# Patient Record
Sex: MALE | Race: WHITE | Employment: FULL TIME | URBAN - METROPOLITAN AREA
[De-identification: names, ages, dates, MRNs, and addresses within clinical notes are randomized per-mention and may not be internally consistent; named-entity substitution may affect disease eponyms.]

---

## 2017-01-17 ENCOUNTER — OFFICE VISIT (OUTPATIENT)
Dept: INTERNAL MEDICINE CLINIC | Facility: CLINIC | Age: 67
End: 2017-01-17

## 2017-01-17 VITALS
WEIGHT: 173 LBS | HEIGHT: 72 IN | SYSTOLIC BLOOD PRESSURE: 123 MMHG | BODY MASS INDEX: 23.43 KG/M2 | TEMPERATURE: 98 F | DIASTOLIC BLOOD PRESSURE: 77 MMHG | HEART RATE: 71 BPM

## 2017-01-17 DIAGNOSIS — J45.50 ASTHMATIC BRONCHITIS, SEVERE PERSISTENT, UNCOMPLICATED: ICD-10-CM

## 2017-01-17 DIAGNOSIS — IMO0001 COLD: Primary | ICD-10-CM

## 2017-01-17 PROBLEM — J45.909 ASTHMATIC BRONCHITIS: Status: ACTIVE | Noted: 2017-01-17

## 2017-01-17 PROBLEM — J45.909 ASTHMATIC BRONCHITIS (HCC): Status: ACTIVE | Noted: 2017-01-17

## 2017-01-17 PROCEDURE — 99213 OFFICE O/P EST LOW 20 MIN: CPT | Performed by: INTERNAL MEDICINE

## 2017-01-17 PROCEDURE — 99212 OFFICE O/P EST SF 10 MIN: CPT | Performed by: INTERNAL MEDICINE

## 2017-01-17 RX ORDER — PREDNISONE 10 MG/1
TABLET ORAL
Qty: 20 TABLET | Refills: 0 | Status: SHIPPED | OUTPATIENT
Start: 2017-01-17 | End: 2017-01-23

## 2017-01-17 RX ORDER — ALBUTEROL SULFATE 90 UG/1
2 AEROSOL, METERED RESPIRATORY (INHALATION) EVERY 4 HOURS PRN
Qty: 1 INHALER | Refills: 6 | Status: SHIPPED | OUTPATIENT
Start: 2017-01-17 | End: 2018-10-20

## 2017-01-17 NOTE — PROGRESS NOTES
Still coughing did 2 rounds of bactrim  Blood pressure 123/77, pulse 71, temperature 97.9 °F (36.6 °C), temperature source Oral, height 6' (1.829 m), weight 173 lb (78.472 kg).     HEENT-NC/AT PEERLA, eom intact, sclerae non icteric, oral exam wnl, ears nl

## 2017-01-19 ENCOUNTER — OFFICE VISIT (OUTPATIENT)
Dept: DERMATOLOGY CLINIC | Facility: CLINIC | Age: 67
End: 2017-01-19

## 2017-01-19 DIAGNOSIS — D23.60 BENIGN NEOPLASM OF SKIN OF UPPER LIMB, INCLUDING SHOULDER, UNSPECIFIED LATERALITY: ICD-10-CM

## 2017-01-19 DIAGNOSIS — D23.30 BENIGN NEOPLASM OF SKIN OF FACE: ICD-10-CM

## 2017-01-19 DIAGNOSIS — D23.4 BENIGN NEOPLASM OF SCALP AND SKIN OF NECK: ICD-10-CM

## 2017-01-19 DIAGNOSIS — D48.5 NEOPLASM OF UNCERTAIN BEHAVIOR OF SKIN: ICD-10-CM

## 2017-01-19 DIAGNOSIS — Z85.820 PERSONAL HISTORY OF MALIGNANT MELANOMA OF SKIN: Primary | ICD-10-CM

## 2017-01-19 DIAGNOSIS — D23.5 BENIGN NEOPLASM OF SKIN OF TRUNK, EXCEPT SCROTUM: ICD-10-CM

## 2017-01-19 DIAGNOSIS — D23.70 BENIGN NEOPLASM OF SKIN OF LOWER LIMB, INCLUDING HIP, UNSPECIFIED LATERALITY: ICD-10-CM

## 2017-01-19 DIAGNOSIS — L81.4 SOLAR LENTIGO: ICD-10-CM

## 2017-01-19 DIAGNOSIS — L82.1 SEBORRHEIC KERATOSES: ICD-10-CM

## 2017-01-19 PROCEDURE — 11100 BIOPSY OF SKIN LESION: CPT | Performed by: DERMATOLOGY

## 2017-01-19 PROCEDURE — 99214 OFFICE O/P EST MOD 30 MIN: CPT | Performed by: DERMATOLOGY

## 2017-01-19 NOTE — PROGRESS NOTES
Past Medical History   Diagnosis Date   • High cholesterol    • Melanoma ([facility] [address] 8/2016     left arm, stage I         Past Surgical History    REPAIR OF NASAL SEPTUM  1985    FOOT SURGERY Left 2006    Comment Nerve procedure    SKIN SURGERY Left 10/07/16

## 2017-01-19 NOTE — PROGRESS NOTES
HPI:     Chief Complaint     Melanoma        HPI     Melanoma    Additional comments: pt here for 4 mos f/u full body exam ( left arm 2016)       Last edited by Deann Prince RN on 1/19/2017  3:04 PM. (History)         of note the patient's melanoma was 0. Social History Main Topics   Smoking status: Current Every Day Smoker  0.50 Packs/Day  For 42.00 Years     Types: Cigarettes    Last Attempt to Quit: 08/12/2015    Smokeless tobacco: Not on file    Alcohol Use: Yes  0.0 oz/week    0 Standard drinks or months. Neoplasm of uncertain behavior of skin-rule out basal cell carcinoma versus inflamed nevus nose– Shave biopsy is performed- See the operative note. Postop instructions given. Further plan pending pathology. .  Seborrheic keratoses-reassurance–no

## 2017-01-19 NOTE — PROCEDURES
Procedural Report for Shave Biopsy    Procedure: With the patient is a supine position, the skin was scrubbed with alcohol. Anesthesia was obtained by injecting 1 mL of 1% Xylocaine with Epinephrine.   The skin surrounding the lession was placed under t

## 2017-01-23 ENCOUNTER — OFFICE VISIT (OUTPATIENT)
Dept: SURGERY | Facility: CLINIC | Age: 67
End: 2017-01-23

## 2017-01-23 ENCOUNTER — TELEPHONE (OUTPATIENT)
Dept: SURGERY | Facility: CLINIC | Age: 67
End: 2017-01-23

## 2017-01-23 DIAGNOSIS — C44.311 BASAL CELL CARCINOMA OF SKIN OF NOSE: ICD-10-CM

## 2017-01-23 DIAGNOSIS — M67.441 GANGLION OF RIGHT HAND: Primary | ICD-10-CM

## 2017-01-23 DIAGNOSIS — M67.441 GANGLION, RIGHT HAND: Primary | ICD-10-CM

## 2017-01-23 PROCEDURE — 99212 OFFICE O/P EST SF 10 MIN: CPT | Performed by: PLASTIC SURGERY

## 2017-01-23 PROCEDURE — 99203 OFFICE O/P NEW LOW 30 MIN: CPT | Performed by: PLASTIC SURGERY

## 2017-01-23 RX ORDER — HYDROCODONE BITARTRATE AND ACETAMINOPHEN 7.5; 325 MG/1; MG/1
TABLET ORAL
Qty: 25 TABLET | Refills: 0 | Status: SHIPPED | OUTPATIENT
Start: 2017-01-23 | End: 2017-03-09

## 2017-01-23 NOTE — TELEPHONE ENCOUNTER
Surgery is scheduled for 2/17/17  Insurance is [facility] PPO  Thank You!

## 2017-01-23 NOTE — H&P
Sharon Gomez is a 78year old male that presents with Patient presents with:  Ganglion: Right Thumb  .     REFERRED BY:  Robert Santana    Pacemaker: No  Latex Allergy: no  Coumadin: No  Plavix: No  Other anticoagulants: No  Cardiac stents: No    HAND cell carcinoma 2017     left nose         Past Surgical History    REPAIR OF NASAL SEPTUM  1985    FOOT SURGERY Left 2006    Comment Nerve procedure    SKIN SURGERY Left 10/07/16    Comment left upper arm melanoma       Social History   Marital Status: Sin This ganglion/mass should be excised. I discussed the procedure at length, including post-operative course and risks as indicated on the Surgical Request Form. Therapy will be necessary post-operatively. The mass could recur (1-5%).    A flap will be

## 2017-01-24 ENCOUNTER — TELEPHONE (OUTPATIENT)
Dept: DERMATOLOGY CLINIC | Facility: CLINIC | Age: 67
End: 2017-01-24

## 2017-01-24 NOTE — TELEPHONE ENCOUNTER
Patient notified of recent biopsy from the left nose which revealed a nodular type basal cell carcinoma. In light of location, patient is referred to Dr. Bridgett Bernal for ultimate excision.   Of note patient just saw Dr. Bridgett Bernal last week for consultation

## 2017-01-26 ENCOUNTER — OFFICE VISIT (OUTPATIENT)
Dept: SURGERY | Facility: CLINIC | Age: 67
End: 2017-01-26

## 2017-01-26 DIAGNOSIS — M67.441 GANGLION OF RIGHT HAND: Primary | ICD-10-CM

## 2017-01-26 DIAGNOSIS — C44.311 BASAL CELL CARCINOMA OF NOSE: ICD-10-CM

## 2017-01-26 PROCEDURE — 99212 OFFICE O/P EST SF 10 MIN: CPT | Performed by: PLASTIC SURGERY

## 2017-01-26 PROCEDURE — 99213 OFFICE O/P EST LOW 20 MIN: CPT | Performed by: PLASTIC SURGERY

## 2017-01-26 NOTE — TELEPHONE ENCOUNTER
Patient is previously scheduled for Excision of ganglion with flap of right thumb on 2-17-17. Added to this procedure is:    Wide excision lesion Left nose, frozen section, full thickness skin great from left preauricular (CPT 77376, 65211)  Basal cell car

## 2017-01-26 NOTE — H&P
Kaylee Blancas is a 78year old male that presents with Patient presents with:  Lesion: Left nose  .     REFERRED BY:  Moi Zhao    Pacemaker: No  Latex Allergy: no  Coumadin: No  Plavix: No  Other anticoagulants: No  Cardiac stents: No    HAND RODGER cell carcinoma 2017     left nose         Past Surgical History    REPAIR OF NASAL SEPTUM  1985    FOOT SURGERY Left 2006    Comment Nerve procedure    SKIN SURGERY Left 10/07/16    Comment left upper arm melanoma       Social History   Marital Status: Sin to the patient the nature of this lesion / these lesions, as well as treatment options. WIDE EXCISION: treatment requires wide excision. A scar will result, which will be permanent.   The scar will be considerably longer than the size of the lesion, as

## 2017-02-02 PROBLEM — C44.311 BASAL CELL CARCINOMA OF NOSE: Status: ACTIVE | Noted: 2017-02-02

## 2017-02-03 ENCOUNTER — TELEPHONE (OUTPATIENT)
Dept: INTERNAL MEDICINE CLINIC | Facility: CLINIC | Age: 67
End: 2017-02-03

## 2017-02-03 RX ORDER — LEVOFLOXACIN 500 MG/1
500 TABLET, FILM COATED ORAL DAILY
Qty: 10 TABLET | Refills: 0 | Status: SHIPPED | OUTPATIENT
Start: 2017-02-03 | End: 2017-02-13

## 2017-02-03 NOTE — TELEPHONE ENCOUNTER
Reason for Call/Chief Complaint:  Sinus / bronchitis   Onset:  6 weeks   Nursing Assessment/Associated Symptoms:  Pt says he has been to see RF a couple of times in the last 6 weeks for sinus issues. Pt complains of sinus drainage, headache.   Pt says costa

## 2017-02-03 NOTE — TELEPHONE ENCOUNTER
Pt said still has sinus, bronchitis issues  Declined appt-asking if can get rx-already saw Dr Joseph Dowd    [facility]

## 2017-02-03 NOTE — TELEPHONE ENCOUNTER
Pt notified of RF message below, he verbalized understanding.      Tell pt a prescription for adifferent antibiotic was sent to his pharmacy

## 2017-02-03 NOTE — TELEPHONE ENCOUNTER
See message/advise, unable to reach to further Triage ,[address] [place] [address] 1/17/17 Dx Cough/Cold,Asthma  LMTCB, please transfer to [id number].

## 2017-02-06 NOTE — TELEPHONE ENCOUNTER
[place] a message [place] [facility] [place], to check on authorization of surgery 2/17/19 and to make sure they are aware initially it was Excision of ganglion with flap of RTH, but now also includes Wide excision lesion left nose, frozen section, Full th

## 2017-02-06 NOTE — TELEPHONE ENCOUNTER
Called [employer]. Patient has active coverage under a PPO policy. All codes checked - [phone] [id number], [phone], 1810 .[facility] [address],[facility], [place] [address], [id number]. No prior auth or pre-determination required for any of the procedures listed.

## 2017-02-14 ENCOUNTER — TELEPHONE (OUTPATIENT)
Dept: SURGERY | Facility: CLINIC | Age: 67
End: 2017-02-14

## 2017-02-14 NOTE — TELEPHONE ENCOUNTER
Pt called stating he was planning on quitting smoking 2/17/17 for surgery that day and post operatively. Could he apply a nicoderm patch in the am before surgery 2/17/17 or would it be better to wait till after the surgery?   Pt told I would ask Dr Layne Hdz

## 2017-02-15 ENCOUNTER — TELEPHONE (OUTPATIENT)
Dept: SURGERY | Facility: CLINIC | Age: 67
End: 2017-02-15

## 2017-02-15 NOTE — TELEPHONE ENCOUNTER
Pt called and told him per Dr Dean Core he was to apply nicotine patch Saturday 2/18/17, the day after surgery. Verbalized understanding. Instructed to call the office with any further questions or concerns. Dr Santosh Kimble notified.

## 2017-02-16 RX ORDER — ROSUVASTATIN CALCIUM 10 MG/1
TABLET, COATED ORAL
Qty: 90 TABLET | Refills: 0 | Status: SHIPPED | OUTPATIENT
Start: 2017-02-16 | End: 2017-05-16

## 2017-02-16 NOTE — TELEPHONE ENCOUNTER
Cholesterol Medications  Protocol Criteria:  · Appointment scheduled in the past 12 months or in the next 3 months  · ALT & LDL on file in the past 12 months  · ALT result < 80  · LDL result <130   Recent Visits       Provider Department Primary Dx    1 mo Results  Component Value Date   ALT 24 09/30/2016   Refilled per office protocol.

## 2017-02-17 ENCOUNTER — ANESTHESIA (OUTPATIENT)
Dept: SURGERY | Facility: HOSPITAL | Age: 67
End: 2017-02-17
Payer: COMMERCIAL

## 2017-02-17 ENCOUNTER — HOSPITAL ENCOUNTER (OUTPATIENT)
Facility: HOSPITAL | Age: 67
Setting detail: HOSPITAL OUTPATIENT SURGERY
Discharge: HOME | End: 2017-02-17
Attending: PLASTIC SURGERY | Admitting: PLASTIC SURGERY
Payer: COMMERCIAL

## 2017-02-17 ENCOUNTER — ANESTHESIA EVENT (OUTPATIENT)
Dept: SURGERY | Facility: HOSPITAL | Age: 67
End: 2017-02-17
Payer: COMMERCIAL

## 2017-02-17 ENCOUNTER — SURGERY (OUTPATIENT)
Age: 67
End: 2017-02-17

## 2017-02-17 ENCOUNTER — HOSPITAL DOCUMENTATION (OUTPATIENT)
Dept: SURGERY | Facility: CLINIC | Age: 67
End: 2017-02-17

## 2017-02-17 VITALS
HEIGHT: 72 IN | BODY MASS INDEX: 23.03 KG/M2 | SYSTOLIC BLOOD PRESSURE: 109 MMHG | TEMPERATURE: 98 F | WEIGHT: 170 LBS | RESPIRATION RATE: 16 BRPM | HEART RATE: 93 BPM | OXYGEN SATURATION: 97 % | DIASTOLIC BLOOD PRESSURE: 65 MMHG

## 2017-02-17 DIAGNOSIS — M67.441 GANGLION OF RIGHT HAND: Primary | ICD-10-CM

## 2017-02-17 DIAGNOSIS — M67.441 GANGLION OF RIGHT HAND: ICD-10-CM

## 2017-02-17 DIAGNOSIS — C44.311 BASAL CELL CARCINOMA OF NOSE: ICD-10-CM

## 2017-02-17 DIAGNOSIS — C44.311 BASAL CELL CARCINOMA OF NOSE: Primary | ICD-10-CM

## 2017-02-17 PROCEDURE — 14040 TIS TRNFR F/C/C/M/N/A/G/H/F: CPT | Performed by: PLASTIC SURGERY

## 2017-02-17 PROCEDURE — 0LB70ZZ EXCISION OF RIGHT HAND TENDON, OPEN APPROACH: ICD-10-PCS | Performed by: PLASTIC SURGERY

## 2017-02-17 PROCEDURE — 0JRJ07Z REPLACEMENT OF RIGHT HAND SUBCUTANEOUS TISSUE AND FASCIA WITH AUTOLOGOUS TISSUE SUBSTITUTE, OPEN APPROACH: ICD-10-PCS | Performed by: PLASTIC SURGERY

## 2017-02-17 PROCEDURE — 0HX1XZZ TRANSFER FACE SKIN, EXTERNAL APPROACH: ICD-10-PCS | Performed by: PLASTIC SURGERY

## 2017-02-17 PROCEDURE — 14060 TIS TRNFR E/N/E/L 10 SQ CM/<: CPT | Performed by: PLASTIC SURGERY

## 2017-02-17 PROCEDURE — 0HB1XZZ EXCISION OF FACE SKIN, EXTERNAL APPROACH: ICD-10-PCS | Performed by: PLASTIC SURGERY

## 2017-02-17 PROCEDURE — 26160 REMOVE TENDON SHEATH LESION: CPT | Performed by: PLASTIC SURGERY

## 2017-02-17 RX ORDER — KETOROLAC TROMETHAMINE 30 MG/ML
INJECTION, SOLUTION INTRAMUSCULAR; INTRAVENOUS AS NEEDED
Status: DISCONTINUED | OUTPATIENT
Start: 2017-02-17 | End: 2017-02-17 | Stop reason: SURG

## 2017-02-17 RX ORDER — HYDROCODONE BITARTRATE AND ACETAMINOPHEN 7.5; 325 MG/1; MG/1
1 TABLET ORAL EVERY 4 HOURS PRN
Status: DISCONTINUED | OUTPATIENT
Start: 2017-02-17 | End: 2017-02-17

## 2017-02-17 RX ORDER — MORPHINE SULFATE 10 MG/ML
6 INJECTION, SOLUTION INTRAMUSCULAR; INTRAVENOUS EVERY 10 MIN PRN
Status: DISCONTINUED | OUTPATIENT
Start: 2017-02-17 | End: 2017-02-17

## 2017-02-17 RX ORDER — FAMOTIDINE 20 MG/1
20 TABLET ORAL ONCE
Status: DISCONTINUED | OUTPATIENT
Start: 2017-02-17 | End: 2017-02-17 | Stop reason: HOSPADM

## 2017-02-17 RX ORDER — NALOXONE HYDROCHLORIDE 0.4 MG/ML
80 INJECTION, SOLUTION INTRAMUSCULAR; INTRAVENOUS; SUBCUTANEOUS AS NEEDED
Status: DISCONTINUED | OUTPATIENT
Start: 2017-02-17 | End: 2017-02-17

## 2017-02-17 RX ORDER — SODIUM CHLORIDE, SODIUM LACTATE, POTASSIUM CHLORIDE, CALCIUM CHLORIDE 600; 310; 30; 20 MG/100ML; MG/100ML; MG/100ML; MG/100ML
INJECTION, SOLUTION INTRAVENOUS CONTINUOUS
Status: DISCONTINUED | OUTPATIENT
Start: 2017-02-17 | End: 2017-02-17

## 2017-02-17 RX ORDER — HYDROMORPHONE HYDROCHLORIDE 1 MG/ML
0.6 INJECTION, SOLUTION INTRAMUSCULAR; INTRAVENOUS; SUBCUTANEOUS EVERY 5 MIN PRN
Status: DISCONTINUED | OUTPATIENT
Start: 2017-02-17 | End: 2017-02-17

## 2017-02-17 RX ORDER — ACETAMINOPHEN 325 MG/1
650 TABLET ORAL ONCE
Status: COMPLETED | OUTPATIENT
Start: 2017-02-17 | End: 2017-02-17

## 2017-02-17 RX ORDER — EPHEDRINE SULFATE 50 MG/ML
INJECTION, SOLUTION INTRAVENOUS AS NEEDED
Status: DISCONTINUED | OUTPATIENT
Start: 2017-02-17 | End: 2017-02-17 | Stop reason: SURG

## 2017-02-17 RX ORDER — ONDANSETRON 2 MG/ML
INJECTION INTRAMUSCULAR; INTRAVENOUS AS NEEDED
Status: DISCONTINUED | OUTPATIENT
Start: 2017-02-17 | End: 2017-02-17 | Stop reason: SURG

## 2017-02-17 RX ORDER — HYDROMORPHONE HYDROCHLORIDE 1 MG/ML
0.4 INJECTION, SOLUTION INTRAMUSCULAR; INTRAVENOUS; SUBCUTANEOUS EVERY 5 MIN PRN
Status: DISCONTINUED | OUTPATIENT
Start: 2017-02-17 | End: 2017-02-17

## 2017-02-17 RX ORDER — LIDOCAINE HYDROCHLORIDE 10 MG/ML
INJECTION, SOLUTION EPIDURAL; INFILTRATION; INTRACAUDAL; PERINEURAL AS NEEDED
Status: DISCONTINUED | OUTPATIENT
Start: 2017-02-17 | End: 2017-02-17 | Stop reason: SURG

## 2017-02-17 RX ORDER — PHENYLEPHRINE HCL 10 MG/ML
VIAL (ML) INJECTION AS NEEDED
Status: DISCONTINUED | OUTPATIENT
Start: 2017-02-17 | End: 2017-02-17 | Stop reason: SURG

## 2017-02-17 RX ORDER — CLINDAMYCIN PHOSPHATE 900 MG/50ML
900 INJECTION INTRAVENOUS ONCE
Status: DISCONTINUED | OUTPATIENT
Start: 2017-02-17 | End: 2017-02-17 | Stop reason: HOSPADM

## 2017-02-17 RX ORDER — MORPHINE SULFATE 4 MG/ML
4 INJECTION, SOLUTION INTRAMUSCULAR; INTRAVENOUS EVERY 10 MIN PRN
Status: DISCONTINUED | OUTPATIENT
Start: 2017-02-17 | End: 2017-02-17

## 2017-02-17 RX ORDER — SODIUM CHLORIDE, SODIUM LACTATE, POTASSIUM CHLORIDE, CALCIUM CHLORIDE 600; 310; 30; 20 MG/100ML; MG/100ML; MG/100ML; MG/100ML
INJECTION, SOLUTION INTRAVENOUS CONTINUOUS PRN
Status: DISCONTINUED | OUTPATIENT
Start: 2017-02-17 | End: 2017-02-17 | Stop reason: SURG

## 2017-02-17 RX ORDER — HYDROMORPHONE HYDROCHLORIDE 1 MG/ML
0.2 INJECTION, SOLUTION INTRAMUSCULAR; INTRAVENOUS; SUBCUTANEOUS EVERY 5 MIN PRN
Status: DISCONTINUED | OUTPATIENT
Start: 2017-02-17 | End: 2017-02-17

## 2017-02-17 RX ORDER — DEXAMETHASONE SODIUM PHOSPHATE 4 MG/ML
VIAL (ML) INJECTION AS NEEDED
Status: DISCONTINUED | OUTPATIENT
Start: 2017-02-17 | End: 2017-02-17 | Stop reason: SURG

## 2017-02-17 RX ORDER — MIDAZOLAM HYDROCHLORIDE 1 MG/ML
INJECTION INTRAMUSCULAR; INTRAVENOUS AS NEEDED
Status: DISCONTINUED | OUTPATIENT
Start: 2017-02-17 | End: 2017-02-17 | Stop reason: SURG

## 2017-02-17 RX ORDER — HYDROCODONE BITARTRATE AND ACETAMINOPHEN 5; 325 MG/1; MG/1
1 TABLET ORAL AS NEEDED
Status: DISCONTINUED | OUTPATIENT
Start: 2017-02-17 | End: 2017-02-17

## 2017-02-17 RX ORDER — ONDANSETRON 2 MG/ML
4 INJECTION INTRAMUSCULAR; INTRAVENOUS ONCE AS NEEDED
Status: DISCONTINUED | OUTPATIENT
Start: 2017-02-17 | End: 2017-02-17

## 2017-02-17 RX ORDER — LIDOCAINE HYDROCHLORIDE AND EPINEPHRINE 5; 5 MG/ML; UG/ML
INJECTION, SOLUTION INFILTRATION; PERINEURAL AS NEEDED
Status: DISCONTINUED | OUTPATIENT
Start: 2017-02-17 | End: 2017-02-17 | Stop reason: HOSPADM

## 2017-02-17 RX ORDER — CLINDAMYCIN PHOSPHATE 150 MG/ML
INJECTION, SOLUTION INTRAVENOUS AS NEEDED
Status: DISCONTINUED | OUTPATIENT
Start: 2017-02-17 | End: 2017-02-17 | Stop reason: SURG

## 2017-02-17 RX ORDER — GLYCOPYRROLATE 0.2 MG/ML
INJECTION INTRAMUSCULAR; INTRAVENOUS AS NEEDED
Status: DISCONTINUED | OUTPATIENT
Start: 2017-02-17 | End: 2017-02-17 | Stop reason: SURG

## 2017-02-17 RX ORDER — MORPHINE SULFATE 2 MG/ML
2 INJECTION, SOLUTION INTRAMUSCULAR; INTRAVENOUS EVERY 10 MIN PRN
Status: DISCONTINUED | OUTPATIENT
Start: 2017-02-17 | End: 2017-02-17

## 2017-02-17 RX ORDER — METOCLOPRAMIDE 10 MG/1
10 TABLET ORAL ONCE
Status: DISCONTINUED | OUTPATIENT
Start: 2017-02-17 | End: 2017-02-17 | Stop reason: HOSPADM

## 2017-02-17 RX ORDER — HYDROCODONE BITARTRATE AND ACETAMINOPHEN 5; 325 MG/1; MG/1
2 TABLET ORAL AS NEEDED
Status: DISCONTINUED | OUTPATIENT
Start: 2017-02-17 | End: 2017-02-17

## 2017-02-17 RX ADMIN — CLINDAMYCIN PHOSPHATE 900 MG: 150 INJECTION, SOLUTION INTRAVENOUS at 10:58:00

## 2017-02-17 RX ADMIN — DEXAMETHASONE SODIUM PHOSPHATE 4 MG: 4 MG/ML VIAL (ML) INJECTION at 10:39:00

## 2017-02-17 RX ADMIN — KETOROLAC TROMETHAMINE 30 MG: 30 INJECTION, SOLUTION INTRAMUSCULAR; INTRAVENOUS at 12:27:00

## 2017-02-17 RX ADMIN — EPHEDRINE SULFATE 10 MG: 50 INJECTION, SOLUTION INTRAVENOUS at 11:00:00

## 2017-02-17 RX ADMIN — MIDAZOLAM HYDROCHLORIDE 2 MG: 1 INJECTION INTRAMUSCULAR; INTRAVENOUS at 10:37:00

## 2017-02-17 RX ADMIN — EPHEDRINE SULFATE 10 MG: 50 INJECTION, SOLUTION INTRAVENOUS at 10:57:00

## 2017-02-17 RX ADMIN — PHENYLEPHRINE HCL 100 MCG: 10 MG/ML VIAL (ML) INJECTION at 11:15:00

## 2017-02-17 RX ADMIN — EPHEDRINE SULFATE 10 MG: 50 INJECTION, SOLUTION INTRAVENOUS at 11:25:00

## 2017-02-17 RX ADMIN — EPHEDRINE SULFATE 10 MG: 50 INJECTION, SOLUTION INTRAVENOUS at 10:55:00

## 2017-02-17 RX ADMIN — PHENYLEPHRINE HCL 100 MCG: 10 MG/ML VIAL (ML) INJECTION at 11:25:00

## 2017-02-17 RX ADMIN — LIDOCAINE HYDROCHLORIDE 50 MG: 10 INJECTION, SOLUTION EPIDURAL; INFILTRATION; INTRACAUDAL; PERINEURAL at 10:39:00

## 2017-02-17 RX ADMIN — GLYCOPYRROLATE 0.2 MG: 0.2 INJECTION INTRAMUSCULAR; INTRAVENOUS at 10:39:00

## 2017-02-17 RX ADMIN — SODIUM CHLORIDE, SODIUM LACTATE, POTASSIUM CHLORIDE, CALCIUM CHLORIDE: 600; 310; 30; 20 INJECTION, SOLUTION INTRAVENOUS at 10:37:00

## 2017-02-17 RX ADMIN — PHENYLEPHRINE HCL 100 MCG: 10 MG/ML VIAL (ML) INJECTION at 11:00:00

## 2017-02-17 RX ADMIN — ONDANSETRON 4 MG: 2 INJECTION INTRAMUSCULAR; INTRAVENOUS at 12:10:00

## 2017-02-17 RX ADMIN — SODIUM CHLORIDE, SODIUM LACTATE, POTASSIUM CHLORIDE, CALCIUM CHLORIDE: 600; 310; 30; 20 INJECTION, SOLUTION INTRAVENOUS at 11:40:00

## 2017-02-17 NOTE — BRIEF OP NOTE
[facility] OPERATING ROOM  Brief Op Note     Anisha Coreas Location: OR   [facility] [id number] MRN [id number]   Admission Date 2/17/2017 Operation Date 2/17/2017   Attending Physician Abhishek Cat MD Operating Physician Lilly Hilliard,

## 2017-02-17 NOTE — INTERVAL H&P NOTE
Pre-op Diagnosis: Ganglion and basal cell carcinoma    The above referenced H&P was reviewed by Sohail Li MD on 2/17/2017, the patient was examined and no significant changes have occurred in the patient's condition since the H&P was performed

## 2017-02-17 NOTE — H&P (VIEW-ONLY)
Jonatan Velazquez is a 78year old male that presents with Patient presents with:  Lesion: Left nose  .     REFERRED BY:  Marce Harley    Pacemaker: No  Latex Allergy: no  Coumadin: No  Plavix: No  Other anticoagulants: No  Cardiac stents: No    HAND RODGER cell carcinoma 2017     left nose         Past Surgical History    REPAIR OF NASAL SEPTUM  1985    FOOT SURGERY Left 2006    Comment Nerve procedure    SKIN SURGERY Left 10/07/16    Comment left upper arm melanoma       Social History   Marital Status: Sin to the patient the nature of this lesion / these lesions, as well as treatment options. WIDE EXCISION: treatment requires wide excision. A scar will result, which will be permanent.   The scar will be considerably longer than the size of the lesion, as

## 2017-02-17 NOTE — ANESTHESIA POSTPROCEDURE EVALUATION
Patient: Amanda Ernandez    Procedure Summary     Date Anesthesia Start Anesthesia Stop Room / Location    02/17/17 1037  [facility] MAIN OR 01 / 300 [facility] MAIN OR       Procedure Diagnosis Surgeon Responsible Provider    HAND GANGLION EXCISION (Right ); SKIN GRAFT

## 2017-02-17 NOTE — ANESTHESIA PREPROCEDURE EVALUATION
Anesthesia PreOp Note    HPI:     Estrellita Kimble is a 78year old male who presents for preoperative consultation requested by: Lorena Saleh MD    Date of Surgery: 2/17/2017    Procedure(s):  HAND GANGLION EXCISION  SKIN GRAFT  EXCISION AVIVA 25 tablet Rfl: 0 Not Taking   Albuterol Sulfate HFA (PROAIR HFA) 108 (90 BASE) MCG/ACT Inhalation Aero Soln Inhale 2 puffs into the lungs every 4 (four) hours as needed for Wheezing.  Disp: 1 Inhaler Rfl: 6 2/17/2017 at 0700       Current Facility-Administe 02/13/17  0935 02/17/17  0835   BP:  129/78   Pulse:  91   Temp:  97.7 °F (36.5 °C)   TempSrc:  Oral   Resp:  16   Height: 1.829 m (6') 1.829 m (6')   Weight: 78.019 kg (172 lb) 77.111 kg (170 lb)   SpO2:  98%        Anesthesia ROS/Med Hx and Physical Exam

## 2017-02-18 NOTE — OPERATIVE REPORT
[facility]    PATIENT'S NAME: Minerva Manish   ATTENDING PHYSICIAN: Kristofer Matthews MD   OPERATING PHYSICIAN: Kristofer Matthews MD   PATIENT ACCOUNT#:   [id number]    LOCATION:  [place] [address]  MEDICAL RECORD #:   [id number] tumor. We felt there was enough skin laxity laterally that we could avoid performing a skin graft and instead perform a local flap. We incised a V-Y subcutaneous triangular advancement flap. Hemostasis was achieved.   After appropriate undermining, it

## 2017-02-21 ENCOUNTER — NURSE ONLY (OUTPATIENT)
Dept: SURGERY | Facility: CLINIC | Age: 67
End: 2017-02-21

## 2017-02-21 DIAGNOSIS — Z48.02 ENCOUNTER FOR REMOVAL OF SUTURES: Primary | ICD-10-CM

## 2017-02-21 DIAGNOSIS — C44.311 BASAL CELL CARCINOMA OF NOSE: ICD-10-CM

## 2017-02-21 DIAGNOSIS — M67.441 GANGLION OF RIGHT HAND: ICD-10-CM

## 2017-02-21 PROCEDURE — 99211 OFF/OP EST MAY X REQ PHY/QHP: CPT | Performed by: PLASTIC SURGERY

## 2017-02-21 NOTE — PROGRESS NOTES
Surgery 1: Nose BCC / VY FLAP; r tH GANGLION / flap  - Date: 02/17/17  - Days Since: 4  Pt in the office today for Nurse only visit for suture removal of nose only. Pt identified by 2 identifiers and orders checked.   Arrived with R arm in a sling and elev

## 2017-02-28 ENCOUNTER — OFFICE VISIT (OUTPATIENT)
Dept: SURGERY | Facility: CLINIC | Age: 67
End: 2017-02-28

## 2017-02-28 DIAGNOSIS — M25.641 JOINT STIFFNESS OF HAND, RIGHT: Primary | ICD-10-CM

## 2017-02-28 DIAGNOSIS — M62.81 DISTAL MUSCLE WEAKNESS: ICD-10-CM

## 2017-02-28 PROCEDURE — 97165 OT EVAL LOW COMPLEX 30 MIN: CPT | Performed by: OCCUPATIONAL THERAPIST

## 2017-02-28 NOTE — PROGRESS NOTES
OCCUPATIONAL THERAPY EVALUATION:   Krista Kearns   [id number]       SUBJECTIVE:    HX of Injury: Right thumb mass  Chief Complaint:   None. Precautions: Minimal use of the right hand.   Premorbid Functional Status: Independent w/ Occ. duties, Feroz Cease evaluation and agrees to the plan of care. Brianna Adams, OTRL       I have reviewed the treatment plan and concur.    Sánchez Stevenson MD

## 2017-03-02 ENCOUNTER — OFFICE VISIT (OUTPATIENT)
Dept: SURGERY | Facility: CLINIC | Age: 67
End: 2017-03-02

## 2017-03-02 ENCOUNTER — NURSE ONLY (OUTPATIENT)
Dept: SURGERY | Facility: CLINIC | Age: 67
End: 2017-03-02

## 2017-03-02 DIAGNOSIS — M25.641 JOINT STIFFNESS OF HAND, RIGHT: Primary | ICD-10-CM

## 2017-03-02 DIAGNOSIS — M62.81 DISTAL MUSCLE WEAKNESS: ICD-10-CM

## 2017-03-02 DIAGNOSIS — C44.311 BASAL CELL CARCINOMA OF NOSE: ICD-10-CM

## 2017-03-02 DIAGNOSIS — M67.441 GANGLION OF RIGHT HAND: ICD-10-CM

## 2017-03-02 DIAGNOSIS — Z48.02 ENCOUNTER FOR REMOVAL OF SUTURES: Primary | ICD-10-CM

## 2017-03-02 PROCEDURE — 99211 OFF/OP EST MAY X REQ PHY/QHP: CPT | Performed by: PLASTIC SURGERY

## 2017-03-02 PROCEDURE — 97110 THERAPEUTIC EXERCISES: CPT | Performed by: OCCUPATIONAL THERAPIST

## 2017-03-02 PROCEDURE — 99070 SPECIAL SUPPLIES PHYS/QHP: CPT | Performed by: OCCUPATIONAL THERAPIST

## 2017-03-02 NOTE — PROGRESS NOTES
Subjective: Doing fine. Objective:     Current level of performance:  ADL: Independent with all daily care tasks. Work: Able to perform work related tasks in full. Leisure: Not addressed.     Measurements/Tests:  ROM:      Full right thumb range of m

## 2017-03-02 NOTE — PROGRESS NOTES
Sutures out, flaps healing well. No scabbing, no bleeding, no drainage, no edema, no c/o pain. All questions satisfactorily answered. Pt to OT for exercise and local care instructions. Pt will call the office with any other concerns.   Dr. Zaki Osei no

## 2017-03-09 ENCOUNTER — HOSPITAL ENCOUNTER (OUTPATIENT)
Dept: GENERAL RADIOLOGY | Facility: HOSPITAL | Age: 67
Discharge: HOME | End: 2017-03-09
Attending: INTERNAL MEDICINE
Payer: COMMERCIAL

## 2017-03-09 ENCOUNTER — OFFICE VISIT (OUTPATIENT)
Dept: INTERNAL MEDICINE CLINIC | Facility: CLINIC | Age: 67
End: 2017-03-09

## 2017-03-09 ENCOUNTER — LAB ENCOUNTER (OUTPATIENT)
Dept: LAB | Facility: HOSPITAL | Age: 67
End: 2017-03-09
Attending: INTERNAL MEDICINE
Payer: COMMERCIAL

## 2017-03-09 VITALS
WEIGHT: 171 LBS | DIASTOLIC BLOOD PRESSURE: 78 MMHG | BODY MASS INDEX: 23.16 KG/M2 | HEART RATE: 76 BPM | RESPIRATION RATE: 16 BRPM | TEMPERATURE: 98 F | SYSTOLIC BLOOD PRESSURE: 123 MMHG | HEIGHT: 72 IN

## 2017-03-09 DIAGNOSIS — R06.2 WHEEZING: Primary | ICD-10-CM

## 2017-03-09 DIAGNOSIS — J32.9 CHRONIC SINUSITIS, UNSPECIFIED LOCATION: ICD-10-CM

## 2017-03-09 DIAGNOSIS — R06.2 WHEEZING: ICD-10-CM

## 2017-03-09 LAB
ALBUMIN SERPL BCP-MCNC: 3.9 G/DL (ref 3.5–4.8)
ALBUMIN/GLOB SERPL: 1.4 {RATIO} (ref 1–2)
ALP SERPL-CCNC: 64 U/L (ref 32–100)
ALT SERPL-CCNC: 24 U/L (ref 17–63)
ANION GAP SERPL CALC-SCNC: 9 MMOL/L (ref 0–18)
AST SERPL-CCNC: 23 U/L (ref 15–41)
BASOPHILS # BLD: 0.1 K/UL (ref 0–0.2)
BASOPHILS NFR BLD: 1 %
BILIRUB SERPL-MCNC: 0.9 MG/DL (ref 0.3–1.2)
BUN SERPL-MCNC: 10 MG/DL (ref 8–20)
BUN/CREAT SERPL: 10.1 (ref 10–20)
CALCIUM SERPL-MCNC: 9.1 MG/DL (ref 8.5–10.5)
CHLORIDE SERPL-SCNC: 101 MMOL/L (ref 95–110)
CO2 SERPL-SCNC: 28 MMOL/L (ref 22–32)
CREAT SERPL-MCNC: 0.99 MG/DL (ref 0.5–1.5)
EOSINOPHIL # BLD: 0 K/UL (ref 0–0.7)
EOSINOPHIL NFR BLD: 1 %
ERYTHROCYTE [DISTWIDTH] IN BLOOD BY AUTOMATED COUNT: 13.3 % (ref 11–15)
GLOBULIN PLAS-MCNC: 2.8 G/DL (ref 2.5–3.7)
GLUCOSE SERPL-MCNC: 91 MG/DL (ref 70–99)
HCT VFR BLD AUTO: 46.6 % (ref 41–52)
HGB BLD-MCNC: 15.9 G/DL (ref 13.5–17.5)
LYMPHOCYTES # BLD: 1.6 K/UL (ref 1–4)
LYMPHOCYTES NFR BLD: 26 %
MCH RBC QN AUTO: 30.8 PG (ref 27–32)
MCHC RBC AUTO-ENTMCNC: 34.2 G/DL (ref 32–37)
MCV RBC AUTO: 90.2 FL (ref 80–100)
MONOCYTES # BLD: 0.8 K/UL (ref 0–1)
MONOCYTES NFR BLD: 14 %
NEUTROPHILS # BLD AUTO: 3.5 K/UL (ref 1.8–7.7)
NEUTROPHILS NFR BLD: 59 %
OSMOLALITY UR CALC.SUM OF ELEC: 285 MOSM/KG (ref 275–295)
PLATELET # BLD AUTO: 176 K/UL (ref 140–400)
PMV BLD AUTO: 7.7 FL (ref 7.4–10.3)
POTASSIUM SERPL-SCNC: 3.7 MMOL/L (ref 3.3–5.1)
PROT SERPL-MCNC: 6.7 G/DL (ref 5.9–8.4)
RBC # BLD AUTO: 5.17 M/UL (ref 4.5–5.9)
SODIUM SERPL-SCNC: 138 MMOL/L (ref 136–144)
WBC # BLD AUTO: 6 K/UL (ref 4–11)

## 2017-03-09 PROCEDURE — 85025 COMPLETE CBC W/AUTO DIFF WBC: CPT

## 2017-03-09 PROCEDURE — 71020 XR CHEST PA + LAT CHEST (CPT=71020): CPT

## 2017-03-09 PROCEDURE — 80053 COMPREHEN METABOLIC PANEL: CPT

## 2017-03-09 PROCEDURE — 36415 COLL VENOUS BLD VENIPUNCTURE: CPT

## 2017-03-09 PROCEDURE — 99213 OFFICE O/P EST LOW 20 MIN: CPT | Performed by: INTERNAL MEDICINE

## 2017-03-09 PROCEDURE — 94640 AIRWAY INHALATION TREATMENT: CPT | Performed by: INTERNAL MEDICINE

## 2017-03-09 RX ORDER — ALBUTEROL SULFATE 2.5 MG/3ML
2.5 SOLUTION RESPIRATORY (INHALATION) ONCE
Status: COMPLETED | OUTPATIENT
Start: 2017-03-09 | End: 2017-03-09

## 2017-03-09 RX ORDER — PREDNISONE 10 MG/1
TABLET ORAL
Qty: 30 TABLET | Refills: 0 | Status: SHIPPED | OUTPATIENT
Start: 2017-03-09 | End: 2017-03-30 | Stop reason: ALTCHOICE

## 2017-03-09 RX ORDER — LEVOFLOXACIN 750 MG/1
750 TABLET ORAL DAILY
Qty: 5 TABLET | Refills: 1 | Status: SHIPPED | OUTPATIENT
Start: 2017-03-09 | End: 2017-03-30 | Stop reason: ALTCHOICE

## 2017-03-09 RX ADMIN — ALBUTEROL SULFATE 2.5 MG: 2.5 SOLUTION RESPIRATORY (INHALATION) at 17:46:00

## 2017-03-10 ENCOUNTER — PATIENT MESSAGE (OUTPATIENT)
Dept: INTERNAL MEDICINE CLINIC | Facility: CLINIC | Age: 67
End: 2017-03-10

## 2017-03-13 ENCOUNTER — PATIENT MESSAGE (OUTPATIENT)
Dept: INTERNAL MEDICINE CLINIC | Facility: CLINIC | Age: 67
End: 2017-03-13

## 2017-03-13 ENCOUNTER — HOSPITAL ENCOUNTER (OUTPATIENT)
Dept: CT IMAGING | Facility: HOSPITAL | Age: 67
Discharge: HOME | End: 2017-03-13
Attending: INTERNAL MEDICINE
Payer: COMMERCIAL

## 2017-03-13 DIAGNOSIS — J32.9 CHRONIC SINUSITIS, UNSPECIFIED LOCATION: ICD-10-CM

## 2017-03-13 PROCEDURE — 70486 CT MAXILLOFACIAL W/O DYE: CPT

## 2017-03-13 NOTE — TELEPHONE ENCOUNTER
From: Krista Kearns  To: Natividad Car MD  Sent: 3/10/2017 11:49 AM CST  Subject: Test Results Question    Would the atherosclerosis found on the X-Ray be a cause for immediate concern?

## 2017-03-14 ENCOUNTER — TELEPHONE (OUTPATIENT)
Dept: INTERNAL MEDICINE CLINIC | Facility: CLINIC | Age: 67
End: 2017-03-14

## 2017-03-14 DIAGNOSIS — J32.9 CHRONIC SINUSITIS, UNSPECIFIED LOCATION: Primary | ICD-10-CM

## 2017-03-14 DIAGNOSIS — J33.9 NASAL POLYPOSIS: ICD-10-CM

## 2017-03-16 ENCOUNTER — TELEPHONE (OUTPATIENT)
Dept: INTERNAL MEDICINE CLINIC | Facility: CLINIC | Age: 67
End: 2017-03-16

## 2017-03-16 ENCOUNTER — OFFICE VISIT (OUTPATIENT)
Dept: SURGERY | Facility: CLINIC | Age: 67
End: 2017-03-16

## 2017-03-16 DIAGNOSIS — M67.441 GANGLION OF RIGHT HAND: Primary | ICD-10-CM

## 2017-03-16 DIAGNOSIS — M62.81 DISTAL MUSCLE WEAKNESS: ICD-10-CM

## 2017-03-16 DIAGNOSIS — M25.641 JOINT STIFFNESS OF HAND, RIGHT: Primary | ICD-10-CM

## 2017-03-16 DIAGNOSIS — C44.311 BASAL CELL CARCINOMA OF NOSE: ICD-10-CM

## 2017-03-16 PROCEDURE — 99212 OFFICE O/P EST SF 10 MIN: CPT | Performed by: PLASTIC SURGERY

## 2017-03-16 PROCEDURE — 97110 THERAPEUTIC EXERCISES: CPT | Performed by: OCCUPATIONAL THERAPIST

## 2017-03-16 PROCEDURE — 99024 POSTOP FOLLOW-UP VISIT: CPT | Performed by: PLASTIC SURGERY

## 2017-03-16 NOTE — PROGRESS NOTES
Surgery 1: Nose BCC / VY FLAP; r tH GANGLION / flap  - Date: 02/17/17  - Days Since: 32           Glasses do not fit well since nasal flap. Discomfort with prolonged wear. Redness to surgical side. No complaints/no pain. N fn.     FROM  S

## 2017-03-16 NOTE — PROGRESS NOTES
Subjective: Can I go to the gym      Objective:     Current level of performance:  ADL: Independent with all self care tasks  Work: Full duty.   Leisure: Exercise    Measurements/Tests:  ROM:  Testing By: khalif   Strength Right: 85 #      Strength Lef

## 2017-03-17 ENCOUNTER — TELEPHONE (OUTPATIENT)
Dept: INTERNAL MEDICINE CLINIC | Facility: CLINIC | Age: 67
End: 2017-03-17

## 2017-03-17 ENCOUNTER — OFFICE VISIT (OUTPATIENT)
Dept: OTOLARYNGOLOGY | Facility: CLINIC | Age: 67
End: 2017-03-17

## 2017-03-17 VITALS
DIASTOLIC BLOOD PRESSURE: 82 MMHG | WEIGHT: 172 LBS | TEMPERATURE: 98 F | BODY MASS INDEX: 24.08 KG/M2 | SYSTOLIC BLOOD PRESSURE: 128 MMHG | HEIGHT: 71 IN

## 2017-03-17 DIAGNOSIS — J32.9 CHRONIC SINUSITIS, UNSPECIFIED LOCATION: Primary | ICD-10-CM

## 2017-03-17 PROCEDURE — 99243 OFF/OP CNSLTJ NEW/EST LOW 30: CPT | Performed by: OTOLARYNGOLOGY

## 2017-03-17 PROCEDURE — 99212 OFFICE O/P EST SF 10 MIN: CPT | Performed by: OTOLARYNGOLOGY

## 2017-03-17 NOTE — TELEPHONE ENCOUNTER
From: Will Ying  To: Dustin Butler MD  Sent: 3/13/2017 5:56 PM CDT  Subject: Prescription Question    The Levofloxacin 750 first 5 tablets have not had much effect. Should I order the refill available?

## 2017-03-17 NOTE — TELEPHONE ENCOUNTER
Please note, Thank you. Pt states that he is in the second course of therapy and that he saw an ENT MD this morning who recommended the same about the Rx and to try using a saline nasal wash for 2-3 weeks.     Pt states that surgery is a consideration in

## 2017-03-17 NOTE — TELEPHONE ENCOUNTER
From   Yanni Childers    To   Josh Coleman MD    Sent   3/13/2017  5:56 PM         The Levofloxacin 750 first 5 tablets have not had much effect. Should I order the refill available?          LMTCB- Email sent to patient to call the office and andres

## 2017-03-20 NOTE — PROGRESS NOTES
Lesly Akins is a 78year old male.  Patient presents with:  Sinus Problem: sinus congestiom,sinus headache for 2 months, had CT scan of sinus done on 3/17, currently on antibiotic and prednisone    HPI:   He has had problems with nasal congestion and Skin Normal Inspection - Normal.   Constitutional Normal Overall appearance - Normal.   Head/Face Normal Facial features - Normal. Eyebrows - Normal. Skull - Normal.   Oral/Oropharynx Normal Lips - Normal, Tonsils - Normal, Tongue - Normal    Nasal Rivera Sonya

## 2017-03-30 ENCOUNTER — OFFICE VISIT (OUTPATIENT)
Dept: INTERNAL MEDICINE CLINIC | Facility: CLINIC | Age: 67
End: 2017-03-30

## 2017-03-30 VITALS
HEIGHT: 71 IN | DIASTOLIC BLOOD PRESSURE: 73 MMHG | SYSTOLIC BLOOD PRESSURE: 110 MMHG | WEIGHT: 173.38 LBS | RESPIRATION RATE: 18 BRPM | BODY MASS INDEX: 24.27 KG/M2 | HEART RATE: 73 BPM

## 2017-03-30 DIAGNOSIS — J32.4 CHRONIC PANSINUSITIS: Primary | ICD-10-CM

## 2017-03-30 PROCEDURE — 99212 OFFICE O/P EST SF 10 MIN: CPT | Performed by: INTERNAL MEDICINE

## 2017-03-30 PROCEDURE — 99213 OFFICE O/P EST LOW 20 MIN: CPT | Performed by: INTERNAL MEDICINE

## 2017-03-30 RX ORDER — ACETAMINOPHEN 650 MG
TABLET, EXTENDED RELEASE ORAL
Qty: 472 ML | Refills: 0 | Status: ON HOLD | OUTPATIENT
Start: 2017-03-30 | End: 2017-06-28

## 2017-03-30 NOTE — PROGRESS NOTES
See ent eval  Pt still having sxs  I reviewed the ct report with him and told him that I think that he needed surgery  Blood pressure 110/73, pulse 73, resp. rate 18, height 5' 11\" (1.803 m), weight 173 lb 6.4 oz (78.654 kg).     BROWN-NC/domenica MAE in

## 2017-04-04 ENCOUNTER — OFFICE VISIT (OUTPATIENT)
Dept: OTOLARYNGOLOGY | Facility: CLINIC | Age: 67
End: 2017-04-04

## 2017-04-04 VITALS — SYSTOLIC BLOOD PRESSURE: 114 MMHG | DIASTOLIC BLOOD PRESSURE: 72 MMHG | HEART RATE: 70 BPM

## 2017-04-04 DIAGNOSIS — J32.9 CHRONIC SINUSITIS, UNSPECIFIED LOCATION: Primary | ICD-10-CM

## 2017-04-04 PROCEDURE — 99212 OFFICE O/P EST SF 10 MIN: CPT | Performed by: OTOLARYNGOLOGY

## 2017-04-04 PROCEDURE — 99213 OFFICE O/P EST LOW 20 MIN: CPT | Performed by: OTOLARYNGOLOGY

## 2017-04-05 NOTE — PROGRESS NOTES
Gregory is a 78year old male.  Patient presents with:  Sinus Problem: f/u chronic sinusitis - pt would like to talk about surgery     HPI:   Continues to experience problems with facial pressure and congestion and some difficulty breathing ascencionu Nasal septum - Septal deviation to the l with septal perforation   Neurological Normal Memory - Normal. Cranial nerves - Cranial nerves II through XII grossly intact.    Neck Exam Normal Inspection - Normal. Palpation - Normal. Parotid gland - Normal. Thyro

## 2017-04-20 ENCOUNTER — OFFICE VISIT (OUTPATIENT)
Dept: DERMATOLOGY CLINIC | Facility: CLINIC | Age: 67
End: 2017-04-20

## 2017-04-20 DIAGNOSIS — D23.4 BENIGN NEOPLASM OF SCALP AND SKIN OF NECK: ICD-10-CM

## 2017-04-20 DIAGNOSIS — Z85.828 HISTORY OF BASAL CELL CARCINOMA: ICD-10-CM

## 2017-04-20 DIAGNOSIS — D23.60 BENIGN NEOPLASM OF SKIN OF UPPER LIMB, INCLUDING SHOULDER, UNSPECIFIED LATERALITY: ICD-10-CM

## 2017-04-20 DIAGNOSIS — D23.30 BENIGN NEOPLASM OF SKIN OF FACE: ICD-10-CM

## 2017-04-20 DIAGNOSIS — Z85.820 PERSONAL HISTORY OF MALIGNANT MELANOMA OF SKIN: Primary | ICD-10-CM

## 2017-04-20 DIAGNOSIS — D23.5 BENIGN NEOPLASM OF SKIN OF TRUNK, EXCEPT SCROTUM: ICD-10-CM

## 2017-04-20 DIAGNOSIS — D23.70 BENIGN NEOPLASM OF SKIN OF LOWER LIMB, INCLUDING HIP, UNSPECIFIED LATERALITY: ICD-10-CM

## 2017-04-20 DIAGNOSIS — L81.4 SOLAR LENTIGO: ICD-10-CM

## 2017-04-20 DIAGNOSIS — L82.1 SEBORRHEIC KERATOSES: ICD-10-CM

## 2017-04-20 PROCEDURE — 99214 OFFICE O/P EST MOD 30 MIN: CPT | Performed by: DERMATOLOGY

## 2017-04-20 PROCEDURE — 99212 OFFICE O/P EST SF 10 MIN: CPT | Performed by: DERMATOLOGY

## 2017-04-20 NOTE — PROGRESS NOTES
HPI:     Chief Complaint     Melanoma        HPI     Melanoma    Additional comments: pt with a h/o melanoma here for recommended 3 mos f/u full body exam       Last edited by Leobardo Dominguez RN on 4/20/2017  1:54 PM. (History)         patient notes a lot of right thumb 2-17-17   • Basal cell carcinoma of nose 2-17-17   • Congestion of nasal sinus    • Chronic headaches          Past Surgical History    REPAIR OF NASAL SEPTUM  1985    FOOT SURGERY Left 2006    Comment Nerve procedure    SKIN SURGERY Left 10/07 recurrent basal cell carcinoma from the left nose.   - melanocytic nevi are uniform in color, shape and borders.   -there are scattered blotchy brown macules on face, trunk and extremities   - there are some cherry red papules  - there are very numerous bro

## 2017-05-15 ENCOUNTER — TELEPHONE (OUTPATIENT)
Dept: OTOLARYNGOLOGY | Facility: CLINIC | Age: 67
End: 2017-05-15

## 2017-05-15 NOTE — TELEPHONE ENCOUNTER
Pt contacted, informed that I need to speak to Dr Sonia Mercado and verify procedure. Informed that  is out of town till next week and that I would reach out after doctors confirmation. He states ok no problem.

## 2017-05-17 RX ORDER — ROSUVASTATIN CALCIUM 10 MG/1
TABLET, COATED ORAL
Qty: 90 TABLET | Refills: 3 | Status: SHIPPED | OUTPATIENT
Start: 2017-05-17 | End: 2018-05-03

## 2017-05-24 ENCOUNTER — TELEPHONE (OUTPATIENT)
Dept: OTOLARYNGOLOGY | Facility: CLINIC | Age: 67
End: 2017-05-24

## 2017-06-13 ENCOUNTER — PATIENT MESSAGE (OUTPATIENT)
Dept: DERMATOLOGY CLINIC | Facility: CLINIC | Age: 67
End: 2017-06-13

## 2017-06-13 NOTE — TELEPHONE ENCOUNTER
Please recommend he try one of the liqiuid salicylic acid wart removers such as duofilm, wart-off, compound W, OR a 75% salicylic acid patch such as mediplast- all otc- if gets mediplast, change every 24 hours. - Also, if very painful,may consider seeing po

## 2017-06-13 NOTE — TELEPHONE ENCOUNTER
From: Kaylee Blancas  To: Reshma Grajeda MD  Sent: 6/13/2017 10:24 AM CDT  Subject: Non-Urgent Medical Question    I believe I have a Plantar Wart. Could you recommend something from the pharmacy that would help ? My heel is pretty painful. Thanks.

## 2017-06-13 NOTE — TELEPHONE ENCOUNTER
Pt seen 4/20/17, states he has a wart to right heel \"it's white painful skin protruding from heel\". Wants to know if we may offer a recc via tel for this?  Kindly advise please

## 2017-06-26 RX ORDER — PROPRANOLOL/HYDROCHLOROTHIAZID 40 MG-25MG
1 TABLET ORAL DAILY
Status: ON HOLD | COMMUNITY
End: 2017-06-28

## 2017-06-26 RX ORDER — LORATADINE 10 MG/1
10 TABLET ORAL DAILY
COMMUNITY
End: 2017-07-19 | Stop reason: ALTCHOICE

## 2017-06-26 RX ORDER — ACETAMINOPHEN 325 MG/1
650 TABLET ORAL EVERY 6 HOURS PRN
COMMUNITY

## 2017-06-28 ENCOUNTER — ANESTHESIA (OUTPATIENT)
Dept: SURGERY | Facility: HOSPITAL | Age: 67
End: 2017-06-28
Payer: COMMERCIAL

## 2017-06-28 ENCOUNTER — SURGERY (OUTPATIENT)
Age: 67
End: 2017-06-28

## 2017-06-28 ENCOUNTER — HOSPITAL ENCOUNTER (OUTPATIENT)
Facility: HOSPITAL | Age: 67
Setting detail: HOSPITAL OUTPATIENT SURGERY
Discharge: HOME | End: 2017-06-28
Attending: OTOLARYNGOLOGY | Admitting: OTOLARYNGOLOGY
Payer: COMMERCIAL

## 2017-06-28 ENCOUNTER — ANESTHESIA EVENT (OUTPATIENT)
Dept: SURGERY | Facility: HOSPITAL | Age: 67
End: 2017-06-28
Payer: COMMERCIAL

## 2017-06-28 VITALS
RESPIRATION RATE: 14 BRPM | DIASTOLIC BLOOD PRESSURE: 80 MMHG | TEMPERATURE: 98 F | SYSTOLIC BLOOD PRESSURE: 140 MMHG | HEART RATE: 76 BPM | BODY MASS INDEX: 24.5 KG/M2 | OXYGEN SATURATION: 97 % | HEIGHT: 71 IN | WEIGHT: 175 LBS

## 2017-06-28 DIAGNOSIS — J32.9 CHRONIC SINUSITIS, UNSPECIFIED LOCATION: ICD-10-CM

## 2017-06-28 DIAGNOSIS — J32.4 CHRONIC PANSINUSITIS: Primary | ICD-10-CM

## 2017-06-28 PROCEDURE — 09BU4ZZ EXCISION OF RIGHT ETHMOID SINUS, PERCUTANEOUS ENDOSCOPIC APPROACH: ICD-10-PCS | Performed by: OTOLARYNGOLOGY

## 2017-06-28 PROCEDURE — 09BV4ZZ EXCISION OF LEFT ETHMOID SINUS, PERCUTANEOUS ENDOSCOPIC APPROACH: ICD-10-PCS | Performed by: OTOLARYNGOLOGY

## 2017-06-28 PROCEDURE — 099R4ZZ DRAINAGE OF LEFT MAXILLARY SINUS, PERCUTANEOUS ENDOSCOPIC APPROACH: ICD-10-PCS | Performed by: OTOLARYNGOLOGY

## 2017-06-28 PROCEDURE — 88305 TISSUE EXAM BY PATHOLOGIST: CPT | Performed by: OTOLARYNGOLOGY

## 2017-06-28 PROCEDURE — 099Q4ZZ DRAINAGE OF RIGHT MAXILLARY SINUS, PERCUTANEOUS ENDOSCOPIC APPROACH: ICD-10-PCS | Performed by: OTOLARYNGOLOGY

## 2017-06-28 PROCEDURE — 8E09XBZ COMPUTER ASSISTED PROCEDURE OF HEAD AND NECK REGION: ICD-10-PCS | Performed by: OTOLARYNGOLOGY

## 2017-06-28 RX ORDER — MORPHINE SULFATE 4 MG/ML
4 INJECTION, SOLUTION INTRAMUSCULAR; INTRAVENOUS EVERY 10 MIN PRN
Status: DISCONTINUED | OUTPATIENT
Start: 2017-06-28 | End: 2017-06-28

## 2017-06-28 RX ORDER — ONDANSETRON 2 MG/ML
INJECTION INTRAMUSCULAR; INTRAVENOUS AS NEEDED
Status: DISCONTINUED | OUTPATIENT
Start: 2017-06-28 | End: 2017-06-28 | Stop reason: SURG

## 2017-06-28 RX ORDER — HYDROCODONE BITARTRATE AND ACETAMINOPHEN 5; 325 MG/1; MG/1
1 TABLET ORAL EVERY 4 HOURS PRN
Status: DISCONTINUED | OUTPATIENT
Start: 2017-06-28 | End: 2017-06-28

## 2017-06-28 RX ORDER — ONDANSETRON 2 MG/ML
4 INJECTION INTRAMUSCULAR; INTRAVENOUS ONCE AS NEEDED
Status: DISCONTINUED | OUTPATIENT
Start: 2017-06-28 | End: 2017-06-28

## 2017-06-28 RX ORDER — ONDANSETRON 2 MG/ML
4 INJECTION INTRAMUSCULAR; INTRAVENOUS EVERY 6 HOURS PRN
Status: DISCONTINUED | OUTPATIENT
Start: 2017-06-28 | End: 2017-06-28

## 2017-06-28 RX ORDER — DEXAMETHASONE SODIUM PHOSPHATE 4 MG/ML
VIAL (ML) INJECTION AS NEEDED
Status: DISCONTINUED | OUTPATIENT
Start: 2017-06-28 | End: 2017-06-28 | Stop reason: SURG

## 2017-06-28 RX ORDER — FLUMAZENIL 0.1 MG/ML
INJECTION, SOLUTION INTRAVENOUS AS NEEDED
Status: DISCONTINUED | OUTPATIENT
Start: 2017-06-28 | End: 2017-06-28 | Stop reason: SURG

## 2017-06-28 RX ORDER — SODIUM CHLORIDE 0.9 % (FLUSH) 0.9 %
10 SYRINGE (ML) INJECTION AS NEEDED
Status: DISCONTINUED | OUTPATIENT
Start: 2017-06-28 | End: 2017-06-28

## 2017-06-28 RX ORDER — LIDOCAINE HYDROCHLORIDE AND EPINEPHRINE 10; 10 MG/ML; UG/ML
INJECTION, SOLUTION INFILTRATION; PERINEURAL AS NEEDED
Status: DISCONTINUED | OUTPATIENT
Start: 2017-06-28 | End: 2017-06-28 | Stop reason: HOSPADM

## 2017-06-28 RX ORDER — METOCLOPRAMIDE 10 MG/1
10 TABLET ORAL ONCE
Status: DISCONTINUED | OUTPATIENT
Start: 2017-06-28 | End: 2017-06-28 | Stop reason: HOSPADM

## 2017-06-28 RX ORDER — HYDROMORPHONE HYDROCHLORIDE 1 MG/ML
0.2 INJECTION, SOLUTION INTRAMUSCULAR; INTRAVENOUS; SUBCUTANEOUS EVERY 5 MIN PRN
Status: DISCONTINUED | OUTPATIENT
Start: 2017-06-28 | End: 2017-06-28

## 2017-06-28 RX ORDER — MORPHINE SULFATE 2 MG/ML
2 INJECTION, SOLUTION INTRAMUSCULAR; INTRAVENOUS EVERY 10 MIN PRN
Status: DISCONTINUED | OUTPATIENT
Start: 2017-06-28 | End: 2017-06-28

## 2017-06-28 RX ORDER — GLYCOPYRROLATE 0.2 MG/ML
INJECTION INTRAMUSCULAR; INTRAVENOUS AS NEEDED
Status: DISCONTINUED | OUTPATIENT
Start: 2017-06-28 | End: 2017-06-28 | Stop reason: SURG

## 2017-06-28 RX ORDER — HYDROMORPHONE HYDROCHLORIDE 1 MG/ML
0.6 INJECTION, SOLUTION INTRAMUSCULAR; INTRAVENOUS; SUBCUTANEOUS EVERY 5 MIN PRN
Status: DISCONTINUED | OUTPATIENT
Start: 2017-06-28 | End: 2017-06-28

## 2017-06-28 RX ORDER — SODIUM CHLORIDE, SODIUM LACTATE, POTASSIUM CHLORIDE, CALCIUM CHLORIDE 600; 310; 30; 20 MG/100ML; MG/100ML; MG/100ML; MG/100ML
INJECTION, SOLUTION INTRAVENOUS CONTINUOUS
Status: DISCONTINUED | OUTPATIENT
Start: 2017-06-28 | End: 2017-06-28

## 2017-06-28 RX ORDER — NALOXONE HYDROCHLORIDE 0.4 MG/ML
80 INJECTION, SOLUTION INTRAMUSCULAR; INTRAVENOUS; SUBCUTANEOUS AS NEEDED
Status: DISCONTINUED | OUTPATIENT
Start: 2017-06-28 | End: 2017-06-28

## 2017-06-28 RX ORDER — CLINDAMYCIN HYDROCHLORIDE 150 MG/1
150 CAPSULE ORAL EVERY 8 HOURS
Qty: 15 CAPSULE | Refills: 0 | Status: SHIPPED | OUTPATIENT
Start: 2017-06-28 | End: 2017-07-05

## 2017-06-28 RX ORDER — ONDANSETRON 4 MG/1
4 TABLET, ORALLY DISINTEGRATING ORAL EVERY 6 HOURS PRN
Status: DISCONTINUED | OUTPATIENT
Start: 2017-06-28 | End: 2017-06-28

## 2017-06-28 RX ORDER — HYDROCODONE BITARTRATE AND ACETAMINOPHEN 5; 325 MG/1; MG/1
1 TABLET ORAL AS NEEDED
Status: DISCONTINUED | OUTPATIENT
Start: 2017-06-28 | End: 2017-06-28

## 2017-06-28 RX ORDER — EPHEDRINE SULFATE 50 MG/ML
INJECTION, SOLUTION INTRAVENOUS AS NEEDED
Status: DISCONTINUED | OUTPATIENT
Start: 2017-06-28 | End: 2017-06-28 | Stop reason: SURG

## 2017-06-28 RX ORDER — DIAPER,BRIEF,INFANT-TODD,DISP
EACH MISCELLANEOUS AS NEEDED
Status: DISCONTINUED | OUTPATIENT
Start: 2017-06-28 | End: 2017-06-28 | Stop reason: HOSPADM

## 2017-06-28 RX ORDER — HYDROCODONE BITARTRATE AND ACETAMINOPHEN 5; 325 MG/1; MG/1
TABLET ORAL
Qty: 30 TABLET | Refills: 0 | Status: SHIPPED | OUTPATIENT
Start: 2017-06-28 | End: 2017-07-19 | Stop reason: ALTCHOICE

## 2017-06-28 RX ORDER — MIDAZOLAM HYDROCHLORIDE 1 MG/ML
INJECTION INTRAMUSCULAR; INTRAVENOUS AS NEEDED
Status: DISCONTINUED | OUTPATIENT
Start: 2017-06-28 | End: 2017-06-28 | Stop reason: SURG

## 2017-06-28 RX ORDER — HYDROMORPHONE HYDROCHLORIDE 1 MG/ML
0.4 INJECTION, SOLUTION INTRAMUSCULAR; INTRAVENOUS; SUBCUTANEOUS EVERY 5 MIN PRN
Status: DISCONTINUED | OUTPATIENT
Start: 2017-06-28 | End: 2017-06-28

## 2017-06-28 RX ORDER — FAMOTIDINE 20 MG/1
20 TABLET ORAL ONCE
Status: DISCONTINUED | OUTPATIENT
Start: 2017-06-28 | End: 2017-06-28 | Stop reason: HOSPADM

## 2017-06-28 RX ORDER — HYDROCODONE BITARTRATE AND ACETAMINOPHEN 5; 325 MG/1; MG/1
2 TABLET ORAL AS NEEDED
Status: DISCONTINUED | OUTPATIENT
Start: 2017-06-28 | End: 2017-06-28

## 2017-06-28 RX ORDER — ACETAMINOPHEN 325 MG/1
650 TABLET ORAL ONCE
Status: COMPLETED | OUTPATIENT
Start: 2017-06-28 | End: 2017-06-28

## 2017-06-28 RX ORDER — MORPHINE SULFATE 10 MG/ML
6 INJECTION, SOLUTION INTRAMUSCULAR; INTRAVENOUS EVERY 10 MIN PRN
Status: DISCONTINUED | OUTPATIENT
Start: 2017-06-28 | End: 2017-06-28

## 2017-06-28 RX ORDER — LIDOCAINE HYDROCHLORIDE 10 MG/ML
INJECTION, SOLUTION EPIDURAL; INFILTRATION; INTRACAUDAL; PERINEURAL AS NEEDED
Status: DISCONTINUED | OUTPATIENT
Start: 2017-06-28 | End: 2017-06-28 | Stop reason: SURG

## 2017-06-28 RX ADMIN — DEXAMETHASONE SODIUM PHOSPHATE 4 MG: 4 MG/ML VIAL (ML) INJECTION at 13:37:00

## 2017-06-28 RX ADMIN — FLUMAZENIL 0.2 MG: 0.1 INJECTION, SOLUTION INTRAVENOUS at 14:26:00

## 2017-06-28 RX ADMIN — LIDOCAINE HYDROCHLORIDE 30 MG: 10 INJECTION, SOLUTION EPIDURAL; INFILTRATION; INTRACAUDAL; PERINEURAL at 13:37:00

## 2017-06-28 RX ADMIN — ONDANSETRON 4 MG: 2 INJECTION INTRAMUSCULAR; INTRAVENOUS at 13:37:00

## 2017-06-28 RX ADMIN — SODIUM CHLORIDE, SODIUM LACTATE, POTASSIUM CHLORIDE, CALCIUM CHLORIDE: 600; 310; 30; 20 INJECTION, SOLUTION INTRAVENOUS at 13:34:00

## 2017-06-28 RX ADMIN — MIDAZOLAM HYDROCHLORIDE 2 MG: 1 INJECTION INTRAMUSCULAR; INTRAVENOUS at 13:37:00

## 2017-06-28 RX ADMIN — EPHEDRINE SULFATE 5 MG: 50 INJECTION, SOLUTION INTRAVENOUS at 13:45:00

## 2017-06-28 RX ADMIN — EPHEDRINE SULFATE 10 MG: 50 INJECTION, SOLUTION INTRAVENOUS at 13:43:00

## 2017-06-28 RX ADMIN — GLYCOPYRROLATE 0.2 MG: 0.2 INJECTION INTRAMUSCULAR; INTRAVENOUS at 13:37:00

## 2017-06-28 RX ADMIN — EPHEDRINE SULFATE 5 MG: 50 INJECTION, SOLUTION INTRAVENOUS at 13:50:00

## 2017-06-28 NOTE — H&P
HPI:   Continues to experience problems with facial pressure and congestion and some difficulty breathing through his nose     Current Outpatient Prescriptions:  ROSUVASTATIN CALCIUM 10 MG Oral Tab TAKE ONE TABLET BY MOUTH EVERY NIGHT AT BEDTIME Disp: 90 t Cranial nerves II through XII grossly intact. Neck Exam Normal Inspection - Normal. Palpation - Normal. Parotid gland - Normal. Thyroid gland - Normal.   Psychiatric Normal Orientation - Oriented to time, place, person & situation.  Appropriate mood and a

## 2017-06-28 NOTE — BRIEF OP NOTE
Pre-Operative Diagnosis: Chronic sinusitis, unspecified location, NSD [J32.9]     Post-Operative Diagnosis: same     Procedure Performed:   Procedure(s):   Ednoscopic maxillary antrostomy with tissue removal, Endoscopic total ethmoidectomy     Surgeon(s

## 2017-06-28 NOTE — INTERVAL H&P NOTE
Pre-op Diagnosis: Chronic sinusitis, unspecified location [J32.9]    The above referenced H&P was reviewed by Sulema Nageotte.  Scarlett Palacio MD on 6/28/2017, the patient was examined and no significant changes have occurred in the patient's condition since the H&P was pe

## 2017-06-28 NOTE — ANESTHESIA POSTPROCEDURE EVALUATION
Patient: Carlitos Bishop    Procedure Summary     Date:  06/28/17 Room / Location:  [address] [place] [address] MAIN OR 01 / 300 [facility] MAIN OR    Anesthesia Start:  7920 Anesthesia Stop:      Procedure:  NASAL SEPTOPLASTY BILAT SINUS ENDOSCOPY ETHM MAX (N/A ) Diagnosis:       Chroni

## 2017-06-29 ENCOUNTER — TELEPHONE (OUTPATIENT)
Dept: OTOLARYNGOLOGY | Facility: CLINIC | Age: 67
End: 2017-06-29

## 2017-06-29 NOTE — TELEPHONE ENCOUNTER
Pt is post op day 1, septoplasty, endo sinus surgery. Per pt, he is doing well, drainage is minimal, has a little headache.     Pt advised no nose blowing, sneeze with mouth open, no heavy lifting or bending for up to two weeks post op; may use saline spra

## 2017-06-29 NOTE — OPERATIVE REPORT
[facility]    PATIENT'S NAME: Satinder Olivas   ATTENDING PHYSICIAN: Kleber Huang MD   OPERATING PHYSICIAN: Kleber Huang MD   PATIENT ACCOUNT#:   [id number]    LOCATION:  [facility] 5 [facility] 10  MEDICAL RECORD #:   [id number]       DATE Natasha Ledesma backbiting forceps, and hypertrophic tissue was removed from the maxillary sinus on the right side.     The left nasal cavity was then visualized and again the middle turbinate was medialized, and the anterior and posterior ethmoid air cells were exenterate

## 2017-07-07 ENCOUNTER — OFFICE VISIT (OUTPATIENT)
Dept: OTOLARYNGOLOGY | Facility: CLINIC | Age: 67
End: 2017-07-07

## 2017-07-07 VITALS
HEIGHT: 71 IN | SYSTOLIC BLOOD PRESSURE: 102 MMHG | WEIGHT: 172 LBS | DIASTOLIC BLOOD PRESSURE: 80 MMHG | TEMPERATURE: 97 F | BODY MASS INDEX: 24.08 KG/M2

## 2017-07-07 DIAGNOSIS — J32.9 CHRONIC SINUSITIS, UNSPECIFIED LOCATION: Primary | ICD-10-CM

## 2017-07-07 PROCEDURE — 99024 POSTOP FOLLOW-UP VISIT: CPT | Performed by: OTOLARYNGOLOGY

## 2017-07-07 PROCEDURE — 99212 OFFICE O/P EST SF 10 MIN: CPT | Performed by: OTOLARYNGOLOGY

## 2017-07-08 NOTE — PROGRESS NOTES
Is in followup of bilateral endoscopic sinus surgery. He is still very congested    Exam:  Nasal cavities cleare of debris. Assessment/plan:  Slowly improving following his surgery.  Start sinus irrigation and return to see me again in about 8 weeks

## 2017-07-18 ENCOUNTER — OFFICE VISIT (OUTPATIENT)
Dept: PODIATRY CLINIC | Facility: CLINIC | Age: 67
End: 2017-07-18

## 2017-07-18 DIAGNOSIS — B07.0 PLANTAR WART OF RIGHT FOOT: Primary | ICD-10-CM

## 2017-07-18 PROCEDURE — 99212 OFFICE O/P EST SF 10 MIN: CPT | Performed by: PODIATRIST

## 2017-07-18 PROCEDURE — 99202 OFFICE O/P NEW SF 15 MIN: CPT | Performed by: PODIATRIST

## 2017-07-18 NOTE — PROGRESS NOTES
HPI:    Patient ID: Ras Poe is a 75year old male. HPI  This pleasant 69-year-old male presents as a new patient to me and states that he is self-referred.   His chief concern is a painful callus on the plantar aspect of the right heel this be ASSESSMENT/PLAN:   Plantar wart of right foot  (primary encounter diagnosis)    No orders of the defined types were placed in this encounter.       Meds This Visit:  No prescriptions requested or ordered in this encounter    Imaging & Referrals:  None

## 2017-07-19 ENCOUNTER — OFFICE VISIT (OUTPATIENT)
Dept: INTERNAL MEDICINE CLINIC | Facility: CLINIC | Age: 67
End: 2017-07-19

## 2017-07-19 VITALS
HEIGHT: 71 IN | BODY MASS INDEX: 24.36 KG/M2 | DIASTOLIC BLOOD PRESSURE: 69 MMHG | OXYGEN SATURATION: 94 % | HEART RATE: 76 BPM | TEMPERATURE: 98 F | SYSTOLIC BLOOD PRESSURE: 108 MMHG | WEIGHT: 174 LBS

## 2017-07-19 DIAGNOSIS — J06.9 ACUTE URI: Primary | ICD-10-CM

## 2017-07-19 PROCEDURE — 99212 OFFICE O/P EST SF 10 MIN: CPT | Performed by: INTERNAL MEDICINE

## 2017-07-19 PROCEDURE — 99213 OFFICE O/P EST LOW 20 MIN: CPT | Performed by: INTERNAL MEDICINE

## 2017-07-19 RX ORDER — DOXYCYCLINE HYCLATE 50 MG/1
50 CAPSULE ORAL 2 TIMES DAILY
Qty: 14 CAPSULE | Refills: 0 | Status: SHIPPED | OUTPATIENT
Start: 2017-07-19 | End: 2017-07-26

## 2017-07-19 NOTE — PROGRESS NOTES
Marlen Liriano is a 75year old male. Patient presents with:   Body ache and/or chills  Chest Congestion    HPI:   For the past 2-3 days, he has had symptoms of fever up to 101°, chills, achiness, headache, nasal congestion, chest congestion and cough p rarely       EXAM:   GENERAL: Pleasant male appearing well and breathing comfortably in no distress  /69 (BP Location: Right arm, Patient Position: Sitting, Cuff Size: adult)   Pulse 76   Temp 98.2 °F (36.8 °C) (Oral)   Ht 5' 11\" (1.803 m)   Wt 174

## 2017-07-19 NOTE — PATIENT INSTRUCTIONS
Please take doxycycline twice daily for 7 days. Treat symptoms with pseudoephedrine, Robitussin and albuterol as needed. Please try to stop smoking. Call if no better.

## 2017-07-27 ENCOUNTER — TELEPHONE (OUTPATIENT)
Dept: PODIATRY CLINIC | Facility: CLINIC | Age: 67
End: 2017-07-27

## 2017-07-27 NOTE — TELEPHONE ENCOUNTER
Was seen a few weeks back. Putting acid on it for about 2 months now. \"Killing me to walk\"  Pain scale up to 8-9. On waking pain level 10  Hurts all the time to 4-9.   Does have his follow up on the 18th of August  Is there anything he can do about the

## 2017-08-01 ENCOUNTER — OFFICE VISIT (OUTPATIENT)
Dept: PODIATRY CLINIC | Facility: CLINIC | Age: 67
End: 2017-08-01

## 2017-08-01 DIAGNOSIS — B07.0 PLANTAR WART OF RIGHT FOOT: Primary | ICD-10-CM

## 2017-08-01 PROCEDURE — 99212 OFFICE O/P EST SF 10 MIN: CPT | Performed by: PODIATRIST

## 2017-08-01 NOTE — PROGRESS NOTES
HPI:    Patient ID: Tamar De La Rosa is a 75year old male. HPI  This 50-year-old male presents with right heel pain. I treated this patient for a wart on the plantar central aspect of the right heel.   After week or so of acid application he called w

## 2017-08-03 ENCOUNTER — TELEPHONE (OUTPATIENT)
Dept: INTERNAL MEDICINE CLINIC | Facility: CLINIC | Age: 67
End: 2017-08-03

## 2017-08-03 RX ORDER — AZITHROMYCIN 250 MG/1
TABLET, FILM COATED ORAL
Qty: 6 TABLET | Refills: 0 | Status: SHIPPED | OUTPATIENT
Start: 2017-08-03 | End: 2017-08-18

## 2017-08-03 NOTE — TELEPHONE ENCOUNTER
Actions Requested: requesting antibiotic  Problem: body aches  Onset and Timing: started getting worse yesterday  Associated Symptoms: chills, fever, occasional cough, post nasal drip, sinus pressure, headache, runny nose  Aggravating by:    Alleviated by:

## 2017-08-18 ENCOUNTER — OFFICE VISIT (OUTPATIENT)
Dept: PODIATRY CLINIC | Facility: CLINIC | Age: 67
End: 2017-08-18

## 2017-08-18 DIAGNOSIS — M72.2 PLANTAR FASCIITIS OF RIGHT FOOT: Primary | ICD-10-CM

## 2017-08-18 PROCEDURE — 99212 OFFICE O/P EST SF 10 MIN: CPT | Performed by: PODIATRIST

## 2017-08-18 PROCEDURE — L3060 FOOT ARCH SUPP LONGITUD/META: HCPCS | Performed by: PODIATRIST

## 2017-08-18 PROCEDURE — 99213 OFFICE O/P EST LOW 20 MIN: CPT | Performed by: PODIATRIST

## 2017-08-18 RX ORDER — MELOXICAM 15 MG/1
15 TABLET ORAL DAILY
Qty: 30 TABLET | Refills: 1 | Status: SHIPPED | OUTPATIENT
Start: 2017-08-18 | End: 2018-10-20

## 2017-08-18 NOTE — PROGRESS NOTES
HPI:    Patient ID: Denny Bryant is a 75year old male. HPI  This 79-year-old male continues to have right heel pain. There is no evidence of recurrent wart. The pain is present especially when first standing and increased activity.   Review of S

## 2017-08-21 ENCOUNTER — OFFICE VISIT (OUTPATIENT)
Dept: DERMATOLOGY CLINIC | Facility: CLINIC | Age: 67
End: 2017-08-21

## 2017-08-21 DIAGNOSIS — D23.60 BENIGN NEOPLASM OF SKIN OF UPPER LIMB, INCLUDING SHOULDER, UNSPECIFIED LATERALITY: ICD-10-CM

## 2017-08-21 DIAGNOSIS — L81.4 SOLAR LENTIGO: ICD-10-CM

## 2017-08-21 DIAGNOSIS — D23.5 BENIGN NEOPLASM OF SKIN OF TRUNK, EXCEPT SCROTUM: ICD-10-CM

## 2017-08-21 DIAGNOSIS — Z85.820 PERSONAL HISTORY OF MALIGNANT MELANOMA OF SKIN: Primary | ICD-10-CM

## 2017-08-21 DIAGNOSIS — Z85.828 HISTORY OF BASAL CELL CARCINOMA: ICD-10-CM

## 2017-08-21 DIAGNOSIS — D23.70 BENIGN NEOPLASM OF SKIN OF LOWER LIMB, INCLUDING HIP, UNSPECIFIED LATERALITY: ICD-10-CM

## 2017-08-21 DIAGNOSIS — L82.1 SEBORRHEIC KERATOSES: ICD-10-CM

## 2017-08-21 DIAGNOSIS — D23.4 BENIGN NEOPLASM OF SCALP AND SKIN OF NECK: ICD-10-CM

## 2017-08-21 DIAGNOSIS — D23.30 BENIGN NEOPLASM OF SKIN OF FACE: ICD-10-CM

## 2017-08-21 PROCEDURE — 99214 OFFICE O/P EST MOD 30 MIN: CPT | Performed by: DERMATOLOGY

## 2017-08-21 PROCEDURE — 99212 OFFICE O/P EST SF 10 MIN: CPT | Performed by: DERMATOLOGY

## 2017-08-21 NOTE — PROGRESS NOTES
HPI:     Chief Complaint     Melanoma        HPI     Melanoma    Additional comments: here for 3 mo follow up full body check, history of melanoma       Last edited by Nasima Bentley RN on 8/21/2017  1:57 PM. (History)        Of note patient's melanoma the sheath of right thumb 2-17-17   • High cholesterol    • Melanoma ([facility] [address] 8/2016    left arm, stage I; .75 mm depth   • Osteoarthritis    • Plantar wart 06/26/2017    right heel   • Pneumonia due to organism     2016     Past Surgical History:  2-17-17: ADJ T distress    The exam was remarkable for the following: . –   there is no visible or palpable evidence of recurrent Melanoma from the left arm  –There is no appreciable adenopathy  –There is no evidence of recurrent basal cell carcinoma from the nose  - michael the defined types were placed in this encounter.         8/21/2017  Maribell Montejo

## 2017-08-21 NOTE — PROGRESS NOTES
Past Medical History:   Diagnosis Date   • Basal cell carcinoma 2017    left nose   • Basal cell carcinoma of nose 2-17-17   • Calculus of kidney 2002    left   • Chronic headaches    • Chronic sinusitis    • Congestion of nasal sinus    • Ganglion of flex

## 2017-09-01 ENCOUNTER — OFFICE VISIT (OUTPATIENT)
Dept: OTOLARYNGOLOGY | Facility: CLINIC | Age: 67
End: 2017-09-01

## 2017-09-01 VITALS
HEIGHT: 71 IN | SYSTOLIC BLOOD PRESSURE: 104 MMHG | DIASTOLIC BLOOD PRESSURE: 62 MMHG | WEIGHT: 174 LBS | BODY MASS INDEX: 24.36 KG/M2 | TEMPERATURE: 98 F

## 2017-09-01 DIAGNOSIS — J32.9 CHRONIC SINUSITIS, UNSPECIFIED LOCATION: Primary | ICD-10-CM

## 2017-09-01 PROCEDURE — 99212 OFFICE O/P EST SF 10 MIN: CPT | Performed by: OTOLARYNGOLOGY

## 2017-09-01 PROCEDURE — 99213 OFFICE O/P EST LOW 20 MIN: CPT | Performed by: OTOLARYNGOLOGY

## 2017-09-01 NOTE — PATIENT INSTRUCTIONS
Chronic Sinusitis  Chronic sinusitis is a long-term swelling or infection of the sinuses. If sinusitis lasts more than 12 weeks, it is called chronic. What is chronic sinusitis? Sinuses are air-filled spaces in the skull behind the face.  They are kep · Taking medicines. Your doctor may prescribe medicines to reduce the amount of mucus and swelling. These help unblock the sinuses and allow them to drain. You will need to take antibiotics if you have a bacterial infection. · Flushing your sinuses.  Your Common symptoms of chronic sinusitis  Symptoms may include:  · Facial pain and pressure  · Headache and sinus pain  · Nasal congestion  · Thick, colored drainage from the nose  · Thick mucus draining down the back of the throat (postnasal drainage)  · Loss

## 2017-09-01 NOTE — PROGRESS NOTES
Marlen Liriano is a 75year old male. Patient presents with: Follow - Up: patient presents for f/u on sinus congestion  Sinus Problem    HPI:   He still feels somewhat congested. He has been treated twice for sinus infection since his surgery.  He does Patient Position: Sitting, Cuff Size: adult)   Temp 98 °F (36.7 °C) (Tympanic)   Ht 5' 11\" (1.803 m)   Wt 174 lb (78.9 kg)   BMI 24.27 kg/m²   System Findings Details   Skin Normal Inspection - Normal.   Constitutional Normal Overall appearance - Normal.

## 2017-09-05 ENCOUNTER — OFFICE VISIT (OUTPATIENT)
Dept: INTERNAL MEDICINE CLINIC | Facility: CLINIC | Age: 67
End: 2017-09-05

## 2017-09-05 VITALS
TEMPERATURE: 98 F | DIASTOLIC BLOOD PRESSURE: 80 MMHG | HEART RATE: 69 BPM | HEIGHT: 71 IN | SYSTOLIC BLOOD PRESSURE: 123 MMHG | WEIGHT: 177 LBS | BODY MASS INDEX: 24.78 KG/M2

## 2017-09-05 DIAGNOSIS — C43.62 MELANOMA OF LEFT UPPER ARM (HCC): ICD-10-CM

## 2017-09-05 DIAGNOSIS — M72.2 PLANTAR FASCIITIS OF RIGHT FOOT: ICD-10-CM

## 2017-09-05 DIAGNOSIS — E78.00 HIGH CHOLESTEROL: Primary | ICD-10-CM

## 2017-09-05 DIAGNOSIS — N40.0 ENLARGED PROSTATE: ICD-10-CM

## 2017-09-05 PROBLEM — J45.909 ASTHMATIC BRONCHITIS: Status: RESOLVED | Noted: 2017-01-17 | Resolved: 2017-09-05

## 2017-09-05 PROBLEM — J45.909 ASTHMATIC BRONCHITIS (HCC): Status: RESOLVED | Noted: 2017-01-17 | Resolved: 2017-09-05

## 2017-09-05 PROCEDURE — 99212 OFFICE O/P EST SF 10 MIN: CPT | Performed by: INTERNAL MEDICINE

## 2017-09-05 PROCEDURE — 99214 OFFICE O/P EST MOD 30 MIN: CPT | Performed by: INTERNAL MEDICINE

## 2017-09-05 NOTE — PROGRESS NOTES
Patient ID: Brianne Olivas is a 75year old male. Patient presents with:  Establish Care       HISTORY OF PRESENT ILLNESS:   HPI  Patient presents for above. Here to establish care after prior physician retired.   Patient with a few multiple chronic flap  No date: COLONOSCOPY  2-17-17: EXCIS TENDON SHEATH TJ MADDEN/FINGR Right      Comment: Exc ganglion R thumb  2006: FOOT SURGERY Left      Comment: Nerve procedure  No date: NASAL SCOPY,REMV PART ETHMOID  No date: NASAL SCOPY,RMV TISS MAXILL SINUS  18 well-developed and well-nourished. HENT:   Head: Normocephalic and atraumatic. Eyes: EOM are normal. Pupils are equal, round, and reactive to light. Neck: Normal range of motion. Neck supple.    Cardiovascular: Normal rate, regular rhythm and normal h

## 2017-09-05 NOTE — PATIENT INSTRUCTIONS
Plantar Fasciitis  Plantar fasciitis is a painful swelling of the plantar fascia. The plantar fascia is a thick, fibrous layer of tissue. It covers the bones on the bottom of your foot. And it supports the foot bones in an arched position.   This can happ · First thing in the morning and before sports, stretch the bottom of your feet. Gently flex your ankle so the toes move toward your knee. · Icing may help control heel pain. Apply an ice pack to the heel for 10-20 minutes as a preventive.  Or ice your roberto Cholesterol is a substance found in all of your body's cells, where it plays an important role. But your body can have too much cholesterol if you eat the wrong types of foods. These include fried foods and foods with saturated or trans fats.  Some health c Your healthcare provider may also order other blood tests to find out your HDL (\"good\") cholesterol, LDL (\"bad\") cholesterol, and triglyceride levels. This combination of blood tests is called a lipoprotein profile or a lipid profile.   What do my test Taking a blood sample with a needle carries risks that include bleeding, infection, bruising, or feeling dizzy. When the needle pricks your arm, you may feel a slight stinging sensation or pain. Afterward, the site may be slightly sore.   What might affect

## 2017-09-11 ENCOUNTER — OFFICE VISIT (OUTPATIENT)
Dept: PODIATRY CLINIC | Facility: CLINIC | Age: 67
End: 2017-09-11

## 2017-09-11 DIAGNOSIS — M72.2 PLANTAR FASCIITIS OF RIGHT FOOT: Primary | ICD-10-CM

## 2017-09-11 PROCEDURE — 99212 OFFICE O/P EST SF 10 MIN: CPT | Performed by: PODIATRIST

## 2017-09-11 NOTE — PROGRESS NOTES
HPI:    Patient ID: La Kingsley is a 75year old male. HPI  This 79-year-old male presents with a minimum of 50% improvement in reference to right heel pain. He reports no ill effects with the oral medication.   Review of Systems  I did review pr

## 2017-10-03 ENCOUNTER — OFFICE VISIT (OUTPATIENT)
Dept: PODIATRY CLINIC | Facility: CLINIC | Age: 67
End: 2017-10-03

## 2017-10-03 DIAGNOSIS — M72.2 PLANTAR FASCIITIS OF RIGHT FOOT: Primary | ICD-10-CM

## 2017-10-03 PROCEDURE — 99212 OFFICE O/P EST SF 10 MIN: CPT | Performed by: PODIATRIST

## 2017-10-03 NOTE — PROGRESS NOTES
HPI:    Patient ID: Will Ying is a 75year old male. HPI  68-year-old male presents for follow-up primarily in reference to right heel pain. Patient states that his pain is resolved completely.   He stopped taking oral anti-inflammatories about

## 2017-10-07 ENCOUNTER — OFFICE VISIT (OUTPATIENT)
Dept: INTERNAL MEDICINE CLINIC | Facility: CLINIC | Age: 67
End: 2017-10-07

## 2017-10-07 VITALS
WEIGHT: 176 LBS | HEART RATE: 80 BPM | HEIGHT: 71 IN | TEMPERATURE: 98 F | BODY MASS INDEX: 24.64 KG/M2 | SYSTOLIC BLOOD PRESSURE: 114 MMHG | DIASTOLIC BLOOD PRESSURE: 71 MMHG

## 2017-10-07 DIAGNOSIS — Z23 NEED FOR VACCINATION: ICD-10-CM

## 2017-10-07 DIAGNOSIS — C43.62 MELANOMA OF LEFT UPPER ARM (HCC): Primary | ICD-10-CM

## 2017-10-07 DIAGNOSIS — Z00.00 ANNUAL PHYSICAL EXAM: ICD-10-CM

## 2017-10-07 DIAGNOSIS — N40.0 ENLARGED PROSTATE: ICD-10-CM

## 2017-10-07 DIAGNOSIS — E78.00 HIGH CHOLESTEROL: ICD-10-CM

## 2017-10-07 DIAGNOSIS — M72.2 PLANTAR FASCIITIS OF RIGHT FOOT: ICD-10-CM

## 2017-10-07 DIAGNOSIS — J32.4 CHRONIC PANSINUSITIS: ICD-10-CM

## 2017-10-07 PROCEDURE — 99397 PER PM REEVAL EST PAT 65+ YR: CPT | Performed by: INTERNAL MEDICINE

## 2017-10-07 PROCEDURE — 90471 IMMUNIZATION ADMIN: CPT | Performed by: INTERNAL MEDICINE

## 2017-10-07 PROCEDURE — 90653 IIV ADJUVANT VACCINE IM: CPT | Performed by: INTERNAL MEDICINE

## 2017-10-07 PROCEDURE — 99212 OFFICE O/P EST SF 10 MIN: CPT | Performed by: INTERNAL MEDICINE

## 2017-10-07 PROCEDURE — 99214 OFFICE O/P EST MOD 30 MIN: CPT | Performed by: INTERNAL MEDICINE

## 2017-10-07 NOTE — PROGRESS NOTES
Patient ID: Krista Kearns is a 75year old male. Patient presents with:  Physical: Flu shot       HISTORY OF PRESENT ILLNESS:   HPI  Patient presents for above. Here for annual physical as well as to discuss multiple medical issues.   Does not recal Right      Comment: Exc lesion of nose, V_Y flap  No date: COLONOSCOPY  2-17-17: EXCIS TENDON SHEATH TJ MADDEN/EFRAIN Right      Comment: Exc ganglion R thumb  2006: FOOT SURGERY Left      Comment: Nerve procedure  No date: NASAL SCOPY,REMV PART ETHMOID  No place, and time. He appears well-developed and well-nourished. HENT:   Nose: Mucosal edema and rhinorrhea present. Right sinus exhibits no maxillary sinus tenderness and no frontal sinus tenderness.  Left sinus exhibits no maxillary sinus tenderness and n

## 2017-10-07 NOTE — PATIENT INSTRUCTIONS
1.  Continue Zyrtec ([facility] Brand)  2. Start Flonase. 3.  Follow-up with Dr. Gabriella Pereira (ENT). Prevention Guidelines, Men Ages 72 and Older  Screening tests and vaccines are an important part of managing your health.  Health counseling is essential, to Prostate cancer All men in this age group, talk to healthcare provider about risks and benefits of digital rectal exam (RAVINDER) and prostate-specific antigen (PSA) screening1 At routine exams   Syphilis Men at increased risk for infection – talk with your hea Fall prevention (exercise, vitamin D supplements) All men in this age group At routine exams   Sexually transmitted infection Men at increased risk for infection – talk with your healthcare provider At routine exams   Use of daily aspirin Men ages 39 to 78

## 2017-10-11 ENCOUNTER — LAB ENCOUNTER (OUTPATIENT)
Dept: LAB | Facility: HOSPITAL | Age: 67
End: 2017-10-11
Attending: INTERNAL MEDICINE
Payer: COMMERCIAL

## 2017-10-11 DIAGNOSIS — E78.00 HIGH CHOLESTEROL: ICD-10-CM

## 2017-10-11 DIAGNOSIS — N40.0 ENLARGED PROSTATE: ICD-10-CM

## 2017-10-11 DIAGNOSIS — Z00.00 ANNUAL PHYSICAL EXAM: ICD-10-CM

## 2017-10-11 PROCEDURE — 80061 LIPID PANEL: CPT

## 2017-10-11 PROCEDURE — 36415 COLL VENOUS BLD VENIPUNCTURE: CPT

## 2017-10-11 PROCEDURE — 84443 ASSAY THYROID STIM HORMONE: CPT

## 2017-10-11 PROCEDURE — 80053 COMPREHEN METABOLIC PANEL: CPT

## 2017-10-11 PROCEDURE — 85025 COMPLETE CBC W/AUTO DIFF WBC: CPT

## 2017-10-12 NOTE — TELEPHONE ENCOUNTER
Call to Alvaro Escalera. REquested call back . Dr. Jabari Bueno on vacation. Inquiry if he has any medication left.  Inquiry can he call his primary for script

## 2017-10-25 RX ORDER — MELOXICAM 15 MG/1
TABLET ORAL
Qty: 30 TABLET | Refills: 0 | OUTPATIENT
Start: 2017-10-25

## 2017-12-30 RX ORDER — VARDENAFIL HYDROCHLORIDE 10 MG/1
TABLET, FILM COATED ORAL
Qty: 2 TABLET | Refills: 2 | Status: SHIPPED | OUTPATIENT
Start: 2017-12-30 | End: 2018-04-07 | Stop reason: ALTCHOICE

## 2018-02-22 ENCOUNTER — OFFICE VISIT (OUTPATIENT)
Dept: DERMATOLOGY CLINIC | Facility: CLINIC | Age: 68
End: 2018-02-22

## 2018-02-22 DIAGNOSIS — D23.60 BENIGN NEOPLASM OF SKIN OF UPPER LIMB, INCLUDING SHOULDER, UNSPECIFIED LATERALITY: ICD-10-CM

## 2018-02-22 DIAGNOSIS — D23.5 BENIGN NEOPLASM OF SKIN OF TRUNK, EXCEPT SCROTUM: ICD-10-CM

## 2018-02-22 DIAGNOSIS — Z85.820 PERSONAL HISTORY OF MALIGNANT MELANOMA OF SKIN: Primary | ICD-10-CM

## 2018-02-22 DIAGNOSIS — L82.1 SEBORRHEIC KERATOSES: ICD-10-CM

## 2018-02-22 DIAGNOSIS — L81.4 SOLAR LENTIGO: ICD-10-CM

## 2018-02-22 DIAGNOSIS — D23.70 BENIGN NEOPLASM OF SKIN OF LOWER LIMB, INCLUDING HIP, UNSPECIFIED LATERALITY: ICD-10-CM

## 2018-02-22 DIAGNOSIS — Z87.2 HISTORY OF ACTINIC KERATOSES: ICD-10-CM

## 2018-02-22 DIAGNOSIS — D23.30 BENIGN NEOPLASM OF SKIN OF FACE: ICD-10-CM

## 2018-02-22 DIAGNOSIS — D23.4 BENIGN NEOPLASM OF SCALP AND SKIN OF NECK: ICD-10-CM

## 2018-02-22 DIAGNOSIS — Z85.828 HISTORY OF BASAL CELL CARCINOMA: ICD-10-CM

## 2018-02-22 PROCEDURE — 99212 OFFICE O/P EST SF 10 MIN: CPT | Performed by: DERMATOLOGY

## 2018-02-22 PROCEDURE — 99214 OFFICE O/P EST MOD 30 MIN: CPT | Performed by: DERMATOLOGY

## 2018-02-22 NOTE — PROGRESS NOTES
HPI:     Chief Complaint     Full Skin Exam        HPI     Full Skin Exam    Additional comments: LOV: 08/21/17. Pt presents for full body exam. Pt has personal hx of melanoma and BCCs.         Last edited by Melita Vinson on 2/22/2018  2:28 PM. (Histo • Ganglion of flexor tendon sheath of right thumb 2-17-17   • High cholesterol    • Melanoma ([facility] [address] 8/2016    left arm, stage I; .75 mm depth   • Osteoarthritis    • Plantar wart 06/26/2017    right heel   • Pneumonia due to organism     2016     Past Saima stated age. Patient is well nourished and in no distress    The exam was remarkable for the following:  - there is no visible or palpable evidence of recurrent melanoma from the left arm. –There is no appreciable adenopathy and locations as noted above. hour(s)). Meds This Visit:      Imaging Orders:  None     Referral Orders:  No orders of the defined types were placed in this encounter.         2/22/2018  Abad Ch

## 2018-04-07 ENCOUNTER — OFFICE VISIT (OUTPATIENT)
Dept: INTERNAL MEDICINE CLINIC | Facility: CLINIC | Age: 68
End: 2018-04-07

## 2018-04-07 VITALS
SYSTOLIC BLOOD PRESSURE: 120 MMHG | BODY MASS INDEX: 24.08 KG/M2 | TEMPERATURE: 98 F | WEIGHT: 172 LBS | HEART RATE: 100 BPM | HEIGHT: 71 IN | DIASTOLIC BLOOD PRESSURE: 76 MMHG

## 2018-04-07 DIAGNOSIS — N52.03 COMBINED ARTERIAL INSUFFICIENCY AND CORPORO-VENOUS OCCLUSIVE ERECTILE DYSFUNCTION: ICD-10-CM

## 2018-04-07 DIAGNOSIS — E78.00 HYPERCHOLESTEROLEMIA: Primary | ICD-10-CM

## 2018-04-07 PROCEDURE — 99212 OFFICE O/P EST SF 10 MIN: CPT | Performed by: INTERNAL MEDICINE

## 2018-04-07 PROCEDURE — 99214 OFFICE O/P EST MOD 30 MIN: CPT | Performed by: INTERNAL MEDICINE

## 2018-04-07 RX ORDER — SILDENAFIL CITRATE 20 MG/1
20 TABLET ORAL DAILY PRN
Qty: 5 TABLET | Refills: 1 | Status: SHIPPED | OUTPATIENT
Start: 2018-04-07 | End: 2018-10-13

## 2018-04-07 NOTE — PATIENT INSTRUCTIONS
Controlling Your Cholesterol  Cholesterol is a waxy substance. It travels in your blood through the blood vessels. When you have high cholesterol, it can build up along the walls of the blood vessels.  This makes the vessels narrower and decreases blood f · Choose an activity you enjoy. Walking, swimming, and riding a bike are some good ways to be active. · Start at a level where you feel comfortable. Increase your time and pace a little each week.   · Work up to 30 to 40 minutes of moderate to high intensi

## 2018-04-07 NOTE — PROGRESS NOTES
Patient ID: Ras Poe is a 75year old male. Patient presents with: Follow - Up: Cholesterol       HISTORY OF PRESENT ILLNESS:   HPI  Patient presents for above. Here for follow-up of multiple issues.   Cholesterol checked 6 months ago and was 10/07/16: SKIN SURGERY Left      Comment: left upper arm melanoma      Current Outpatient Prescriptions:   •  Sildenafil Citrate 20 MG Oral Tab, Take 1 tablet (20 mg total) by mouth daily as needed. , Disp: 5 tablet, Rfl: 1  •  ROSUVASTATIN CALCIUM 10 MG Or Abdominal: Soft. Bowel sounds are normal. He exhibits no distension. Musculoskeletal:        Right hand: He exhibits bony tenderness. Left hand: He exhibits bony tenderness.         Hands:  Neurological: He is alert and oriented to person, place, a

## 2018-04-09 ENCOUNTER — APPOINTMENT (OUTPATIENT)
Dept: LAB | Age: 68
End: 2018-04-09
Attending: INTERNAL MEDICINE
Payer: COMMERCIAL

## 2018-04-09 DIAGNOSIS — E78.00 HYPERCHOLESTEROLEMIA: ICD-10-CM

## 2018-04-09 PROCEDURE — 36415 COLL VENOUS BLD VENIPUNCTURE: CPT

## 2018-04-09 PROCEDURE — 80053 COMPREHEN METABOLIC PANEL: CPT

## 2018-04-09 PROCEDURE — 80061 LIPID PANEL: CPT

## 2018-04-10 ENCOUNTER — PATIENT MESSAGE (OUTPATIENT)
Dept: INTERNAL MEDICINE CLINIC | Facility: CLINIC | Age: 68
End: 2018-04-10

## 2018-04-12 NOTE — TELEPHONE ENCOUNTER
From: Macye Nix  To: Heidy Castaneda MD  Sent: 4/10/2018 5:48 PM CDT  Subject: Non-Urgent Medical Question    Are my low results for Globulin and A/G ratio a cause for concern? Not sure what they mean. Thanks.

## 2018-05-07 RX ORDER — ROSUVASTATIN CALCIUM 10 MG/1
TABLET, COATED ORAL
Qty: 30 TABLET | Refills: 2 | Status: SHIPPED | OUTPATIENT
Start: 2018-05-07 | End: 2018-08-08

## 2018-05-07 NOTE — TELEPHONE ENCOUNTER
Cholesterol Medications  Protocol Criteria:  · Appointment scheduled in the past 12 months or in the next 3 months  · ALT & LDL on file in the past 12 months  · ALT result < 80  · LDL result <130   Recent Outpatient Visits            1 month ago Hyperchole

## 2018-05-29 ENCOUNTER — OFFICE VISIT (OUTPATIENT)
Dept: INTERNAL MEDICINE CLINIC | Facility: CLINIC | Age: 68
End: 2018-05-29

## 2018-05-29 ENCOUNTER — LAB ENCOUNTER (OUTPATIENT)
Dept: LAB | Age: 68
End: 2018-05-29
Attending: INTERNAL MEDICINE
Payer: COMMERCIAL

## 2018-05-29 VITALS
HEIGHT: 71 IN | SYSTOLIC BLOOD PRESSURE: 126 MMHG | HEART RATE: 72 BPM | TEMPERATURE: 98 F | WEIGHT: 176 LBS | DIASTOLIC BLOOD PRESSURE: 74 MMHG | BODY MASS INDEX: 24.64 KG/M2

## 2018-05-29 DIAGNOSIS — M25.549 PAIN IN MULTIPLE FINGER JOINTS: Primary | ICD-10-CM

## 2018-05-29 DIAGNOSIS — M25.549 PAIN IN MULTIPLE FINGER JOINTS: ICD-10-CM

## 2018-05-29 PROCEDURE — 99214 OFFICE O/P EST MOD 30 MIN: CPT | Performed by: INTERNAL MEDICINE

## 2018-05-29 PROCEDURE — 85652 RBC SED RATE AUTOMATED: CPT

## 2018-05-29 PROCEDURE — 86038 ANTINUCLEAR ANTIBODIES: CPT

## 2018-05-29 PROCEDURE — 85025 COMPLETE CBC W/AUTO DIFF WBC: CPT

## 2018-05-29 PROCEDURE — 36415 COLL VENOUS BLD VENIPUNCTURE: CPT

## 2018-05-29 PROCEDURE — 99212 OFFICE O/P EST SF 10 MIN: CPT | Performed by: INTERNAL MEDICINE

## 2018-05-29 NOTE — PROGRESS NOTES
Patient ID: Lily Singleton is a 75year old male. Patient presents with: Follow - Up: Finger pain due to Arthritis       HISTORY OF PRESENT ILLNESS:   HPI  Patient presents for above.   He relays bilateral hand pain, more specifically the distal inte 2-17-17: ADJ TISS Bridgett Null <10SQCM Right      Comment: Exc lesion of nose, V_Y flap  No date: COLONOSCOPY  2-17-17: EXCIS TENDON SHEATH TJ MADDEN/EFRAIN Right      Comment: Exc ganglion R thumb  2006: FOOT SURGERY Left      Comment: Nerve procedure BP: 126/74   Pulse: 72   Temp: 97.7 °F (36.5 °C)   TempSrc: Oral   Weight: 176 lb (79.8 kg)   Height: 5' 11\" (1.803 m)       Body mass index is 24.55 kg/m². Physical Exam   Constitutional: He is oriented to person, place, and time.  He appears well-deve

## 2018-05-31 ENCOUNTER — TELEPHONE (OUTPATIENT)
Dept: INTERNAL MEDICINE CLINIC | Facility: CLINIC | Age: 68
End: 2018-05-31

## 2018-05-31 NOTE — TELEPHONE ENCOUNTER
Call transferred by CSS: Pt is returning AG's call and was informed of [employer] message below regarding results. Pt agrees with recommendations and will contact rheumatologist; will call us back if any worsening before he can get in.  Denies further question

## 2018-06-22 ENCOUNTER — OFFICE VISIT (OUTPATIENT)
Dept: RHEUMATOLOGY | Facility: CLINIC | Age: 68
End: 2018-06-22

## 2018-06-22 VITALS
WEIGHT: 172 LBS | HEIGHT: 71 IN | DIASTOLIC BLOOD PRESSURE: 80 MMHG | HEART RATE: 69 BPM | BODY MASS INDEX: 24.08 KG/M2 | SYSTOLIC BLOOD PRESSURE: 123 MMHG

## 2018-06-22 DIAGNOSIS — M15.9 PRIMARY OSTEOARTHRITIS INVOLVING MULTIPLE JOINTS: Primary | ICD-10-CM

## 2018-06-22 PROCEDURE — 99212 OFFICE O/P EST SF 10 MIN: CPT | Performed by: INTERNAL MEDICINE

## 2018-06-22 PROCEDURE — 99244 OFF/OP CNSLTJ NEW/EST MOD 40: CPT | Performed by: INTERNAL MEDICINE

## 2018-06-22 RX ORDER — NAPROXEN SODIUM 220 MG
220 TABLET ORAL AS NEEDED
COMMUNITY
End: 2018-06-22

## 2018-06-22 NOTE — PROGRESS NOTES
Dear Dr. Alisson Farley:    I saw your patient Cristopher Houston in consultation this afternoon at your request for evaluation of polyarthritis.   As you know, he is a 58-year-old gentleman who for the last 5 years has had progressive arthritis across his hand PIP an daily. Rare alcohol. 3 cups of coffee a day. He stays busy walking, going up and down stairs, working on a quarts plus.     Review of systems:  No fevers, chills, sweats, anorexia, weight loss, rash, abnormal alopecia, internal eye inflammation, oral and

## 2018-08-08 RX ORDER — ROSUVASTATIN CALCIUM 10 MG/1
TABLET, COATED ORAL
Qty: 90 TABLET | Refills: 0 | Status: SHIPPED | OUTPATIENT
Start: 2018-08-08 | End: 2018-11-16

## 2018-08-08 NOTE — TELEPHONE ENCOUNTER
Cholesterol Medications  Protocol Criteria:  · Appointment scheduled in the past 12 months or in the next 3 months  · ALT & LDL on file in the past 12 months  · ALT result < 80  · LDL result <130   Recent Outpatient Visits            1 month ago Primary os

## 2018-08-23 ENCOUNTER — OFFICE VISIT (OUTPATIENT)
Dept: DERMATOLOGY CLINIC | Facility: CLINIC | Age: 68
End: 2018-08-23
Payer: COMMERCIAL

## 2018-08-23 DIAGNOSIS — D23.30 BENIGN NEOPLASM OF SKIN OF FACE: ICD-10-CM

## 2018-08-23 DIAGNOSIS — D23.60 BENIGN NEOPLASM OF SKIN OF UPPER LIMB, INCLUDING SHOULDER, UNSPECIFIED LATERALITY: ICD-10-CM

## 2018-08-23 DIAGNOSIS — L82.1 SEBORRHEIC KERATOSES: ICD-10-CM

## 2018-08-23 DIAGNOSIS — L82.0 INFLAMED SEBORRHEIC KERATOSIS: ICD-10-CM

## 2018-08-23 DIAGNOSIS — D23.70 BENIGN NEOPLASM OF SKIN OF LOWER LIMB, INCLUDING HIP, UNSPECIFIED LATERALITY: ICD-10-CM

## 2018-08-23 DIAGNOSIS — Z85.828 HISTORY OF BASAL CELL CARCINOMA: ICD-10-CM

## 2018-08-23 DIAGNOSIS — Z85.820 PERSONAL HISTORY OF MALIGNANT MELANOMA OF SKIN: Primary | ICD-10-CM

## 2018-08-23 DIAGNOSIS — D23.4 BENIGN NEOPLASM OF SCALP AND SKIN OF NECK: ICD-10-CM

## 2018-08-23 DIAGNOSIS — D23.5 BENIGN NEOPLASM OF SKIN OF TRUNK, EXCEPT SCROTUM: ICD-10-CM

## 2018-08-23 DIAGNOSIS — Z87.2 HISTORY OF ACTINIC KERATOSES: ICD-10-CM

## 2018-08-23 DIAGNOSIS — L81.4 SOLAR LENTIGO: ICD-10-CM

## 2018-08-23 PROCEDURE — 99214 OFFICE O/P EST MOD 30 MIN: CPT | Performed by: DERMATOLOGY

## 2018-08-23 PROCEDURE — 99212 OFFICE O/P EST SF 10 MIN: CPT | Performed by: DERMATOLOGY

## 2018-08-23 PROCEDURE — 17110 DESTRUCTION B9 LES UP TO 14: CPT | Performed by: DERMATOLOGY

## 2018-08-23 NOTE — PROGRESS NOTES
HPI:     Chief Complaint     Full Skin Exam        HPI     Full Skin Exam    Additional comments: LOV: 02/22/18. Pt presents for a full skin exam. Pt has personal hx of Melanoma and BCC.         Last edited by Issa Martel on 8/23/2018  1:25 PM. (Hist Disp: 30 tablet Rfl: 1   acetaminophen 325 MG Oral Tab Take 650 mg by mouth every 6 (six) hours as needed for Pain.  Disp:  Rfl:    Albuterol Sulfate HFA (PROAIR HFA) 108 (90 BASE) MCG/ACT Inhalation Aero Soln Inhale 2 puffs into the lungs every 4 (four) ho Social History Narrative   None on file     Family History   Problem Relation Age of Onset   • Dementia Mother    • Seizure Disorder Mother    • Hypertension Mother    • Cancer Mother      skin       PHYSICAL EXAM:   Physical Exam  A complete body exam months  Seborrheic keratoses-diagnosis discussed–reassurance–no treatment  Solar lentigo-proper use and application of broad-spectrum sunblock SPF 30 or higher discussed.   Patient understands to put it on  15-20 minutes before outsides so that  it is absor

## 2018-10-13 DIAGNOSIS — N52.03 COMBINED ARTERIAL INSUFFICIENCY AND CORPORO-VENOUS OCCLUSIVE ERECTILE DYSFUNCTION: ICD-10-CM

## 2018-10-16 RX ORDER — SILDENAFIL CITRATE 20 MG/1
TABLET ORAL
Qty: 5 TABLET | Refills: 0 | Status: SHIPPED | OUTPATIENT
Start: 2018-10-16 | End: 2018-10-20

## 2018-10-16 NOTE — TELEPHONE ENCOUNTER
No Protocol on this med.        Requested Prescriptions     Pending Prescriptions Disp Refills   • SILDENAFIL CITRATE 20 MG Oral Tab [Pharmacy Med Name: Sildenafil Citrate Oral Tablet 20 MG] 5 tablet 0     Sig: TAKE ONE TABLET BY MOUTH DAILY AS NEEDED

## 2018-10-20 ENCOUNTER — OFFICE VISIT (OUTPATIENT)
Dept: INTERNAL MEDICINE CLINIC | Facility: CLINIC | Age: 68
End: 2018-10-20
Payer: COMMERCIAL

## 2018-10-20 VITALS
HEIGHT: 71 IN | SYSTOLIC BLOOD PRESSURE: 131 MMHG | WEIGHT: 176 LBS | BODY MASS INDEX: 24.64 KG/M2 | HEART RATE: 79 BPM | TEMPERATURE: 98 F | DIASTOLIC BLOOD PRESSURE: 88 MMHG

## 2018-10-20 DIAGNOSIS — Z12.11 COLON CANCER SCREENING: ICD-10-CM

## 2018-10-20 DIAGNOSIS — Z00.00 ANNUAL PHYSICAL EXAM: Primary | ICD-10-CM

## 2018-10-20 DIAGNOSIS — N52.03 COMBINED ARTERIAL INSUFFICIENCY AND CORPORO-VENOUS OCCLUSIVE ERECTILE DYSFUNCTION: ICD-10-CM

## 2018-10-20 DIAGNOSIS — N40.0 ENLARGED PROSTATE: ICD-10-CM

## 2018-10-20 DIAGNOSIS — F17.210 CIGARETTE NICOTINE DEPENDENCE WITHOUT COMPLICATION: ICD-10-CM

## 2018-10-20 DIAGNOSIS — E78.00 HYPERCHOLESTEROLEMIA: ICD-10-CM

## 2018-10-20 DIAGNOSIS — D48.5 NEOPLASM OF UNCERTAIN BEHAVIOR OF SKIN: ICD-10-CM

## 2018-10-20 DIAGNOSIS — C43.62 MELANOMA OF LEFT UPPER ARM (HCC): ICD-10-CM

## 2018-10-20 DIAGNOSIS — Z85.820 PERSONAL HISTORY OF MALIGNANT MELANOMA OF SKIN: ICD-10-CM

## 2018-10-20 PROCEDURE — 99397 PER PM REEVAL EST PAT 65+ YR: CPT | Performed by: INTERNAL MEDICINE

## 2018-10-20 RX ORDER — SILDENAFIL CITRATE 20 MG/1
TABLET ORAL
Qty: 5 TABLET | Refills: 0 | Status: SHIPPED | OUTPATIENT
Start: 2018-10-20 | End: 2019-03-27

## 2018-10-20 NOTE — PATIENT INSTRUCTIONS
Prevention Guidelines, Men Ages 72 and Older  Screening tests and vaccines are an important part of managing your health. A screening test is done to find possible disorders or diseases in people who don't have any symptoms.  The goal is to find a disease Hepatitis C Men at increased risk for infection – talk with your healthcare provider At routine exams   High cholesterol or triglycerides All men in this age group At least every 5 years   HIV Men at increased risk for infection – talk with your healthcare Pneumococcal conjugate vaccine (PCV13) and pneumococcal polysaccharide vaccine (PPSV23) All men in this age group 1 dose of each vaccine   Tetanus/diphtheria/  pertussis (Td/Tdap) booster All men in this age group Td every 10 years, or Tdap if you will hav Support programs can be a big help, especially for heavy smokers. These groups offer lectures, ways to change behavior, and peer support. Here are some ways to find a support program:  · Free national quitline: 800-QUIT-NOW (176-200-1360).   · [facility] quit · 1 year: Risk of heart attack decreases by half. · 5 years: Risk of lung cancer decreases by half; risk of stroke becomes the same as a nonsmoker’s. For more on how to quit smoking, try these online resources:   · Smokefree. gov  · \"Clearing the Air\" b

## 2018-10-20 NOTE — PROGRESS NOTES
Patient ID: Kaylee Blancas is a 70year old male. Patient presents with:  Physical       HISTORY OF PRESENT ILLNESS:   HPI  Patient presents for above. Here for annual physical as well as to discuss multiple medical issues.   Does not recall when he 2-17-17    Exc lesion of nose, V_Y flap   • COLONOSCOPY     • EXCIS TENDON SHEATH LESN,HAND/DINAR Right 2-17-17    Exc ganglion R thumb   • EXCISION LESION HEAD/NECK/FACE Left 2/17/2017    Performed by Rogelio Avila MD at [address]   • FOOT SURG Yes        Alcohol/week: 0.0 oz        Comment: rarely      Drug use: No      Sexual activity: Not on file    Other Topics      Concerns:         Service: Not Asked        Blood Transfusions: Not Asked        Caffeine Concern: Yes          coffee, mood and affect. ASSESSMENT/PLAN:   1. Annual physical exam  · CBC WITH DIFFERENTIAL WITH PLATELET; Future  · COMP METABOLIC PANEL (14); Future  · LIPID PANEL; Future  · TSH W REFLEX TO FREE T4; Future  · PSA; Future    2.  Combined arterial insuffi

## 2018-10-22 ENCOUNTER — LAB ENCOUNTER (OUTPATIENT)
Dept: LAB | Facility: HOSPITAL | Age: 68
End: 2018-10-22
Attending: INTERNAL MEDICINE
Payer: COMMERCIAL

## 2018-10-22 DIAGNOSIS — Z12.11 COLON CANCER SCREENING: ICD-10-CM

## 2018-10-22 DIAGNOSIS — N40.0 ENLARGED PROSTATE: ICD-10-CM

## 2018-10-22 DIAGNOSIS — E78.00 HYPERCHOLESTEROLEMIA: ICD-10-CM

## 2018-10-22 DIAGNOSIS — Z00.00 ANNUAL PHYSICAL EXAM: ICD-10-CM

## 2018-10-22 PROCEDURE — 84153 ASSAY OF PSA TOTAL: CPT

## 2018-10-22 PROCEDURE — 85025 COMPLETE CBC W/AUTO DIFF WBC: CPT

## 2018-10-22 PROCEDURE — 80053 COMPREHEN METABOLIC PANEL: CPT

## 2018-10-22 PROCEDURE — 36415 COLL VENOUS BLD VENIPUNCTURE: CPT

## 2018-10-22 PROCEDURE — 84443 ASSAY THYROID STIM HORMONE: CPT

## 2018-10-22 PROCEDURE — 80061 LIPID PANEL: CPT

## 2018-10-23 ENCOUNTER — APPOINTMENT (OUTPATIENT)
Dept: LAB | Age: 68
End: 2018-10-23
Attending: INTERNAL MEDICINE
Payer: COMMERCIAL

## 2018-10-23 PROCEDURE — 82274 ASSAY TEST FOR BLOOD FECAL: CPT

## 2018-10-31 NOTE — H&P
[address] [facility] [address] Gastroenterology                                                                                                  Clinic History and Physical     Pa kidney 2002    left   • Chronic headaches    • Chronic sinusitis    • Congestion of nasal sinus    • Ganglion of flexor tendon sheath of right thumb 2-17-17   • High cholesterol    • Melanoma ([facility] [address] 8/2016    left arm, stage I; .75 mm depth   • Osteoarthrit consult notes Disp: 1 Bottle Rfl: 0   ibuprofen 600 MG Oral Tab Take 600 mg by mouth every 6 (six) hours as needed for Pain.  Disp:  Rfl:    Sildenafil Citrate 20 MG Oral Tab TAKE ONE TABLET BY MOUTH DAILY AS NEEDED Disp: 5 tablet Rfl: 0   ROSUVASTATIN CALC labs and imaging were reviewed and discussed with patient today. See HPI and A&P for further details.      .  ASSESSMENT/PLAN:   Krista Kearns is a 70year old year-old male pt of Dr. Edward Song with history of adenomatous colon polyps, basal cell carcinom elected to proceed with colonoscopy with intervention [i.e. polypectomy, stent placement, etc.] as indicated. Orders This Visit:  No orders of the defined types were placed in this encounter.       Meds This Visit:  Requested Prescriptions     Si

## 2018-11-02 ENCOUNTER — NURSE TRIAGE (OUTPATIENT)
Dept: INTERNAL MEDICINE CLINIC | Facility: CLINIC | Age: 68
End: 2018-11-02

## 2018-11-02 NOTE — TELEPHONE ENCOUNTER
Pt called in stating that he is urinating more frequently due to an enlarged prostate. Pt is requesting a new prescription for a Beta Blocker to control this issue.   Please advise

## 2018-11-03 NOTE — TELEPHONE ENCOUNTER
Please reply to pool: EM RN TRIAGE    Action Requested: Summary for Provider     []  Critical Lab, Recommendations Needed  [x] Need Additional Advice  []   FYI    [x]   Need Orders  [] Need Medications Sent to Pharmacy  []  Other     SUMMARY: Declines OV,

## 2018-11-05 ENCOUNTER — TELEPHONE (OUTPATIENT)
Dept: INTERNAL MEDICINE CLINIC | Facility: CLINIC | Age: 68
End: 2018-11-05

## 2018-11-05 DIAGNOSIS — R39.89 URINARY PROBLEM: Primary | ICD-10-CM

## 2018-11-06 ENCOUNTER — APPOINTMENT (OUTPATIENT)
Dept: LAB | Facility: HOSPITAL | Age: 68
End: 2018-11-06
Attending: INTERNAL MEDICINE
Payer: COMMERCIAL

## 2018-11-06 DIAGNOSIS — R39.89 URINARY PROBLEM: ICD-10-CM

## 2018-11-06 PROCEDURE — 81003 URINALYSIS AUTO W/O SCOPE: CPT

## 2018-11-06 PROCEDURE — 87086 URINE CULTURE/COLONY COUNT: CPT

## 2018-11-06 NOTE — TELEPHONE ENCOUNTER
Mariam Gutierrez to patient, he reports that he has feeling of not emptying bladder completely, he has frequent urination, advised him to get UA and culture and perhaps see urologist for further evaluation.   Order placed, advised him that I will forward test re

## 2018-11-13 ENCOUNTER — OFFICE VISIT (OUTPATIENT)
Dept: GASTROENTEROLOGY | Facility: CLINIC | Age: 68
End: 2018-11-13
Payer: COMMERCIAL

## 2018-11-13 ENCOUNTER — TELEPHONE (OUTPATIENT)
Dept: GASTROENTEROLOGY | Facility: CLINIC | Age: 68
End: 2018-11-13

## 2018-11-13 VITALS
SYSTOLIC BLOOD PRESSURE: 124 MMHG | HEIGHT: 71 IN | DIASTOLIC BLOOD PRESSURE: 77 MMHG | HEART RATE: 78 BPM | BODY MASS INDEX: 24.78 KG/M2 | WEIGHT: 177 LBS

## 2018-11-13 DIAGNOSIS — Z86.010 HISTORY OF COLON POLYPS: ICD-10-CM

## 2018-11-13 DIAGNOSIS — R19.5 POSITIVE FIT (FECAL IMMUNOCHEMICAL TEST): ICD-10-CM

## 2018-11-13 DIAGNOSIS — Z86.010 HX OF COLONIC POLYPS: Primary | ICD-10-CM

## 2018-11-13 DIAGNOSIS — R19.5 POSITIVE FIT (FECAL IMMUNOCHEMICAL TEST): Primary | ICD-10-CM

## 2018-11-13 PROCEDURE — 99243 OFF/OP CNSLTJ NEW/EST LOW 30: CPT | Performed by: NURSE PRACTITIONER

## 2018-11-13 PROCEDURE — 99212 OFFICE O/P EST SF 10 MIN: CPT | Performed by: NURSE PRACTITIONER

## 2018-11-13 RX ORDER — IBUPROFEN 600 MG/1
600 TABLET ORAL EVERY 6 HOURS PRN
COMMUNITY
End: 2020-08-25

## 2018-11-13 NOTE — TELEPHONE ENCOUNTER
Scheduled for:  Colonoscopy 35510  Provider Name: Dr Hilario Dowling  Date:  Wed 12/26/18  Location:  [facility]  Sedation:  IV  Time: 10:30 am  Prep: split dose colyte  Meds/Allergies Reconciled?:  PCN  Diagnosis with codes:  HCP Z86.010, + FIT R19.5   Was patient informed t

## 2018-11-13 NOTE — PATIENT INSTRUCTIONS
1. Schedule colonoscopy with Dr. Naman Vizcaino w/ IV [place] or MAC    2.  bowel prep from pharmacy - split dose Colyte    3. Continue all medications for procedure     4. Read all bowel prep instructions carefully    5.  AVOID seeds, nuts, popcorn,

## 2018-11-16 RX ORDER — ROSUVASTATIN CALCIUM 10 MG/1
TABLET, COATED ORAL
Qty: 30 TABLET | Refills: 2 | Status: SHIPPED | OUTPATIENT
Start: 2018-11-16 | End: 2019-02-06

## 2018-11-19 ENCOUNTER — TELEPHONE (OUTPATIENT)
Dept: OTHER | Age: 68
End: 2018-11-19

## 2018-11-20 ENCOUNTER — PATIENT MESSAGE (OUTPATIENT)
Dept: GASTROENTEROLOGY | Facility: CLINIC | Age: 68
End: 2018-11-20

## 2018-11-20 NOTE — TELEPHONE ENCOUNTER
From: Royal Jenkins  To: EMERY Bustamante  Sent: 11/20/2018 5:59 AM CST  Subject: Non-Urgent Medical Question    I was wondering if an Endoscopy is included in Colonoscopy procedure? If not, do you think  it could be done or is necessary? thanks.

## 2018-11-21 ENCOUNTER — TELEPHONE (OUTPATIENT)
Dept: GASTROENTEROLOGY | Facility: CLINIC | Age: 68
End: 2018-11-21

## 2018-11-21 NOTE — TELEPHONE ENCOUNTER
Nursing: The patient did not discuss this with me at the time of the office visit.     If he has no other significant upper GI symptoms such as substantial reflux, nausea, vomiting, hematemesis, dysphagia, odynophagia, chest pain, shortness of breath, etc.

## 2018-11-21 NOTE — TELEPHONE ENCOUNTER
Pt states he has been feeling a little bloated and having gas that has been going on for about a couple months. No belching.  Pt is consuming a lot of vegetables with meals ( broccoli, green beans etc)  Denies N/V, fever,rectal bleeding, mucus mixed wit

## 2018-11-21 NOTE — TELEPHONE ENCOUNTER
PA for Sildenafil Citrate 20 mg tab completed with [employer] via CMM response time 24-72 hours KEY [id number].

## 2018-11-21 NOTE — TELEPHONE ENCOUNTER
Regarding: RE: Non-Urgent Medical Question  Contact: [phone]  ----- Message from Generic, Mychart sent at 11/20/2018 12:37 PM CST -----    just having a little more gas than usual. Maybe I eat too fast.  ----- Message -----  From: Gemma Kaminski: 11/20

## 2018-12-26 ENCOUNTER — HOSPITAL ENCOUNTER (OUTPATIENT)
Facility: HOSPITAL | Age: 68
Setting detail: HOSPITAL OUTPATIENT SURGERY
Discharge: HOME | End: 2018-12-26
Attending: INTERNAL MEDICINE | Admitting: INTERNAL MEDICINE
Payer: COMMERCIAL

## 2018-12-26 VITALS
OXYGEN SATURATION: 95 % | DIASTOLIC BLOOD PRESSURE: 77 MMHG | HEART RATE: 78 BPM | BODY MASS INDEX: 24.5 KG/M2 | WEIGHT: 175 LBS | RESPIRATION RATE: 12 BRPM | SYSTOLIC BLOOD PRESSURE: 107 MMHG | HEIGHT: 71 IN

## 2018-12-26 DIAGNOSIS — R19.5 POSITIVE FIT (FECAL IMMUNOCHEMICAL TEST): ICD-10-CM

## 2018-12-26 DIAGNOSIS — Z86.010 HX OF COLONIC POLYPS: ICD-10-CM

## 2018-12-26 PROCEDURE — 45385 COLONOSCOPY W/LESION REMOVAL: CPT | Performed by: INTERNAL MEDICINE

## 2018-12-26 PROCEDURE — 0DBL8ZX EXCISION OF TRANSVERSE COLON, VIA NATURAL OR ARTIFICIAL OPENING ENDOSCOPIC, DIAGNOSTIC: ICD-10-PCS | Performed by: INTERNAL MEDICINE

## 2018-12-26 PROCEDURE — G0500 MOD SEDAT ENDO SERVICE >5YRS: HCPCS | Performed by: INTERNAL MEDICINE

## 2018-12-26 RX ORDER — MIDAZOLAM HYDROCHLORIDE 1 MG/ML
1 INJECTION INTRAMUSCULAR; INTRAVENOUS EVERY 5 MIN PRN
Status: DISCONTINUED | OUTPATIENT
Start: 2018-12-26 | End: 2018-12-26

## 2018-12-26 RX ORDER — SODIUM CHLORIDE 0.9 % (FLUSH) 0.9 %
10 SYRINGE (ML) INJECTION AS NEEDED
Status: DISCONTINUED | OUTPATIENT
Start: 2018-12-26 | End: 2018-12-26

## 2018-12-26 RX ORDER — MIDAZOLAM HYDROCHLORIDE 1 MG/ML
INJECTION INTRAMUSCULAR; INTRAVENOUS
Status: DISCONTINUED | OUTPATIENT
Start: 2018-12-26 | End: 2018-12-26

## 2018-12-26 RX ORDER — SODIUM CHLORIDE, SODIUM LACTATE, POTASSIUM CHLORIDE, CALCIUM CHLORIDE 600; 310; 30; 20 MG/100ML; MG/100ML; MG/100ML; MG/100ML
INJECTION, SOLUTION INTRAVENOUS CONTINUOUS
Status: DISCONTINUED | OUTPATIENT
Start: 2018-12-26 | End: 2018-12-26

## 2018-12-26 NOTE — H&P
History & Physical Examination    Patient Name: Vicky Vang  MRN: [id number]  [facility]: [id number]  YOB: 1950    Diagnosis: Positive FIT test, personal history of adenomatous colon polyps        Medications Prior to Admission:  ROSUVASTATIN LESION HEAD/NECK/FACE Left 2/17/2017    Performed by Louisa Michel MD at [address] [facility] MAIN OR   • FOOT SURGERY Left 2006    Nerve procedure   • HAND GANGLION EXCISION Right 2/17/2017    Performed by Louisa Michel MD at [address] [facility]   • NASAL SCOPY,

## 2018-12-26 NOTE — OPERATIVE REPORT
[facility] Endoscopy Report      Date of Procedure:  12/26/18      Preoperative Diagnosis:  1. Positive FIT test  2. Personal history of adenomatous colon polyps      Postoperative Diagnosis:  1. Colon polyps  2.   Internal hemorrhoids and retrieved via suction. 2.  In the distal transverse colon there was a 5-6 mm sessile polyp which was cold snare excised and retrieved via suction. Inspection of all the above sites revealed no evidence of ongoing bleeding.   There were no other colo

## 2018-12-31 ENCOUNTER — TELEPHONE (OUTPATIENT)
Dept: GASTROENTEROLOGY | Facility: CLINIC | Age: 68
End: 2018-12-31

## 2018-12-31 NOTE — TELEPHONE ENCOUNTER
----- Message from Nasir Storm MD sent at 12/29/2018  1:49 PM CST -----  Please see the 12/27/18 email encounter with documentation from 12/29/18:    \"Mr. Azalia Newsome left a message on your voicemail.   You had #3 small polyps in the colon whi

## 2019-02-06 RX ORDER — ROSUVASTATIN CALCIUM 10 MG/1
TABLET, COATED ORAL
Qty: 30 TABLET | Refills: 1 | Status: SHIPPED | OUTPATIENT
Start: 2019-02-06 | End: 2019-04-16

## 2019-02-28 ENCOUNTER — OFFICE VISIT (OUTPATIENT)
Dept: INTERNAL MEDICINE CLINIC | Facility: CLINIC | Age: 69
End: 2019-02-28
Payer: COMMERCIAL

## 2019-02-28 ENCOUNTER — OFFICE VISIT (OUTPATIENT)
Dept: DERMATOLOGY CLINIC | Facility: CLINIC | Age: 69
End: 2019-02-28
Payer: COMMERCIAL

## 2019-02-28 VITALS
HEIGHT: 71 IN | WEIGHT: 176 LBS | BODY MASS INDEX: 24.64 KG/M2 | SYSTOLIC BLOOD PRESSURE: 127 MMHG | HEART RATE: 71 BPM | TEMPERATURE: 98 F | DIASTOLIC BLOOD PRESSURE: 80 MMHG

## 2019-02-28 DIAGNOSIS — L81.4 SOLAR LENTIGO: ICD-10-CM

## 2019-02-28 DIAGNOSIS — L82.1 SEBORRHEIC KERATOSES: ICD-10-CM

## 2019-02-28 DIAGNOSIS — J06.9 UPPER RESPIRATORY TRACT INFECTION, UNSPECIFIED TYPE: Primary | ICD-10-CM

## 2019-02-28 DIAGNOSIS — D23.30 BENIGN NEOPLASM OF SKIN OF FACE: ICD-10-CM

## 2019-02-28 DIAGNOSIS — Z85.828 HISTORY OF BASAL CELL CARCINOMA: ICD-10-CM

## 2019-02-28 DIAGNOSIS — D23.4 BENIGN NEOPLASM OF SCALP AND SKIN OF NECK: ICD-10-CM

## 2019-02-28 DIAGNOSIS — Z87.2 HISTORY OF ACTINIC KERATOSES: ICD-10-CM

## 2019-02-28 DIAGNOSIS — D23.60 BENIGN NEOPLASM OF SKIN OF UPPER LIMB, INCLUDING SHOULDER, UNSPECIFIED LATERALITY: ICD-10-CM

## 2019-02-28 DIAGNOSIS — Z85.820 PERSONAL HISTORY OF MALIGNANT MELANOMA OF SKIN: Primary | ICD-10-CM

## 2019-02-28 DIAGNOSIS — M54.50 ACUTE RIGHT-SIDED LOW BACK PAIN WITHOUT SCIATICA: ICD-10-CM

## 2019-02-28 DIAGNOSIS — D23.5 BENIGN NEOPLASM OF SKIN OF TRUNK, EXCEPT SCROTUM: ICD-10-CM

## 2019-02-28 DIAGNOSIS — D23.70 BENIGN NEOPLASM OF SKIN OF LOWER LIMB, INCLUDING HIP, UNSPECIFIED LATERALITY: ICD-10-CM

## 2019-02-28 PROCEDURE — 99212 OFFICE O/P EST SF 10 MIN: CPT | Performed by: INTERNAL MEDICINE

## 2019-02-28 PROCEDURE — 99212 OFFICE O/P EST SF 10 MIN: CPT | Performed by: DERMATOLOGY

## 2019-02-28 PROCEDURE — 99214 OFFICE O/P EST MOD 30 MIN: CPT | Performed by: DERMATOLOGY

## 2019-02-28 PROCEDURE — 99214 OFFICE O/P EST MOD 30 MIN: CPT | Performed by: INTERNAL MEDICINE

## 2019-02-28 RX ORDER — METHYLPREDNISOLONE 4 MG/1
TABLET ORAL
Qty: 1 KIT | Refills: 0 | Status: SHIPPED | OUTPATIENT
Start: 2019-02-28 | End: 2019-03-26 | Stop reason: ALTCHOICE

## 2019-02-28 NOTE — PATIENT INSTRUCTIONS
Lumbar Extension (Flexibility)    1. Lie face down on your stomach, forehead on the floor. You can lie on a mat or towel. 2. Bend your arms next to your body and lift your upper body up on your forearms.  Your palms and forearms should be flat on the dominguez

## 2019-02-28 NOTE — PROGRESS NOTES
Patient ID: Mohit Rodríguez is a 70year old male.   Patient presents with:  Cold: cold and congestion x1 week   Back Pain: Back pain due to injury yesterday        HISTORY OF PRESENT ILLNESS:   HPI  1 week(s) ago  Fever - No, Tmax (N/A)  Cough - Yes, p Left 2/17/2017    Performed by Sigrid Fajardo MD at [address] [place] [address] MAIN OR   • FOOT SURGERY Left 2006    Nerve procedure   • HAND GANGLION EXCISION Right 2/17/2017    Performed by Sigrid Fajardo MD at [address] [place] [address] OR   • [facility] use: Yes        Alcohol/week: 0.0 oz        Comment: rarely      Drug use: No      Sexual activity: Not on file    Lifestyle      Physical activity:        Days per week: Not on file        Minutes per session: Not on file      Stress: Not on file    Relat external ear and ear canal normal.   Nose: Right sinus exhibits no maxillary sinus tenderness and no frontal sinus tenderness. Left sinus exhibits no maxillary sinus tenderness and no frontal sinus tenderness.    Mouth/Throat: Posterior oropharyngeal erythe

## 2019-02-28 NOTE — PROGRESS NOTES
HPI:     Chief Complaint     Full Skin Exam        HPI     Full Skin Exam      Additional comments: LOV /23/2018 Patient present with lesions of concern.  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   • Calculus of kidney 2002    left   • Chronic headaches    • Chronic sinusitis    • Congestion of nasal sinus    • Ganglion of flexor tendon sheath of right thumb 2-17-17   • High cholesterol    • [facility]    l Packs/day: 1.00        Years: 42.00        Pack years: 43        Types: Cigarettes      Smokeless tobacco: Never Used    Substance and Sexual Activity      Alcohol use:  Yes        Alcohol/week: 0.0 oz        Comment: rarely      Drug use: No      Sexual ac performed, including face, neck, chest , back, abdomen, r upper extremity, l upper extremity, buttocks, genital area, l lower extremity and right lower extremity.   Also exam of the scalp, as well as cervical, axillary, inguinal, and supraclavicular lymph n neoplasm of scalp and skin of neck  Benign neoplasm of skin of face  Benign neoplasm of skin of upper limb, including shoulder, unspecified laterality  Benign neoplasm of skin of lower limb, including hip, unspecified laterality    No orders of the defined

## 2019-03-26 ENCOUNTER — OFFICE VISIT (OUTPATIENT)
Dept: PODIATRY CLINIC | Facility: CLINIC | Age: 69
End: 2019-03-26
Payer: COMMERCIAL

## 2019-03-26 ENCOUNTER — HOSPITAL ENCOUNTER (OUTPATIENT)
Dept: GENERAL RADIOLOGY | Facility: HOSPITAL | Age: 69
Discharge: HOME | End: 2019-03-26
Attending: PODIATRIST
Payer: COMMERCIAL

## 2019-03-26 DIAGNOSIS — R52 PAIN: Primary | ICD-10-CM

## 2019-03-26 DIAGNOSIS — R52 PAIN: ICD-10-CM

## 2019-03-26 DIAGNOSIS — M84.374A STRESS FRACTURE OF METATARSAL BONE OF RIGHT FOOT, INITIAL ENCOUNTER: ICD-10-CM

## 2019-03-26 PROCEDURE — 99212 OFFICE O/P EST SF 10 MIN: CPT | Performed by: PODIATRIST

## 2019-03-26 PROCEDURE — 99213 OFFICE O/P EST LOW 20 MIN: CPT | Performed by: PODIATRIST

## 2019-03-26 PROCEDURE — 73630 X-RAY EXAM OF FOOT: CPT | Performed by: PODIATRIST

## 2019-03-26 NOTE — PROGRESS NOTES
HPI:    Patient ID: Darien Hdez is a 70year old male. This 56-year-old male presents to the office today having not been seen in about a year and a half.   He has a new complaint of pain over the top of the right foot this been present for 6-8 week was instructed on its use potential concerns and expectations. He was instructed to ice this dorsal aspect of the foot twice every evening for 15 minutes and some caution in reference to activity levels.   Reevaluate in approximately 2-3 weeks for further

## 2019-03-27 DIAGNOSIS — N52.03 COMBINED ARTERIAL INSUFFICIENCY AND CORPORO-VENOUS OCCLUSIVE ERECTILE DYSFUNCTION: ICD-10-CM

## 2019-03-27 RX ORDER — SILDENAFIL CITRATE 20 MG/1
TABLET ORAL
Qty: 5 TABLET | Refills: 0 | Status: SHIPPED | OUTPATIENT
Start: 2019-03-27 | End: 2019-06-12

## 2019-03-27 NOTE — TELEPHONE ENCOUNTER
No refill protocol for this med. Routed to MD for review.       Recent Visits  Date Type Provider Dept   02/28/19 Office Visit Chon Mckeon MD [facility]-Internal Med   10/20/18 Office Visit MD [facility]   05/29/18 Office Visit Chon Mckeon

## 2019-04-16 ENCOUNTER — OFFICE VISIT (OUTPATIENT)
Dept: PODIATRY CLINIC | Facility: CLINIC | Age: 69
End: 2019-04-16
Payer: COMMERCIAL

## 2019-04-16 ENCOUNTER — HOSPITAL ENCOUNTER (OUTPATIENT)
Dept: GENERAL RADIOLOGY | Facility: HOSPITAL | Age: 69
Discharge: HOME | End: 2019-04-16
Attending: PODIATRIST
Payer: COMMERCIAL

## 2019-04-16 DIAGNOSIS — R52 PAIN: ICD-10-CM

## 2019-04-16 DIAGNOSIS — M19.90 ARTHRITIS: ICD-10-CM

## 2019-04-16 DIAGNOSIS — R52 PAIN: Primary | ICD-10-CM

## 2019-04-16 PROCEDURE — 99213 OFFICE O/P EST LOW 20 MIN: CPT | Performed by: PODIATRIST

## 2019-04-16 PROCEDURE — 73630 X-RAY EXAM OF FOOT: CPT | Performed by: PODIATRIST

## 2019-04-16 PROCEDURE — 99212 OFFICE O/P EST SF 10 MIN: CPT | Performed by: PODIATRIST

## 2019-04-16 NOTE — PROGRESS NOTES
HPI:    Patient ID: Brianne Olivas is a 70year old male. 44-year-old male presents for follow-up in reference to right forefoot pain. Patient would state that he is not significantly better as a result of time and treatment.   He has aching soreness

## 2019-04-18 RX ORDER — ROSUVASTATIN CALCIUM 10 MG/1
TABLET, COATED ORAL
Qty: 90 TABLET | Refills: 1 | Status: SHIPPED | OUTPATIENT
Start: 2019-04-18 | End: 2019-10-02

## 2019-04-18 NOTE — TELEPHONE ENCOUNTER
Refill passed per [facility]], [facility] protocol.   Cholesterol Medications  Protocol Criteria:  · Appointment scheduled in the past 12 months or in the next 3 months  · ALT & LDL on file in the past 12 months  · ALT result < 80  · LDL result <130   Recent Outpat

## 2019-04-22 ENCOUNTER — TELEPHONE (OUTPATIENT)
Dept: PODIATRY CLINIC | Facility: CLINIC | Age: 69
End: 2019-04-22

## 2019-04-23 NOTE — TELEPHONE ENCOUNTER
There remains an area in question so an MRI has been ordered to assist in making the diagnosis.  scr

## 2019-04-23 NOTE — TELEPHONE ENCOUNTER
Pt notified per [facility] message. Also informed him of MRI approval and to call CS to make appt and f/u after.

## 2019-04-30 ENCOUNTER — HOSPITAL ENCOUNTER (OUTPATIENT)
Dept: MRI IMAGING | Facility: HOSPITAL | Age: 69
Discharge: HOME | End: 2019-04-30
Attending: PODIATRIST
Payer: COMMERCIAL

## 2019-04-30 DIAGNOSIS — M19.90 ARTHRITIS: ICD-10-CM

## 2019-04-30 PROCEDURE — 73718 MRI LOWER EXTREMITY W/O DYE: CPT | Performed by: PODIATRIST

## 2019-05-01 ENCOUNTER — OFFICE VISIT (OUTPATIENT)
Dept: PODIATRY CLINIC | Facility: CLINIC | Age: 69
End: 2019-05-01
Payer: COMMERCIAL

## 2019-05-01 VITALS — RESPIRATION RATE: 17 BRPM | DIASTOLIC BLOOD PRESSURE: 77 MMHG | HEART RATE: 72 BPM | SYSTOLIC BLOOD PRESSURE: 118 MMHG

## 2019-05-01 DIAGNOSIS — M19.90 ARTHRITIS: Primary | ICD-10-CM

## 2019-05-01 PROCEDURE — 99212 OFFICE O/P EST SF 10 MIN: CPT | Performed by: PODIATRIST

## 2019-05-01 PROCEDURE — 99213 OFFICE O/P EST LOW 20 MIN: CPT | Performed by: PODIATRIST

## 2019-05-01 PROCEDURE — 20600 DRAIN/INJ JOINT/BURSA W/O US: CPT | Performed by: PODIATRIST

## 2019-05-01 NOTE — PROCEDURES
Per verbal order from Dr. Shereen Carbajal, draw up 0.5ml of 0.5% Marcaine and 20 mg of Depo-Medrol for cortisone injection to right foot, heel Robby Santoro RN

## 2019-05-01 NOTE — PROGRESS NOTES
HPI:    Patient ID: La Hermann is a 70year old male. 65-year-old male presents for follow-up in reference to right foot pain.   I had an opportunity to review her recent MRI that shows joint changes primarily associated with the fourth metatarsal

## 2019-06-05 ENCOUNTER — OFFICE VISIT (OUTPATIENT)
Dept: PODIATRY CLINIC | Facility: CLINIC | Age: 69
End: 2019-06-05
Payer: COMMERCIAL

## 2019-06-05 DIAGNOSIS — R52 PAIN: ICD-10-CM

## 2019-06-05 DIAGNOSIS — M19.90 ARTHRITIS: Primary | ICD-10-CM

## 2019-06-05 PROCEDURE — 99212 OFFICE O/P EST SF 10 MIN: CPT | Performed by: PODIATRIST

## 2019-06-05 PROCEDURE — 99213 OFFICE O/P EST LOW 20 MIN: CPT | Performed by: PODIATRIST

## 2019-06-05 NOTE — PROGRESS NOTES
HPI:    Patient ID: Royal Jenkins is a 70year old male. 58-year-old male presents for follow-up in reference to right midfoot pain. The injection of cortisone 1 month ago provided him with approximately 50% improvement.       ROS:   I did review med

## 2019-06-12 DIAGNOSIS — N52.03 COMBINED ARTERIAL INSUFFICIENCY AND CORPORO-VENOUS OCCLUSIVE ERECTILE DYSFUNCTION: ICD-10-CM

## 2019-06-13 ENCOUNTER — TELEPHONE (OUTPATIENT)
Dept: INTERNAL MEDICINE CLINIC | Facility: CLINIC | Age: 69
End: 2019-06-13

## 2019-06-13 DIAGNOSIS — N52.03 COMBINED ARTERIAL INSUFFICIENCY AND CORPORO-VENOUS OCCLUSIVE ERECTILE DYSFUNCTION: Primary | ICD-10-CM

## 2019-06-13 RX ORDER — SILDENAFIL CITRATE 20 MG/1
TABLET ORAL
Qty: 5 TABLET | Refills: 0 | Status: SHIPPED | OUTPATIENT
Start: 2019-06-13 | End: 2019-06-19

## 2019-06-13 NOTE — TELEPHONE ENCOUNTER
Review pended refill request as it does not fall under a protocol.     Requested Prescriptions     Pending Prescriptions Disp Refills   • SILDENAFIL CITRATE 20 MG Oral Tab [Pharmacy Med Name: Sildenafil Citrate 20 Mg Tab Teva] 5 tablet 0     Sig: TAKE ONE T

## 2019-06-13 NOTE — TELEPHONE ENCOUNTER
Prior Alejandro Marks is needed please go to  Key. [employer]  Key: [id number]    Current Outpatient Medications:   •  SILDENAFIL CITRATE 20 MG Oral Tab, TAKE ONE TABLET BY MOUTH DAILY AS NEEDED, Disp: 5 tablet, Rfl: 0 yes

## 2019-06-13 NOTE — TELEPHONE ENCOUNTER
PA for Sildenafil Citrate 20 mg tab completed with [employer] via CMM response time 24-72 hours KEY [id number].

## 2019-06-17 ENCOUNTER — NURSE TRIAGE (OUTPATIENT)
Dept: OTHER | Age: 69
End: 2019-06-17

## 2019-06-17 ENCOUNTER — PATIENT MESSAGE (OUTPATIENT)
Dept: INTERNAL MEDICINE CLINIC | Facility: CLINIC | Age: 69
End: 2019-06-17

## 2019-06-17 NOTE — TELEPHONE ENCOUNTER
From: Tee Han  To: Olinda Dela Cruz MD  Sent: 6/17/2019 9:44 AM CDT  Subject: Non-Urgent Medical Question    I have allergies; congestion in sinus. I cannot seem to get my ears unstuffed. Any suggestions?

## 2019-06-17 NOTE — TELEPHONE ENCOUNTER
Action Requested: Summary for Provider     []  Critical Lab, Recommendations Needed  [] Need Additional Advice  [x]   FYI    []   Need Orders  [] Need Medications Sent to Pharmacy  []  Other     SUMMARY: See email below.  OV already scheduled for 6/19/19 at

## 2019-06-17 NOTE — TELEPHONE ENCOUNTER
Subject Delivery           Non-Urgent Medical Paola Quintana 6/17/2019 9:44 AM Reply    To: CITLALLI Ramirez      [facility]      Created: 6/17/2019 9:44 AM        *-*-*This message has not been handled. *-*-*    I have allergies; congestion in s

## 2019-06-19 ENCOUNTER — OFFICE VISIT (OUTPATIENT)
Dept: INTERNAL MEDICINE CLINIC | Facility: CLINIC | Age: 69
End: 2019-06-19
Payer: COMMERCIAL

## 2019-06-19 VITALS
WEIGHT: 176 LBS | HEIGHT: 71 IN | SYSTOLIC BLOOD PRESSURE: 123 MMHG | DIASTOLIC BLOOD PRESSURE: 80 MMHG | TEMPERATURE: 98 F | HEART RATE: 71 BPM | BODY MASS INDEX: 24.64 KG/M2

## 2019-06-19 DIAGNOSIS — R09.81 SINUS CONGESTION: Primary | ICD-10-CM

## 2019-06-19 DIAGNOSIS — J30.2 SEASONAL ALLERGIES: ICD-10-CM

## 2019-06-19 PROCEDURE — 99213 OFFICE O/P EST LOW 20 MIN: CPT | Performed by: INTERNAL MEDICINE

## 2019-06-19 PROCEDURE — 99212 OFFICE O/P EST SF 10 MIN: CPT | Performed by: INTERNAL MEDICINE

## 2019-06-19 RX ORDER — TADALAFIL 10 MG/1
10 TABLET ORAL
Qty: 10 TABLET | Refills: 3 | Status: SHIPPED | OUTPATIENT
Start: 2019-06-19 | End: 2019-06-26

## 2019-06-19 NOTE — PATIENT INSTRUCTIONS
1. Begin taking Claritin-D or equivalent for 7 to 10 days. After this begin taking only Claritin or its equivalent. 2.  Continue saline washes.

## 2019-06-19 NOTE — PROGRESS NOTES
Patient ID: Krista Kearns is a 78year old male. Patient presents with:  Sinus Problem: Stuffed sinus and ears clogged x3 weeks        HISTORY OF PRESENT ILLNESS:   HPI  Patient presents for above.   Here with sinus congestion and ear congestion for Performed by Josue Underwood MD at [address] [facility] MAIN OR   • NASAL SCOPY,REMV PART ETHMOID     • NASAL SCOPY,RMV TISS MAXILL SINUS     • NASAL SEPTOPLASTY BILAT SINUS ENDOSCOPY ETHM MAX N/A 6/28/2017    Performed by Cheyenne Mckeon MD at [address] [facility] MAIN OR   • REPAI Stress: Not on file    Relationships      Social connections:        Talks on phone: Not on file        Gets together: Not on file        Attends Amish service: Not on file        Active member of club or organization: Not on file        Attends meetin Eyes: Pupils are equal, round, and reactive to light. EOM are normal. No scleral icterus. Cardiovascular: Normal rate, regular rhythm and normal heart sounds. Pulmonary/Chest: Effort normal and breath sounds normal. No respiratory distress.    Lymphad

## 2019-06-22 ENCOUNTER — PATIENT MESSAGE (OUTPATIENT)
Dept: INTERNAL MEDICINE CLINIC | Facility: CLINIC | Age: 69
End: 2019-06-22

## 2019-06-22 NOTE — TELEPHONE ENCOUNTER
From: Tee Han  To: Mirza Torres MD  Sent: 6/22/2019 3:47 AM CDT  Subject: Non-Urgent Medical Question    the CLARATIN-D is not helping to unclog my sinuses or clear my ears. Actually, seems worse and affects my prostate.  Also, the pharmacy did n

## 2019-06-22 NOTE — TELEPHONE ENCOUNTER
See email encounter 6/22/19, pt following up for his script for Sildenafil. He was advised via [employer] of alternative script sent in due to coverage issue.  Did follow up with Shayne Davis today, they ran script for Tadalafil and said this was not covered

## 2019-06-22 NOTE — TELEPHONE ENCOUNTER
Patient seen in [address] [place] [address] Rd 6/19 and given script for Claritin D, please advise on alternative per patient request.

## 2019-06-24 NOTE — TELEPHONE ENCOUNTER
PA for Tadalafil 10 mg tab completed with "[employer] via CMM response time 24-72 hours KEY [id number].

## 2019-06-24 NOTE — TELEPHONE ENCOUNTER
PA for Tadalafil 10 mg tab completed with [employer] via CMM response time 3-5 business days Merle Crews..

## 2019-06-26 ENCOUNTER — TELEPHONE (OUTPATIENT)
Dept: INTERNAL MEDICINE CLINIC | Facility: CLINIC | Age: 69
End: 2019-06-26

## 2019-06-26 ENCOUNTER — OFFICE VISIT (OUTPATIENT)
Dept: INTERNAL MEDICINE CLINIC | Facility: CLINIC | Age: 69
End: 2019-06-26
Payer: COMMERCIAL

## 2019-06-26 VITALS
HEIGHT: 71 IN | WEIGHT: 176 LBS | HEART RATE: 68 BPM | SYSTOLIC BLOOD PRESSURE: 125 MMHG | BODY MASS INDEX: 24.64 KG/M2 | DIASTOLIC BLOOD PRESSURE: 79 MMHG | TEMPERATURE: 98 F

## 2019-06-26 DIAGNOSIS — R09.81 SINUS CONGESTION: Primary | ICD-10-CM

## 2019-06-26 DIAGNOSIS — N52.03 COMBINED ARTERIAL INSUFFICIENCY AND CORPORO-VENOUS OCCLUSIVE ERECTILE DYSFUNCTION: ICD-10-CM

## 2019-06-26 PROCEDURE — 99212 OFFICE O/P EST SF 10 MIN: CPT | Performed by: INTERNAL MEDICINE

## 2019-06-26 PROCEDURE — 99213 OFFICE O/P EST LOW 20 MIN: CPT | Performed by: INTERNAL MEDICINE

## 2019-06-26 RX ORDER — TADALAFIL 10 MG/1
10 TABLET ORAL
Qty: 5 TABLET | Refills: 3 | Status: SHIPPED | OUTPATIENT
Start: 2019-06-26 | End: 2019-10-30 | Stop reason: ALTCHOICE

## 2019-06-26 RX ORDER — PREDNISONE 20 MG/1
40 TABLET ORAL DAILY
Qty: 10 TABLET | Refills: 0 | Status: SHIPPED | OUTPATIENT
Start: 2019-06-26 | End: 2019-07-01

## 2019-06-26 NOTE — PROGRESS NOTES
Patient ID: Dany Turner is a 78year old male. Patient presents with: Follow - Up: 1 week follow up on sinus, not getting better. HISTORY OF PRESENT ILLNESS:   HPI  Patient presents for above. Sinus congestion the past several weeks.   Too ETHMOID     • NASAL SCOPY,RMV TISS MAXILL SINUS     • NASAL SEPTOPLASTY BILAT SINUS ENDOSCOPY ETHM MAX N/A 6/28/2017    Performed by January Mcdonald MD at [facility] OR   • Leela Thurman [id number]   • REPAIR OF NASAL SEPTUM     • SKIN GRAFT Left 2/17/20 per session: Not on file      Stress: Not on file    Relationships      Social connections:        Talks on phone: Not on file        Gets together: Not on file        Attends [employer] service: Not on file        Active member of club or organization: Not No oropharyngeal exudate. Eyes: Pupils are equal, round, and reactive to light. EOM are normal. No scleral icterus. Cardiovascular: Normal rate, regular rhythm and normal heart sounds.    Pulmonary/Chest: Effort normal and breath sounds normal. No respi

## 2019-06-26 NOTE — TELEPHONE ENCOUNTER
Received fax from [place] stating Tadalafil 10mg tabs requires PA. 1) key. [employer] [place]  2) bella:  Cam Angel

## 2019-06-26 NOTE — TELEPHONE ENCOUNTER
Received fax from [address] [place] [address] stating Tadalafil 10mg tabs requires PA. 1) key. [employer]. [employer]  2) bella:  Reema Parra

## 2019-07-02 ENCOUNTER — OFFICE VISIT (OUTPATIENT)
Dept: PHYSICAL THERAPY | Facility: HOSPITAL | Age: 69
End: 2019-07-02
Attending: PODIATRIST
Payer: COMMERCIAL

## 2019-07-02 DIAGNOSIS — M19.90 ARTHRITIS: ICD-10-CM

## 2019-07-02 PROCEDURE — 97110 THERAPEUTIC EXERCISES: CPT

## 2019-07-02 PROCEDURE — 97140 MANUAL THERAPY 1/> REGIONS: CPT

## 2019-07-02 PROCEDURE — 97161 PT EVAL LOW COMPLEX 20 MIN: CPT

## 2019-07-02 NOTE — PROGRESS NOTES
LOWER EXTREMITY EVALUATION:   Referring Physician: Dr. Eliseo Rai  Diagnosis: Arthritis 4th metatarsal base cuboid joint, R lateral foot pain  Date of Onset: Feb 2019 Date of Service: 7/2/2019     PATIENT SUMMARY:   Jonatan Velazquez is a 78year old y/o mal edd oconnor tiss maxill sinus     • Repair of nasal septum  1985   • Repair of nasal septum     • Skin surgery Left 10/07/16    left upper arm melanoma     Medications reviewed with pt in chart.         ASSESSMENT:   Pt presents with R lateral foot pain secon and 3rd TMT, Calcaneocuboid, fibular head, and 4th and 5th met on cuboid manipulations. TL junction manip.   Tx response: improved segmental mobility of R midfoot, rearfoot and ankle with increased SLR to 70 deg with reduced adverse neural tension  Charges:

## 2019-07-08 ENCOUNTER — PATIENT MESSAGE (OUTPATIENT)
Dept: INTERNAL MEDICINE CLINIC | Facility: CLINIC | Age: 69
End: 2019-07-08

## 2019-07-08 DIAGNOSIS — R09.81 SINUS CONGESTION: Primary | ICD-10-CM

## 2019-07-08 DIAGNOSIS — J32.4 CHRONIC PANSINUSITIS: ICD-10-CM

## 2019-07-08 NOTE — TELEPHONE ENCOUNTER
From: Ashley Perkins  To: Briana Steele MD  Sent: 7/8/2019 5:45 AM CDT  Subject: Non-Urgent Medical Question    Nothing seems to be helping my sinuses. Maybe it is time to see an allergist? Is there one in the building I can make an appointment with?

## 2019-07-09 ENCOUNTER — APPOINTMENT (OUTPATIENT)
Dept: PHYSICAL THERAPY | Facility: HOSPITAL | Age: 69
End: 2019-07-09
Attending: PODIATRIST
Payer: COMMERCIAL

## 2019-07-09 NOTE — TELEPHONE ENCOUNTER
To: CITLALLI Yoder [place]      From: Ashley Perkins      Created: 7/9/2019 7:41 AM          I will make an appointment.               Appointment Information   Name: Gordon Jorge MRN: [id number]   Date: 7/12/2019 Status: Nan   Appt Time: 7:50 AM Length: 1

## 2019-07-12 ENCOUNTER — OFFICE VISIT (OUTPATIENT)
Dept: AUDIOLOGY | Facility: CLINIC | Age: 69
End: 2019-07-12
Payer: COMMERCIAL

## 2019-07-12 ENCOUNTER — OFFICE VISIT (OUTPATIENT)
Dept: OTOLARYNGOLOGY | Facility: CLINIC | Age: 69
End: 2019-07-12
Payer: COMMERCIAL

## 2019-07-12 VITALS
TEMPERATURE: 98 F | HEART RATE: 71 BPM | HEIGHT: 71 IN | BODY MASS INDEX: 24.92 KG/M2 | RESPIRATION RATE: 16 BRPM | DIASTOLIC BLOOD PRESSURE: 79 MMHG | WEIGHT: 178 LBS | SYSTOLIC BLOOD PRESSURE: 122 MMHG

## 2019-07-12 DIAGNOSIS — H90.3 SENSORINEURAL HEARING LOSS, BILATERAL: Primary | ICD-10-CM

## 2019-07-12 DIAGNOSIS — J30.89 NON-SEASONAL ALLERGIC RHINITIS, UNSPECIFIED TRIGGER: ICD-10-CM

## 2019-07-12 DIAGNOSIS — H91.93 BILATERAL HEARING LOSS, UNSPECIFIED HEARING LOSS TYPE: Primary | ICD-10-CM

## 2019-07-12 PROCEDURE — 99213 OFFICE O/P EST LOW 20 MIN: CPT | Performed by: OTOLARYNGOLOGY

## 2019-07-12 PROCEDURE — 92567 TYMPANOMETRY: CPT | Performed by: AUDIOLOGIST

## 2019-07-12 PROCEDURE — 92557 COMPREHENSIVE HEARING TEST: CPT | Performed by: AUDIOLOGIST

## 2019-07-12 RX ORDER — MONTELUKAST SODIUM 10 MG/1
10 TABLET ORAL NIGHTLY
Qty: 30 TABLET | Refills: 3 | Status: SHIPPED | OUTPATIENT
Start: 2019-07-12 | End: 2019-10-30

## 2019-07-12 NOTE — PROGRESS NOTES
AUDIOLOGY REPORT      Krista Kearns is a 78year old male     Referring Provider: Daisy Albert   YOB: 1950  Medical Record: [id number]      Patient Hearing History:  Patient feels his ear are plugged, but does not have significant hear

## 2019-07-12 NOTE — PROGRESS NOTES
Amanda Ernandez is a 78year old male.  Patient presents with:  Hearing Loss: ears muffled x 2 months  Sinus Problem: congestion    HPI:   He has had significant problems with sinusitis in the past.  Generally has been doing pretty well but in the last f rashes  RESPIRATORY: denies shortness of breath with exertion  NEURO: denies headaches    EXAM:   /79   Pulse 71   Temp 97.9 °F (36.6 °C) (Tympanic)   Resp 16   Ht 5' 11\" (1.803 m)   Wt 178 lb (80.7 kg)   BMI 24.83 kg/m²   System Findings Details

## 2019-07-16 ENCOUNTER — OFFICE VISIT (OUTPATIENT)
Dept: PHYSICAL THERAPY | Facility: HOSPITAL | Age: 69
End: 2019-07-16
Attending: PODIATRIST
Payer: COMMERCIAL

## 2019-07-16 DIAGNOSIS — M19.90 ARTHRITIS: ICD-10-CM

## 2019-07-16 PROCEDURE — 97110 THERAPEUTIC EXERCISES: CPT

## 2019-07-16 PROCEDURE — 97140 MANUAL THERAPY 1/> REGIONS: CPT

## 2019-07-17 NOTE — PROGRESS NOTES
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)      Medication Changes since last visit?: No    Subjective: Pt reports R foot feeling a little better since last session.  Has be

## 2019-07-18 ENCOUNTER — OFFICE VISIT (OUTPATIENT)
Dept: PHYSICAL THERAPY | Facility: HOSPITAL | Age: 69
End: 2019-07-18
Attending: PODIATRIST
Payer: COMMERCIAL

## 2019-07-18 ENCOUNTER — TELEPHONE (OUTPATIENT)
Dept: INTERNAL MEDICINE CLINIC | Facility: CLINIC | Age: 69
End: 2019-07-18

## 2019-07-18 DIAGNOSIS — M19.90 ARTHRITIS: ICD-10-CM

## 2019-07-18 PROCEDURE — 97140 MANUAL THERAPY 1/> REGIONS: CPT

## 2019-07-18 PROCEDURE — 97110 THERAPEUTIC EXERCISES: CPT

## 2019-07-18 NOTE — PROGRESS NOTES
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

## 2019-07-18 NOTE — TELEPHONE ENCOUNTER
Received from [employer] stating Tadalafil 10mg tabs requires PA. 1) key. [email or web address]  2) [id number]

## 2019-07-23 ENCOUNTER — OFFICE VISIT (OUTPATIENT)
Dept: PHYSICAL THERAPY | Facility: HOSPITAL | Age: 69
End: 2019-07-23
Attending: PODIATRIST
Payer: COMMERCIAL

## 2019-07-23 DIAGNOSIS — M19.90 ARTHRITIS: ICD-10-CM

## 2019-07-23 PROCEDURE — 97110 THERAPEUTIC EXERCISES: CPT

## 2019-07-23 PROCEDURE — 97140 MANUAL THERAPY 1/> REGIONS: CPT

## 2019-07-23 NOTE — PROGRESS NOTES
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  Continues to see some i EX 2       Total Timed Treatment: MT 10 min, EX 30 min  Total Treatment Time: 40 min

## 2019-07-25 ENCOUNTER — OFFICE VISIT (OUTPATIENT)
Dept: PHYSICAL THERAPY | Facility: HOSPITAL | Age: 69
End: 2019-07-25
Attending: PODIATRIST
Payer: COMMERCIAL

## 2019-07-25 DIAGNOSIS — M19.90 ARTHRITIS: ICD-10-CM

## 2019-07-25 PROCEDURE — 97110 THERAPEUTIC EXERCISES: CPT

## 2019-07-25 PROCEDURE — 97140 MANUAL THERAPY 1/> REGIONS: CPT

## 2019-07-25 NOTE — PROGRESS NOTES
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 Executing exercises appropriately with minimal compensations. Ready to transition to full HEP next visit secondary to progress made.      Goals: 4 wks   At least 60 min walking to be able to go grocery shopping without difficul

## 2019-07-30 ENCOUNTER — OFFICE VISIT (OUTPATIENT)
Dept: PHYSICAL THERAPY | Facility: HOSPITAL | Age: 69
End: 2019-07-30
Attending: PODIATRIST
Payer: COMMERCIAL

## 2019-07-30 DIAGNOSIS — M19.90 ARTHRITIS: ICD-10-CM

## 2019-07-30 PROCEDURE — 97140 MANUAL THERAPY 1/> REGIONS: CPT

## 2019-07-30 PROCEDURE — 97110 THERAPEUTIC EXERCISES: CPT

## 2019-07-30 NOTE — PROGRESS NOTES
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     Sincerely,  Electronically signed by therapist: Jackie Lopez, PT    [de-identified] certification required: Yes  I certify the need for these services furnished under this plan of treatment a

## 2019-08-01 ENCOUNTER — APPOINTMENT (OUTPATIENT)
Dept: PHYSICAL THERAPY | Facility: HOSPITAL | Age: 69
End: 2019-08-01
Attending: PODIATRIST
Payer: COMMERCIAL

## 2019-08-06 ENCOUNTER — APPOINTMENT (OUTPATIENT)
Dept: PHYSICAL THERAPY | Facility: HOSPITAL | Age: 69
End: 2019-08-06
Attending: PODIATRIST
Payer: COMMERCIAL

## 2019-08-08 ENCOUNTER — APPOINTMENT (OUTPATIENT)
Dept: PHYSICAL THERAPY | Facility: HOSPITAL | Age: 69
End: 2019-08-08
Attending: PODIATRIST
Payer: COMMERCIAL

## 2019-08-12 ENCOUNTER — OFFICE VISIT (OUTPATIENT)
Dept: INTERNAL MEDICINE CLINIC | Facility: CLINIC | Age: 69
End: 2019-08-12
Payer: COMMERCIAL

## 2019-08-12 VITALS
HEART RATE: 79 BPM | TEMPERATURE: 99 F | DIASTOLIC BLOOD PRESSURE: 76 MMHG | BODY MASS INDEX: 25.06 KG/M2 | SYSTOLIC BLOOD PRESSURE: 127 MMHG | HEIGHT: 71 IN | WEIGHT: 179 LBS

## 2019-08-12 DIAGNOSIS — J06.9 UPPER RESPIRATORY TRACT INFECTION, UNSPECIFIED TYPE: Primary | ICD-10-CM

## 2019-08-12 DIAGNOSIS — F17.210 CIGARETTE NICOTINE DEPENDENCE WITHOUT COMPLICATION: ICD-10-CM

## 2019-08-12 PROCEDURE — 99214 OFFICE O/P EST MOD 30 MIN: CPT | Performed by: INTERNAL MEDICINE

## 2019-08-12 RX ORDER — PREDNISONE 20 MG/1
40 TABLET ORAL DAILY
Qty: 10 TABLET | Refills: 0 | Status: SHIPPED | OUTPATIENT
Start: 2019-08-12 | End: 2019-08-17

## 2019-08-12 NOTE — PROGRESS NOTES
Patient ID: Vicky Vang is a 78year old male.   Patient presents with:  Cold: with cough, congestion, ache body, slight fever, runny nose, for almost 2 wks        HISTORY OF PRESENT ILLNESS:   HPI  10 day(s) ago  Fever - Yes, Tmax (99.0)  Cough - Y Ryan Momin MD at [facility] OR   • NASAL SCOPY,REMV PART ETHMOID     • NASAL SCOPY,RMV TISS MAXILL SINUS     • NASAL SEPTOPLASTY BILAT SINUS ENDOSCOPY ETHM MAX N/A 6/28/2017    Performed by January Mcdonald MD at [facility] OR   • Leela Thurman [address]   • standard drinks        Comment: rarely      Drug use: No      Sexual activity: Not on file    Lifestyle      Physical activity:        Days per week: Not on file        Minutes per session: Not on file      Stress: Not on file    Relationships      Social edema or tonsillar abscesses. Eyes: No scleral icterus. Cardiovascular: Normal rate, regular rhythm and normal heart sounds. Pulmonary/Chest: Effort normal and breath sounds normal. No respiratory distress.    Lymphadenopathy:        Head (right side)

## 2019-08-12 NOTE — PATIENT INSTRUCTIONS
1.  Stop smoking. 2.  Stop smoking. 3.  Take steroids exactly as prescribed. 4.  Symptomatic treatment with hot showers, steam inhalation, Ifrah Sunshine, cough suppressants. 5.  Be sure to drink plenty of fluids.     How to Quit Smoking  Smoking is one o Nicotine replacement therapy may make quitting easier. Certain aids, such as the nicotine patch, gum, and lozenges, are available without a prescription. It is best to use these under a doctor’s care, though.  The skin patch provides a steady supply of sumeet © 1319-5525 [employer] [address]. [address] [facility], [facility] [place]. All rights reserved. This information is not intended as a substitute for professional medical care. Always follow your healthcare professional's instructions.

## 2019-08-13 ENCOUNTER — APPOINTMENT (OUTPATIENT)
Dept: PHYSICAL THERAPY | Facility: HOSPITAL | Age: 69
End: 2019-08-13
Attending: PODIATRIST
Payer: COMMERCIAL

## 2019-08-15 ENCOUNTER — APPOINTMENT (OUTPATIENT)
Dept: PHYSICAL THERAPY | Facility: HOSPITAL | Age: 69
End: 2019-08-15
Attending: PODIATRIST
Payer: COMMERCIAL

## 2019-08-29 ENCOUNTER — OFFICE VISIT (OUTPATIENT)
Dept: DERMATOLOGY CLINIC | Facility: CLINIC | Age: 69
End: 2019-08-29
Payer: COMMERCIAL

## 2019-08-29 DIAGNOSIS — Z85.820 PERSONAL HISTORY OF MALIGNANT MELANOMA OF SKIN: Primary | ICD-10-CM

## 2019-08-29 DIAGNOSIS — D23.30 BENIGN NEOPLASM OF SKIN OF FACE: ICD-10-CM

## 2019-08-29 DIAGNOSIS — L82.1 SEBORRHEIC KERATOSES: ICD-10-CM

## 2019-08-29 DIAGNOSIS — L81.4 SOLAR LENTIGO: ICD-10-CM

## 2019-08-29 DIAGNOSIS — L82.0 INFLAMED SEBORRHEIC KERATOSIS: ICD-10-CM

## 2019-08-29 DIAGNOSIS — D23.5 BENIGN NEOPLASM OF SKIN OF TRUNK, EXCEPT SCROTUM: ICD-10-CM

## 2019-08-29 DIAGNOSIS — D23.4 BENIGN NEOPLASM OF SCALP AND SKIN OF NECK: ICD-10-CM

## 2019-08-29 DIAGNOSIS — Z85.828 HISTORY OF BASAL CELL CARCINOMA: ICD-10-CM

## 2019-08-29 DIAGNOSIS — D23.60 BENIGN NEOPLASM OF SKIN OF UPPER LIMB, INCLUDING SHOULDER, UNSPECIFIED LATERALITY: ICD-10-CM

## 2019-08-29 DIAGNOSIS — Z87.2 HISTORY OF ACTINIC KERATOSES: ICD-10-CM

## 2019-08-29 DIAGNOSIS — D23.70 BENIGN NEOPLASM OF SKIN OF LOWER LIMB, INCLUDING HIP, UNSPECIFIED LATERALITY: ICD-10-CM

## 2019-08-29 PROCEDURE — 99214 OFFICE O/P EST MOD 30 MIN: CPT | Performed by: DERMATOLOGY

## 2019-08-29 PROCEDURE — 17110 DESTRUCTION B9 LES UP TO 14: CPT | Performed by: DERMATOLOGY

## 2019-08-29 NOTE — PROGRESS NOTES
HPI:     Chief Complaint     Follow - Up        HPI     Follow - Up      Additional comments: per patient full body check, no concerns at this time, per patient has a history of skin cancer          Last edited by Lorena Mercado, [facility] on 8/29/2019  1:36 P HIVES    Past Medical History:   Diagnosis Date   • Basal cell carcinoma 2017    left nose   • Basal cell carcinoma of nose 2-17-17   • Calculus of kidney 2002    left   • Chronic headaches    • Chronic sinusitis    • Congestion of nasal sinus    • Ganglio Non-medical: Not on file    Tobacco Use      Smoking status: Current Every Day Smoker        Packs/day: 1.00        Years: 42.00        Pack years: 43        Types: Cigarettes      Smokeless tobacco: Never Used    Substance and Sexual Activity      Alcoh skin   • Cancer Father         Leukemia       PHYSICAL EXAM:   Physical Exam  A complete body exam was performed, including face, neck, chest , back, abdomen, r upper extremity, l upper extremity, buttocks, genital area, l lower extremity and right lower understands to put it on  15-20 minutes before outsides so that  it is absorbed and working and to reapply it every few hours.     Benign neoplasm of skin of trunk, except scrotum  Benign neoplasm of scalp and skin of neck  Benign neoplasm of skin of face

## 2019-09-04 ENCOUNTER — PATIENT MESSAGE (OUTPATIENT)
Dept: INTERNAL MEDICINE CLINIC | Facility: CLINIC | Age: 69
End: 2019-09-04

## 2019-09-04 DIAGNOSIS — N52.03 COMBINED ARTERIAL INSUFFICIENCY AND CORPORO-VENOUS OCCLUSIVE ERECTILE DYSFUNCTION: ICD-10-CM

## 2019-09-04 RX ORDER — SILDENAFIL CITRATE 20 MG/1
TABLET ORAL
Qty: 10 TABLET | Refills: 5 | Status: SHIPPED | OUTPATIENT
Start: 2019-09-04 | End: 2020-08-25

## 2019-09-04 NOTE — TELEPHONE ENCOUNTER
From: Sushila Cruz  To: Kamran Hadley MD  Sent: 9/4/2019 5:26 AM CDT  Subject: Prescription Question    Could I get a refill for Sildenafil Citrate 20 MG. Maybe 10 or 20?  Do not send through my insurance because it is not covered, I can pay out-of-poc

## 2019-10-03 RX ORDER — ROSUVASTATIN CALCIUM 10 MG/1
TABLET, COATED ORAL
Qty: 90 TABLET | Refills: 1 | Status: SHIPPED | OUTPATIENT
Start: 2019-10-03 | End: 2020-04-07

## 2019-10-03 NOTE — TELEPHONE ENCOUNTER
Refill passed per [facility], [facility] protocol.     Cholesterol Medications  Protocol Criteria:  · Appointment scheduled in the past 12 months or in the next 3 months  · ALT & LDL on file in the past 12 months  · ALT result < 80  · LDL result <130   Recent Outp

## 2019-10-30 ENCOUNTER — OFFICE VISIT (OUTPATIENT)
Dept: INTERNAL MEDICINE CLINIC | Facility: CLINIC | Age: 69
End: 2019-10-30
Payer: COMMERCIAL

## 2019-10-30 ENCOUNTER — LAB ENCOUNTER (OUTPATIENT)
Dept: LAB | Age: 69
End: 2019-10-30
Attending: INTERNAL MEDICINE
Payer: COMMERCIAL

## 2019-10-30 ENCOUNTER — APPOINTMENT (OUTPATIENT)
Dept: LAB | Age: 69
End: 2019-10-30
Attending: INTERNAL MEDICINE
Payer: COMMERCIAL

## 2019-10-30 VITALS
DIASTOLIC BLOOD PRESSURE: 75 MMHG | HEIGHT: 71 IN | HEART RATE: 67 BPM | WEIGHT: 180 LBS | SYSTOLIC BLOOD PRESSURE: 125 MMHG | TEMPERATURE: 98 F | BODY MASS INDEX: 25.2 KG/M2

## 2019-10-30 DIAGNOSIS — E78.00 HYPERCHOLESTEROLEMIA: ICD-10-CM

## 2019-10-30 DIAGNOSIS — Z87.2 HISTORY OF ACTINIC KERATOSES: ICD-10-CM

## 2019-10-30 DIAGNOSIS — Z00.00 ANNUAL PHYSICAL EXAM: ICD-10-CM

## 2019-10-30 DIAGNOSIS — N52.03 COMBINED ARTERIAL INSUFFICIENCY AND CORPORO-VENOUS OCCLUSIVE ERECTILE DYSFUNCTION: ICD-10-CM

## 2019-10-30 DIAGNOSIS — N40.0 ENLARGED PROSTATE: ICD-10-CM

## 2019-10-30 DIAGNOSIS — J30.2 SEASONAL ALLERGIES: ICD-10-CM

## 2019-10-30 DIAGNOSIS — C44.311 BASAL CELL CARCINOMA OF NOSE: ICD-10-CM

## 2019-10-30 DIAGNOSIS — Z00.00 ANNUAL PHYSICAL EXAM: Primary | ICD-10-CM

## 2019-10-30 DIAGNOSIS — F17.210 CIGARETTE NICOTINE DEPENDENCE WITHOUT COMPLICATION: ICD-10-CM

## 2019-10-30 DIAGNOSIS — Z12.11 COLON CANCER SCREENING: ICD-10-CM

## 2019-10-30 PROBLEM — M72.2 PLANTAR FASCIITIS OF RIGHT FOOT: Status: RESOLVED | Noted: 2017-09-05 | Resolved: 2019-10-30

## 2019-10-30 PROCEDURE — 80053 COMPREHEN METABOLIC PANEL: CPT

## 2019-10-30 PROCEDURE — 36415 COLL VENOUS BLD VENIPUNCTURE: CPT

## 2019-10-30 PROCEDURE — 80061 LIPID PANEL: CPT

## 2019-10-30 PROCEDURE — 84153 ASSAY OF PSA TOTAL: CPT

## 2019-10-30 PROCEDURE — 99213 OFFICE O/P EST LOW 20 MIN: CPT | Performed by: INTERNAL MEDICINE

## 2019-10-30 PROCEDURE — 85025 COMPLETE CBC W/AUTO DIFF WBC: CPT

## 2019-10-30 PROCEDURE — 99397 PER PM REEVAL EST PAT 65+ YR: CPT | Performed by: INTERNAL MEDICINE

## 2019-10-30 PROCEDURE — 84443 ASSAY THYROID STIM HORMONE: CPT

## 2019-10-30 RX ORDER — MONTELUKAST SODIUM 10 MG/1
10 TABLET ORAL NIGHTLY
Qty: 30 TABLET | Refills: 2 | Status: SHIPPED | OUTPATIENT
Start: 2019-10-30 | End: 2020-03-11

## 2019-10-30 NOTE — PROGRESS NOTES
Patient ID: Lesly Akins is a 78year old male. Patient presents with:  Physical       HISTORY OF PRESENT ILLNESS:   HPI  Patient presents for above. Here for his annual physical.    History of high cholesterol on Crestor.   Tolerating medication w LID,NOS,EAR <10SQCM Right 2-17-17    Exc lesion of nose, V_Y flap   • COLONOSCOPY     • COLONOSCOPY     • COLONOSCOPY N/A 12/26/2018    Performed by Henry Munguia MD at [address] T[address] MADDEN/EFRAIN Right 2-17-17    Exc gang Transportation needs:        Medical: Not on file        Non-medical: Not on file    Tobacco Use      Smoking status: Current Every Day Smoker        Packs/day: 1.00        Years: 42.00        Pack years: 43        Types: Cigarettes      Smokeless tobacco: TempSrc: Oral   Weight: 180 lb (81.6 kg)   Height: 5' 11\" (1.803 m)       Body mass index is 25.1 kg/m². Physical Exam   Constitutional: He is oriented to person, place, and time. He appears well-developed and well-nourished.    HENT:   Head: Normocep next year during spring time. 9. Colon cancer screening  · OCCULT BLOOD, FECAL, IMMUNOASSAY; Future    Return in about 6 months (around 4/30/2020) for Routine Follow-Up.     Elyssa Xavier MD  10/30/2019

## 2019-10-30 NOTE — PATIENT INSTRUCTIONS
Prevention Guidelines, Men Ages 72 and Older  Screening tests and vaccines are an important part of managing your health. A screening test is done to find possible disorders or diseases in people who don't have any symptoms.  The goal is to find a disease ea Hepatitis C Men at increased risk for infection – talk with your healthcare provider At routine exams   High cholesterol or triglycerides All men in this age group At least every 5 years   HIV Men at increased risk for infection – talk with your healthcare Pneumococcal conjugate vaccine (PCV13) and pneumococcal polysaccharide vaccine (PPSV23) All men in this age group 1 dose of each vaccine   Tetanus/diphtheria/  pertussis (Td/Tdap) booster All men in this age group Td every 10 years, or Tdap if you will hav The cholesterol in your blood comes from 2 sources: cholesterol in food that you eat and cholesterol that your liver makes. You should limit the amount of cholesterol in your diet. But the cholesterol that your body makes has the greatest disease risk.  And · Avoid saturated fats found in animal products such as meat, dairy (whole-milk, cheese and ice cream), poultry skin, and egg yolks. Plants high in saturated oils include coconut oil, palm oil, and palm kernel oil.   · If you eat meat, choose smaller portio Most former smokers quit cold turkey (all at once). Trying to cut back gradually doesn't seem to work as well, perhaps because it continues the smoking habit. Also, it is possible to inhale more while smoking fewer cigarettes.  This results in the same amou · 20 minutes: Blood pressure and pulse return to normal.  · 8 hours: Oxygen levels return to normal.  · 2 days: Ability to smell and taste begin to improve as damaged nerves regrow. · 2 to 3 weeks: Circulation and lung function improve.   · 1 to 9 months:

## 2020-01-03 ENCOUNTER — PATIENT MESSAGE (OUTPATIENT)
Dept: INTERNAL MEDICINE CLINIC | Facility: CLINIC | Age: 70
End: 2020-01-03

## 2020-01-03 ENCOUNTER — NURSE TRIAGE (OUTPATIENT)
Dept: INTERNAL MEDICINE CLINIC | Facility: CLINIC | Age: 70
End: 2020-01-03

## 2020-01-03 ENCOUNTER — APPOINTMENT (OUTPATIENT)
Dept: GENERAL RADIOLOGY | Age: 70
End: 2020-01-03
Attending: EMERGENCY MEDICINE
Payer: COMMERCIAL

## 2020-01-03 ENCOUNTER — HOSPITAL ENCOUNTER (OUTPATIENT)
Age: 70
Discharge: HOME | End: 2020-01-03
Attending: EMERGENCY MEDICINE
Payer: COMMERCIAL

## 2020-01-03 VITALS
BODY MASS INDEX: 24.5 KG/M2 | DIASTOLIC BLOOD PRESSURE: 77 MMHG | HEIGHT: 71 IN | SYSTOLIC BLOOD PRESSURE: 127 MMHG | HEART RATE: 92 BPM | WEIGHT: 175 LBS | TEMPERATURE: 98 F | OXYGEN SATURATION: 100 % | RESPIRATION RATE: 20 BRPM

## 2020-01-03 DIAGNOSIS — R05.9 COUGH: Primary | ICD-10-CM

## 2020-01-03 PROCEDURE — 99213 OFFICE O/P EST LOW 20 MIN: CPT

## 2020-01-03 PROCEDURE — 99203 OFFICE O/P NEW LOW 30 MIN: CPT

## 2020-01-03 PROCEDURE — 71046 X-RAY EXAM CHEST 2 VIEWS: CPT | Performed by: EMERGENCY MEDICINE

## 2020-01-03 NOTE — TELEPHONE ENCOUNTER
From: Marcus Alexandre  To: Alfredo Curry MD  Sent: 1/3/2020 2:04 PM CST  Subject: Non-Urgent Medical Question    I have had bad flu symptoms since Monday the 30th. Is there a prescription that might help? I have occasional slight fever. Taking Tylenol.

## 2020-01-03 NOTE — ED PROVIDER NOTES
Patient Seen in: [facility]      History   Patient presents with:  Cough/URI    Stated Complaint: FLU SYMPTOMS    HPI    This patient states he has had fever and cough for the past 5 days.   The patient stated that he had patient/caregiver and is not pertinent to presenting problem.     Social History    Tobacco Use      Smoking status: Current Every Day Smoker        Packs/day: 1.00        Years: 42.00        Pack years: 43        Types: Cigarettes      Smokeless tobacco: N BONES: Scattered mild degenerative endplate changes in the visualized thoracolumbar spine. OTHER: Negative. CONCLUSION: No acute cardiopulmonary abnormality.    Dictated by (CST): Rex Orellana MD on 1/03/2020 at 17:41     Approved by (CST): Jania Luna

## 2020-01-03 NOTE — TELEPHONE ENCOUNTER
Action Requested: Summary for Provider     []  Critical Lab, Recommendations Needed  [] Need Additional Advice  []   FYI    []   Need Orders  [] Need Medications Sent to Pharmacy  []  Other     SUMMARY: Pt wants to be seen, no appointments available until

## 2020-01-07 ENCOUNTER — OFFICE VISIT (OUTPATIENT)
Dept: INTERNAL MEDICINE CLINIC | Facility: CLINIC | Age: 70
End: 2020-01-07
Payer: COMMERCIAL

## 2020-01-07 VITALS
HEIGHT: 71 IN | OXYGEN SATURATION: 98 % | BODY MASS INDEX: 24.92 KG/M2 | SYSTOLIC BLOOD PRESSURE: 135 MMHG | WEIGHT: 178 LBS | TEMPERATURE: 99 F | DIASTOLIC BLOOD PRESSURE: 80 MMHG | HEART RATE: 89 BPM

## 2020-01-07 DIAGNOSIS — J01.00 ACUTE NON-RECURRENT MAXILLARY SINUSITIS: ICD-10-CM

## 2020-01-07 DIAGNOSIS — J06.9 UPPER RESPIRATORY TRACT INFECTION, UNSPECIFIED TYPE: Primary | ICD-10-CM

## 2020-01-07 PROCEDURE — 99213 OFFICE O/P EST LOW 20 MIN: CPT | Performed by: INTERNAL MEDICINE

## 2020-01-07 RX ORDER — AZITHROMYCIN 250 MG/1
TABLET, FILM COATED ORAL
Qty: 6 TABLET | Refills: 0 | Status: SHIPPED | OUTPATIENT
Start: 2020-01-07 | End: 2020-03-05 | Stop reason: ALTCHOICE

## 2020-01-07 NOTE — PROGRESS NOTES
Patient ID: Marlen Pittman is a 78year old male. Patient presents with:  Chest Congestion: x9 days getting worse. Cough, runny nose, low grade fever 99.6 , chest congestion, body aches, chills.         HISTORY OF PRESENT ILLNESS:   HPI  Patient prese 2006    Nerve procedure   • HAND GANGLION EXCISION Right 2/17/2017    Performed by Zhen Boo MD at [address] [facility] MAIN OR   • NASAL SCOPY,REMV PART ETHMOID     • NASAL SCOPY,RMV TISS MAXILL SINUS     • NASAL SEPTOPLASTY BILAT SINUS ENDOSCOPY ETHM MAX N/A Activity      Alcohol use:  Yes        Alcohol/week: 0.0 standard drinks        Comment: rarely      Drug use: No      Sexual activity: Not on file    Lifestyle      Physical activity:        Days per week: Not on file        Minutes per session: Not on joe Tympanic membrane, external ear and ear canal normal.   Nose: Mucosal edema and rhinorrhea present. Right sinus exhibits maxillary sinus tenderness. Right sinus exhibits no frontal sinus tenderness. Left sinus exhibits maxillary sinus tenderness.  Left sinu

## 2020-02-29 ENCOUNTER — HOSPITAL ENCOUNTER (OUTPATIENT)
Dept: GENERAL RADIOLOGY | Facility: HOSPITAL | Age: 70
Discharge: HOME | End: 2020-02-29
Attending: INTERNAL MEDICINE
Payer: MEDICARE

## 2020-02-29 ENCOUNTER — OFFICE VISIT (OUTPATIENT)
Dept: INTERNAL MEDICINE CLINIC | Facility: CLINIC | Age: 70
End: 2020-02-29
Payer: MEDICARE

## 2020-02-29 VITALS
RESPIRATION RATE: 17 BRPM | WEIGHT: 178 LBS | SYSTOLIC BLOOD PRESSURE: 126 MMHG | DIASTOLIC BLOOD PRESSURE: 80 MMHG | HEIGHT: 71 IN | HEART RATE: 80 BPM | BODY MASS INDEX: 24.92 KG/M2

## 2020-02-29 DIAGNOSIS — M54.50 ACUTE MIDLINE LOW BACK PAIN WITHOUT SCIATICA: ICD-10-CM

## 2020-02-29 DIAGNOSIS — M54.50 ACUTE MIDLINE LOW BACK PAIN WITHOUT SCIATICA: Primary | ICD-10-CM

## 2020-02-29 PROCEDURE — 72110 X-RAY EXAM L-2 SPINE 4/>VWS: CPT | Performed by: INTERNAL MEDICINE

## 2020-02-29 PROCEDURE — 99213 OFFICE O/P EST LOW 20 MIN: CPT | Performed by: INTERNAL MEDICINE

## 2020-02-29 PROCEDURE — G0463 HOSPITAL OUTPT CLINIC VISIT: HCPCS | Performed by: INTERNAL MEDICINE

## 2020-02-29 RX ORDER — PREDNISONE 20 MG/1
40 TABLET ORAL DAILY
Qty: 10 TABLET | Refills: 0 | Status: SHIPPED | OUTPATIENT
Start: 2020-02-29 | End: 2020-03-05 | Stop reason: ALTCHOICE

## 2020-02-29 RX ORDER — METHOCARBAMOL 500 MG/1
TABLET, FILM COATED ORAL 3 TIMES DAILY PRN
Qty: 30 TABLET | Refills: 1 | Status: SHIPPED | OUTPATIENT
Start: 2020-02-29 | End: 2020-06-23 | Stop reason: ALTCHOICE

## 2020-02-29 NOTE — PROGRESS NOTES
Patient ID: Darien Hdez is a 78year old male. Patient presents with:  Back Pain       HISTORY OF PRESENT ILLNESS:   HPI  Pt presents for low back pain.   Onset - 2 month(s) ago  Pain - 6/10  Nature of pain Arman Saver  Radiation - No  Trauma - Yes, li MILANA RANDALL TISS MAXILL SINUS     • NASAL SEPTOPLASTY BILAT SINUS ENDOSCOPY ETHM MAX N/A 6/28/2017    Performed by Edenilson Patel MD at [address] [facility] MAIN OR   • [address] Thur[address]   • REPAIR OF NASAL SEPTUM     • SKIN GRAFT Left 2/17/2017    Performed by Katie Russo Types: Cigarettes      Smokeless tobacco: Never Used    Substance and Sexual Activity      Alcohol use:  Yes        Alcohol/week: 0.0 standard drinks        Comment: rarely      Drug use: No      Sexual activity: Not on file    Lifestyle      Physical a normal heart sounds. Pulmonary/Chest: Effort normal and breath sounds normal. No respiratory distress. He has no wheezes. He has no rales. Musculoskeletal:      Lumbar back: He exhibits decreased range of motion and tenderness.    Neurological: He is al

## 2020-03-05 ENCOUNTER — OFFICE VISIT (OUTPATIENT)
Dept: DERMATOLOGY CLINIC | Facility: CLINIC | Age: 70
End: 2020-03-05
Payer: MEDICARE

## 2020-03-05 DIAGNOSIS — Z85.828 HISTORY OF BASAL CELL CARCINOMA: ICD-10-CM

## 2020-03-05 DIAGNOSIS — Z85.820 PERSONAL HISTORY OF MALIGNANT MELANOMA OF SKIN: Primary | ICD-10-CM

## 2020-03-05 DIAGNOSIS — D23.60 BENIGN NEOPLASM OF SKIN OF UPPER LIMB, INCLUDING SHOULDER, UNSPECIFIED LATERALITY: ICD-10-CM

## 2020-03-05 DIAGNOSIS — L81.4 SOLAR LENTIGO: ICD-10-CM

## 2020-03-05 DIAGNOSIS — D23.30 BENIGN NEOPLASM OF SKIN OF FACE: ICD-10-CM

## 2020-03-05 DIAGNOSIS — Z87.2 HISTORY OF ACTINIC KERATOSES: ICD-10-CM

## 2020-03-05 DIAGNOSIS — D23.70 BENIGN NEOPLASM OF SKIN OF LOWER LIMB, INCLUDING HIP, UNSPECIFIED LATERALITY: ICD-10-CM

## 2020-03-05 DIAGNOSIS — L82.1 SEBORRHEIC KERATOSES: ICD-10-CM

## 2020-03-05 DIAGNOSIS — L57.8 SUN-DAMAGED SKIN: ICD-10-CM

## 2020-03-05 DIAGNOSIS — D23.5 BENIGN NEOPLASM OF SKIN OF TRUNK, EXCEPT SCROTUM: ICD-10-CM

## 2020-03-05 DIAGNOSIS — D23.4 BENIGN NEOPLASM OF SCALP AND SKIN OF NECK: ICD-10-CM

## 2020-03-05 PROCEDURE — 99214 OFFICE O/P EST MOD 30 MIN: CPT | Performed by: DERMATOLOGY

## 2020-03-05 PROCEDURE — G0463 HOSPITAL OUTPT CLINIC VISIT: HCPCS | Performed by: DERMATOLOGY

## 2020-03-05 NOTE — PATIENT INSTRUCTIONS
Recommend using a broad spectrum sunblock which is SPF 30 or higher. Apply 15-20 minutes prior to exposure so that it is absorbed and working. Reapply every 2-3 hours.     A facial moisturizer with sunblock, such as Aveeno Positively Radiant,Cerave am or

## 2020-03-05 NOTE — PROGRESS NOTES
HPI:     Chief Complaint     Follow - Up        HPI     Follow - Up      Additional comments: Full body skin check. No areas of particular concern.           Last edited by Fred Nunez RN on 3/5/2020  1:30 PM. (History)          Patient presents in follo • Basal cell carcinoma 2017    left nose   • Basal cell carcinoma of nose 2-17-17   • Calculus of kidney 2002    left   • Chronic headaches    • Chronic sinusitis    • Congestion of nasal sinus    • Ganglion of flexor tendon sheath of right thumb 2-17-17 Smoking status: Current Every Day Smoker        Packs/day: 1.00        Years: 42.00        Pack years: 43        Types: Cigarettes      Smokeless tobacco: Never Used    Substance and Sexual Activity      Alcohol use:  Yes        Alcohol/week: 0.0 standard d Physical Exam  A complete body exam was performed, including face, neck, chest , back, abdomen, r upper extremity, l upper extremity, buttocks, genital area, l lower extremity and right lower extremity.   Also exam of scalp, as well as cervical, inguinal, unspecified laterality  Benign neoplasm of skin of lower limb, including hip, unspecified laterality    No orders of the defined types were placed in this encounter.       Results From Past 48 Hours:  No results found for this or any previous visit (from th

## 2020-03-12 RX ORDER — MONTELUKAST SODIUM 10 MG/1
10 TABLET ORAL NIGHTLY
Qty: 30 TABLET | Refills: 2 | Status: SHIPPED | OUTPATIENT
Start: 2020-03-12 | End: 2020-06-16

## 2020-04-07 RX ORDER — ROSUVASTATIN CALCIUM 10 MG/1
TABLET, COATED ORAL
Qty: 90 TABLET | Refills: 0 | Status: SHIPPED | OUTPATIENT
Start: 2020-04-07 | End: 2020-07-08

## 2020-05-04 ENCOUNTER — PATIENT MESSAGE (OUTPATIENT)
Dept: INTERNAL MEDICINE CLINIC | Facility: CLINIC | Age: 70
End: 2020-05-04

## 2020-05-04 ENCOUNTER — NURSE TRIAGE (OUTPATIENT)
Dept: INTERNAL MEDICINE CLINIC | Facility: CLINIC | Age: 70
End: 2020-05-04

## 2020-05-04 NOTE — TELEPHONE ENCOUNTER
Please contact patient. Thank you  ----- Message from Mendy Christianson sent at 5/4/2020  2:46 PM CDT -----  Regarding: Non-Urgent Medical Question  Contact: [phone]  I have had diarrhea since Saturday. Only slight fever Saturday none since.  I have

## 2020-05-04 NOTE — TELEPHONE ENCOUNTER
From: Marlen Liriano  To: Francis Ayoub MD  Sent: 5/4/2020 2:46 PM CDT  Subject: Non-Urgent Medical Question    I have had diarrhea since Saturday. Only slight fever Saturday none since. I have no cough or shortness of breath at this time.  Is diarrhea co

## 2020-05-05 ENCOUNTER — HOSPITAL ENCOUNTER (EMERGENCY)
Facility: HOSPITAL | Age: 70
Discharge: HOME | End: 2020-05-05
Attending: EMERGENCY MEDICINE
Payer: MEDICARE

## 2020-05-05 VITALS
SYSTOLIC BLOOD PRESSURE: 115 MMHG | WEIGHT: 175 LBS | DIASTOLIC BLOOD PRESSURE: 80 MMHG | OXYGEN SATURATION: 96 % | RESPIRATION RATE: 16 BRPM | TEMPERATURE: 98 F | HEART RATE: 71 BPM | HEIGHT: 71 IN | BODY MASS INDEX: 24.5 KG/M2

## 2020-05-05 DIAGNOSIS — R19.7 DIARRHEA, UNSPECIFIED TYPE: Primary | ICD-10-CM

## 2020-05-05 PROCEDURE — 85025 COMPLETE CBC W/AUTO DIFF WBC: CPT | Performed by: EMERGENCY MEDICINE

## 2020-05-05 PROCEDURE — 96360 HYDRATION IV INFUSION INIT: CPT

## 2020-05-05 PROCEDURE — 99284 EMERGENCY DEPT VISIT MOD MDM: CPT

## 2020-05-05 PROCEDURE — 83735 ASSAY OF MAGNESIUM: CPT | Performed by: EMERGENCY MEDICINE

## 2020-05-05 PROCEDURE — 80048 BASIC METABOLIC PNL TOTAL CA: CPT | Performed by: EMERGENCY MEDICINE

## 2020-05-05 NOTE — TELEPHONE ENCOUNTER
Action Requested: Summary for Provider     []  Critical Lab, Recommendations Needed  [x] Need Additional Advice  []   FYI    []   Need Orders  [] Need Medications Sent to Pharmacy  []  Other     SUMMARY: Patient was advised to proceed to the ER now due to

## 2020-05-05 NOTE — ED INITIAL ASSESSMENT (HPI)
Patient here with N/V/D for the last week. Denies abdominal pain, and has been able to keep some food and liquids down. Denies fevers.

## 2020-05-05 NOTE — ED PROVIDER NOTES
Patient Seen in: [facility] AND [facility] Emergency Department      History   Patient presents with:  Nausea/Vomiting/Diarrhea    Stated Complaint: N/V/D    HPI    Pt is 77 yo M who p/w watery nonbloody diarrhea x 4 days.  States he goes to bathroom every 20 min SKIN SURGERY Left 10/07/16    left upper arm melanoma                    Social History    Tobacco Use      Smoking status: Current Every Day Smoker        Packs/day: 1.00        Years: 42.00        Pack years: 43        Types: Cigarettes      Smokeless to PLATELET.   Procedure                               Abnormality         Status                     ---------                               -----------         ------                     CBC W/ DIFFERENTIAL[[id number]]          Abnormal            Final resul

## 2020-05-06 ENCOUNTER — APPOINTMENT (OUTPATIENT)
Dept: LAB | Age: 70
End: 2020-05-06
Attending: INTERNAL MEDICINE
Payer: MEDICARE

## 2020-05-06 ENCOUNTER — VIRTUAL PHONE E/M (OUTPATIENT)
Dept: INTERNAL MEDICINE CLINIC | Facility: CLINIC | Age: 70
End: 2020-05-06
Payer: MEDICARE

## 2020-05-06 DIAGNOSIS — R19.7 DIARRHEA OF PRESUMED INFECTIOUS ORIGIN: Primary | ICD-10-CM

## 2020-05-06 PROCEDURE — 99441 PHONE E/M BY PHYS 5-10 MIN: CPT | Performed by: INTERNAL MEDICINE

## 2020-05-06 NOTE — PROGRESS NOTES
Patient ID: Carlitos Bishop is a 78year old male. Patient presents with:  Diarrhea       Virtual/Telephone Check-In    Carlitos Bishop verbally consents to a Virtual/Telephone Check-In service on 05/06/20.  Patient understands and accepts financial Performed by Brain Baldwin MD at [address] [place] [address] MAIN OR   • FOOT SURGERY Left 2006    Nerve procedure   • HAND GANGLION EXCISION Right 2/17/2017    Performed by Brain Baldwin MD at [address] [place] [address] MAIN OR   • NASAL SCOPY,REMV PART ETHMOID     • NASAL SCOPY,R Smoker        Packs/day: 1.00        Years: 42.00        Pack years: 43        Types: Cigarettes      Smokeless tobacco: Never Used    Substance and Sexual Activity      Alcohol use:  Yes        Alcohol/week: 0.0 standard drinks        Comment: rarely origin  · STOOL CULTURE W/SHIGATOXIN; Future  · CDIFFICILE TOXIGENIC PCR (OPT); Future  · Based on above test will determine if antibiotics are required. · Drink fluids as tolerated inclusive of water, low carbohydrate Gatorade, and Pedialyte.   · Do not t

## 2020-05-06 NOTE — TELEPHONE ENCOUNTER
MIMI Lynch  Pt stated that he was in the ER yesterday and he was advised to F/u with Dr. Eun Last. Pt stated that he continues to have the diarrhea nothing has changed. He already has had 4 loose stools today. Everything that he eats comes right out.  Pt will

## 2020-05-06 NOTE — PATIENT INSTRUCTIONS
1.  Get lab test done as ordered. 2.  Refrain from taking Imodium or equivalent medication. 3.  Drink plenty of fluids. Diarrhea with Uncertain Cause (Adult)    Diarrhea is when stools are loose and watery.  This can be caused by:  · Viral infections · You may use acetaminophen or NSAID medicines like ibuprofen or naproxen to reduce pain and fever. Don’t use these if you have chronic liver or kidney disease, or ever had a stomach ulcer or gastrointestinal bleeding.  Don't use NSAID medicines if you are · Don’t force yourself to eat, especially if you are having cramping, vomiting, or diarrhea. Don’t eat large amounts at a time, even if you are hungry. · If you eat, avoid fatty, greasy, spicy, or fried foods.   · Don’t eat dairy foods or drink milk if you · Abdominal pain that gets worse  · Constant lower right abdominal pain  · Continued vomiting and inability to keep liquids down  · Diarrhea more than 5 times a day  · Blood in vomit or stool  · Dark urine or no urine for 8 hours, dry mouth and tongue, tir

## 2020-05-07 ENCOUNTER — TELEPHONE (OUTPATIENT)
Dept: INTERNAL MEDICINE CLINIC | Facility: CLINIC | Age: 70
End: 2020-05-07

## 2020-05-07 DIAGNOSIS — A04.72 C. DIFFICILE DIARRHEA: Primary | ICD-10-CM

## 2020-05-07 RX ORDER — VANCOMYCIN HYDROCHLORIDE 125 MG/1
125 CAPSULE ORAL 4 TIMES DAILY
Qty: 56 CAPSULE | Refills: 0 | Status: SHIPPED | OUTPATIENT
Start: 2020-05-07 | End: 2020-05-21

## 2020-05-14 ENCOUNTER — VIRTUAL PHONE E/M (OUTPATIENT)
Dept: INTERNAL MEDICINE CLINIC | Facility: CLINIC | Age: 70
End: 2020-05-14
Payer: MEDICARE

## 2020-05-14 DIAGNOSIS — A04.72 CLOSTRIDIUM DIFFICILE DIARRHEA: Primary | ICD-10-CM

## 2020-05-14 PROCEDURE — 99441 PHONE E/M BY PHYS 5-10 MIN: CPT | Performed by: INTERNAL MEDICINE

## 2020-05-14 NOTE — PROGRESS NOTES
Patient ID: Brianne Olivas is a 78year old male. Patient presents with: Follow - Up       Virtual/Telephone Check-In    Brianne Olivas verbally consents to a Virtual/Telephone Check-In service on 05/14/20.  Patient understands and accepts financi Angely Morgan MD at [facility] MAIN OR   • NASAL SCOPY,REMV PART ETHMOID     • NASAL SCOPY,RMV TISS MAXILL SINUS     • NASAL SEPTOPLASTY BILAT SINUS ENDOSCOPY ETHM MAX N/A 6/28/2017    Performed by Brittney Dodge MD at [facility] OR   • Leela Thur[id number]   • Years: 42.00        Pack years: 43        Types: Cigarettes      Smokeless tobacco: Never Used    Substance and Sexual Activity      Alcohol use:  Yes        Alcohol/week: 0.0 standard drinks        Comment: rarely      Drug use: No      Sexual activity: No have girlfriend come over and cut haircut until he has completed the antibiotics as he still may be contagious. · Drink plenty of fluids. · Advance diet as tolerated. Return if symptoms worsen or fail to improve.     Time spent on encounter  10 minutes

## 2020-05-14 NOTE — PATIENT INSTRUCTIONS
Clostridium Difficile (C. Diff) Infection   Clostridium difficile (C. diff) bacteria can be very harmful. They affect the intestinal tract. They can cause symptoms ranging from mild diarrhea to severe inflammation of the large intestine (colon).  C. diff To confirm the infection, a stool sample is taken. It's tested for the bacteria or the toxins made by the bacteria. How is C. diff infection treated?   In many cases, you will be given an antibiotic or other medicine or therapy directed at [facility]. diff inf Hospitals and nursing homes take these steps to help prevent C. diff infections:   · Limiting use of antibiotics. Giving antibiotics only when needed can help reduce C. diff infections.   · Handwashing. Hospital staff should wash their hands before and afte · Wash your hands well before and after visiting someone who has C. diff infection. Use soap and water. Alcohol-based hand  may not work against C. diff. They are not advised after contact with someone with C diff.   · If the staff asks you to, wear

## 2020-05-17 DIAGNOSIS — A04.72 C. DIFFICILE DIARRHEA: ICD-10-CM

## 2020-05-17 RX ORDER — VANCOMYCIN HYDROCHLORIDE 125 MG/1
CAPSULE ORAL
Qty: 56 CAPSULE | Refills: 0 | OUTPATIENT
Start: 2020-05-17

## 2020-05-28 ENCOUNTER — PATIENT MESSAGE (OUTPATIENT)
Dept: INTERNAL MEDICINE CLINIC | Facility: CLINIC | Age: 70
End: 2020-05-28

## 2020-05-28 ENCOUNTER — TELEPHONE (OUTPATIENT)
Dept: INTERNAL MEDICINE CLINIC | Facility: CLINIC | Age: 70
End: 2020-05-28

## 2020-05-28 DIAGNOSIS — A04.72 C. DIFFICILE DIARRHEA: Primary | ICD-10-CM

## 2020-05-28 NOTE — TELEPHONE ENCOUNTER
Patient reports was positive for C.diff, had appt 5/14 and was put on antibiotic which did help, symptoms had improved, but starting yesterday has been with temp of 99, has had loose watery stools about 6-7 times today.  No abdominal pain, vomiting or any o

## 2020-05-28 NOTE — TELEPHONE ENCOUNTER
Call patient and triage. From: Carlitos Bishop  To: Paresh Diaz MD  Sent: 5/28/2020  5:25 AM CDT  Subject: Visit Follow-up Question    Good morning. Last night I had a slight fever and this morning diarrhea.  These are the same symptoms I had a few we

## 2020-05-28 NOTE — TELEPHONE ENCOUNTER
From: Ras Poe  To: Milly Diane MD  Sent: 5/28/2020 5:25 AM CDT  Subject: Visit Follow-up Question    Good morning. Last night I had a slight fever and this morning diarrhea.  These are the same symptoms I had a few weeks ago when Dr. Shabana mello

## 2020-05-29 RX ORDER — METRONIDAZOLE 500 MG/1
500 TABLET ORAL 3 TIMES DAILY
Qty: 30 TABLET | Refills: 0 | Status: SHIPPED | OUTPATIENT
Start: 2020-05-29 | End: 2020-06-08

## 2020-05-29 NOTE — TELEPHONE ENCOUNTER
I will prescribe Flagyl, although it is second line treatment. No need to be tested for COVID at this time. We have a diagnosis and a cause for his symptoms.

## 2020-06-16 RX ORDER — MONTELUKAST SODIUM 10 MG/1
10 TABLET ORAL NIGHTLY
Qty: 30 TABLET | Refills: 0 | Status: SHIPPED | OUTPATIENT
Start: 2020-06-16 | End: 2020-07-20

## 2020-06-22 ENCOUNTER — TELEPHONE (OUTPATIENT)
Dept: INTERNAL MEDICINE CLINIC | Facility: CLINIC | Age: 70
End: 2020-06-22

## 2020-06-22 NOTE — TELEPHONE ENCOUNTER
Pt states he has issues with his stool since dx with C Diff in May. Pt states he had diarrhea today \"I don't have it everyday, but the stools are never really formed\"    Pt denies fever, nausea/vomiting.       Advised pt to f/u with Dr Florence Villa and pt agr

## 2020-06-23 ENCOUNTER — VIRTUAL PHONE E/M (OUTPATIENT)
Dept: INTERNAL MEDICINE CLINIC | Facility: CLINIC | Age: 70
End: 2020-06-23
Payer: MEDICARE

## 2020-06-23 DIAGNOSIS — A04.72 C. DIFFICILE DIARRHEA: ICD-10-CM

## 2020-06-23 DIAGNOSIS — R19.7 DIARRHEA, UNSPECIFIED TYPE: Primary | ICD-10-CM

## 2020-06-23 PROCEDURE — 99442 PHONE E/M BY PHYS 11-20 MIN: CPT | Performed by: INTERNAL MEDICINE

## 2020-06-23 NOTE — PROGRESS NOTES
Patient ID: Darien Hdez is a 69year old male. Patient presents with:  Diarrhea       Virtual/Telephone Check-In    Darien Hdez verbally consents to a Virtual/Telephone Check-In service on 06/23/20.  Patient understands and accepts financial Right 2-17-17    Exc ganglion R thumb   • EXCISION LESION HEAD/NECK/FACE Left 2/17/2017    Performed by Jaswant Alcaraz MD at [facility]   • FOOT SURGERY Left 2006    Nerve procedure   • HAND GANGLION EXCISION Right 2/17/2017    Performed by Alanis Wall Cigarettes      Smokeless tobacco: Never Used    Substance and Sexual Activity      Alcohol use:  Yes        Alcohol/week: 0.0 standard drinks        Comment: rarely      Drug use: No      Sexual activity: Not on file    Lifestyle      Physical activity: excessive oral fluids to compensate for his quitting tobacco use. · Told to eat a low fiber diet. · Start probiotic. · Patient states taking oral vancomycin is cost prohibitive to do again. 2. C. difficile diarrhea  · As per #1.     Return in about 1

## 2020-07-08 RX ORDER — ROSUVASTATIN CALCIUM 10 MG/1
TABLET, COATED ORAL
Qty: 90 TABLET | Refills: 0 | Status: SHIPPED | OUTPATIENT
Start: 2020-07-08 | End: 2020-09-30

## 2020-07-20 RX ORDER — MONTELUKAST SODIUM 10 MG/1
TABLET ORAL
Qty: 30 TABLET | Refills: 0 | Status: SHIPPED | OUTPATIENT
Start: 2020-07-20 | End: 2020-08-31

## 2020-07-24 RX ORDER — MONTELUKAST SODIUM 10 MG/1
TABLET ORAL
Qty: 30 TABLET | Refills: 0 | OUTPATIENT
Start: 2020-07-24

## 2020-08-11 ENCOUNTER — APPOINTMENT (OUTPATIENT)
Dept: CT IMAGING | Facility: HOSPITAL | Age: 70
End: 2020-08-11
Attending: EMERGENCY MEDICINE
Payer: MEDICARE

## 2020-08-11 ENCOUNTER — APPOINTMENT (OUTPATIENT)
Dept: GENERAL RADIOLOGY | Facility: HOSPITAL | Age: 70
End: 2020-08-11
Attending: EMERGENCY MEDICINE
Payer: MEDICARE

## 2020-08-11 ENCOUNTER — NURSE TRIAGE (OUTPATIENT)
Dept: INTERNAL MEDICINE CLINIC | Facility: CLINIC | Age: 70
End: 2020-08-11

## 2020-08-11 ENCOUNTER — HOSPITAL ENCOUNTER (EMERGENCY)
Facility: HOSPITAL | Age: 70
Discharge: HOME | End: 2020-08-11
Attending: EMERGENCY MEDICINE
Payer: MEDICARE

## 2020-08-11 VITALS
TEMPERATURE: 99 F | DIASTOLIC BLOOD PRESSURE: 74 MMHG | BODY MASS INDEX: 25.9 KG/M2 | HEART RATE: 72 BPM | OXYGEN SATURATION: 96 % | RESPIRATION RATE: 18 BRPM | WEIGHT: 185 LBS | SYSTOLIC BLOOD PRESSURE: 119 MMHG | HEIGHT: 71 IN

## 2020-08-11 DIAGNOSIS — N20.0 KIDNEY STONE: Primary | ICD-10-CM

## 2020-08-11 LAB
ANION GAP SERPL CALC-SCNC: 4 MMOL/L (ref 0–18)
BASOPHILS # BLD AUTO: 0.04 X10(3) UL (ref 0–0.2)
BASOPHILS NFR BLD AUTO: 0.4 %
BILIRUB UR QL: NEGATIVE
BUN BLD-MCNC: 15 MG/DL (ref 7–18)
BUN/CREAT SERPL: 9 (ref 10–20)
CALCIUM BLD-MCNC: 9.6 MG/DL (ref 8.5–10.1)
CHLORIDE SERPL-SCNC: 104 MMOL/L (ref 98–112)
CLARITY UR: CLEAR
CO2 SERPL-SCNC: 29 MMOL/L (ref 21–32)
COLOR UR: YELLOW
CREAT BLD-MCNC: 1.66 MG/DL (ref 0.7–1.3)
DEPRECATED RDW RBC AUTO: 42.5 FL (ref 35.1–46.3)
EOSINOPHIL # BLD AUTO: 0.08 X10(3) UL (ref 0–0.7)
EOSINOPHIL NFR BLD AUTO: 0.8 %
ERYTHROCYTE [DISTWIDTH] IN BLOOD BY AUTOMATED COUNT: 12.8 % (ref 11–15)
GLUCOSE BLD-MCNC: 102 MG/DL (ref 70–99)
GLUCOSE UR-MCNC: NEGATIVE MG/DL
HCT VFR BLD AUTO: 48 % (ref 39–53)
HGB BLD-MCNC: 16.2 G/DL (ref 13–17.5)
IMM GRANULOCYTES # BLD AUTO: 0.03 X10(3) UL (ref 0–1)
IMM GRANULOCYTES NFR BLD: 0.3 %
KETONES UR-MCNC: NEGATIVE MG/DL
LEUKOCYTE ESTERASE UR QL STRIP.AUTO: NEGATIVE
LYMPHOCYTES # BLD AUTO: 1.84 X10(3) UL (ref 1–4)
LYMPHOCYTES NFR BLD AUTO: 18.4 %
MCH RBC QN AUTO: 30.7 PG (ref 26–34)
MCHC RBC AUTO-ENTMCNC: 33.8 G/DL (ref 31–37)
MCV RBC AUTO: 91.1 FL (ref 80–100)
MONOCYTES # BLD AUTO: 0.84 X10(3) UL (ref 0.1–1)
MONOCYTES NFR BLD AUTO: 8.4 %
NEUTROPHILS # BLD AUTO: 7.16 X10 (3) UL (ref 1.5–7.7)
NEUTROPHILS # BLD AUTO: 7.16 X10(3) UL (ref 1.5–7.7)
NEUTROPHILS NFR BLD AUTO: 71.7 %
NITRITE UR QL STRIP.AUTO: NEGATIVE
OSMOLALITY SERPL CALC.SUM OF ELEC: 285 MOSM/KG (ref 275–295)
PH UR: 5 [PH] (ref 5–8)
PLATELET # BLD AUTO: 189 10(3)UL (ref 150–450)
POTASSIUM SERPL-SCNC: 3.8 MMOL/L (ref 3.5–5.1)
PROT UR-MCNC: NEGATIVE MG/DL
RBC # BLD AUTO: 5.27 X10(6)UL (ref 3.8–5.8)
RBC #/AREA URNS AUTO: 3 /HPF
SODIUM SERPL-SCNC: 137 MMOL/L (ref 136–145)
SP GR UR STRIP: 1.01 (ref 1–1.03)
UROBILINOGEN UR STRIP-ACNC: <2
WBC # BLD AUTO: 10 X10(3) UL (ref 4–11)
WBC #/AREA URNS AUTO: 1 /HPF

## 2020-08-11 PROCEDURE — 99284 EMERGENCY DEPT VISIT MOD MDM: CPT

## 2020-08-11 PROCEDURE — 96374 THER/PROPH/DIAG INJ IV PUSH: CPT

## 2020-08-11 PROCEDURE — 81001 URINALYSIS AUTO W/SCOPE: CPT | Performed by: EMERGENCY MEDICINE

## 2020-08-11 PROCEDURE — 96376 TX/PRO/DX INJ SAME DRUG ADON: CPT

## 2020-08-11 PROCEDURE — 85025 COMPLETE CBC W/AUTO DIFF WBC: CPT | Performed by: EMERGENCY MEDICINE

## 2020-08-11 PROCEDURE — 74019 RADEX ABDOMEN 2 VIEWS: CPT | Performed by: EMERGENCY MEDICINE

## 2020-08-11 PROCEDURE — 74176 CT ABD & PELVIS W/O CONTRAST: CPT | Performed by: EMERGENCY MEDICINE

## 2020-08-11 PROCEDURE — 80048 BASIC METABOLIC PNL TOTAL CA: CPT | Performed by: EMERGENCY MEDICINE

## 2020-08-11 RX ORDER — MORPHINE SULFATE 4 MG/ML
4 INJECTION, SOLUTION INTRAMUSCULAR; INTRAVENOUS ONCE
Status: COMPLETED | OUTPATIENT
Start: 2020-08-11 | End: 2020-08-11

## 2020-08-11 RX ORDER — HYDROCODONE BITARTRATE AND ACETAMINOPHEN 5; 325 MG/1; MG/1
1-2 TABLET ORAL EVERY 6 HOURS PRN
Qty: 10 TABLET | Refills: 0 | Status: SHIPPED | OUTPATIENT
Start: 2020-08-11 | End: 2020-08-18

## 2020-08-11 RX ORDER — TAMSULOSIN HYDROCHLORIDE 0.4 MG/1
0.4 CAPSULE ORAL DAILY
Qty: 7 CAPSULE | Refills: 0 | Status: SHIPPED | OUTPATIENT
Start: 2020-08-11 | End: 2020-08-18

## 2020-08-11 NOTE — TELEPHONE ENCOUNTER
MIMI Last pt will go to ER  RN f/u tomorrow 8/12    Pt think he has a kidney stone. As his symptoms are feeling the same as when he had a kidney stone about 20 years ago. Pt is having right side lower back pain. Pt stated that the pain is a 5/10 right

## 2020-08-11 NOTE — ED INITIAL ASSESSMENT (HPI)
Pt c/o R flank pain x3 days, pt has hx of renal stones, pt goes from R lower back to RLQ. Pt states pain is intermittent, states \"my pee is kind of orange,\" no other  problems. Pt had one episode of vomiting yesterday.

## 2020-08-11 NOTE — ED PROVIDER NOTES
Patient Seen in: [facility] Emergency Department      History   Patient presents with:  Abdomen/Flank Pain    Stated Complaint: flank pain    HPI    65-year-old male with history of kidney stone 20 years ago presents for evaluation of right flank Social History    Tobacco Use      Smoking status: Current Every Day Smoker        Packs/day: 1.00        Years: 42.00        Pack years: 43        Types: Cigarettes      Smokeless tobacco: Never Used    Alcohol use:  Yes      Alcohol/week: 0.0 st Blood Urine Small (*)     RBC URINE 3 (*)     Bacteria Urine Few (*)     All other components within normal limits   BASIC METABOLIC PANEL (8) - Abnormal; Notable for the following components:    Glucose 102 (*)     Creatinine 1.66 (*)     BUN/CREA Rati as discussed above.     Dictated by (CST): Hermes Odonnell MD on 8/11/2020 at 12:24 PM     Finalized by (CST): Hermes Odonnell MD on 8/11/2020 at 12:28 PM                  MDM     Differential Diagnosis includes but is not limited to: Kidney stone, pyelonephr pm    Follow-up:   Tabitha Armstrong, [address] [facility]  [address]  [phone]    Schedule an appointment as soon as possible for a visit in 2 days  For follow up    We recommend that you schedule follow up care with a primary care provi

## 2020-08-18 ENCOUNTER — TELEPHONE (OUTPATIENT)
Dept: SURGERY | Facility: CLINIC | Age: 70
End: 2020-08-18

## 2020-08-18 NOTE — TELEPHONE ENCOUNTER
S/W pt and determined he was in the ER on 8/11 and DX with an Rt 8 mm obstructing stone.  I informed that I will reach out to all of the urologist to see if any are willing to add him on to be seen ASAP since at this time I do not have an open appt to offer

## 2020-08-18 NOTE — TELEPHONE ENCOUNTER
Patient has appt 8/31 and does not think he can wait that long. He was in ER and was informed that his kidney stone was too large to pass on it's own and was instructed to see dr within a couple of days.  Please advise

## 2020-08-21 ENCOUNTER — LAB ENCOUNTER (OUTPATIENT)
Dept: LAB | Facility: HOSPITAL | Age: 70
End: 2020-08-21
Attending: UROLOGY
Payer: MEDICARE

## 2020-08-21 ENCOUNTER — OFFICE VISIT (OUTPATIENT)
Dept: SURGERY | Facility: CLINIC | Age: 70
End: 2020-08-21
Payer: MEDICARE

## 2020-08-21 VITALS
BODY MASS INDEX: 25.9 KG/M2 | TEMPERATURE: 98 F | HEIGHT: 71 IN | WEIGHT: 185 LBS | DIASTOLIC BLOOD PRESSURE: 93 MMHG | SYSTOLIC BLOOD PRESSURE: 139 MMHG | HEART RATE: 72 BPM

## 2020-08-21 DIAGNOSIS — Z01.818 PREOP EXAMINATION: ICD-10-CM

## 2020-08-21 DIAGNOSIS — N20.0 KIDNEY STONES: Primary | ICD-10-CM

## 2020-08-21 DIAGNOSIS — R39.9 SYMPTOM INVOLVING BLADDER: ICD-10-CM

## 2020-08-21 DIAGNOSIS — N40.1 BENIGN PROSTATIC HYPERPLASIA WITH LOWER URINARY TRACT SYMPTOMS, SYMPTOM DETAILS UNSPECIFIED: ICD-10-CM

## 2020-08-21 DIAGNOSIS — N20.1 RIGHT URETERAL STONE: ICD-10-CM

## 2020-08-21 LAB
ANION GAP SERPL CALC-SCNC: 5 MMOL/L (ref 0–18)
BASOPHILS # BLD AUTO: 0.07 X10(3) UL (ref 0–0.2)
BASOPHILS NFR BLD AUTO: 0.9 %
BUN BLD-MCNC: 19 MG/DL (ref 7–18)
BUN/CREAT SERPL: 13.2 (ref 10–20)
CALCIUM BLD-MCNC: 9.6 MG/DL (ref 8.5–10.1)
CHLORIDE SERPL-SCNC: 102 MMOL/L (ref 98–112)
CO2 SERPL-SCNC: 30 MMOL/L (ref 21–32)
CREAT BLD-MCNC: 1.44 MG/DL (ref 0.7–1.3)
DEPRECATED RDW RBC AUTO: 39.8 FL (ref 35.1–46.3)
EOSINOPHIL # BLD AUTO: 0.11 X10(3) UL (ref 0–0.7)
EOSINOPHIL NFR BLD AUTO: 1.4 %
ERYTHROCYTE [DISTWIDTH] IN BLOOD BY AUTOMATED COUNT: 12.1 % (ref 11–15)
GLUCOSE BLD-MCNC: 86 MG/DL (ref 70–99)
HCT VFR BLD AUTO: 43.9 % (ref 39–53)
HGB BLD-MCNC: 14.8 G/DL (ref 13–17.5)
IMM GRANULOCYTES # BLD AUTO: 0.02 X10(3) UL (ref 0–1)
IMM GRANULOCYTES NFR BLD: 0.2 %
LYMPHOCYTES # BLD AUTO: 2.3 X10(3) UL (ref 1–4)
LYMPHOCYTES NFR BLD AUTO: 28.3 %
MCH RBC QN AUTO: 30.4 PG (ref 26–34)
MCHC RBC AUTO-ENTMCNC: 33.7 G/DL (ref 31–37)
MCV RBC AUTO: 90.1 FL (ref 80–100)
MONOCYTES # BLD AUTO: 0.68 X10(3) UL (ref 0.1–1)
MONOCYTES NFR BLD AUTO: 8.4 %
NEUTROPHILS # BLD AUTO: 4.94 X10 (3) UL (ref 1.5–7.7)
NEUTROPHILS # BLD AUTO: 4.94 X10(3) UL (ref 1.5–7.7)
NEUTROPHILS NFR BLD AUTO: 60.8 %
OSMOLALITY SERPL CALC.SUM OF ELEC: 286 MOSM/KG (ref 275–295)
PATIENT FASTING Y/N/NP: NO
PLATELET # BLD AUTO: 270 10(3)UL (ref 150–450)
POTASSIUM SERPL-SCNC: 4.1 MMOL/L (ref 3.5–5.1)
RBC # BLD AUTO: 4.87 X10(6)UL (ref 3.8–5.8)
SODIUM SERPL-SCNC: 137 MMOL/L (ref 136–145)
WBC # BLD AUTO: 8.1 X10(3) UL (ref 4–11)

## 2020-08-21 PROCEDURE — 85025 COMPLETE CBC W/AUTO DIFF WBC: CPT

## 2020-08-21 PROCEDURE — G0463 HOSPITAL OUTPT CLINIC VISIT: HCPCS | Performed by: UROLOGY

## 2020-08-21 PROCEDURE — 36415 COLL VENOUS BLD VENIPUNCTURE: CPT

## 2020-08-21 PROCEDURE — 99204 OFFICE O/P NEW MOD 45 MIN: CPT | Performed by: UROLOGY

## 2020-08-21 PROCEDURE — 93010 ELECTROCARDIOGRAM REPORT: CPT | Performed by: UROLOGY

## 2020-08-21 PROCEDURE — 93005 ELECTROCARDIOGRAM TRACING: CPT

## 2020-08-21 PROCEDURE — 87086 URINE CULTURE/COLONY COUNT: CPT | Performed by: UROLOGY

## 2020-08-21 PROCEDURE — 80048 BASIC METABOLIC PNL TOTAL CA: CPT

## 2020-08-21 RX ORDER — DUTASTERIDE 0.5 MG/1
0.5 CAPSULE, LIQUID FILLED ORAL DAILY
Qty: 90 CAPSULE | Refills: 3 | Status: SHIPPED | OUTPATIENT
Start: 2020-08-21 | End: 2021-11-08

## 2020-08-21 RX ORDER — TAMSULOSIN HYDROCHLORIDE 0.4 MG/1
0.4 CAPSULE ORAL DAILY
Qty: 90 CAPSULE | Refills: 3 | Status: SHIPPED | OUTPATIENT
Start: 2020-08-21 | End: 2020-09-20

## 2020-08-21 NOTE — PROGRESS NOTES
SUBJECTIVE:  Marlen Liriano is a 69year old male who presents for a consultation at the request of, and a copy of this note will be sent to, DR. Tiffany Carpenter, for evaluation of  benign prostatic hyperplasia and urinary stone.  He states that the problem LID,NOS,EAR <10SQCM Right 2-17-17    Exc lesion of nose, V_Y flap   • COLONOSCOPY     • COLONOSCOPY     • COLONOSCOPY N/A 12/26/2018    Performed by Shon Kohli MD at [address] T[address] MADDEN/EFRAIN Right 2-17-17    Exc gang and fatigue. Positive for: None.   All other review of systems reviewed and otherwise negative    OBJECTIVE:  BP (!) 139/93   Pulse 72   Temp 97.8 °F (36.6 °C)   Ht 5' 11\" (1.803 m)   Wt 185 lb (83.9 kg)   BMI 25.80 kg/m²   He appears well, in no apparent Signed Prescriptions Disp Refills   • tamsulosin (FLOMAX) cap 90 capsule 3     Sig: Take 1 capsule (0.4 mg total) by mouth daily.  Take 1/2 hour following the same meal each day   • Dutasteride 0.5 MG Oral Cap 90 capsule 3     Sig: Take 1 capsule (0.5 m

## 2020-08-21 NOTE — PROGRESS NOTES
Patient seen in office, Cystoscopy, Right retrograde pyelogram, right ureteroscopy, laser lithotripsy, and right stent placement, Thursday 08/27/2020, went over pre-op/labs done today all questions answered, verbalized understanding.

## 2020-08-24 ENCOUNTER — APPOINTMENT (OUTPATIENT)
Dept: LAB | Facility: HOSPITAL | Age: 70
End: 2020-08-24
Attending: UROLOGY
Payer: MEDICARE

## 2020-08-24 DIAGNOSIS — Z01.818 PREOP TESTING: ICD-10-CM

## 2020-08-26 LAB — SARS-COV-2 RNA RESP QL NAA+PROBE: NOT DETECTED

## 2020-08-27 ENCOUNTER — TELEPHONE (OUTPATIENT)
Dept: SURGERY | Facility: CLINIC | Age: 70
End: 2020-08-27

## 2020-08-27 ENCOUNTER — APPOINTMENT (OUTPATIENT)
Dept: GENERAL RADIOLOGY | Facility: HOSPITAL | Age: 70
End: 2020-08-27
Attending: UROLOGY
Payer: MEDICARE

## 2020-08-27 ENCOUNTER — ANESTHESIA EVENT (OUTPATIENT)
Dept: SURGERY | Facility: HOSPITAL | Age: 70
End: 2020-08-27
Payer: MEDICARE

## 2020-08-27 ENCOUNTER — ANESTHESIA (OUTPATIENT)
Dept: SURGERY | Facility: HOSPITAL | Age: 70
End: 2020-08-27
Payer: MEDICARE

## 2020-08-27 ENCOUNTER — HOSPITAL ENCOUNTER (OUTPATIENT)
Facility: HOSPITAL | Age: 70
Setting detail: HOSPITAL OUTPATIENT SURGERY
Discharge: HOME | End: 2020-08-27
Attending: UROLOGY | Admitting: UROLOGY
Payer: MEDICARE

## 2020-08-27 VITALS
SYSTOLIC BLOOD PRESSURE: 141 MMHG | OXYGEN SATURATION: 98 % | DIASTOLIC BLOOD PRESSURE: 87 MMHG | RESPIRATION RATE: 15 BRPM | WEIGHT: 178 LBS | TEMPERATURE: 98 F | BODY MASS INDEX: 24.92 KG/M2 | HEIGHT: 71 IN | HEART RATE: 71 BPM

## 2020-08-27 DIAGNOSIS — Z01.818 PREOP TESTING: Primary | ICD-10-CM

## 2020-08-27 DIAGNOSIS — N20.1 RIGHT URETERAL STONE: ICD-10-CM

## 2020-08-27 DIAGNOSIS — N20.1 RIGHT URETERAL STONE: Primary | ICD-10-CM

## 2020-08-27 PROCEDURE — 0T768DZ DILATION OF RIGHT URETER WITH INTRALUMINAL DEVICE, VIA NATURAL OR ARTIFICIAL OPENING ENDOSCOPIC: ICD-10-PCS | Performed by: UROLOGY

## 2020-08-27 PROCEDURE — 0TF68ZZ FRAGMENTATION IN RIGHT URETER, VIA NATURAL OR ARTIFICIAL OPENING ENDOSCOPIC: ICD-10-PCS | Performed by: UROLOGY

## 2020-08-27 PROCEDURE — 52276 CYSTOSCOPY AND TREATMENT: CPT | Performed by: UROLOGY

## 2020-08-27 PROCEDURE — 52356 CYSTO/URETERO W/LITHOTRIPSY: CPT | Performed by: UROLOGY

## 2020-08-27 PROCEDURE — BT1D1ZZ FLUOROSCOPY OF RIGHT KIDNEY, URETER AND BLADDER USING LOW OSMOLAR CONTRAST: ICD-10-PCS | Performed by: UROLOGY

## 2020-08-27 DEVICE — URETERAL STENT
Type: IMPLANTABLE DEVICE | Site: URETER | Status: FUNCTIONAL
Brand: ASCERTA™

## 2020-08-27 RX ORDER — NALOXONE HYDROCHLORIDE 0.4 MG/ML
80 INJECTION, SOLUTION INTRAMUSCULAR; INTRAVENOUS; SUBCUTANEOUS AS NEEDED
Status: DISCONTINUED | OUTPATIENT
Start: 2020-08-27 | End: 2020-08-27

## 2020-08-27 RX ORDER — ONDANSETRON 2 MG/ML
INJECTION INTRAMUSCULAR; INTRAVENOUS AS NEEDED
Status: DISCONTINUED | OUTPATIENT
Start: 2020-08-27 | End: 2020-08-27 | Stop reason: SURG

## 2020-08-27 RX ORDER — LIDOCAINE HYDROCHLORIDE 20 MG/ML
JELLY TOPICAL AS NEEDED
Status: DISCONTINUED | OUTPATIENT
Start: 2020-08-27 | End: 2020-08-27 | Stop reason: HOSPADM

## 2020-08-27 RX ORDER — LEVOFLOXACIN 5 MG/ML
500 INJECTION, SOLUTION INTRAVENOUS EVERY 24 HOURS
Status: DISCONTINUED | OUTPATIENT
Start: 2020-08-27 | End: 2020-08-27 | Stop reason: HOSPADM

## 2020-08-27 RX ORDER — PHENAZOPYRIDINE HYDROCHLORIDE 200 MG/1
200 TABLET, FILM COATED ORAL ONCE
Status: CANCELLED | OUTPATIENT
Start: 2020-08-27 | End: 2020-08-27

## 2020-08-27 RX ORDER — SULFAMETHOXAZOLE AND TRIMETHOPRIM 800; 160 MG/1; MG/1
1 TABLET ORAL 2 TIMES DAILY
Qty: 6 TABLET | Refills: 0 | Status: SHIPPED | OUTPATIENT
Start: 2020-08-28 | End: 2020-08-31

## 2020-08-27 RX ORDER — ONDANSETRON 2 MG/ML
4 INJECTION INTRAMUSCULAR; INTRAVENOUS ONCE AS NEEDED
Status: DISCONTINUED | OUTPATIENT
Start: 2020-08-27 | End: 2020-08-27

## 2020-08-27 RX ORDER — MORPHINE SULFATE 4 MG/ML
2 INJECTION, SOLUTION INTRAMUSCULAR; INTRAVENOUS EVERY 10 MIN PRN
Status: DISCONTINUED | OUTPATIENT
Start: 2020-08-27 | End: 2020-08-27

## 2020-08-27 RX ORDER — METOCLOPRAMIDE 10 MG/1
10 TABLET ORAL ONCE
Status: DISCONTINUED | OUTPATIENT
Start: 2020-08-27 | End: 2020-08-27 | Stop reason: HOSPADM

## 2020-08-27 RX ORDER — LIDOCAINE HYDROCHLORIDE 10 MG/ML
INJECTION, SOLUTION EPIDURAL; INFILTRATION; INTRACAUDAL; PERINEURAL AS NEEDED
Status: DISCONTINUED | OUTPATIENT
Start: 2020-08-27 | End: 2020-08-27 | Stop reason: SURG

## 2020-08-27 RX ORDER — MIDAZOLAM HYDROCHLORIDE 1 MG/ML
INJECTION INTRAMUSCULAR; INTRAVENOUS AS NEEDED
Status: DISCONTINUED | OUTPATIENT
Start: 2020-08-27 | End: 2020-08-27 | Stop reason: SURG

## 2020-08-27 RX ORDER — ACETAMINOPHEN 500 MG
1000 TABLET ORAL ONCE
Status: COMPLETED | OUTPATIENT
Start: 2020-08-27 | End: 2020-08-27

## 2020-08-27 RX ORDER — MORPHINE SULFATE 10 MG/ML
6 INJECTION, SOLUTION INTRAMUSCULAR; INTRAVENOUS EVERY 10 MIN PRN
Status: DISCONTINUED | OUTPATIENT
Start: 2020-08-27 | End: 2020-08-27

## 2020-08-27 RX ORDER — EPHEDRINE SULFATE 50 MG/ML
INJECTION, SOLUTION INTRAVENOUS AS NEEDED
Status: DISCONTINUED | OUTPATIENT
Start: 2020-08-27 | End: 2020-08-27 | Stop reason: SURG

## 2020-08-27 RX ORDER — ACETAMINOPHEN AND CODEINE PHOSPHATE 300; 30 MG/1; MG/1
1 TABLET ORAL EVERY 4 HOURS PRN
Qty: 20 TABLET | Refills: 0 | Status: SHIPPED | OUTPATIENT
Start: 2020-08-27 | End: 2020-10-06

## 2020-08-27 RX ORDER — HYDROCODONE BITARTRATE AND ACETAMINOPHEN 5; 325 MG/1; MG/1
2 TABLET ORAL AS NEEDED
Status: DISCONTINUED | OUTPATIENT
Start: 2020-08-27 | End: 2020-08-27

## 2020-08-27 RX ORDER — FAMOTIDINE 20 MG/1
20 TABLET ORAL ONCE
Status: DISCONTINUED | OUTPATIENT
Start: 2020-08-27 | End: 2020-08-27 | Stop reason: HOSPADM

## 2020-08-27 RX ORDER — SODIUM CHLORIDE, SODIUM LACTATE, POTASSIUM CHLORIDE, CALCIUM CHLORIDE 600; 310; 30; 20 MG/100ML; MG/100ML; MG/100ML; MG/100ML
INJECTION, SOLUTION INTRAVENOUS CONTINUOUS
Status: DISCONTINUED | OUTPATIENT
Start: 2020-08-27 | End: 2020-08-27

## 2020-08-27 RX ORDER — ALBUTEROL SULFATE 2.5 MG/3ML
2.5 SOLUTION RESPIRATORY (INHALATION)
Status: DISCONTINUED | OUTPATIENT
Start: 2020-08-27 | End: 2020-08-27

## 2020-08-27 RX ORDER — DEXAMETHASONE SODIUM PHOSPHATE 4 MG/ML
VIAL (ML) INJECTION AS NEEDED
Status: DISCONTINUED | OUTPATIENT
Start: 2020-08-27 | End: 2020-08-27 | Stop reason: SURG

## 2020-08-27 RX ORDER — MORPHINE SULFATE 4 MG/ML
4 INJECTION, SOLUTION INTRAMUSCULAR; INTRAVENOUS EVERY 10 MIN PRN
Status: DISCONTINUED | OUTPATIENT
Start: 2020-08-27 | End: 2020-08-27

## 2020-08-27 RX ORDER — HYDROMORPHONE HYDROCHLORIDE 1 MG/ML
0.6 INJECTION, SOLUTION INTRAMUSCULAR; INTRAVENOUS; SUBCUTANEOUS EVERY 5 MIN PRN
Status: DISCONTINUED | OUTPATIENT
Start: 2020-08-27 | End: 2020-08-27

## 2020-08-27 RX ORDER — HYDROMORPHONE HYDROCHLORIDE 1 MG/ML
0.2 INJECTION, SOLUTION INTRAMUSCULAR; INTRAVENOUS; SUBCUTANEOUS EVERY 5 MIN PRN
Status: DISCONTINUED | OUTPATIENT
Start: 2020-08-27 | End: 2020-08-27

## 2020-08-27 RX ORDER — HYDROCODONE BITARTRATE AND ACETAMINOPHEN 5; 325 MG/1; MG/1
1 TABLET ORAL AS NEEDED
Status: DISCONTINUED | OUTPATIENT
Start: 2020-08-27 | End: 2020-08-27

## 2020-08-27 RX ORDER — VANCOMYCIN HYDROCHLORIDE 125 MG/1
125 CAPSULE ORAL DAILY
Qty: 3 CAPSULE | Refills: 0 | Status: SHIPPED | OUTPATIENT
Start: 2020-08-27 | End: 2020-10-06 | Stop reason: ALTCHOICE

## 2020-08-27 RX ORDER — HYDROMORPHONE HYDROCHLORIDE 1 MG/ML
0.4 INJECTION, SOLUTION INTRAMUSCULAR; INTRAVENOUS; SUBCUTANEOUS EVERY 5 MIN PRN
Status: DISCONTINUED | OUTPATIENT
Start: 2020-08-27 | End: 2020-08-27

## 2020-08-27 RX ADMIN — EPHEDRINE SULFATE 5 MG: 50 INJECTION, SOLUTION INTRAVENOUS at 14:11:00

## 2020-08-27 RX ADMIN — ONDANSETRON 4 MG: 2 INJECTION INTRAMUSCULAR; INTRAVENOUS at 14:12:00

## 2020-08-27 RX ADMIN — LEVOFLOXACIN 500 MG: 5 INJECTION, SOLUTION INTRAVENOUS at 14:02:00

## 2020-08-27 RX ADMIN — MIDAZOLAM HYDROCHLORIDE 2 MG: 1 INJECTION INTRAMUSCULAR; INTRAVENOUS at 13:55:00

## 2020-08-27 RX ADMIN — LIDOCAINE HYDROCHLORIDE 50 MG: 10 INJECTION, SOLUTION EPIDURAL; INFILTRATION; INTRACAUDAL; PERINEURAL at 13:55:00

## 2020-08-27 RX ADMIN — DEXAMETHASONE SODIUM PHOSPHATE 4 MG: 4 MG/ML VIAL (ML) INJECTION at 14:12:00

## 2020-08-27 RX ADMIN — SODIUM CHLORIDE, SODIUM LACTATE, POTASSIUM CHLORIDE, CALCIUM CHLORIDE: 600; 310; 30; 20 INJECTION, SOLUTION INTRAVENOUS at 14:41:00

## 2020-08-27 RX ADMIN — EPHEDRINE SULFATE 5 MG: 50 INJECTION, SOLUTION INTRAVENOUS at 14:17:00

## 2020-08-27 RX ADMIN — EPHEDRINE SULFATE 5 MG: 50 INJECTION, SOLUTION INTRAVENOUS at 14:28:00

## 2020-08-27 NOTE — TELEPHONE ENCOUNTER
Urology staff, this patient needs to see a nurse next Tuesday for a Moran catheter removal.  He then needs subsequent to that a 2-week cystoscopy and right stent removal in the office. A KUB should be done prior to that appointment by 1 to 2 days which I have ordered today. Please call the patient and schedule.

## 2020-08-27 NOTE — H&P
SUBJECTIVE:  Lily Singleton is a 69year old male who presents for a consultation at the request of, and a copy of this note will be sent to, DR. Sara Abebe, for evaluation of  benign prostatic hyperplasia and urinary stone.  He states that the problem • ADJ TISS Buyoanna Lent <10SQCM Right 2-17-17     Exc lesion of nose, V_Y flap   • COLONOSCOPY       • COLONOSCOPY       • COLONOSCOPY N/A 12/26/2018     Performed by Carey Juan MD at [address] TJ MADDEN/EFRAIN SHARPE gain, weight loss, fever, night sweats, bone pain, malaise and fatigue. Positive for: None.   All other review of systems reviewed and otherwise negative     OBJECTIVE:  BP (!) 139/93   Pulse 72   Temp 97.8 °F (36.6 °C)   Ht 5' 11\" (1.803 m)   Wt 185 lb (8 the patient.     Meds This Visit:  Requested Prescriptions             Signed Prescriptions Disp Refills   • tamsulosin (FLOMAX) cap 90 capsule 3       Sig: Take 1 capsule (0.4 mg total) by mouth daily.  Take 1/2 hour following the same meal each day   • Fabio

## 2020-08-27 NOTE — ANESTHESIA POSTPROCEDURE EVALUATION
Patient: Delfina Garcias    Procedure Summary     Date:  08/27/20 Room / Location:  [facility] OR  / [facility] OR    Anesthesia Start:  8410 Anesthesia Stop:      Procedures:       CYSTOSCOPY RETROGRADE (Right Bladder)      CYSTOSCOPY STENT INSERTION (Ri

## 2020-08-27 NOTE — OPERATIVE REPORT
[facility]    Operative Note     Brianne Deisy Location: OR   CSN [id number] MRN [id number]   Admission Date 8/27/2020 Operation Date 8/27/2020   Attending Physician Cindy Rodgers MD Operating Physician MD Ida Garcia the 12 o'clock position and the stricture. This released the ring. The stricture was short only about 0.25 cm in length. At this point I was easily able to get the rigid cystoscope into the bladder. The bladder demonstrated otherwise no abnormalities. entirety of this procedure. There were no perioperative or immediate postop complications.      North Loja MD  8/27/2020  3:28 PM

## 2020-08-27 NOTE — ANESTHESIA PREPROCEDURE EVALUATION
Anesthesia PreOp Note    HPI:     Adonis Perez is a 69year old male who presents for preoperative consultation requested by:  Kali Charles MD    Date of Surgery: 8/27/2020    Procedure(s):  CYSTOSCOPY RETROGRADE  CYSTOSCOPY STENT INSERTION  LASER • High cholesterol    • Melanoma ([address] 8/2016    left arm, stage I; .75 mm depth   • Osteoarthritis    • Plantar wart 06/26/2017    right heel   • Pneumonia due to organism     2016   • Visual impairment     Glasses       Past Surgical History:   Procedure lactated ringers infusion, , Intravenous, Continuous, Martin Cooley MD, Last Rate: 20 mL/hr at 08/27/20 1130  famoTIDine (PEPCID) tab 20 mg, 20 mg, Oral, Once, Martin Cooley MD  Metoclopramide HCl (REGLAN) tab 10 mg, 10 mg, Oral, Once, Martin Cooley, Attends meetings of clubs or organizations: Not on file        Relationship status: Not on file      Intimate partner violence:        Fear of current or ex partner: Not on file        Emotionally abused: Not on file        Physically abused: Not on Weight: 81.6 kg (180 lb) 80.7 kg (178 lb)   Height: 1.803 m (5' 11\") 1.803 m (5' 11\")        Anesthesia Evaluation     Patient summary reviewed and Nursing notes reviewed    No history of anesthetic complications   Airway   Mallampati: I  TM distance: >3

## 2020-08-27 NOTE — INTERVAL H&P NOTE
Pre-op Diagnosis: Right ureteral stone [N20.1]    The above referenced H&P was reviewed by North Loja MD on 8/27/2020, the patient was examined and no significant changes have occurred in the patient's condition since the H&P was performed.   I discusse

## 2020-08-27 NOTE — ANESTHESIA PROCEDURE NOTES
Airway
Airway  Date/Time: 8/27/2020 1:57 PM  Urgency: Elective      General Information and Staff    Patient location during procedure: OR  Anesthesiologist: Myesha Muro MD  Resident/CRNA: Marcy Rodriguez CRNA  Performed: CRNA     Indications and Patient C
complains of pain/discomfort

## 2020-08-28 NOTE — TELEPHONE ENCOUNTER
Spoke with patient.  Assisted in scheduling patient for nurse visit, and cystoscopy with stent removal.     Dat Romero can I double book cystoscopy and stent removal for 9/11/20 @9am?      Future Appointments   Date Time Provider Andra Landa   9/1/2020  9:00 AM [facility]   9/8/2020 11:45 AM Carolina Shipley MD [facility]   9/11/2020  8:45 AM Raine Walker MD [facility]

## 2020-08-31 RX ORDER — MONTELUKAST SODIUM 10 MG/1
TABLET ORAL
Qty: 30 TABLET | Refills: 0 | Status: SHIPPED | OUTPATIENT
Start: 2020-08-31 | End: 2020-09-29

## 2020-09-01 ENCOUNTER — NURSE ONLY (OUTPATIENT)
Dept: SURGERY | Facility: CLINIC | Age: 70
End: 2020-09-01
Payer: MEDICARE

## 2020-09-01 DIAGNOSIS — Z97.8 FOLEY CATHETER STATUS: Primary | ICD-10-CM

## 2020-09-01 PROCEDURE — G0463 HOSPITAL OUTPT CLINIC VISIT: HCPCS | Performed by: UROLOGY

## 2020-09-01 NOTE — PROGRESS NOTES
Pt presents for nurse visit for aiken catheter removal. He was properly identified by spelling of last name and , safely assisted onto exam table and draped for modesty as he lowered his lower clothing.  Aiken catheter noted to be connected to large b

## 2020-09-02 RX ORDER — VANCOMYCIN HYDROCHLORIDE 125 MG/1
CAPSULE ORAL
Qty: 3 CAPSULE | Refills: 0 | OUTPATIENT
Start: 2020-09-02

## 2020-09-08 ENCOUNTER — OFFICE VISIT (OUTPATIENT)
Dept: DERMATOLOGY CLINIC | Facility: CLINIC | Age: 70
End: 2020-09-08
Payer: MEDICARE

## 2020-09-08 DIAGNOSIS — D22.9 MULTIPLE BENIGN NEVI: ICD-10-CM

## 2020-09-08 DIAGNOSIS — L57.0 ACTINIC KERATOSIS: ICD-10-CM

## 2020-09-08 DIAGNOSIS — L57.8 SUN-DAMAGED SKIN: ICD-10-CM

## 2020-09-08 DIAGNOSIS — Z85.820 PERSONAL HISTORY OF MALIGNANT MELANOMA OF SKIN: Primary | ICD-10-CM

## 2020-09-08 DIAGNOSIS — L82.1 SEBORRHEIC KERATOSES: ICD-10-CM

## 2020-09-08 DIAGNOSIS — Z85.828 HISTORY OF BASAL CELL CARCINOMA: ICD-10-CM

## 2020-09-08 DIAGNOSIS — L81.4 SOLAR LENTIGO: ICD-10-CM

## 2020-09-08 PROCEDURE — 99214 OFFICE O/P EST MOD 30 MIN: CPT | Performed by: DERMATOLOGY

## 2020-09-08 PROCEDURE — G0463 HOSPITAL OUTPT CLINIC VISIT: HCPCS | Performed by: DERMATOLOGY

## 2020-09-08 PROCEDURE — 17000 DESTRUCT PREMALG LESION: CPT | Performed by: DERMATOLOGY

## 2020-09-08 RX ORDER — CLINDAMYCIN HYDROCHLORIDE 300 MG/1
CAPSULE ORAL
COMMUNITY
Start: 2020-04-08 | End: 2020-10-06 | Stop reason: ALTCHOICE

## 2020-09-08 NOTE — PROGRESS NOTES
HPI:     Chief Complaint     Full Skin Exam        HPI     Full Skin Exam      Additional comments: LOV 3/5/20. pt presenting today with full body skin check.  pt has HX of AK's,BCC,Malignant Melanoma          Last edited by SAMUEL Michaud on 9/8/202 [facility]) cap Take 1 capsule (0.4 mg total) by mouth daily.  Take 1/2 hour following the same meal each day (Patient not taking: Reported on 9/8/2020 ) 90 capsule 3   • acetaminophen 325 MG Oral Tab Take 650 mg by mouth every 6 (six) hours as needed for Pain N/A 6/28/2017    Performed by Rodriguez Cohen MD at [address] [place] [address] MAIN OR   • [address]   • [address] [facility]     • SKIN GRAFT Left 2/17/2017    Performed by Danielito Herrera MD at [address] [place] [address] OR   • SKIN SURGERY Left 10/07/16    left pe Concern: Yes          coffee, 3cups/day        Occupational Exposure: Not Asked        Hobby Hazards: Not Asked        Sleep Concern: Not Asked        Stress Concern: Not Asked        Weight Concern: Not Asked        Special Diet: Not Asked        Back Car back        ASSESSMENT/PLAN:   Personal history of malignant melanoma of skin  (primary encounter diagnosis)-no evidence of recurrence– ABCDE's of melanoma discussed. the patient is to follow up every 6 months. Monthly self exams.  The patient will come narciso

## 2020-09-09 ENCOUNTER — HOSPITAL ENCOUNTER (OUTPATIENT)
Dept: GENERAL RADIOLOGY | Facility: HOSPITAL | Age: 70
Discharge: HOME | End: 2020-09-09
Attending: UROLOGY
Payer: MEDICARE

## 2020-09-09 DIAGNOSIS — N20.1 RIGHT URETERAL STONE: ICD-10-CM

## 2020-09-09 PROCEDURE — 74021 RADEX ABDOMEN 3+ VIEWS: CPT | Performed by: UROLOGY

## 2020-09-11 ENCOUNTER — PROCEDURE (OUTPATIENT)
Dept: SURGERY | Facility: CLINIC | Age: 70
End: 2020-09-11
Payer: MEDICARE

## 2020-09-11 VITALS
SYSTOLIC BLOOD PRESSURE: 139 MMHG | BODY MASS INDEX: 25 KG/M2 | WEIGHT: 178 LBS | HEART RATE: 77 BPM | DIASTOLIC BLOOD PRESSURE: 84 MMHG

## 2020-09-11 DIAGNOSIS — N20.0 KIDNEY STONES: Primary | ICD-10-CM

## 2020-09-11 DIAGNOSIS — N40.1 BENIGN PROSTATIC HYPERPLASIA WITH LOWER URINARY TRACT SYMPTOMS, SYMPTOM DETAILS UNSPECIFIED: ICD-10-CM

## 2020-09-11 PROCEDURE — 52310 CYSTOSCOPY AND TREATMENT: CPT | Performed by: UROLOGY

## 2020-09-11 PROCEDURE — 99213 OFFICE O/P EST LOW 20 MIN: CPT | Performed by: UROLOGY

## 2020-09-11 PROCEDURE — G0463 HOSPITAL OUTPT CLINIC VISIT: HCPCS | Performed by: UROLOGY

## 2020-09-11 RX ORDER — CIPROFLOXACIN 500 MG/1
500 TABLET, FILM COATED ORAL ONCE
Status: COMPLETED | OUTPATIENT
Start: 2020-09-11 | End: 2020-09-11

## 2020-09-11 RX ADMIN — CIPROFLOXACIN 500 MG: 500 TABLET, FILM COATED ORAL at 10:23:00

## 2020-09-11 NOTE — PROGRESS NOTES
Ashley Perkins is a 69year old male. HPI:   Patient presents with:  Kidney Problem: patient presents for cysto,stent removal      76-year-old male presents for cystoscopy right ureteral stent removal.  Feels well. Urinating without complaints.   Suzi Greer BILAT SINUS ENDOSCOPY ETHM MAX N/A 6/28/2017    Performed by Wendy Ferrari MD at [address] [place] [address] OR   • [address]   • REPAIR OF NASAL SEPTUM     • SKIN GRAFT Left 2/17/2017    Performed by Jerry Peralta MD at [address] [place] [address] OR   • SKIN MATEUSZ Physician: No ref.  provider found     Patient presents with:  Kidney Problem: patient presents for cysto,stent removal          CYSTOSCOPY    Anesthesia:  2% lidocaine gel    Urethra: Normal  Prostate / Pelvic: Normal  Bladder: Normal.  No tumor, stone, di

## 2020-09-29 RX ORDER — MONTELUKAST SODIUM 10 MG/1
TABLET ORAL
Qty: 30 TABLET | Refills: 0 | Status: SHIPPED | OUTPATIENT
Start: 2020-09-29 | End: 2020-10-30

## 2020-09-29 NOTE — TELEPHONE ENCOUNTER
This refill request is being sent to the provider for the following reason:  [x]Patient has not had an appointment within the past 12 months _last visit on 7/12/19 allergic rhinitis request for refill,please advise.

## 2020-09-30 RX ORDER — ROSUVASTATIN CALCIUM 10 MG/1
TABLET, COATED ORAL
Qty: 90 TABLET | Refills: 0 | Status: SHIPPED | OUTPATIENT
Start: 2020-09-30 | End: 2020-12-28

## 2020-10-06 ENCOUNTER — OFFICE VISIT (OUTPATIENT)
Dept: INTERNAL MEDICINE CLINIC | Facility: CLINIC | Age: 70
End: 2020-10-06
Payer: MEDICARE

## 2020-10-06 VITALS
BODY MASS INDEX: 25.9 KG/M2 | WEIGHT: 185 LBS | TEMPERATURE: 98 F | HEART RATE: 65 BPM | SYSTOLIC BLOOD PRESSURE: 132 MMHG | DIASTOLIC BLOOD PRESSURE: 87 MMHG | HEIGHT: 71 IN

## 2020-10-06 DIAGNOSIS — Z00.00 ENCOUNTER FOR ANNUAL HEALTH EXAMINATION: Primary | ICD-10-CM

## 2020-10-06 DIAGNOSIS — C43.62 MELANOMA OF LEFT UPPER ARM (HCC): ICD-10-CM

## 2020-10-06 DIAGNOSIS — N20.0 RIGHT KIDNEY STONE: ICD-10-CM

## 2020-10-06 DIAGNOSIS — N40.0 ENLARGED PROSTATE: ICD-10-CM

## 2020-10-06 DIAGNOSIS — N52.03 COMBINED ARTERIAL INSUFFICIENCY AND CORPORO-VENOUS OCCLUSIVE ERECTILE DYSFUNCTION: ICD-10-CM

## 2020-10-06 DIAGNOSIS — E78.00 HYPERCHOLESTEROLEMIA: ICD-10-CM

## 2020-10-06 DIAGNOSIS — J30.2 SEASONAL ALLERGIES: ICD-10-CM

## 2020-10-06 DIAGNOSIS — Z12.11 COLON CANCER SCREENING: ICD-10-CM

## 2020-10-06 PROBLEM — F17.210 CIGARETTE NICOTINE DEPENDENCE WITHOUT COMPLICATION: Status: RESOLVED | Noted: 2018-10-20 | Resolved: 2020-10-06

## 2020-10-06 PROCEDURE — G0402 INITIAL PREVENTIVE EXAM: HCPCS | Performed by: INTERNAL MEDICINE

## 2020-10-06 RX ORDER — TAMSULOSIN HYDROCHLORIDE 0.4 MG/1
CAPSULE ORAL DAILY
COMMUNITY
End: 2021-11-08

## 2020-10-06 NOTE — PATIENT INSTRUCTIONS
Benjamin Ralph's SCREENING SCHEDULE   Tests on this list are recommended by your physician but may not be covered, or covered at this frequency, by your insurer. Please check with your insurance carrier before scheduling to verify coverage.     PREVEN abnormal Colonoscopy due on 12/26/2021 Update [facility] if applicable    Flex Sigmoidoscopy Screen  Covered every 5 years No results found for this or any previous visit. No flowsheet data found.      Fecal Occult Blood   Covered Annually Occult Bl unless Medically needed    Zoster (Not covered by Medicare Part B) No orders found for this or any previous visit. This may be covered with your pharmacy  prescription benefits     Recommended Websites for Advanced Directives    [email or web address] than normal, follow the advice of your healthcare provider   Colorectal cancer All men in this age group Flexible sigmoidoscopy every 5 years, or colonoscopy every 10 years, or double-contrast barium enema every 5 years; yearly fecal occult blood test or f (varicella) All men in this age group who have no record of this infection or vaccine 2 doses; second dose should be given at least 4 weeks after the first dose   Hepatitis A Men at increased risk for infection – talk with your healthcare provider 2 doses intended as a substitute for professional medical care. Always follow your healthcare professional's instructions.

## 2020-10-06 NOTE — PROGRESS NOTES
HPI:   Sharon Gomez is a 69year old male who presents for a Medicare Initial Preventative Physical Exam (Welcome to Medicare- < 12 months on Medicare). Patient presents for above.   Here for his welcome to Medicare exam.    History of high soto depressed, or hopeless (over the last two weeks)?: Not at all  PHQ-2 SCORE: 0      Advanced Directive:  He does NOT have a Living Will on file in [facility] [address]    Advance care planning including the explanation and discussion of advance directives standard forms pe Labs:   Lab Results   Component Value Date    CHOLEST 149 10/30/2019    HDL 45 10/30/2019    LDL 93 10/30/2019    TRIG 57 10/30/2019          Last Chemistry Labs:   Lab Results   Component Value Date    AST 17 10/30/2019    ALT 25 10/30/2019    CA 9.6 08/2 SOCIAL HISTORY:   He  reports that he quit smoking about 6 months ago. His smoking use included cigarettes. He smoked 0.00 packs per day for 42.00 years. He has never used smokeless tobacco. He reports current alcohol use.  He reports that he does not use because I misunderstand what they say: No   I misunderstand what others are saying and make inappropriate responses: No I avoid social activities because I cannot hear well and fear I will reply improperly: No   Family members and friends have told me they (Prevnar 13) 08/20/2016   • Pneumococcal (Prevnar 7) 09/28/2015, 09/28/2015, 09/28/2015, 09/29/2015   • Pneumovax 23 09/27/2015   • TDAP 07/09/2020   • Zoster Vaccine Recombinant Adjuvanted (Shingrix) 07/09/2020, 09/24/2020        ASSESSMENT AND OTHER RELE maintain positive mental well-being?: Social Interaction; Visiting Friends    This section provided for quick review of chart, separate sheet to patient  [address] Internal Lab or Procedure External Lab or Procedure   D abusers     Tetanus Toxoid  Only covered with a cut with metal- TD and TDaP Not covered by Medicare Part B) No vaccine history found This may be covered with your prescription benefits, but Medicare does not cover unless Medically needed    Zoster   Not co

## 2020-10-07 ENCOUNTER — LAB ENCOUNTER (OUTPATIENT)
Dept: LAB | Age: 70
End: 2020-10-07
Attending: INTERNAL MEDICINE
Payer: MEDICARE

## 2020-10-07 DIAGNOSIS — E78.00 HYPERCHOLESTEROLEMIA: ICD-10-CM

## 2020-10-07 DIAGNOSIS — Z00.00 ENCOUNTER FOR ANNUAL HEALTH EXAMINATION: ICD-10-CM

## 2020-10-07 PROCEDURE — 85025 COMPLETE CBC W/AUTO DIFF WBC: CPT

## 2020-10-07 PROCEDURE — 80061 LIPID PANEL: CPT

## 2020-10-07 PROCEDURE — 84443 ASSAY THYROID STIM HORMONE: CPT

## 2020-10-07 PROCEDURE — 36415 COLL VENOUS BLD VENIPUNCTURE: CPT

## 2020-10-07 PROCEDURE — 80053 COMPREHEN METABOLIC PANEL: CPT

## 2020-10-09 ENCOUNTER — TELEPHONE (OUTPATIENT)
Dept: INTERNAL MEDICINE CLINIC | Facility: CLINIC | Age: 70
End: 2020-10-09

## 2020-10-09 NOTE — TELEPHONE ENCOUNTER
Patient calling asking to review current lab,   Reviewed  as requested , Patient has no further questions at this time

## 2020-10-13 ENCOUNTER — HOSPITAL ENCOUNTER (OUTPATIENT)
Dept: ULTRASOUND IMAGING | Facility: HOSPITAL | Age: 70
Discharge: HOME | End: 2020-10-13
Attending: UROLOGY
Payer: MEDICARE

## 2020-10-13 DIAGNOSIS — N20.0 KIDNEY STONES: ICD-10-CM

## 2020-10-13 PROCEDURE — 76775 US EXAM ABDO BACK WALL LIM: CPT | Performed by: UROLOGY

## 2020-10-30 RX ORDER — MONTELUKAST SODIUM 10 MG/1
TABLET ORAL
Qty: 30 TABLET | Refills: 0 | Status: SHIPPED | OUTPATIENT
Start: 2020-10-30 | End: 2020-11-27

## 2020-11-12 ENCOUNTER — OFFICE VISIT (OUTPATIENT)
Dept: SURGERY | Facility: CLINIC | Age: 70
End: 2020-11-12
Payer: MEDICARE

## 2020-11-12 VITALS — DIASTOLIC BLOOD PRESSURE: 74 MMHG | SYSTOLIC BLOOD PRESSURE: 116 MMHG | HEART RATE: 76 BPM

## 2020-11-12 DIAGNOSIS — N20.0 KIDNEY STONES: ICD-10-CM

## 2020-11-12 DIAGNOSIS — N40.1 BENIGN PROSTATIC HYPERPLASIA WITH LOWER URINARY TRACT SYMPTOMS, SYMPTOM DETAILS UNSPECIFIED: Primary | ICD-10-CM

## 2020-11-12 PROCEDURE — 51798 US URINE CAPACITY MEASURE: CPT | Performed by: UROLOGY

## 2020-11-12 PROCEDURE — G0463 HOSPITAL OUTPT CLINIC VISIT: HCPCS | Performed by: UROLOGY

## 2020-11-12 PROCEDURE — 99213 OFFICE O/P EST LOW 20 MIN: CPT | Performed by: UROLOGY

## 2020-11-12 NOTE — PROGRESS NOTES
Lesly Akins is a 69year old male. HPI:   Patient presents with: Follow - Up: Present for 3 month follow up. Patient states frequent urination. Feels like bladder is not emptying out. Denies any pain.        59-year-old male status post cystoscop predominantly cystic, but exhibits a peripheral mural nodule. Consider follow-up MRI of the abdomen with and without contrast if further assessment. 2. No sonographically discernible nephrolithiasis.      3. Simple-appearing left renal cyst.           D 8/27/2020    Performed by Da Horan MD at [facility] OR   • NASAL SCOPY,REMV PART ETHMOID     • NASAL SCOPY,RMV TISS MAXILL SINUS     • NASAL SEPTOPLASTY BILAT SINUS ENDOSCOPY ETHM MAX N/A 6/28/2017    Performed by Gilmer Bridges MD at [facility] OR   • reassess symptoms. Call if he has any problems or concerns. Orders This Visit:  No orders of the defined types were placed in this encounter.       Meds This Visit:  Requested Prescriptions      No prescriptions requested or ordered in this encount

## 2020-11-27 RX ORDER — MONTELUKAST SODIUM 10 MG/1
TABLET ORAL
Qty: 30 TABLET | Refills: 0 | Status: SHIPPED | OUTPATIENT
Start: 2020-11-27 | End: 2021-01-04

## 2020-12-08 ENCOUNTER — TELEMEDICINE (OUTPATIENT)
Dept: INTERNAL MEDICINE CLINIC | Facility: CLINIC | Age: 70
End: 2020-12-08
Payer: MEDICARE

## 2020-12-08 ENCOUNTER — TELEPHONE (OUTPATIENT)
Dept: INTERNAL MEDICINE CLINIC | Facility: CLINIC | Age: 70
End: 2020-12-08

## 2020-12-08 DIAGNOSIS — R52 BODY ACHES: Primary | ICD-10-CM

## 2020-12-08 DIAGNOSIS — R19.7 DIARRHEA, UNSPECIFIED TYPE: ICD-10-CM

## 2020-12-08 DIAGNOSIS — J02.9 SORE THROAT: ICD-10-CM

## 2020-12-08 PROCEDURE — 99213 OFFICE O/P EST LOW 20 MIN: CPT | Performed by: INTERNAL MEDICINE

## 2020-12-08 NOTE — TELEPHONE ENCOUNTER
Action Requested: Summary for Provider     []  Critical Lab, Recommendations Needed  [] Need Additional Advice  []   FYI    []   Need Orders  [] Need Medications Sent to Pharmacy  []  Other     SUMMARY: virtual visit scheduled today with Dr Kaykay Hooker for cheo

## 2020-12-08 NOTE — PROGRESS NOTES
Patient ID: Aida Hussein is a 69year old male. Patient presents with:  Diarrhea  Body ache and/or chills         HISTORY OF PRESENT ILLNESS:   Patient presents for above.   This visit is conducted using Telemedicine with live, interactive video and CYSTOSCOPY STENT INSERTION Right 8/27/2020    Performed by Theodosia Bumpers, MD at [address] [place] [address] MAIN OR   • CYSTOSCOPY URETEROSCOPY Right 8/27/2020    Performed by Theodosia Bumpers, MD at [address] [place] [address] OR   • [place] 2 LESN,HAND/EFRAIN Right 2-17-17    Exc ganglion on file        Inability: Not on file      Transportation needs        Medical: Not on file        Non-medical: Not on file    Tobacco Use      Smoking status: Former Smoker        Packs/day: 0.00        Years: 42.00        Pack years: 0        Types: Ciga Unable to perform vitals or do physical exam as this is a virtual video visit. Patient appears alert. No conversational dyspnea or distress. ASSESSMENT/PLAN:   1. Body aches  · We will hold off on testing at this time for COVID-19.   · Symptomatic tr consent form, related consents and the risks discussed. Lastly, the patient confirmed that they were in [place]. Included in this visit, time may have been spent reviewing labs, medications, radiology tests and decision making.  Appropriate medical de

## 2020-12-09 ENCOUNTER — TELEPHONE (OUTPATIENT)
Dept: INTERNAL MEDICINE CLINIC | Facility: CLINIC | Age: 70
End: 2020-12-09

## 2020-12-09 NOTE — TELEPHONE ENCOUNTER
Verified name and . Patient had telemedicine appointment with Dr. Eun Last on 20 and is calling to schedule another virtual visit for follow up.  Virtual appointment scheduled:  Future Appointments   Date Time Provider Andra Landa   12/10/2020

## 2020-12-10 ENCOUNTER — TELEMEDICINE (OUTPATIENT)
Dept: INTERNAL MEDICINE CLINIC | Facility: CLINIC | Age: 70
End: 2020-12-10
Payer: MEDICARE

## 2020-12-10 DIAGNOSIS — R52 BODY ACHES: Primary | ICD-10-CM

## 2020-12-10 PROCEDURE — 99213 OFFICE O/P EST LOW 20 MIN: CPT | Performed by: INTERNAL MEDICINE

## 2020-12-10 NOTE — PROGRESS NOTES
Patient ID: Ras Poe is a 69year old male. Patient presents with: Follow - Up         HISTORY OF PRESENT ILLNESS:   Patient presents for above. This visit is conducted using Telemedicine with live, interactive video and audio (Ateedaimity).   [facility] MAIN OR   • EXCIS TENDON SHEATH MARYANNE,HAND/FINGR Right 2-17-17    Exc ganglion R thumb   • EXCISION LESION HEAD/NECK/FACE Left 2/17/2017    Performed by Maria Eugenia Lopez MD at [facility]   • FOOT SURGERY Left 2006    Nerve procedure   • HAND GANG Packs/day: 0.00        Years: 42.00        Pack years: 0        Types: Cigarettes        Quit date: 3/25/2020        Years since quittin.7      Smokeless tobacco: Never Used    Substance and Sexual Activity      Alcohol use:  Yes        Alcohol/wee aches  · We will hold off on getting Covid testing at this time. · Symptomatic treatment. · Continue precautions such as going grocery shopping early in the morning and isolating at work. Return if symptoms worsen or fail to improve.     Time spent on care above. Coding/billing information is submitted for this visit based on complexity of care and/or time spent for the visit.     Alfredo Curry MD  12/10/2020

## 2020-12-15 ENCOUNTER — LAB ENCOUNTER (OUTPATIENT)
Dept: LAB | Facility: HOSPITAL | Age: 70
End: 2020-12-15
Attending: INTERNAL MEDICINE
Payer: MEDICARE

## 2020-12-15 ENCOUNTER — TELEPHONE (OUTPATIENT)
Dept: INTERNAL MEDICINE CLINIC | Facility: CLINIC | Age: 70
End: 2020-12-15

## 2020-12-15 DIAGNOSIS — R50.9 FEVER, UNSPECIFIED FEVER CAUSE: ICD-10-CM

## 2020-12-15 DIAGNOSIS — R09.81 SINUS CONGESTION: ICD-10-CM

## 2020-12-15 DIAGNOSIS — R19.7 DIARRHEA, UNSPECIFIED TYPE: Primary | ICD-10-CM

## 2020-12-15 DIAGNOSIS — R19.7 DIARRHEA, UNSPECIFIED TYPE: ICD-10-CM

## 2020-12-15 NOTE — TELEPHONE ENCOUNTER
Patient informed of Dr Ian Nguyễn response below and pt states was already contacted by Central Scheduling and has an appt scheduled to complete the COVID test today at 4:30 pm.

## 2020-12-15 NOTE — TELEPHONE ENCOUNTER
Pt called and still having sinus pressure, occasional diarrhea, low grade temp (t-max 99.5). Pt states he has been taking Tylenol.      Please advise

## 2020-12-17 ENCOUNTER — TELEPHONE (OUTPATIENT)
Dept: INTERNAL MEDICINE CLINIC | Facility: CLINIC | Age: 70
End: 2020-12-17

## 2020-12-17 NOTE — TELEPHONE ENCOUNTER
Patient asking for positive Covid results be faxed to his employer's HR.     Faxed results to Sallie Lopez at [phone]

## 2020-12-19 ENCOUNTER — APPOINTMENT (OUTPATIENT)
Dept: GENERAL RADIOLOGY | Facility: HOSPITAL | Age: 70
End: 2020-12-19
Attending: EMERGENCY MEDICINE
Payer: MEDICARE

## 2020-12-19 ENCOUNTER — HOSPITAL ENCOUNTER (EMERGENCY)
Facility: HOSPITAL | Age: 70
Discharge: HOME | End: 2020-12-19
Attending: EMERGENCY MEDICINE
Payer: MEDICARE

## 2020-12-19 VITALS
TEMPERATURE: 99 F | HEART RATE: 80 BPM | BODY MASS INDEX: 25.2 KG/M2 | HEIGHT: 71 IN | DIASTOLIC BLOOD PRESSURE: 80 MMHG | OXYGEN SATURATION: 96 % | SYSTOLIC BLOOD PRESSURE: 115 MMHG | RESPIRATION RATE: 18 BRPM | WEIGHT: 180 LBS

## 2020-12-19 DIAGNOSIS — U07.1 COVID-19 VIRUS INFECTION: Primary | ICD-10-CM

## 2020-12-19 PROCEDURE — 99283 EMERGENCY DEPT VISIT LOW MDM: CPT

## 2020-12-19 PROCEDURE — 71045 X-RAY EXAM CHEST 1 VIEW: CPT | Performed by: EMERGENCY MEDICINE

## 2020-12-19 NOTE — ED INITIAL ASSESSMENT (HPI)
Patient reports COVID + test on Tuesday, presenting today for concern for his lungs and breathing. Also reports sinus headache that is improved with tylenol, last taken 24 hours ago.     Adds mild diarrhea and fever last week, unsure of when symptoms starte

## 2020-12-19 NOTE — ED PROVIDER NOTES
Patient Seen in: [facility] AND [facility] Emergency Department    History   No chief complaint on file. Stated Complaint: COVID + Headache, VARUN. HPI    Patient here with COVID + sick  for about 10 days.   No travel, no contact with known cronoavirus patien by Tabitha Armstrong MD at [address] [facility] MAIN OR   • NASAL SCOPY,REMV PART ETHMOID     • [facility]     • NASAL SEPTOPLASTY BILAT SINUS ENDOSCOPY ETHM MAX N/A 6/28/2017    Performed by Shu Nick MD at [address] 180.3 cm (5' 11\")   Wt 81.6 kg   SpO2 97%   BMI 25.10 kg/m²   PULSE OX Nl on room air    GENERAL: awake alert no sig distress  HEAD: normocephalic, atraumatic  EYES: sclera non icteric bilateral, conjunctiva clear    NOSE: nasal turbinates boggy  NECK: barker

## 2020-12-21 ENCOUNTER — TELEMEDICINE (OUTPATIENT)
Dept: INTERNAL MEDICINE CLINIC | Facility: CLINIC | Age: 70
End: 2020-12-21
Payer: MEDICARE

## 2020-12-21 ENCOUNTER — TELEPHONE (OUTPATIENT)
Dept: INTERNAL MEDICINE CLINIC | Facility: CLINIC | Age: 70
End: 2020-12-21

## 2020-12-21 DIAGNOSIS — U07.1 COVID-19: Primary | ICD-10-CM

## 2020-12-21 PROCEDURE — 99213 OFFICE O/P EST LOW 20 MIN: CPT | Performed by: INTERNAL MEDICINE

## 2020-12-21 NOTE — PROGRESS NOTES
Patient ID: Mohit Rodríguez is a 69year old male. Patient presents with:  Covid         HISTORY OF PRESENT ILLNESS:   Patient presents for above. This visit is conducted using Telemedicine with live, interactive video and audio (Doximity).   Following N/A 12/26/2018    Performed by Lyly Ford MD at [place] [address] Right 8/27/2020    Performed by Princess Kerri MD at [facility] OR   Homer Parra Right 8/27/2020    Performed by Princess Kerri MD at [facility] Not on file    Occupational History      Occupation:     Social Needs      Financial resource strain: Not on file      Food insecurity        Worry: Not on file        Inability: Not on file      Transportation needs        Medical: Not o Pt has a pacemaker: No        Pt has a defibrillator: No        Reaction to local anesthetic: No    Social History Narrative      Not on file      PHYSICAL EXAM:   Unable to perform vitals or do physical exam as this is a virtual video visit.   Patient ap practices, telehealth consent form and other related consent forms and documents. which are located on the [facility] website. The patient verbally agreed to telehealth consent form, related consents and the risks discussed.     Lastly, the patient confirmed that

## 2020-12-21 NOTE — TELEPHONE ENCOUNTER
Scheduled Dr Rodney Rider today 1 pm doximity visit. Advised on video visit process, billing; patient verbalized understanding. ER 12/19/2020. Covid+ 12/15/2020. Patient stated had a little difficulty breathing, but thinks may have been anxiety.  No fever, cou

## 2020-12-28 ENCOUNTER — TELEMEDICINE (OUTPATIENT)
Dept: INTERNAL MEDICINE CLINIC | Facility: CLINIC | Age: 70
End: 2020-12-28
Payer: MEDICARE

## 2020-12-28 ENCOUNTER — NURSE TRIAGE (OUTPATIENT)
Dept: INTERNAL MEDICINE CLINIC | Facility: CLINIC | Age: 70
End: 2020-12-28

## 2020-12-28 DIAGNOSIS — R09.81 NASAL CONGESTION: ICD-10-CM

## 2020-12-28 DIAGNOSIS — U07.1 COVID-19: Primary | ICD-10-CM

## 2020-12-28 PROCEDURE — 99213 OFFICE O/P EST LOW 20 MIN: CPT | Performed by: INTERNAL MEDICINE

## 2020-12-28 RX ORDER — ROSUVASTATIN CALCIUM 10 MG/1
TABLET, COATED ORAL
Qty: 90 TABLET | Refills: 0 | Status: SHIPPED | OUTPATIENT
Start: 2020-12-28 | End: 2021-03-27

## 2020-12-28 NOTE — TELEPHONE ENCOUNTER
Patient indicated that was diagnosed with COVID on 12/15/2020. Symptoms are about the same. Mostly sinus symptoms. No fever. No shortness of breath or chest. Still has anxiety. Otherwise no other symptoms.  Patient wanted to schedule a video visit with Dr Wallace Courts

## 2020-12-29 ENCOUNTER — TELEPHONE (OUTPATIENT)
Dept: OTOLARYNGOLOGY | Facility: CLINIC | Age: 70
End: 2020-12-29

## 2020-12-29 NOTE — TELEPHONE ENCOUNTER
Dr Zane Morales, please advise. Televisit 12/28/2020. Patient got the claritan-D yesterday. He said he's having urinary frequency, he feels his bladder isn't emptying the way it should today, and he's asking if the claritan-D affects the prostate?  He denied he

## 2020-12-31 NOTE — TELEPHONE ENCOUNTER
[facility] message sent advising patient he can take Singulair and Claritin together.

## 2021-01-04 ENCOUNTER — OFFICE VISIT (OUTPATIENT)
Dept: OTOLARYNGOLOGY | Facility: CLINIC | Age: 71
End: 2021-01-04
Payer: MEDICARE

## 2021-01-04 VITALS
WEIGHT: 180 LBS | TEMPERATURE: 98 F | SYSTOLIC BLOOD PRESSURE: 122 MMHG | HEART RATE: 82 BPM | HEIGHT: 71 IN | BODY MASS INDEX: 25.2 KG/M2 | DIASTOLIC BLOOD PRESSURE: 70 MMHG

## 2021-01-04 DIAGNOSIS — J01.90 ACUTE SINUSITIS, RECURRENCE NOT SPECIFIED, UNSPECIFIED LOCATION: Primary | ICD-10-CM

## 2021-01-04 PROCEDURE — 99213 OFFICE O/P EST LOW 20 MIN: CPT | Performed by: OTOLARYNGOLOGY

## 2021-01-04 RX ORDER — AZITHROMYCIN 250 MG/1
TABLET, FILM COATED ORAL
Qty: 6 TABLET | Refills: 0 | Status: SHIPPED | OUTPATIENT
Start: 2021-01-04 | End: 2021-01-09

## 2021-01-04 RX ORDER — MONTELUKAST SODIUM 10 MG/1
TABLET ORAL
Qty: 30 TABLET | Refills: 0 | Status: SHIPPED | OUTPATIENT
Start: 2021-01-04 | End: 2021-01-28

## 2021-01-04 NOTE — TELEPHONE ENCOUNTER
This refill request is being sent to the provider for the following reason:  [x]Patient has not had an appointment within the past 12 months but has made an appointment on: ___  []Medication is not within protocol  []Patient did not complete follow up amberly

## 2021-01-05 NOTE — PROGRESS NOTES
Dewey Obregon is a 69year old male. Patient presents with:  Sinus Problem: Patient Presents with: Sinus congestion, pressure and slight headache,  positive for covid on 12/16/20    HPI:   About 3 weeks ago he was diagnosed with Covid.   He has been ex Comment: rarely    Drug use: No       REVIEW OF SYSTEMS:   GENERAL HEALTH: feels well otherwise  GENERAL : denies fever, chills, sweats, weight loss, weight gain  SKIN: denies any unusual skin lesions or rashes  RESPIRATORY: denies shortness of breath with return if any problems persist      The patient indicates understanding of these issues and agrees to the plan. Kleber Sainz MD  1/5/2021  6:26 AM

## 2021-01-11 ENCOUNTER — TELEPHONE (OUTPATIENT)
Dept: OTOLARYNGOLOGY | Facility: CLINIC | Age: 71
End: 2021-01-11

## 2021-01-11 NOTE — TELEPHONE ENCOUNTER
Patient was prescribed antibiotics for his sinus infection and has not helped. Please call at:[phone],thanks.   *Patient has appointment Friday

## 2021-01-11 NOTE — TELEPHONE ENCOUNTER
Dr Lashell Frederick patient stated had finished the antibiotic prescribed but symptoms still the  same sinus congestion,pressure headache,no fever,made an appt to see you on Friday, asking if needed to take other medication, please advise.

## 2021-01-11 NOTE — TELEPHONE ENCOUNTER
I discussed this with him when he was in the office. It is very possible that the Covid itself is causing all of his congestion. I do not know if any medicines will help or if it will just take time.   He can try to take over-the-counter decongestants lik

## 2021-01-25 ENCOUNTER — OFFICE VISIT (OUTPATIENT)
Dept: OTOLARYNGOLOGY | Facility: CLINIC | Age: 71
End: 2021-01-25
Payer: MEDICARE

## 2021-01-25 VITALS
BODY MASS INDEX: 25 KG/M2 | HEART RATE: 87 BPM | WEIGHT: 180 LBS | TEMPERATURE: 98 F | SYSTOLIC BLOOD PRESSURE: 139 MMHG | DIASTOLIC BLOOD PRESSURE: 86 MMHG

## 2021-01-25 DIAGNOSIS — J01.90 ACUTE SINUSITIS, RECURRENCE NOT SPECIFIED, UNSPECIFIED LOCATION: Primary | ICD-10-CM

## 2021-01-25 PROCEDURE — 99213 OFFICE O/P EST LOW 20 MIN: CPT | Performed by: OTOLARYNGOLOGY

## 2021-01-25 NOTE — PROGRESS NOTES
Brooklyn Blake is a 69year old male. Patient presents with: Follow - Up: Follow up on acute sinusitis. Patient c/o sinus pressure and congestion. HPI:   About 2 months ago he was diagnosed with Covid.   He continues to have a lot of problems with /86 (BP Location: Left arm)   Pulse 87   Temp 97.6 °F (36.4 °C) (Tympanic)   Wt 180 lb (81.6 kg)   BMI 25.10 kg/m²   System Findings Details   Skin Normal Inspection - Normal.   Constitutional Normal Overall appearance - Normal.   Head/Face Normal

## 2021-01-28 RX ORDER — MONTELUKAST SODIUM 10 MG/1
TABLET ORAL
Qty: 30 TABLET | Refills: 3 | Status: SHIPPED | OUTPATIENT
Start: 2021-01-28 | End: 2021-06-01

## 2021-02-02 ENCOUNTER — NURSE TRIAGE (OUTPATIENT)
Dept: INTERNAL MEDICINE CLINIC | Facility: CLINIC | Age: 71
End: 2021-02-02

## 2021-02-02 NOTE — TELEPHONE ENCOUNTER
Action Requested: Summary for Provider     []  Critical Lab, Recommendations Needed  [] Need Additional Advice  []   FYI    []   Need Orders  [] Need Medications Sent to Pharmacy  []  Other     SUMMARY:appt a made, left knee pain x 2 weeks from just walkin

## 2021-02-03 ENCOUNTER — PATIENT MESSAGE (OUTPATIENT)
Dept: INTERNAL MEDICINE CLINIC | Facility: CLINIC | Age: 71
End: 2021-02-03

## 2021-02-03 ENCOUNTER — OFFICE VISIT (OUTPATIENT)
Dept: INTERNAL MEDICINE CLINIC | Facility: CLINIC | Age: 71
End: 2021-02-03
Payer: MEDICARE

## 2021-02-03 ENCOUNTER — HOSPITAL ENCOUNTER (OUTPATIENT)
Dept: GENERAL RADIOLOGY | Facility: HOSPITAL | Age: 71
Discharge: HOME | End: 2021-02-03
Attending: INTERNAL MEDICINE
Payer: MEDICARE

## 2021-02-03 VITALS
BODY MASS INDEX: 26.46 KG/M2 | SYSTOLIC BLOOD PRESSURE: 130 MMHG | WEIGHT: 189 LBS | TEMPERATURE: 98 F | DIASTOLIC BLOOD PRESSURE: 76 MMHG | HEIGHT: 71 IN | HEART RATE: 73 BPM

## 2021-02-03 DIAGNOSIS — M25.562 ACUTE PAIN OF LEFT KNEE: Primary | ICD-10-CM

## 2021-02-03 DIAGNOSIS — M25.562 ACUTE PAIN OF LEFT KNEE: ICD-10-CM

## 2021-02-03 PROCEDURE — 99213 OFFICE O/P EST LOW 20 MIN: CPT | Performed by: INTERNAL MEDICINE

## 2021-02-03 PROCEDURE — 73562 X-RAY EXAM OF KNEE 3: CPT | Performed by: INTERNAL MEDICINE

## 2021-02-03 NOTE — PATIENT INSTRUCTIONS
Knee Pain with Uncertain Cause    There are several common causes for knee pain.  These can include:  · A sprain of the ligaments that support the joint  · An injury to the cartilage lining of the joint  · Arthritis from wear-and-tear or inflammation  The Follow up with your healthcare provider as advised. This is usually within 1 to 2 weeks.   If X-rays were taken, you will be told of any new findings that may affect your care  Call 911  Call 911 if you have:  · Shortness of breath  · Chest pain  When to se

## 2021-02-03 NOTE — TELEPHONE ENCOUNTER
As indicated below the patient did view the result note from Dr. Star Guillen today 2/3/2021.       Viewed by Vicky Vang on 2/3/2021 12:11 PM  Written by Miguel Kirby MD on 2/3/2021 11:42 AM  Lien Nixon,     Your x-ray is attached. Quang Fess was relatively normal excep

## 2021-02-03 NOTE — TELEPHONE ENCOUNTER
From: Dany Turner  To: Viviana Ozuna MD  Sent: 2/3/2021 11:09 AM CST  Subject: Test Results Question    I have a question about X-Ray Knee resulted on 2/21 at 10:05 AM. What do the results mean?

## 2021-02-03 NOTE — PROGRESS NOTES
Patient ID: Marlen Liriano is a 69year old male. Patient presents with:  Knee Pain: left knee pain x2 weeks        HISTORY OF PRESENT ILLNESS:   HPI  Patient presents for above. Here with left knee pain for the past 2 weeks.   States he was walking 8/27/2020    Performed by John Ramirez MD at [facility] OR   • [place] Right 8/27/2020    Performed by John Ramirez MD at [facility] OR   • CYSTOSCOPY URETEROSCOPY Right 8/27/2020    Performed by John Ramirez MD at [address] [place] [address]   Homer MCINTYRE resource strain: Not on file      Food insecurity        Worry: Not on file        Inability: Not on file      Transportation needs        Medical: Not on file        Non-medical: Not on file    Tobacco Use      Smoking status: Former Smoker        Packs/d Social History Narrative      Not on file          PHYSICAL EXAM:      02/03/21  0859   BP: 130/76   Pulse: 73   Temp: 98.3 °F (36.8 °C)   TempSrc: Oral   Weight: 189 lb (85.7 kg)   Height: 5' 11\" (1.803 m)       Body mass index is 26.36 kg/m².     Physica

## 2021-02-09 ENCOUNTER — OFFICE VISIT (OUTPATIENT)
Dept: ORTHOPEDICS CLINIC | Facility: CLINIC | Age: 71
End: 2021-02-09
Payer: MEDICARE

## 2021-02-09 VITALS — RESPIRATION RATE: 17 BRPM | HEART RATE: 73 BPM | DIASTOLIC BLOOD PRESSURE: 76 MMHG | SYSTOLIC BLOOD PRESSURE: 119 MMHG

## 2021-02-09 DIAGNOSIS — M17.12 PRIMARY OSTEOARTHRITIS OF LEFT KNEE: ICD-10-CM

## 2021-02-09 DIAGNOSIS — M23.92 INTERNAL DERANGEMENT OF LEFT KNEE: Primary | ICD-10-CM

## 2021-02-09 PROCEDURE — 20610 DRAIN/INJ JOINT/BURSA W/O US: CPT | Performed by: ORTHOPAEDIC SURGERY

## 2021-02-09 PROCEDURE — 99204 OFFICE O/P NEW MOD 45 MIN: CPT | Performed by: ORTHOPAEDIC SURGERY

## 2021-02-09 RX ORDER — TRIAMCINOLONE ACETONIDE 40 MG/ML
40 INJECTION, SUSPENSION INTRA-ARTICULAR; INTRAMUSCULAR ONCE
Status: COMPLETED | OUTPATIENT
Start: 2021-02-09 | End: 2021-02-09

## 2021-02-09 RX ADMIN — TRIAMCINOLONE ACETONIDE 40 MG: 40 INJECTION, SUSPENSION INTRA-ARTICULAR; INTRAMUSCULAR at 13:23:00

## 2021-02-09 NOTE — H&P
NURSING INTAKE COMMENTS: Patient presents with:  Knee Pain: Left - onset 3 weeks ago - no injury - something popped while he was walking - has pain on the medial aspect of the knee rated as 5-10/10 on and off - has x-rays in the system       HPI: This 69 y procedure   • HAND GANGLION EXCISION Right 2/17/2017    Performed by Dank Jarrett MD at [address] [place] [address] MAIN OR   • LASER HOLMIUM LITHOTRIPSY N/A 8/27/2020    Performed by Hall Runner, MD at [address] [place] [address] OR   • NASAL SCOPY,REMV PART ETHMOID     • NASAL SCOPY, worrisome skin lesions  EYES:denies blurred vision or double vision  HEENT: denies new nasal congestion, sinus pain or ST  LUNGS: denies shortness of breath  CARDIOVASCULAR: denies chest pain  GI: no hematemesis, no worsening heartburn, no diarrhea  : no Saray Samaniego MD on 2/03/2021 at 10:04 AM     Finalized by (CST): Dayne Aschoff, MD on 2/03/2021 at 10:05 AM             Lab Results   Component Value Date    WBC 7.5 10/07/2020    HGB 15.4 10/07/2020    .0 10/07/2020      Lab Results   Component Value Date

## 2021-02-09 NOTE — PROGRESS NOTES
Per verbal order from Dr. Dario Crump, draw up 5ml of 0.5% Marcaine and 1ml of Kenalog 40 for cortisone injection to left knee. Echo Macedo  Patient provided education handout for cortisone injection.    Patient left office prior to obtaining post injection danny

## 2021-02-23 ENCOUNTER — TELEPHONE (OUTPATIENT)
Dept: ORTHOPEDICS CLINIC | Facility: CLINIC | Age: 71
End: 2021-02-23

## 2021-02-23 NOTE — TELEPHONE ENCOUNTER
Per had cortisone shot on 2/9/21. States it has helped and feels about 90% better. Wanting to know what the next step is, will pt still need MRI.  Please advise

## 2021-03-11 ENCOUNTER — OFFICE VISIT (OUTPATIENT)
Dept: DERMATOLOGY CLINIC | Facility: CLINIC | Age: 71
End: 2021-03-11
Payer: MEDICARE

## 2021-03-11 DIAGNOSIS — L82.1 SEBORRHEIC KERATOSES: ICD-10-CM

## 2021-03-11 DIAGNOSIS — Z87.2 HISTORY OF ACTINIC KERATOSES: ICD-10-CM

## 2021-03-11 DIAGNOSIS — L57.8 SUN-DAMAGED SKIN: ICD-10-CM

## 2021-03-11 DIAGNOSIS — D22.9 MULTIPLE BENIGN NEVI: ICD-10-CM

## 2021-03-11 DIAGNOSIS — L82.0 INFLAMED SEBORRHEIC KERATOSIS: ICD-10-CM

## 2021-03-11 DIAGNOSIS — Z85.828 HISTORY OF BASAL CELL CARCINOMA: ICD-10-CM

## 2021-03-11 DIAGNOSIS — Z85.820 PERSONAL HISTORY OF MALIGNANT MELANOMA OF SKIN: Primary | ICD-10-CM

## 2021-03-11 DIAGNOSIS — L81.4 SOLAR LENTIGO: ICD-10-CM

## 2021-03-11 PROCEDURE — 17110 DESTRUCTION B9 LES UP TO 14: CPT | Performed by: DERMATOLOGY

## 2021-03-11 PROCEDURE — 99213 OFFICE O/P EST LOW 20 MIN: CPT | Performed by: DERMATOLOGY

## 2021-03-11 NOTE — PROGRESS NOTES
HPI:     Chief Complaint     Lesion        HPI     Lesion      Additional comments: LOV 9/8/20. pt presenting today with lesions of concern. pt requesting full body skin check.  pt has HX of Ak's,BCC, Malignant Melanoma          Last edited by Diya Toure Esophageal reflux    • Ganglion of flexor tendon sheath of right thumb 2-17-17   • High cholesterol    • [facility]    left arm, stage I; .75 mm depth   • Osteoarthritis    • Plantar wart 06/26/2017    right heel   • Pneumonia due to organism Use      Smoking status: Former Smoker        Packs/day: 0.00        Years: 42.00        Pack years: 0        Types: Cigarettes        Quit date: 3/25/2020        Years since quittin.9      Smokeless tobacco: Never Used    Vaping Use      Vaping Use: N Violence:       Fear of Current or Ex-Partner:       Emotionally Abused:       Physically Abused:       Sexually Abused:   Family History   Problem Relation Age of Onset   • Dementia Mother    • Seizure Disorder Mother    • Hypertension Mother    • Cancer working and to reapply it every few hours. History of basal cell carcinoma-no recurrence–follow-up as above  Inflamed seborrheic keratosis posterior left shoulder–treated with cryotherapy. Post cryo directions given.   Patient understands they may recur

## 2021-03-27 RX ORDER — ROSUVASTATIN CALCIUM 10 MG/1
TABLET, COATED ORAL
Qty: 90 TABLET | Refills: 0 | Status: SHIPPED | OUTPATIENT
Start: 2021-03-27 | End: 2021-06-25

## 2021-03-29 DIAGNOSIS — N52.03 COMBINED ARTERIAL INSUFFICIENCY AND CORPORO-VENOUS OCCLUSIVE ERECTILE DYSFUNCTION: ICD-10-CM

## 2021-03-29 RX ORDER — SILDENAFIL CITRATE 20 MG/1
TABLET ORAL
Qty: 10 TABLET | Refills: 0 | OUTPATIENT
Start: 2021-03-29

## 2021-04-06 ENCOUNTER — LAB ENCOUNTER (OUTPATIENT)
Dept: LAB | Age: 71
End: 2021-04-06
Attending: INTERNAL MEDICINE
Payer: MEDICARE

## 2021-04-06 ENCOUNTER — OFFICE VISIT (OUTPATIENT)
Dept: INTERNAL MEDICINE CLINIC | Facility: CLINIC | Age: 71
End: 2021-04-06
Payer: MEDICARE

## 2021-04-06 VITALS
DIASTOLIC BLOOD PRESSURE: 74 MMHG | HEART RATE: 73 BPM | WEIGHT: 189 LBS | HEIGHT: 71 IN | SYSTOLIC BLOOD PRESSURE: 133 MMHG | BODY MASS INDEX: 26.46 KG/M2

## 2021-04-06 DIAGNOSIS — N52.03 COMBINED ARTERIAL INSUFFICIENCY AND CORPORO-VENOUS OCCLUSIVE ERECTILE DYSFUNCTION: ICD-10-CM

## 2021-04-06 DIAGNOSIS — E78.00 HYPERCHOLESTEROLEMIA: Primary | ICD-10-CM

## 2021-04-06 DIAGNOSIS — N40.0 ENLARGED PROSTATE: ICD-10-CM

## 2021-04-06 PROCEDURE — 99214 OFFICE O/P EST MOD 30 MIN: CPT | Performed by: INTERNAL MEDICINE

## 2021-04-06 PROCEDURE — 36415 COLL VENOUS BLD VENIPUNCTURE: CPT

## 2021-04-06 PROCEDURE — 84153 ASSAY OF PSA TOTAL: CPT

## 2021-04-06 RX ORDER — SILDENAFIL CITRATE 20 MG/1
TABLET ORAL
Qty: 10 TABLET | Refills: 5 | Status: SHIPPED | OUTPATIENT
Start: 2021-04-06 | End: 2021-04-06

## 2021-04-06 RX ORDER — SILDENAFIL CITRATE 20 MG/1
TABLET ORAL
Qty: 10 TABLET | Refills: 5 | Status: SHIPPED | OUTPATIENT
Start: 2021-04-06

## 2021-04-06 NOTE — PROGRESS NOTES
Patient ID: Lesly Akins is a 69year old male. Patient presents with: Follow - Up: 6 month follow up        HISTORY OF PRESENT ILLNESS:   HPI  Patient presents for above. Here for 6-month follow-up. History of enlarged prostate.   Urinates sev RETROGRADE Right 8/27/2020    Performed by Da Horan MD at [id number] [place] [address] [place] Right 8/27/2020    Performed by Da Horan MD at [facility] OR   • CYSTOSCOPY URETEROSCOPY Right 8/27/2020    Performed by Da Horan MD at education level: Not on file    Occupational History      Occupation:     Tobacco Use      Smoking status: Former Smoker        Packs/day: 0.00        Years: 42.00        Pack years: 0        Types: Cigarettes        Quit date: 3/25/2020 Organizations:       Attends Club or Organization Meetings:       Marital Status:   Intimate Partner Violence:       Fear of Current or Ex-Partner:       Emotionally Abused:       Physically Abused:       Sexually Abused:         PHYSICAL EXAM:      04/06/

## 2021-04-06 NOTE — PATIENT INSTRUCTIONS
Low-Cholesterol Diet  Your body needs cholesterol to build new cells and create certain hormones. There are 2 kinds of cholesterol in your blood:     · HDL (“good”) cholesterol. This prevents fat deposits (plaque) from building up in your arteries.  In this cholesterol level.   Create a diet high in good fats, low in bad fats (and low in cholesterol)  The following steps will help you create a diet high in good fats and low in bad fats:  · Talk with your doctor before starting a low cholesterol diet or weight

## 2021-05-13 ENCOUNTER — TELEPHONE (OUTPATIENT)
Dept: SURGERY | Facility: CLINIC | Age: 71
End: 2021-05-13

## 2021-05-13 ENCOUNTER — OFFICE VISIT (OUTPATIENT)
Dept: SURGERY | Facility: CLINIC | Age: 71
End: 2021-05-13
Payer: MEDICARE

## 2021-05-13 VITALS
SYSTOLIC BLOOD PRESSURE: 120 MMHG | WEIGHT: 185 LBS | HEART RATE: 73 BPM | HEIGHT: 71 IN | DIASTOLIC BLOOD PRESSURE: 83 MMHG | BODY MASS INDEX: 25.9 KG/M2 | RESPIRATION RATE: 16 BRPM

## 2021-05-13 DIAGNOSIS — N40.1 BENIGN PROSTATIC HYPERPLASIA WITH LOWER URINARY TRACT SYMPTOMS, SYMPTOM DETAILS UNSPECIFIED: Primary | ICD-10-CM

## 2021-05-13 DIAGNOSIS — N20.0 KIDNEY STONES: ICD-10-CM

## 2021-05-13 PROCEDURE — 99213 OFFICE O/P EST LOW 20 MIN: CPT | Performed by: UROLOGY

## 2021-05-13 NOTE — TELEPHONE ENCOUNTER
Xavier Alaniz,  I saw this patient in follow-up today. I want him to discuss with you the presence of a mildly complex lesion in his liver with recommendation by radiologist to obtain an MRI and follow-up. I am not sure that he discuss this with you during his recent office appointment. I wanted to bring it to your attention in case additional tests are required.

## 2021-05-13 NOTE — PROGRESS NOTES
Anisha Coreas is a 69year old male.     HPI:   Patient presents with:  BPH: pt is on tamsulosin and dutasteride, daytime is worse then night-freq urination   Kidney Problem      71-year-old male with BPH, history of kidney stones, in follow-up to immanuel Social History: Social History    Tobacco Use      Smoking status: Former Smoker        Packs/day: 0.00        Years: 42.00        Pack years: 0        Types: Cigarettes        Quit date: 3/25/2020        Years since quittin.1      Smokeless tobacco: encounter.       Meds This Visit:  Requested Prescriptions      No prescriptions requested or ordered in this encounter       Imaging & Referrals:  None     5/13/2021  Marium Espinosa MD

## 2021-05-14 ENCOUNTER — TELEPHONE (OUTPATIENT)
Dept: INTERNAL MEDICINE CLINIC | Facility: CLINIC | Age: 71
End: 2021-05-14

## 2021-05-14 ENCOUNTER — TELEMEDICINE (OUTPATIENT)
Dept: INTERNAL MEDICINE CLINIC | Facility: CLINIC | Age: 71
End: 2021-05-14

## 2021-05-14 DIAGNOSIS — K76.89 LIVER CYST: Primary | ICD-10-CM

## 2021-05-14 PROCEDURE — 99213 OFFICE O/P EST LOW 20 MIN: CPT | Performed by: INTERNAL MEDICINE

## 2021-05-14 NOTE — TELEPHONE ENCOUNTER
Patient reports had kidney US 10/13/20, after reviewing results Dr. Judd Rangel recommended follow up with Dr Aguirre Late regarding a cyst on the liver and possibly ordering an MRI. Patient does not recall discussing his results, forgot to bring up at appt.  Video vis

## 2021-05-14 NOTE — PROGRESS NOTES
Patient ID: Brianne Olivas is a 69year old male. Patient presents with: Follow - Up         HISTORY OF PRESENT ILLNESS:   Patient presents for above. This visit is conducted using Telemedicine with live, interactive video and audio.   Patient with a Beth Villafana MD;  Location: [address] [place] [address] ENDOSCOPY   • EXCIS TENDON SHEATH LESN,HAND/DINAR Right 2-17-17    Exc ganglion R thumb   • FOOT SURGERY Left 2006    Nerve procedure   • NASAL SCOPY,REMV PART ETHMOID     • NASAL SCOPY,RMV TISS MAXILL SINUS     • R Not Asked        Hobby Hazards: Not Asked        Sleep Concern: Not Asked        Stress Concern: Not Asked        Weight Concern: Not Asked        Special Diet: Not Asked        Back Care: Not Asked        Exercise: Not Asked        Bike Helmet: Not Asked time 5 minutes     Dewey Obregon understands video evaluation is not a substitute for face-to-face examination or emergency care. Patient advised to go to ER or call 911 for worsening symptoms or acute distress.      Telehealth outside of [employer] errors have been corrected.  While every attempt is made to correct errors during dictation discrepancies may still exist.    Berta Kearns MD  5/14/2021

## 2021-06-01 RX ORDER — MONTELUKAST SODIUM 10 MG/1
TABLET ORAL
Qty: 30 TABLET | Refills: 0 | Status: SHIPPED | OUTPATIENT
Start: 2021-06-01 | End: 2021-06-26

## 2021-06-08 ENCOUNTER — TELEPHONE (OUTPATIENT)
Dept: INTERNAL MEDICINE CLINIC | Facility: CLINIC | Age: 71
End: 2021-06-08

## 2021-06-08 ENCOUNTER — HOSPITAL ENCOUNTER (OUTPATIENT)
Dept: MRI IMAGING | Facility: HOSPITAL | Age: 71
Discharge: HOME | End: 2021-06-08
Attending: INTERNAL MEDICINE
Payer: MEDICARE

## 2021-06-08 DIAGNOSIS — K76.89 LIVER CYST: ICD-10-CM

## 2021-06-08 PROCEDURE — A9575 INJ GADOTERATE MEGLUMI 0.1ML: HCPCS | Performed by: RADIOLOGY

## 2021-06-08 PROCEDURE — 74183 MRI ABD W/O CNTR FLWD CNTR: CPT | Performed by: INTERNAL MEDICINE

## 2021-06-08 PROCEDURE — 82565 ASSAY OF CREATININE: CPT

## 2021-06-08 NOTE — TELEPHONE ENCOUNTER
Pt calling to inform he completed his MRI this morning and received his results on my chart. Advised when Dr Emy Zendejas reviews results he will either receive a my chart message with results or he will be called with results and recommendations.     Advised pt

## 2021-06-25 RX ORDER — ROSUVASTATIN CALCIUM 10 MG/1
TABLET, COATED ORAL
Qty: 90 TABLET | Refills: 0 | Status: SHIPPED | OUTPATIENT
Start: 2021-06-25 | End: 2021-09-22

## 2021-06-26 RX ORDER — MONTELUKAST SODIUM 10 MG/1
TABLET ORAL
Qty: 30 TABLET | Refills: 3 | Status: SHIPPED | OUTPATIENT
Start: 2021-06-26 | End: 2021-12-18

## 2021-07-06 ENCOUNTER — PROCEDURE (OUTPATIENT)
Dept: SURGERY | Facility: CLINIC | Age: 71
End: 2021-07-06
Payer: MEDICARE

## 2021-07-06 VITALS — DIASTOLIC BLOOD PRESSURE: 78 MMHG | SYSTOLIC BLOOD PRESSURE: 116 MMHG | HEART RATE: 70 BPM

## 2021-07-06 DIAGNOSIS — N40.1 BENIGN PROSTATIC HYPERPLASIA WITH LOWER URINARY TRACT SYMPTOMS, SYMPTOM DETAILS UNSPECIFIED: Primary | ICD-10-CM

## 2021-07-06 DIAGNOSIS — N20.0 KIDNEY STONES: ICD-10-CM

## 2021-07-06 PROCEDURE — 76873 ECHOGRAP TRANS R PROS STUDY: CPT | Performed by: UROLOGY

## 2021-07-06 PROCEDURE — 99213 OFFICE O/P EST LOW 20 MIN: CPT | Performed by: UROLOGY

## 2021-07-06 PROCEDURE — 52000 CYSTOURETHROSCOPY: CPT | Performed by: UROLOGY

## 2021-07-06 RX ORDER — CIPROFLOXACIN 500 MG/1
500 TABLET, FILM COATED ORAL ONCE
Status: COMPLETED | OUTPATIENT
Start: 2021-07-06 | End: 2021-07-06

## 2021-07-06 RX ADMIN — CIPROFLOXACIN 500 MG: 500 TABLET, FILM COATED ORAL at 10:45:00

## 2021-07-06 NOTE — PROGRESS NOTES
Darien Hdez is a 66year old male.     HPI:   Patient presents with:  Procedure: PT presents for cysto/trus      41-year-old male presents in follow-up to previous visit May 13, 2021 for BPH, lower urinary tract symptoms and a previous urologic histo Father         Leukemia      Social History: Social History    Tobacco Use      Smoking status: Former Smoker        Packs/day: 0.00        Years: 42.00        Pack years: 0        Types: Cigarettes        Quit date: 3/25/2020        Years since quitting: hyperplasia with lower urinary tract symptoms, symptom details unspecified  (primary encounter diagnosis)  Kidney stones    Reviewed findings at length with patient. Discussed options for management.   At this point his symptoms do not appear to be particu

## 2021-07-07 ENCOUNTER — TELEPHONE (OUTPATIENT)
Dept: SURGERY | Facility: CLINIC | Age: 71
End: 2021-07-07

## 2021-07-07 NOTE — TELEPHONE ENCOUNTER
----- Message from Terri Ferris sent at 7/7/2021  4:47 PM CDT -----  Regarding: Test Results Question  Contact: [phone]  Doctor Alex Penn I forgot to ask a follow-up question about the Grade 2 Bladder   Trabeculation. I have.  Does this condition r

## 2021-07-08 ENCOUNTER — OFFICE VISIT (OUTPATIENT)
Dept: GASTROENTEROLOGY | Facility: CLINIC | Age: 71
End: 2021-07-08
Payer: MEDICARE

## 2021-07-08 VITALS
SYSTOLIC BLOOD PRESSURE: 112 MMHG | BODY MASS INDEX: 26.18 KG/M2 | DIASTOLIC BLOOD PRESSURE: 69 MMHG | WEIGHT: 187 LBS | HEIGHT: 71 IN

## 2021-07-08 DIAGNOSIS — K76.89 LIVER CYST: Primary | ICD-10-CM

## 2021-07-08 PROCEDURE — 99213 OFFICE O/P EST LOW 20 MIN: CPT | Performed by: INTERNAL MEDICINE

## 2021-07-08 NOTE — TELEPHONE ENCOUNTER
Pt stopped at  to  Get a answer to the last  T.E.. Explained It was sent to  Dr Fausto Brown . Pt agreed .

## 2021-07-08 NOTE — PROGRESS NOTES
HPI/Subjective:   Patient ID: Jonatan Velazquez is a 66year old male. HPI  The patient returns in follow-up. He was last seen at the time of his colonoscopy in December 2018.     As per previous notes the patient underwent a colonoscopy for a positive episodic diarrhea with looser stools. Of note the patient was chewing large quantities of sugarless gum since discontinuing smoking. He eliminated the gum about 1 week prior and his symptoms have improved. He has noted no bleeding.     The patient does n Palpations: Abdomen is soft. There is no mass. Tenderness: There is no abdominal tenderness. There is no guarding or rebound. Musculoskeletal:      Cervical back: Neck supple. Lymphadenopathy:      Cervical: No cervical adenopathy.    Neurological: 78   Total Bilirubin      0.1 - 2.0 mg/dL 0.5   TOTAL PROTEIN      6.4 - 8.2 g/dL 7.0   Albumin      3.4 - 5.0 g/dL 3.8   Globulin      2.8 - 4.4 g/dL 3.2   A/G Ratio      1.0 - 2.0 1.2   Patient Fasting?        Yes   TSH      0.358 - 3.740 mIU/mL 1.200 XR ABDOMEN 2 VIEWS (CPT=74019), 8/11/2020, 12:02 PM.       INDICATIONS: Calculus of kidney (N20.0).     TECHNIQUE: Ultrasound examination was performed to visualize the kidneys and bladder.         FINDINGS:   RIGHT KIDNEY: Measures 10.8 cm in length.  Urszula Barthel information is transmitted to the [facility] ([address] [place] [address] of Radiology) [address] ([address]) which includes the Dose Index Registry.       FINDINGS:   URINARY TRACT: 8 x 6 x 6 mm calculus in the right ureter at about S1 with moderate right Liver cyst  (primary encounter diagnosis)  Upon imaging of the abdomen for urologic symptoms, the patient was found to have multiple cystic lesions within the liver.   An ultrasound suggested hyper echogenic nodule, however, subsequent imaging reveals no

## 2021-07-08 NOTE — PATIENT INSTRUCTIONS
1.  Follow-up MRI in 1 year to confirm that the cysts are stable. 2.  Avoid sugarless gum. 3.  Please contact me if the diarrhea returns. 4. In reviewing your chart, you are due for a colonoscopy in December 2021.

## 2021-07-09 ENCOUNTER — TELEPHONE (OUTPATIENT)
Dept: GASTROENTEROLOGY | Facility: CLINIC | Age: 71
End: 2021-07-09

## 2021-07-09 NOTE — TELEPHONE ENCOUNTER
Snapshot updated. Recall for MRI of liver due June 2022 entered into patient outreach in [address] [place] [address] Rd.

## 2021-07-12 NOTE — TELEPHONE ENCOUNTER
S/W pt and informed him that AUBREE had not answered his msg yet but that I could offer info that his bladder trabeculation of moderate and his symptoms are for the most part controlled with Tamsulsoin and Dutasteride so this is why AUBREE suggested reassessment

## 2021-07-13 ENCOUNTER — TELEPHONE (OUTPATIENT)
Dept: GASTROENTEROLOGY | Facility: CLINIC | Age: 71
End: 2021-07-13

## 2021-07-13 DIAGNOSIS — Z86.010 PERSONAL HISTORY OF COLONIC POLYPS: Primary | ICD-10-CM

## 2021-07-13 RX ORDER — POLYETHYLENE GLYCOL 3350, SODIUM CHLORIDE, SODIUM BICARBONATE, POTASSIUM CHLORIDE 420; 11.2; 5.72; 1.48 G/4L; G/4L; G/4L; G/4L
4 POWDER, FOR SOLUTION ORAL ONCE
Qty: 4000 ML | Refills: 0 | Status: SHIPPED | OUTPATIENT
Start: 2021-07-13 | End: 2021-07-13

## 2021-07-13 NOTE — TELEPHONE ENCOUNTER
GI/RN--    This patient came into the office to schedule his colonoscopy in December but I did not have orders. He did come in for an OV on 7/8/21 but Dr. Meng Louise did not enter orders for the colonoscopy.     Please request orders from Dr. Meng Louise

## 2021-07-13 NOTE — TELEPHONE ENCOUNTER
Dr. Sosa -    Patient is anxious to schedule his 3 year colon recall (due to be completed in December 2021). You saw him in the office on 7/8/21. Please provide orders to get patient scheduled if appropriate. Thank you!

## 2021-07-13 NOTE — TELEPHONE ENCOUNTER
May schedule for a history of polyps following a split dose TriLyte preparation and either IV sedation or monitored anesthesia care per scheduling.

## 2021-07-19 NOTE — TELEPHONE ENCOUNTER
Scheduled for:  Colonoscopy [id number]  Provider Name:    Date:  01/14/2022 (per patient's request)  Location:  [facility]  Sedation:  MAC  Time:  11:15am, (pt is aware [facility] will call with arrival time)    Prep:  Split dose Trilyte  Meds/Allergies Delon

## 2021-08-27 ENCOUNTER — TELEPHONE (OUTPATIENT)
Dept: INTERNAL MEDICINE CLINIC | Facility: CLINIC | Age: 71
End: 2021-08-27

## 2021-08-27 NOTE — TELEPHONE ENCOUNTER
Patient calling asking about flu and covid booster   Informed flu shot not available  yet , he anupama call us back in a few weeks

## 2021-09-21 ENCOUNTER — OFFICE VISIT (OUTPATIENT)
Dept: DERMATOLOGY CLINIC | Facility: CLINIC | Age: 71
End: 2021-09-21
Payer: MEDICARE

## 2021-09-21 DIAGNOSIS — Z85.820 PERSONAL HISTORY OF MALIGNANT MELANOMA OF SKIN: ICD-10-CM

## 2021-09-21 DIAGNOSIS — Z87.2 HISTORY OF ACTINIC KERATOSES: ICD-10-CM

## 2021-09-21 DIAGNOSIS — D48.5 NEOPLASM OF UNCERTAIN BEHAVIOR OF SKIN: Primary | ICD-10-CM

## 2021-09-21 DIAGNOSIS — L57.8 SUN-DAMAGED SKIN: ICD-10-CM

## 2021-09-21 DIAGNOSIS — Z85.828 HISTORY OF BASAL CELL CARCINOMA: ICD-10-CM

## 2021-09-21 DIAGNOSIS — L81.4 SOLAR LENTIGO: ICD-10-CM

## 2021-09-21 DIAGNOSIS — L82.1 SEBORRHEIC KERATOSES: ICD-10-CM

## 2021-09-21 DIAGNOSIS — D22.9 MULTIPLE BENIGN NEVI: ICD-10-CM

## 2021-09-21 PROCEDURE — 88305 TISSUE EXAM BY PATHOLOGIST: CPT | Performed by: DERMATOLOGY

## 2021-09-21 PROCEDURE — 11102 TANGNTL BX SKIN SINGLE LES: CPT | Performed by: DERMATOLOGY

## 2021-09-21 PROCEDURE — 99213 OFFICE O/P EST LOW 20 MIN: CPT | Performed by: DERMATOLOGY

## 2021-09-21 NOTE — PROGRESS NOTES
HPI:     Chief Complaint     Derm Problem        HPI     Derm Problem      Additional comments: LOV 3/11/21 - Pt presenting for assessment of lesions on body in light of pt's hx of skin cancer. Pt has personal hx of AKs, BCC, and Malignant Melanoma. thumb 2-17-17   • High cholesterol    • Melanoma ([facility] [address] 8/2016    left arm, stage I; .75 mm depth   • Osteoarthritis    • Plantar wart 06/26/2017    right heel   • Pneumonia due to organism     2016   • Visual impairment     Glasses     Past Surgical Histor Asked        Exercise: Not Asked        Bike Helmet: Not Asked        Seat Belt: Not Asked        Self-Exams: Not Asked        Grew up on a farm: No        History of tanning: No        Outdoor occupation: No        Pt has a pacemaker: No        Pt has a d upper extremity, l upper extremity, buttocks, genital area, l lower extremity and right lower extremity, and scalp-also exam of cervical, axillary, and supraclavicular lymph nodes    The patient is alert, oriented, and appears their stated age.   Patient is before outsides so that  it is absorbed and working and to reapply it every few hours.   History of basal cell carcinoma-no recurrence–follow-up as above   solar lentigo  History of actinic keratoses  Multiple benign nevi    Orders Placed This Encounter

## 2021-09-22 RX ORDER — ROSUVASTATIN CALCIUM 10 MG/1
10 TABLET, COATED ORAL NIGHTLY
Qty: 90 TABLET | Refills: 1 | Status: SHIPPED | OUTPATIENT
Start: 2021-09-22

## 2021-09-22 NOTE — TELEPHONE ENCOUNTER
Refill passed per [facility] protocol.     Requested Prescriptions   Pending Prescriptions Disp Refills    ROSUVASTATIN 10 MG Oral Tab [Pharmacy Med Name: Rosuvastatin Calcium 10 Mg Tab Nort] 90 tablet 0     Sig: TAKE ONE TABLET BY MOUTH AT BEDTIME

## 2021-09-24 NOTE — PROGRESS NOTES
Logged in [address]. Patient informed of test results and all LSS' recommendations. Voiced understanding.   Appt made

## 2021-10-06 ENCOUNTER — OFFICE VISIT (OUTPATIENT)
Dept: INTERNAL MEDICINE CLINIC | Facility: CLINIC | Age: 71
End: 2021-10-06
Payer: MEDICARE

## 2021-10-06 ENCOUNTER — LAB ENCOUNTER (OUTPATIENT)
Dept: LAB | Age: 71
End: 2021-10-06
Attending: INTERNAL MEDICINE
Payer: MEDICARE

## 2021-10-06 VITALS
DIASTOLIC BLOOD PRESSURE: 84 MMHG | HEIGHT: 71 IN | SYSTOLIC BLOOD PRESSURE: 122 MMHG | WEIGHT: 190 LBS | HEART RATE: 79 BPM | BODY MASS INDEX: 26.6 KG/M2

## 2021-10-06 DIAGNOSIS — Z00.00 ENCOUNTER FOR ANNUAL HEALTH EXAMINATION: Primary | ICD-10-CM

## 2021-10-06 DIAGNOSIS — N40.0 ENLARGED PROSTATE: ICD-10-CM

## 2021-10-06 DIAGNOSIS — Z00.00 ENCOUNTER FOR ANNUAL HEALTH EXAMINATION: ICD-10-CM

## 2021-10-06 DIAGNOSIS — Z12.11 COLON CANCER SCREENING: ICD-10-CM

## 2021-10-06 DIAGNOSIS — E78.00 HYPERCHOLESTEROLEMIA: ICD-10-CM

## 2021-10-06 DIAGNOSIS — N52.03 COMBINED ARTERIAL INSUFFICIENCY AND CORPORO-VENOUS OCCLUSIVE ERECTILE DYSFUNCTION: ICD-10-CM

## 2021-10-06 PROBLEM — J32.4 CHRONIC PANSINUSITIS: Status: RESOLVED | Noted: 2017-06-28 | Resolved: 2021-10-06

## 2021-10-06 PROBLEM — U07.1 COVID-19: Status: RESOLVED | Noted: 2020-12-21 | Resolved: 2021-10-06

## 2021-10-06 PROBLEM — Z86.16 HISTORY OF COVID-19: Status: ACTIVE | Noted: 2021-10-06

## 2021-10-06 PROCEDURE — 80053 COMPREHEN METABOLIC PANEL: CPT

## 2021-10-06 PROCEDURE — 84443 ASSAY THYROID STIM HORMONE: CPT

## 2021-10-06 PROCEDURE — 85025 COMPLETE CBC W/AUTO DIFF WBC: CPT

## 2021-10-06 PROCEDURE — 36415 COLL VENOUS BLD VENIPUNCTURE: CPT

## 2021-10-06 PROCEDURE — 80061 LIPID PANEL: CPT

## 2021-10-06 PROCEDURE — G0438 PPPS, INITIAL VISIT: HCPCS | Performed by: INTERNAL MEDICINE

## 2021-10-06 NOTE — PATIENT INSTRUCTIONS
Benjamin Ralph's SCREENING SCHEDULE   Tests on this list are recommended by your physician but may not be covered, or covered at this frequency, by your insurer. Please check with your insurance carrier before scheduling to verify coverage.    Wilver Bravo Each vaccine ([id number] & Llkexgvha24) covered once after 65 Prevnar 13: 08/20/2016    Ywkpliziw19: 09/27/2015     No recommendations at this time    Hepatitis B One screening covered for patients with certain risk factors   -  No recommendations at this t

## 2021-10-14 ENCOUNTER — OFFICE VISIT (OUTPATIENT)
Dept: DERMATOLOGY CLINIC | Facility: CLINIC | Age: 71
End: 2021-10-14
Payer: MEDICARE

## 2021-10-14 VITALS — SYSTOLIC BLOOD PRESSURE: 131 MMHG | DIASTOLIC BLOOD PRESSURE: 79 MMHG | OXYGEN SATURATION: 100 % | HEART RATE: 92 BPM

## 2021-10-14 DIAGNOSIS — L82.0 INFLAMED SEBORRHEIC KERATOSIS: ICD-10-CM

## 2021-10-14 DIAGNOSIS — C44.41 BASAL CELL CARCINOMA OF LEFT SIDE OF NECK: Primary | ICD-10-CM

## 2021-10-14 PROCEDURE — 88305 TISSUE EXAM BY PATHOLOGIST: CPT | Performed by: DERMATOLOGY

## 2021-10-14 PROCEDURE — 11622 EXC S/N/H/F/G MAL+MRG 1.1-2: CPT | Performed by: DERMATOLOGY

## 2021-10-14 PROCEDURE — 12042 INTMD RPR N-HF/GENIT2.6-7.5: CPT | Performed by: DERMATOLOGY

## 2021-10-14 NOTE — PROGRESS NOTES
HPI:     Chief Complaint     Procedure        HPI     Procedure      Additional comments: LOV 9/21/21. pt presenting today with Excision to L posterior neck.  pt has HX of AKs,Melanoma          Last edited by SAMUEL Rowe on 10/14/2021  9:53 AM. (H 12/26/2018    Procedure: COLONOSCOPY;  Surgeon: Sun Chaudhry MD;  Location: [address] [place] [address] ENDOSCOPY   • EXCIS TENDON SHEATH LESN,HAND/FINGR Right 2-17-17    Exc ganglion R thumb   • FOOT SURGERY Left 2006    Nerve procedure   • NASAL SCOPY,REMV PART ETHMOID Difficulty of Paying Living Expenses: Not on file  Food Insecurity:       Worried About Running Out of Food in the Last Year: Not on file      Ran Out of Food in the Last Year: Not on file  Transportation Needs:       Lack of Transportation (Medical):  Not given. Patient tolerated procedure well. Patient is to follow up in 11 days for suture removal.  Patient will call if  any interim problems or concerns.   Inflamed seborrheic keratosis–treated with cryotherapy at no additional charge    Orders Placed This

## 2021-10-14 NOTE — PROCEDURES
Procedural Report for Elliptical Excision    Procedure: With the patient is a r lateral decub position, the skin was scrubbed with hibiclens. Field block anesthesia was obtained by injecting 6 mL of 1% Xylocaine with Epinephrine.   A fusiform excision w

## 2021-10-18 ENCOUNTER — TELEPHONE (OUTPATIENT)
Dept: DERMATOLOGY CLINIC | Facility: CLINIC | Age: 71
End: 2021-10-18

## 2021-10-18 NOTE — TELEPHONE ENCOUNTER
Dr. Debbie Maciel pt coming in 10/25/21 for suture removal and most likely we were going to go over his test results too - pt calling, can I tell him the results?      Pasted here:    Final Diagnosis:   Skin, left posterior neck, excision:  -Residual basal cell

## 2021-10-18 NOTE — TELEPHONE ENCOUNTER
Patient called    [facility] to a call [facility]

## 2021-10-25 ENCOUNTER — OFFICE VISIT (OUTPATIENT)
Dept: DERMATOLOGY CLINIC | Facility: CLINIC | Age: 71
End: 2021-10-25
Payer: MEDICARE

## 2021-10-25 DIAGNOSIS — Z48.02 VISIT FOR SUTURE REMOVAL: Primary | ICD-10-CM

## 2021-10-25 NOTE — PROGRESS NOTES
Patient HPI:     Chief Complaint     Suture Removal        HPI     Suture Removal      Additional comments: LOV 10/14/21 - Pt presenting for suture removal from left side of neck post excision.           Last edited by Trina Ruiz RN on 10/25/2021 11: <10SQCM Right 2-17-17    Exc lesion of nose, V_Y flap   • COLONOSCOPY     • COLONOSCOPY     • COLONOSCOPY N/A 12/26/2018    Procedure: COLONOSCOPY;  Surgeon: Fili Fisher MD;  Location: [facility] ENDOSCOPY   • EXCIS TENDON SHEATH TJ MADDEN/EFRAIN Right 2 History Narrative      Not on file    Social Determinants of Health  Financial Resource Strain:       Difficulty of Paying Living Expenses: Not on file  Food Insecurity:       Worried About Running Out of Food in the Last Year: Not on file      Ran Out of concerns. No orders of the defined types were placed in this encounter. Results From Past 48 Hours:  No results found for this or any previous visit (from the past 48 hour(s)).     Meds This Visit:      Imaging Orders:  None     Referral Orders:  No

## 2021-11-08 ENCOUNTER — OFFICE VISIT (OUTPATIENT)
Dept: SURGERY | Facility: CLINIC | Age: 71
End: 2021-11-08
Payer: MEDICARE

## 2021-11-08 DIAGNOSIS — N20.0 KIDNEY STONES: ICD-10-CM

## 2021-11-08 DIAGNOSIS — N40.1 BENIGN PROSTATIC HYPERPLASIA WITH LOWER URINARY TRACT SYMPTOMS, SYMPTOM DETAILS UNSPECIFIED: Primary | ICD-10-CM

## 2021-11-08 PROCEDURE — 99213 OFFICE O/P EST LOW 20 MIN: CPT | Performed by: UROLOGY

## 2021-11-08 RX ORDER — TAMSULOSIN HYDROCHLORIDE 0.4 MG/1
CAPSULE ORAL
Qty: 90 CAPSULE | Refills: 3 | Status: SHIPPED | OUTPATIENT
Start: 2021-11-08

## 2021-11-08 RX ORDER — DUTASTERIDE 0.5 MG/1
CAPSULE, LIQUID FILLED ORAL
Qty: 90 CAPSULE | Refills: 3 | Status: SHIPPED | OUTPATIENT
Start: 2021-11-08

## 2021-11-08 NOTE — PROGRESS NOTES
Anisha Coreas is a 66year old male. HPI:   Patient presents with:  BPH: Pt here for check up. Pt states symptoms area better.       16-year-old male with a history of BPH on maximal medical therapy with tamsulosin and dutasteride here for follow-u Dementia Mother    • Seizure Disorder Mother    • Hypertension Mother    • Cancer Mother         skin   • Cancer Father         Leukemia      Social History: Social History    Tobacco Use      Smoking status: Former Smoker        Packs/day: 0.00        Yea Skin normal color for race, warm, dry and intact. No evidence of rash.

## 2021-11-08 NOTE — TELEPHONE ENCOUNTER
RN approved the refill request of Tamsulosin and Dustasteride.  Patient at office today, 11/8    Benign Prostatic Hypertrophy Medications      Protocol Criteria:  • Appointment scheduled in the past 12 months or in the next 2 months  • If patients is david

## 2021-12-03 ENCOUNTER — APPOINTMENT (OUTPATIENT)
Dept: CT IMAGING | Facility: HOSPITAL | Age: 71
End: 2021-12-03
Attending: EMERGENCY MEDICINE
Payer: MEDICARE

## 2021-12-03 ENCOUNTER — HOSPITAL ENCOUNTER (EMERGENCY)
Facility: HOSPITAL | Age: 71
Discharge: HOME | End: 2021-12-03
Attending: EMERGENCY MEDICINE
Payer: MEDICARE

## 2021-12-03 VITALS
HEIGHT: 71 IN | TEMPERATURE: 98 F | WEIGHT: 186 LBS | OXYGEN SATURATION: 99 % | HEART RATE: 66 BPM | BODY MASS INDEX: 26.04 KG/M2 | DIASTOLIC BLOOD PRESSURE: 99 MMHG | SYSTOLIC BLOOD PRESSURE: 158 MMHG | RESPIRATION RATE: 18 BRPM

## 2021-12-03 DIAGNOSIS — R10.9 RIGHT SIDED ABDOMINAL PAIN: Primary | ICD-10-CM

## 2021-12-03 PROCEDURE — 85025 COMPLETE CBC W/AUTO DIFF WBC: CPT | Performed by: EMERGENCY MEDICINE

## 2021-12-03 PROCEDURE — 99284 EMERGENCY DEPT VISIT MOD MDM: CPT

## 2021-12-03 PROCEDURE — 36415 COLL VENOUS BLD VENIPUNCTURE: CPT

## 2021-12-03 PROCEDURE — 81003 URINALYSIS AUTO W/O SCOPE: CPT

## 2021-12-03 PROCEDURE — 80048 BASIC METABOLIC PNL TOTAL CA: CPT | Performed by: EMERGENCY MEDICINE

## 2021-12-03 PROCEDURE — 80048 BASIC METABOLIC PNL TOTAL CA: CPT

## 2021-12-03 PROCEDURE — 74176 CT ABD & PELVIS W/O CONTRAST: CPT | Performed by: EMERGENCY MEDICINE

## 2021-12-03 PROCEDURE — 81003 URINALYSIS AUTO W/O SCOPE: CPT | Performed by: EMERGENCY MEDICINE

## 2021-12-03 PROCEDURE — 85025 COMPLETE CBC W/AUTO DIFF WBC: CPT

## 2021-12-03 NOTE — ED INITIAL ASSESSMENT (HPI)
Pt came in for right lower back pain that radiates to the RLQ abdomen going on for a month getting worse.

## 2021-12-03 NOTE — ED PROVIDER NOTES
Patient Seen in: [facility] AND [facility]      History   Patient presents with:  Abdomen/Flank Pain  Back Pain    Stated Complaint: R Flank pain radiates to RLQ w/hx of kidney stones    Subjective:   HPI    Patient is a 50-year-old male who REPAIR OF NASAL SEPTUM  1985   • REPAIR OF NASAL SEPTUM     • SKIN SURGERY Left 10/07/16    left upper arm melanoma                Social History    Tobacco Use      Smoking status: Former Smoker        Packs/day: 0.00        Years: 42.00        Pack years adenopathy. Neurological: Alert and oriented to person, place, and time. Normal reflexes. No cranial nerve deficit. No motor os sensory defecits noted Coordination normal.   Skin: Skin is warm and dry. Psychiatric: Normal mood and affect.  Behavior is n next three months to obtain basic health screening including reassessment of your blood pressure.       Medications Prescribed:  Current Discharge Medication List

## 2021-12-09 ENCOUNTER — OFFICE VISIT (OUTPATIENT)
Dept: INTERNAL MEDICINE CLINIC | Facility: CLINIC | Age: 71
End: 2021-12-09
Payer: MEDICARE

## 2021-12-09 VITALS
BODY MASS INDEX: 26.46 KG/M2 | HEIGHT: 71 IN | HEART RATE: 69 BPM | WEIGHT: 189 LBS | DIASTOLIC BLOOD PRESSURE: 68 MMHG | SYSTOLIC BLOOD PRESSURE: 116 MMHG

## 2021-12-09 DIAGNOSIS — M54.50 CHRONIC RIGHT-SIDED LOW BACK PAIN WITHOUT SCIATICA: ICD-10-CM

## 2021-12-09 DIAGNOSIS — R10.31 CHRONIC RLQ PAIN: Primary | ICD-10-CM

## 2021-12-09 DIAGNOSIS — G89.29 CHRONIC RIGHT-SIDED LOW BACK PAIN WITHOUT SCIATICA: ICD-10-CM

## 2021-12-09 DIAGNOSIS — G89.29 CHRONIC RLQ PAIN: Primary | ICD-10-CM

## 2021-12-09 PROCEDURE — 99214 OFFICE O/P EST MOD 30 MIN: CPT | Performed by: INTERNAL MEDICINE

## 2021-12-09 NOTE — PROGRESS NOTES
Patient ID: Darien Hdez is a 66year old male. Patient presents with:  Back Pain: \"I hurt my back a month ago\"  Stomach Pain: \"I started having stomach pain\" x 1 month \"Not constant, it comes and goes\".  Per pt has been seen at urgent care an COLONOSCOPY     • COLONOSCOPY N/A 12/26/2018    Procedure: COLONOSCOPY;  Surgeon: Alberta Anderson MD;  Location: [facility] ENDOSCOPY   • EXCIS TENDON SHEATH MARYANNEHAND/FINGR Right 2-17-17    Exc ganglion R thumb   • FOOT SURGERY Left 2006    Nerve procedure  Service: Not Asked        Blood Transfusions: Not Asked        Caffeine Concern: Yes          coffee, 3cups/day        Occupational Exposure: Not Asked        Hobby Hazards: Not Asked        Sleep Concern: Not Asked        Stress Concern: Not Behavior: Behavior normal.           ASSESSMENT/PLAN:   1. Chronic RLQ pain  · No findings on exam.  · Labs and CT scan reviewed from urgent care and none are concerning. · Increase fiber and fluids. · Stay active.     2. Chronic right-sided low back pain

## 2021-12-18 RX ORDER — MONTELUKAST SODIUM 10 MG/1
TABLET ORAL
Qty: 90 TABLET | Refills: 0 | Status: SHIPPED | OUTPATIENT
Start: 2021-12-18

## 2021-12-27 NOTE — TELEPHONE ENCOUNTER
Recall colon in 3 years per Dr. Debi Aguilar. Colon done 12/26/18 . Snapshot updated. Purse String (Simple) Text: Given the location of the defect and the characteristics of the surrounding skin a purse string closure was deemed most appropriate.  Undermining was performed circumfirentially around the surgical defect.  A purse string suture was then placed and tightened.

## 2022-01-03 ENCOUNTER — TELEPHONE (OUTPATIENT)
Dept: GASTROENTEROLOGY | Facility: CLINIC | Age: 72
End: 2022-01-03

## 2022-01-03 RX ORDER — POLYETHYLENE GLYCOL 3350, SODIUM CHLORIDE, SODIUM BICARBONATE, POTASSIUM CHLORIDE 420; 11.2; 5.72; 1.48 G/4L; G/4L; G/4L; G/4L
4000 POWDER, FOR SOLUTION ORAL AS DIRECTED
Qty: 4000 ML | Refills: 0 | Status: SHIPPED | OUTPATIENT
Start: 2022-01-03

## 2022-01-03 NOTE — TELEPHONE ENCOUNTER
Patient was prescribed Trilyte prep on 21 by Dr. Bradley Watkins, however, ordered has since . Sent in new e-script as per written order by Dr. Bradley Watkins for patient's colonoscopy on 22.     I called and informed the patient that a ne

## 2022-01-10 ENCOUNTER — TELEPHONE (OUTPATIENT)
Dept: GASTROENTEROLOGY | Facility: CLINIC | Age: 72
End: 2022-01-10

## 2022-01-11 ENCOUNTER — LAB REQUISITION (OUTPATIENT)
Dept: SURGERY | Age: 72
End: 2022-01-11
Payer: MEDICARE

## 2022-01-11 DIAGNOSIS — Z01.818 PREOP EXAMINATION: ICD-10-CM

## 2022-01-12 LAB — SARS-COV-2 RNA RESP QL NAA+PROBE: NOT DETECTED

## 2022-01-14 ENCOUNTER — SURGERY CENTER DOCUMENTATION (OUTPATIENT)
Dept: SURGERY | Age: 72
End: 2022-01-14

## 2022-01-14 PROCEDURE — G0105 COLORECTAL SCRN; HI RISK IND: HCPCS | Performed by: INTERNAL MEDICINE

## 2022-01-14 NOTE — PROCEDURES
[address] Endoscopy Report      Date of Procedure:  01/14/22      Preoperative Diagnosis:  Personal history of adenomatous colon polyps      Postoperative Diagnosis:  1. Sigmoid colon diverticulosis  2.   Internal hemorrhoids ileum    Recommendations:  1. High-fiber diet. 2.  In the absence of any new symptoms or signs, surveillance colonoscopy in 5 years.         Aguila Jimenez MD  1/14/2022

## 2022-03-15 RX ORDER — ROSUVASTATIN CALCIUM 10 MG/1
10 TABLET, COATED ORAL NIGHTLY
Qty: 90 TABLET | Refills: 1 | Status: SHIPPED | OUTPATIENT
Start: 2022-03-15

## 2022-03-15 NOTE — TELEPHONE ENCOUNTER
Refill passed per [facility] protocol. Requested Prescriptions   Pending Prescriptions Disp Refills    ROSUVASTATIN 10 MG Oral Tab [Pharmacy Med Name: Rosuvastatin Calcium 10 Mg Tab Nort] 90 tablet 0     Sig: Take 1 tablet (10 mg total) by mouth nightly. Cholesterol Medication Protocol Passed - 3/15/2022  1:31 AM        Passed - ALT in past 12 months        Passed - LDL in past 12 months        Passed - Last ALT < 80       Lab Results   Component Value Date    ALT 33 10/06/2021             Passed - Last LDL < 130     Lab Results   Component Value Date    LDL 82 10/06/2021               Passed - Appointment in past 12 or next 3 months              Recent Outpatient Visits              3 months ago Chronic RLQ pain    [facility]at[facility] Cast[facility]eda, Fer Gongora MD    Office Visit    4 months ago Benign prostatic hyperplasia with lower urinary tract symptoms, symptom details unspecified    [facility], [address], Avril Sarabia MD    Office Visit    4 months ago Visit for suture removal    [facility] Mireille Villafana MD    Office Visit    5 months ago Basal cell carcinoma of left side of neck    [facility] Mireille Villafana MD    Office Visit    5 months ago Encounter for annual health examination    Dariel Perez MD    Office Visit            Future Appointments         Provider Department Appt Notes    In 6 days Jackelin Clarke MD [facility] full body 6 months/joseline patient    In 3 weeks Joel Ryan MD [employer] [employer], [facility] 6 month f/u    In 1 month Mariluz Sanchez  for [facility], [address] Return in about 6 months (around 5/8/2022).

## 2022-03-21 ENCOUNTER — OFFICE VISIT (OUTPATIENT)
Dept: DERMATOLOGY CLINIC | Facility: CLINIC | Age: 72
End: 2022-03-21
Payer: MEDICARE

## 2022-03-21 DIAGNOSIS — Z87.2 HISTORY OF ACTINIC KERATOSES: ICD-10-CM

## 2022-03-21 DIAGNOSIS — Z85.828 HISTORY OF BASAL CELL CARCINOMA: ICD-10-CM

## 2022-03-21 DIAGNOSIS — D23.9 BENIGN NEOPLASM OF SKIN, UNSPECIFIED LOCATION: ICD-10-CM

## 2022-03-21 DIAGNOSIS — D22.9 MULTIPLE BENIGN NEVI: Primary | ICD-10-CM

## 2022-03-21 DIAGNOSIS — L57.8 SUN-DAMAGED SKIN: ICD-10-CM

## 2022-03-21 DIAGNOSIS — L82.1 SEBORRHEIC KERATOSES: ICD-10-CM

## 2022-03-21 DIAGNOSIS — L81.4 SOLAR LENTIGO: ICD-10-CM

## 2022-03-21 DIAGNOSIS — Z85.820 PERSONAL HISTORY OF MALIGNANT MELANOMA OF SKIN: ICD-10-CM

## 2022-03-21 PROCEDURE — 99214 OFFICE O/P EST MOD 30 MIN: CPT | Performed by: DERMATOLOGY

## 2022-03-23 RX ORDER — MONTELUKAST SODIUM 10 MG/1
TABLET ORAL
Qty: 90 TABLET | Refills: 0 | OUTPATIENT
Start: 2022-03-23

## 2022-03-24 ENCOUNTER — TELEPHONE (OUTPATIENT)
Dept: INTERNAL MEDICINE CLINIC | Facility: CLINIC | Age: 72
End: 2022-03-24

## 2022-03-24 ENCOUNTER — LAB ENCOUNTER (OUTPATIENT)
Dept: LAB | Facility: HOSPITAL | Age: 72
End: 2022-03-24
Attending: INTERNAL MEDICINE
Payer: MEDICARE

## 2022-03-24 DIAGNOSIS — Z20.822 ENCOUNTER FOR LABORATORY TESTING FOR COVID-19 VIRUS: ICD-10-CM

## 2022-03-24 LAB — SARS-COV-2 RNA RESP QL NAA+PROBE: NOT DETECTED

## 2022-03-24 NOTE — TELEPHONE ENCOUNTER
Patient was exposed to family member on Saturday who tested positive for COVID-19. Patient has no symptoms. Per protocol COVID alinity test ordered and patient advised to test 5 days after exposure. Patient transferred to central scheduling.

## 2022-04-06 ENCOUNTER — OFFICE VISIT (OUTPATIENT)
Dept: INTERNAL MEDICINE CLINIC | Facility: CLINIC | Age: 72
End: 2022-04-06
Payer: MEDICARE

## 2022-04-06 VITALS
DIASTOLIC BLOOD PRESSURE: 74 MMHG | BODY MASS INDEX: 26.6 KG/M2 | HEART RATE: 77 BPM | WEIGHT: 190 LBS | HEIGHT: 71 IN | SYSTOLIC BLOOD PRESSURE: 138 MMHG

## 2022-04-06 DIAGNOSIS — E78.00 HYPERCHOLESTEROLEMIA: Primary | ICD-10-CM

## 2022-04-06 DIAGNOSIS — N40.0 ENLARGED PROSTATE: ICD-10-CM

## 2022-04-06 PROCEDURE — 99214 OFFICE O/P EST MOD 30 MIN: CPT | Performed by: INTERNAL MEDICINE

## 2022-04-08 ENCOUNTER — OFFICE VISIT (OUTPATIENT)
Dept: OTOLARYNGOLOGY | Facility: CLINIC | Age: 72
End: 2022-04-08
Payer: MEDICARE

## 2022-04-08 VITALS — TEMPERATURE: 98 F

## 2022-04-08 DIAGNOSIS — J30.9 ALLERGIC RHINITIS, UNSPECIFIED SEASONALITY, UNSPECIFIED TRIGGER: Primary | ICD-10-CM

## 2022-04-08 PROCEDURE — 99213 OFFICE O/P EST LOW 20 MIN: CPT | Performed by: OTOLARYNGOLOGY

## 2022-04-08 RX ORDER — MONTELUKAST SODIUM 10 MG/1
10 TABLET ORAL NIGHTLY
Qty: 90 TABLET | Refills: 3 | Status: SHIPPED | OUTPATIENT
Start: 2022-04-08

## 2022-04-26 ENCOUNTER — LAB ENCOUNTER (OUTPATIENT)
Dept: LAB | Facility: HOSPITAL | Age: 72
End: 2022-04-26
Attending: INTERNAL MEDICINE
Payer: MEDICARE

## 2022-04-26 ENCOUNTER — TELEPHONE (OUTPATIENT)
Dept: INTERNAL MEDICINE CLINIC | Facility: CLINIC | Age: 72
End: 2022-04-26

## 2022-04-26 DIAGNOSIS — Z20.828 EXPOSURE TO SARS-ASSOCIATED CORONAVIRUS: ICD-10-CM

## 2022-04-26 LAB — SARS-COV-2 RNA RESP QL NAA+PROBE: NOT DETECTED

## 2022-04-26 NOTE — TELEPHONE ENCOUNTER
Patient calling, identified name and , states received a letter that he was probably exposed to person with COVID      Would like to be COVID test, he is asymptomatic     COVID  Test ordered ,  Provided information to call Central scheduling at [phone] to make appt     Patient verbalizes understanding and agrees.

## 2022-05-04 ENCOUNTER — OFFICE VISIT (OUTPATIENT)
Dept: DERMATOLOGY CLINIC | Facility: CLINIC | Age: 72
End: 2022-05-04
Payer: MEDICARE

## 2022-05-04 DIAGNOSIS — L81.4 SOLAR LENTIGO: ICD-10-CM

## 2022-05-04 DIAGNOSIS — D48.5 NEOPLASM OF UNCERTAIN BEHAVIOR OF SKIN: Primary | ICD-10-CM

## 2022-05-04 DIAGNOSIS — L82.1 SEBORRHEIC KERATOSES: ICD-10-CM

## 2022-05-04 DIAGNOSIS — Z85.828 HISTORY OF BASAL CELL CARCINOMA: ICD-10-CM

## 2022-05-04 DIAGNOSIS — Z87.2 HISTORY OF ACTINIC KERATOSES: ICD-10-CM

## 2022-05-04 DIAGNOSIS — Z85.820 PERSONAL HISTORY OF MALIGNANT MELANOMA OF SKIN: ICD-10-CM

## 2022-05-04 PROCEDURE — 99213 OFFICE O/P EST LOW 20 MIN: CPT | Performed by: DERMATOLOGY

## 2022-05-04 PROCEDURE — 88305 TISSUE EXAM BY PATHOLOGIST: CPT | Performed by: DERMATOLOGY

## 2022-05-06 ENCOUNTER — TELEPHONE (OUTPATIENT)
Dept: DERMATOLOGY CLINIC | Facility: CLINIC | Age: 72
End: 2022-05-06

## 2022-05-06 NOTE — TELEPHONE ENCOUNTER
Pt informed that Dr. Juan Luis Starr is still in the process of reviewing pathology and we will contact him as soon as the results are ready.

## 2022-05-09 ENCOUNTER — OFFICE VISIT (OUTPATIENT)
Dept: SURGERY | Facility: CLINIC | Age: 72
End: 2022-05-09
Payer: MEDICARE

## 2022-05-09 DIAGNOSIS — N40.1 BENIGN PROSTATIC HYPERPLASIA WITH LOWER URINARY TRACT SYMPTOMS, SYMPTOM DETAILS UNSPECIFIED: Primary | ICD-10-CM

## 2022-05-09 PROCEDURE — 99213 OFFICE O/P EST LOW 20 MIN: CPT | Performed by: UROLOGY

## 2022-05-10 ENCOUNTER — TELEPHONE (OUTPATIENT)
Dept: OTOLARYNGOLOGY | Facility: CLINIC | Age: 72
End: 2022-05-10

## 2022-05-10 NOTE — PROGRESS NOTES
Results added to pmh. Patient informed of test results and all KMT's recommendations. Voiced understanding. Pt chooses Dr. Fuentes Diss  - path routed, his contact info provided to pt.

## 2022-05-10 NOTE — PROGRESS NOTES
The pathology report from last visit showed [address] at right ala base. Patient had seen Dr. Clementine Looney previously. In this location if he prefers to stay at [place] we will have him follow-up with Dr. Martine Enamorado for excision or Mohs please log in test results. Please call patient and inform of results and recommendations.  (please add to history). Pt to  rtc  as scheduled for skin exam  or prn.

## 2022-05-10 NOTE — TELEPHONE ENCOUNTER
Per pt was referred to Dr. Chelsie Fu in dermatology by Dr. Alessandra Ceja for a spot on his nose. Per pt Dr. Chelsie Fu recommended getting it removed and pt is calling to schedule the procedure.  Please advise

## 2022-05-12 ENCOUNTER — OFFICE VISIT (OUTPATIENT)
Dept: OTOLARYNGOLOGY | Facility: CLINIC | Age: 72
End: 2022-05-12
Payer: MEDICARE

## 2022-05-12 VITALS — TEMPERATURE: 98 F | WEIGHT: 190 LBS | HEIGHT: 71 IN | BODY MASS INDEX: 26.6 KG/M2

## 2022-05-12 DIAGNOSIS — C44.311 BASAL CELL CARCINOMA OF NOSE: Primary | ICD-10-CM

## 2022-05-12 PROCEDURE — 99213 OFFICE O/P EST LOW 20 MIN: CPT | Performed by: OTOLARYNGOLOGY

## 2022-05-13 ENCOUNTER — TELEPHONE (OUTPATIENT)
Dept: OTOLARYNGOLOGY | Facility: CLINIC | Age: 72
End: 2022-05-13

## 2022-05-13 NOTE — TELEPHONE ENCOUNTER
Patient is scheduled for procedure on 5/18/22 at [address] [place] [address] with Dr. Julien Saab.

## 2022-05-18 ENCOUNTER — TELEPHONE (OUTPATIENT)
Dept: OTOLARYNGOLOGY | Facility: CLINIC | Age: 72
End: 2022-05-18

## 2022-05-18 ENCOUNTER — LAB REQUISITION (OUTPATIENT)
Dept: LAB | Facility: HOSPITAL | Age: 72
End: 2022-05-18
Payer: MEDICARE

## 2022-05-18 DIAGNOSIS — C44.311 BASAL CELL CARCINOMA OF SKIN OF NOSE: ICD-10-CM

## 2022-05-18 PROCEDURE — 88305 TISSUE EXAM BY PATHOLOGIST: CPT | Performed by: OTOLARYNGOLOGY

## 2022-05-18 PROCEDURE — 88331 PATH CONSLTJ SURG 1 BLK 1SPC: CPT | Performed by: OTOLARYNGOLOGY

## 2022-05-18 RX ORDER — CLARITHROMYCIN 500 MG/1
500 TABLET, COATED ORAL 2 TIMES DAILY
Qty: 14 TABLET | Refills: 0 | Status: SHIPPED | OUTPATIENT
Start: 2022-05-18

## 2022-05-18 RX ORDER — HYDROCODONE BITARTRATE AND ACETAMINOPHEN 5; 325 MG/1; MG/1
1 TABLET ORAL EVERY 6 HOURS PRN
Qty: 20 TABLET | Refills: 0 | Status: SHIPPED | OUTPATIENT
Start: 2022-05-18

## 2022-05-19 ENCOUNTER — TELEPHONE (OUTPATIENT)
Dept: OTOLARYNGOLOGY | Facility: CLINIC | Age: 72
End: 2022-05-19

## 2022-05-19 NOTE — TELEPHONE ENCOUNTER
Patient POD 1 excision of BCCA of right face. Patient reports doing ok, pain is controlled and taking antibitotics as prescribed. Aware to call our office with increased swelling, drainage, or fever. FU scheduled for 5/26/22.

## 2022-05-22 NOTE — PROGRESS NOTES
Operative Report                     Shave/  Tangential biopsy     Clinical diagnosis:    Size of lesion:    Location:pt with pearly papule at base of right ala  Spec 1 Description >>>>>: right ala base  Spec 1 Comment: r/o BCC pearly papule 5x6 mm    Procedure: With patient in appropriate position the skin of the above was scrubbed with alcohol. Anesthesia was obtained with 1% Xylocaine with epinephrine. The skin surrounding the lesion was placed under tension and the lesion was incised using a #15 scalpel blade. The specimen was sent for histopathologic exam.    Hemostasis was obtained with electrocautery/aluminum chloride. Estimated blood loss less than 2 cc. Biopsy dressed with Polysporin, bandage. Pressure dressing:   No    Complications: None    Written instructions given and reviewed with patient    Await pathology    Contact information reviewed.     Procedural physician:  Micheal Rosario MD

## 2022-05-26 ENCOUNTER — OFFICE VISIT (OUTPATIENT)
Dept: OTOLARYNGOLOGY | Facility: CLINIC | Age: 72
End: 2022-05-26
Payer: MEDICARE

## 2022-05-26 VITALS — HEIGHT: 71 IN | BODY MASS INDEX: 26.6 KG/M2 | TEMPERATURE: 97 F | WEIGHT: 190 LBS

## 2022-05-26 DIAGNOSIS — C44.311 BASAL CELL CARCINOMA OF NOSE: Primary | ICD-10-CM

## 2022-05-27 ENCOUNTER — TELEPHONE (OUTPATIENT)
Dept: OTOLARYNGOLOGY | Facility: CLINIC | Age: 72
End: 2022-05-27

## 2022-06-07 ENCOUNTER — TELEPHONE (OUTPATIENT)
Dept: GASTROENTEROLOGY | Facility: CLINIC | Age: 72
End: 2022-06-07

## 2022-06-07 DIAGNOSIS — K76.89 LIVER CYST: Primary | ICD-10-CM

## 2022-06-07 NOTE — TELEPHONE ENCOUNTER
Malaika Garcia-    Patient is due for MRI Abdomen (recall in June 2022), per recommendation by Dr. Emy Vides, to confirm stability of liver cysts. Please sign off on pended order below, on behalf of Dr. Emy Vides, if appropriate.     Thank you

## 2022-06-20 ENCOUNTER — HOSPITAL ENCOUNTER (OUTPATIENT)
Dept: MRI IMAGING | Facility: HOSPITAL | Age: 72
Discharge: HOME | End: 2022-06-20
Attending: NURSE PRACTITIONER
Payer: MEDICARE

## 2022-06-20 DIAGNOSIS — K76.89 LIVER CYST: ICD-10-CM

## 2022-06-20 LAB — CREAT BLD-MCNC: 0.8 MG/DL

## 2022-06-20 PROCEDURE — A9575 INJ GADOTERATE MEGLUMI 0.1ML: HCPCS | Performed by: NURSE PRACTITIONER

## 2022-06-20 PROCEDURE — 74183 MRI ABD W/O CNTR FLWD CNTR: CPT | Performed by: NURSE PRACTITIONER

## 2022-06-20 PROCEDURE — 82565 ASSAY OF CREATININE: CPT

## 2022-06-28 ENCOUNTER — TELEPHONE (OUTPATIENT)
Dept: GASTROENTEROLOGY | Facility: CLINIC | Age: 72
End: 2022-06-28

## 2022-06-28 NOTE — TELEPHONE ENCOUNTER
Recall MRI liver in 1 year per Dr. Dian Grider. Last done:6/20/22  Next due:6/20/23    Updated snap shot and pt outreach.

## 2022-06-28 NOTE — TELEPHONE ENCOUNTER
----- Message from Hansel Llanos MD sent at 6/27/2022  5:43 PM CDT -----  Hi  Asaf iVnayak,    I have reviewed the MRI with the radiologist.As mentioned there has been no change in the appearance of the cysts over the past year. The radiologist is recommending a follow-up MRI or CT scan in 1 year. We will send you a reminder note at that time. If I can answer any questions regarding the above, please do not hesitate to contact me. Sincerely,    Dr. Bowser Right RNs: Please enter MRI liver recall for 1 year.

## 2022-08-01 NOTE — PROGRESS NOTES
HPI:   Brianne Olivas is a 66year old male who presents for a Medicare Subsequent Annual Wellness visit (Pt already had Initial Annual Wellness). Patient presents for above.   Here for his welcome to Medicare exam.     History of high cholesterol patient in AVS     He does NOT have a Power of  for [employer] on file in [place].    Advance care planning including the explanation and discussion of advance directives standard forms performed Face to Face with patient and Family/surrogate (if pres 10/07/2020        CBC  (most recent labs)   Lab Results   Component Value Date    WBC 7.5 10/07/2020    HGB 15.4 10/07/2020    .0 10/07/2020        ALLERGIES:   He is allergic to penicillins.     CURRENT MEDICATIONS:   rosuvastatin 10 MG Oral Tab, Ta alcohol use. He reports that he does not use drugs. REVIEW OF SYSTEMS:   Review of Systems   Constitutional: Negative. HENT: Negative. Eyes: Negative. Respiratory: Negative. Cardiovascular: Negative. Gastrointestinal: Negative.     Endocr responses: No I avoid social activities because I cannot hear well and fear I will reply improperly: No   Family members and friends have told me they think I may have hearing loss:  No                Visual Acuity                           Physical Exam  C 09/01/2021   • FLUZONE 6 months and older PFS 0.5 ml (82531) 10/25/2013, 08/13/2020   • Flublok Quad Influenza Vaccine (79912) 10/11/2019   • Fluvirin, 3 Years & >, Im 09/23/2014   • Fluzone Vaccine Medicare () 09/27/2015, 08/20/2016, 09/14/2018   • I Interaction     Supplementary Documentation:   Clair Friend SCREENING SCHEDULE   Tests on this list are recommended by your physician but may not be covered, or covered at this frequency, by your insurer.    Please check with your insurance carrier 09/01/2021  No recommendations at this time    Pneumococcal Each vaccine (Cpjnouk70 & Eqkevdfmj03) covered once after 65 Prevnar 13: 08/20/2016    Idwgyvfzm10: 09/27/2015     No recommendations at this time    Hepatitis B One screening covered for patients Negative/Unknown

## 2022-09-07 RX ORDER — ROSUVASTATIN CALCIUM 10 MG/1
10 TABLET, COATED ORAL NIGHTLY
Qty: 90 TABLET | Refills: 1 | Status: SHIPPED | OUTPATIENT
Start: 2022-09-07 | End: 2023-02-15

## 2022-09-07 NOTE — TELEPHONE ENCOUNTER
Refill passed per [facility] protocol. Requested Prescriptions   Pending Prescriptions Disp Refills    ROSUVASTATIN 10 MG Oral Tab [Pharmacy Med Name: Rosuvastatin Calcium 10 Mg Tab Nort] 90 tablet 0     Sig: Take 1 tablet (10 mg total) by mouth nightly.         Cholesterol Medication Protocol Passed - 9/7/2022  1:54 AM        Passed - ALT in past 12 months        Passed - LDL in past 12 months        Passed - Last ALT < 80       Lab Results   Component Value Date    ALT 33 10/06/2021             Passed - Last LDL < 130     Lab Results   Component Value Date    LDL 82 10/06/2021               Passed - In person appointment or virtual visit in the past 12 mos or appointment in next 3 mos       Recent Outpatient Visits              3 months ago Basal cell carcinoma of nose    [facility], Eloisa Gain, MD    Office Visit    3 months ago Basal cell carcinoma of nose    [facility], Eloisa Gain, MD    Office Visit    4 months ago Benign prostatic hyperplasia with lower urinary tract symptoms, symptom details unspecified    [facility] Jacquet, [facility]    Office Visit    4 months ago Neoplasm of uncertain behavior of skin    [facility] Jackelin Clarke MD    Office Visit    5 months ago Allergic rhinitis, unspecified seasonality, unspecified trigger    [facility], Eloisa Gain, MD    Office Visit     Future Appointments         Provider Department Appt Notes    In 2 weeks Jackelin Clarke MD [facility] follow up     In 3 weeks Randy Valverde MD [facility], [address] [place] [address]    In 1 month Joel Ryan MD [facility], [facility], [facility], [address] [place] [address]    In 2 months Brianne Fu, [address] [facility], [place] Return in about 6 months (around 11/9/2022). In 7 months Bonny Sneed MD [facility], [address]                    Recent Outpatient Visits              3 months ago Basal cell carcinoma of nose    [facility], [address], José Miguel Carlson MD    Office Visit    3 months ago Basal cell carcinoma of nose    [facility], [address], José Miguel Carlson MD    Office Visit    4 months ago Benign prostatic hyperplasia with lower urinary tract symptoms, symptom details unspecified    [facility], [address], Jaky Torres MD    Office Visit    4 months ago Neoplasm of uncertain behavior of skin    [facility] Jerrell López MD    Office Visit    5 months ago Allergic rhinitis, unspecified seasonality, unspecified trigger    [facility], [address], José Miguel Carlson MD    Office Visit           Future Appointments         Provider Department Appt Notes    In 2 weeks Jerrell López MD [facility] Dermatology follow up     In 3 weeks Bonny Sneed MD [facility], [address], [address] [place] [address]    In 1 month Hanane Sneed MD [facility], [facility], [address] [facility], [address]    In 2 months Corrie Reyes, [address] [facility], [address] [facility] Return in about 6 months (around 11/9/2022).     In 7 months Bonny Sneed MD [facility], [address], Ale 1 YEAR FOLLOW UP

## 2022-09-09 RX ORDER — ROSUVASTATIN CALCIUM 10 MG/1
10 TABLET, COATED ORAL NIGHTLY
Qty: 90 TABLET | Refills: 1 | Status: CANCELLED | OUTPATIENT
Start: 2022-09-09

## 2022-09-26 ENCOUNTER — OFFICE VISIT (OUTPATIENT)
Dept: DERMATOLOGY CLINIC | Facility: CLINIC | Age: 72
End: 2022-09-26
Payer: MEDICARE

## 2022-09-26 DIAGNOSIS — Z85.828 HISTORY OF BASAL CELL CARCINOMA: ICD-10-CM

## 2022-09-26 DIAGNOSIS — Z85.820 PERSONAL HISTORY OF MALIGNANT MELANOMA OF SKIN: ICD-10-CM

## 2022-09-26 DIAGNOSIS — D22.9 MULTIPLE BENIGN NEVI: ICD-10-CM

## 2022-09-26 DIAGNOSIS — L81.4 SOLAR LENTIGO: ICD-10-CM

## 2022-09-26 DIAGNOSIS — L82.1 SEBORRHEIC KERATOSES: ICD-10-CM

## 2022-09-26 DIAGNOSIS — Z87.2 HISTORY OF ACTINIC KERATOSES: Primary | ICD-10-CM

## 2022-09-26 DIAGNOSIS — D23.9 BENIGN NEOPLASM OF SKIN, UNSPECIFIED LOCATION: ICD-10-CM

## 2022-09-26 PROCEDURE — 99213 OFFICE O/P EST LOW 20 MIN: CPT | Performed by: DERMATOLOGY

## 2022-09-26 PROCEDURE — 1126F AMNT PAIN NOTED NONE PRSNT: CPT | Performed by: DERMATOLOGY

## 2022-09-29 ENCOUNTER — OFFICE VISIT (OUTPATIENT)
Dept: OTOLARYNGOLOGY | Facility: CLINIC | Age: 72
End: 2022-09-29

## 2022-09-29 DIAGNOSIS — C44.311 BASAL CELL CARCINOMA OF NOSE: Primary | ICD-10-CM

## 2022-09-29 PROCEDURE — 99213 OFFICE O/P EST LOW 20 MIN: CPT | Performed by: OTOLARYNGOLOGY

## 2022-10-07 ENCOUNTER — LAB ENCOUNTER (OUTPATIENT)
Dept: LAB | Age: 72
End: 2022-10-07
Attending: INTERNAL MEDICINE
Payer: MEDICARE

## 2022-10-07 ENCOUNTER — OFFICE VISIT (OUTPATIENT)
Dept: INTERNAL MEDICINE CLINIC | Facility: CLINIC | Age: 72
End: 2022-10-07
Payer: MEDICARE

## 2022-10-07 VITALS
WEIGHT: 194 LBS | HEIGHT: 71 IN | SYSTOLIC BLOOD PRESSURE: 134 MMHG | DIASTOLIC BLOOD PRESSURE: 79 MMHG | HEART RATE: 73 BPM | BODY MASS INDEX: 27.16 KG/M2

## 2022-10-07 DIAGNOSIS — Z12.11 COLON CANCER SCREENING: ICD-10-CM

## 2022-10-07 DIAGNOSIS — C44.41 BASAL CELL CARCINOMA OF LEFT SIDE OF NECK: ICD-10-CM

## 2022-10-07 DIAGNOSIS — E78.00 HYPERCHOLESTEROLEMIA: ICD-10-CM

## 2022-10-07 DIAGNOSIS — C44.311 BASAL CELL CARCINOMA OF NOSE: ICD-10-CM

## 2022-10-07 DIAGNOSIS — Z00.00 ENCOUNTER FOR ANNUAL HEALTH EXAMINATION: Primary | ICD-10-CM

## 2022-10-07 DIAGNOSIS — M54.2 CERVICALGIA: ICD-10-CM

## 2022-10-07 DIAGNOSIS — N52.03 COMBINED ARTERIAL INSUFFICIENCY AND CORPORO-VENOUS OCCLUSIVE ERECTILE DYSFUNCTION: ICD-10-CM

## 2022-10-07 DIAGNOSIS — Z85.820 PERSONAL HISTORY OF MALIGNANT MELANOMA OF SKIN: ICD-10-CM

## 2022-10-07 DIAGNOSIS — Z00.00 ENCOUNTER FOR ANNUAL HEALTH EXAMINATION: ICD-10-CM

## 2022-10-07 DIAGNOSIS — J30.2 SEASONAL ALLERGIES: ICD-10-CM

## 2022-10-07 LAB
ALBUMIN SERPL-MCNC: 3.8 G/DL (ref 3.4–5)
ALBUMIN/GLOB SERPL: 1.3 {RATIO} (ref 1–2)
ALP LIVER SERPL-CCNC: 64 U/L
ALT SERPL-CCNC: 30 U/L
ANION GAP SERPL CALC-SCNC: 5 MMOL/L (ref 0–18)
AST SERPL-CCNC: 16 U/L (ref 15–37)
BASOPHILS # BLD AUTO: 0.06 X10(3) UL (ref 0–0.2)
BASOPHILS NFR BLD AUTO: 0.8 %
BILIRUB SERPL-MCNC: 0.6 MG/DL (ref 0.1–2)
BUN BLD-MCNC: 18 MG/DL (ref 7–18)
BUN/CREAT SERPL: 20.7 (ref 10–20)
CALCIUM BLD-MCNC: 9.4 MG/DL (ref 8.5–10.1)
CHLORIDE SERPL-SCNC: 106 MMOL/L (ref 98–112)
CHOLEST SERPL-MCNC: 139 MG/DL (ref ?–200)
CO2 SERPL-SCNC: 28 MMOL/L (ref 21–32)
CREAT BLD-MCNC: 0.87 MG/DL
DEPRECATED RDW RBC AUTO: 40 FL (ref 35.1–46.3)
EOSINOPHIL # BLD AUTO: 0.15 X10(3) UL (ref 0–0.7)
EOSINOPHIL NFR BLD AUTO: 2.1 %
ERYTHROCYTE [DISTWIDTH] IN BLOOD BY AUTOMATED COUNT: 12.2 % (ref 11–15)
FASTING PATIENT LIPID ANSWER: YES
FASTING STATUS PATIENT QL REPORTED: YES
GFR SERPLBLD BASED ON 1.73 SQ M-ARVRAT: 91 ML/MIN/1.73M2 (ref 60–?)
GLOBULIN PLAS-MCNC: 3 G/DL (ref 2.8–4.4)
GLUCOSE BLD-MCNC: 89 MG/DL (ref 70–99)
HCT VFR BLD AUTO: 47.5 %
HDLC SERPL-MCNC: 47 MG/DL (ref 40–59)
HGB BLD-MCNC: 16 G/DL
IMM GRANULOCYTES # BLD AUTO: 0.03 X10(3) UL (ref 0–1)
IMM GRANULOCYTES NFR BLD: 0.4 %
LDLC SERPL CALC-MCNC: 81 MG/DL (ref ?–100)
LYMPHOCYTES # BLD AUTO: 2.48 X10(3) UL (ref 1–4)
LYMPHOCYTES NFR BLD AUTO: 34.4 %
MCH RBC QN AUTO: 30.7 PG (ref 26–34)
MCHC RBC AUTO-ENTMCNC: 33.7 G/DL (ref 31–37)
MCV RBC AUTO: 91 FL
MONOCYTES # BLD AUTO: 0.59 X10(3) UL (ref 0.1–1)
MONOCYTES NFR BLD AUTO: 8.2 %
NEUTROPHILS # BLD AUTO: 3.89 X10 (3) UL (ref 1.5–7.7)
NEUTROPHILS # BLD AUTO: 3.89 X10(3) UL (ref 1.5–7.7)
NEUTROPHILS NFR BLD AUTO: 54.1 %
NONHDLC SERPL-MCNC: 92 MG/DL (ref ?–130)
OSMOLALITY SERPL CALC.SUM OF ELEC: 289 MOSM/KG (ref 275–295)
PLATELET # BLD AUTO: 218 10(3)UL (ref 150–450)
POTASSIUM SERPL-SCNC: 4.3 MMOL/L (ref 3.5–5.1)
PROT SERPL-MCNC: 6.8 G/DL (ref 6.4–8.2)
PSA SERPL-MCNC: 0.78 NG/ML (ref ?–4)
RBC # BLD AUTO: 5.22 X10(6)UL
SODIUM SERPL-SCNC: 139 MMOL/L (ref 136–145)
TRIGL SERPL-MCNC: 50 MG/DL (ref 30–149)
TSI SER-ACNC: 1.04 MIU/ML (ref 0.36–3.74)
VLDLC SERPL CALC-MCNC: 8 MG/DL (ref 0–30)
WBC # BLD AUTO: 7.2 X10(3) UL (ref 4–11)

## 2022-10-07 PROCEDURE — 85025 COMPLETE CBC W/AUTO DIFF WBC: CPT

## 2022-10-07 PROCEDURE — 84443 ASSAY THYROID STIM HORMONE: CPT

## 2022-10-07 PROCEDURE — 36415 COLL VENOUS BLD VENIPUNCTURE: CPT

## 2022-10-07 PROCEDURE — 80053 COMPREHEN METABOLIC PANEL: CPT

## 2022-10-07 PROCEDURE — 1125F AMNT PAIN NOTED PAIN PRSNT: CPT | Performed by: INTERNAL MEDICINE

## 2022-10-07 PROCEDURE — 80061 LIPID PANEL: CPT

## 2022-10-07 PROCEDURE — G0439 PPPS, SUBSEQ VISIT: HCPCS | Performed by: INTERNAL MEDICINE

## 2022-10-07 PROCEDURE — 84153 ASSAY OF PSA TOTAL: CPT

## 2022-11-07 ENCOUNTER — TELEPHONE (OUTPATIENT)
Dept: SURGERY | Facility: CLINIC | Age: 72
End: 2022-11-07

## 2022-11-07 NOTE — TELEPHONE ENCOUNTER
Pt was called to cancel his appt today  11/7/22 due to  MD out sick. Pavithra Eli stated to call and we would get back to Pt. With new appt.  Pt agreed

## 2022-11-09 NOTE — TELEPHONE ENCOUNTER
Spoke with patient, assisted in scheduling patient for follow up visit. PT confirmed and verbalized understanding.      Future Appointments   Date Time Provider Andra Landa   11/10/2022  1:20 PM Robert Jimense MD [facility]   3/27/2023 12:30 PM Ivelisse Shields MD [facility]   4/7/2023  1:00 PM Vaibhav Obregon MD [address] [facility] OF THE [facility]   4/11/2023  9:15 AM Ritchie Renteria MD [facility]

## 2022-11-10 ENCOUNTER — OFFICE VISIT (OUTPATIENT)
Dept: SURGERY | Facility: CLINIC | Age: 72
End: 2022-11-10
Payer: MEDICARE

## 2022-11-10 DIAGNOSIS — N20.0 KIDNEY STONES: ICD-10-CM

## 2022-11-10 DIAGNOSIS — N40.1 BENIGN PROSTATIC HYPERPLASIA WITH LOWER URINARY TRACT SYMPTOMS, SYMPTOM DETAILS UNSPECIFIED: Primary | ICD-10-CM

## 2022-11-10 PROCEDURE — 99213 OFFICE O/P EST LOW 20 MIN: CPT | Performed by: UROLOGY

## 2022-12-02 RX ORDER — DUTASTERIDE 0.5 MG/1
0.5 CAPSULE, LIQUID FILLED ORAL DAILY
Qty: 90 CAPSULE | Refills: 3 | Status: SHIPPED | OUTPATIENT
Start: 2022-12-02

## 2022-12-02 RX ORDER — DUTASTERIDE 0.5 MG/1
CAPSULE, LIQUID FILLED ORAL
Qty: 90 CAPSULE | Refills: 0 | Status: SHIPPED | OUTPATIENT
Start: 2022-12-02 | End: 2022-12-02

## 2022-12-09 ENCOUNTER — NURSE TRIAGE (OUTPATIENT)
Dept: INTERNAL MEDICINE CLINIC | Facility: CLINIC | Age: 72
End: 2022-12-09

## 2022-12-15 ENCOUNTER — HOSPITAL ENCOUNTER (OUTPATIENT)
Dept: GENERAL RADIOLOGY | Facility: HOSPITAL | Age: 72
Discharge: HOME | End: 2022-12-15
Attending: INTERNAL MEDICINE
Payer: MEDICARE

## 2022-12-15 ENCOUNTER — OFFICE VISIT (OUTPATIENT)
Dept: INTERNAL MEDICINE CLINIC | Facility: CLINIC | Age: 72
End: 2022-12-15
Payer: MEDICARE

## 2022-12-15 VITALS
HEIGHT: 71 IN | BODY MASS INDEX: 27.16 KG/M2 | OXYGEN SATURATION: 96 % | SYSTOLIC BLOOD PRESSURE: 110 MMHG | WEIGHT: 194 LBS | DIASTOLIC BLOOD PRESSURE: 76 MMHG | HEART RATE: 82 BPM

## 2022-12-15 DIAGNOSIS — M54.2 CERVICALGIA: ICD-10-CM

## 2022-12-15 DIAGNOSIS — M54.2 CERVICALGIA: Primary | ICD-10-CM

## 2022-12-15 PROCEDURE — 72050 X-RAY EXAM NECK SPINE 4/5VWS: CPT | Performed by: INTERNAL MEDICINE

## 2022-12-15 PROCEDURE — 1125F AMNT PAIN NOTED PAIN PRSNT: CPT | Performed by: INTERNAL MEDICINE

## 2022-12-15 PROCEDURE — 99213 OFFICE O/P EST LOW 20 MIN: CPT | Performed by: INTERNAL MEDICINE

## 2023-01-05 ENCOUNTER — OFFICE VISIT (OUTPATIENT)
Dept: PHYSICAL THERAPY | Facility: HOSPITAL | Age: 73
End: 2023-01-05
Attending: INTERNAL MEDICINE
Payer: MEDICARE

## 2023-01-05 DIAGNOSIS — M54.2 CERVICALGIA: ICD-10-CM

## 2023-01-05 PROCEDURE — 97162 PT EVAL MOD COMPLEX 30 MIN: CPT

## 2023-01-05 PROCEDURE — 97110 THERAPEUTIC EXERCISES: CPT

## 2023-01-11 ENCOUNTER — OFFICE VISIT (OUTPATIENT)
Dept: PHYSICAL THERAPY | Facility: HOSPITAL | Age: 73
End: 2023-01-11
Attending: INTERNAL MEDICINE
Payer: MEDICARE

## 2023-01-11 PROCEDURE — 97140 MANUAL THERAPY 1/> REGIONS: CPT

## 2023-01-11 PROCEDURE — 97110 THERAPEUTIC EXERCISES: CPT

## 2023-01-12 ENCOUNTER — OFFICE VISIT (OUTPATIENT)
Dept: PHYSICAL THERAPY | Facility: HOSPITAL | Age: 73
End: 2023-01-12
Attending: INTERNAL MEDICINE
Payer: MEDICARE

## 2023-01-12 PROCEDURE — 97140 MANUAL THERAPY 1/> REGIONS: CPT

## 2023-01-12 PROCEDURE — 97110 THERAPEUTIC EXERCISES: CPT

## 2023-01-16 ENCOUNTER — OFFICE VISIT (OUTPATIENT)
Dept: PHYSICAL THERAPY | Facility: HOSPITAL | Age: 73
End: 2023-01-16
Attending: INTERNAL MEDICINE
Payer: MEDICARE

## 2023-01-16 PROCEDURE — 97140 MANUAL THERAPY 1/> REGIONS: CPT

## 2023-01-16 PROCEDURE — 97110 THERAPEUTIC EXERCISES: CPT

## 2023-01-17 ENCOUNTER — NURSE TRIAGE (OUTPATIENT)
Dept: INTERNAL MEDICINE CLINIC | Facility: CLINIC | Age: 73
End: 2023-01-17

## 2023-01-19 ENCOUNTER — OFFICE VISIT (OUTPATIENT)
Dept: INTERNAL MEDICINE CLINIC | Facility: CLINIC | Age: 73
End: 2023-01-19

## 2023-01-19 ENCOUNTER — HOSPITAL ENCOUNTER (OUTPATIENT)
Dept: GENERAL RADIOLOGY | Facility: HOSPITAL | Age: 73
Discharge: HOME | End: 2023-01-19
Attending: INTERNAL MEDICINE
Payer: MEDICARE

## 2023-01-19 ENCOUNTER — OFFICE VISIT (OUTPATIENT)
Dept: PHYSICAL THERAPY | Facility: HOSPITAL | Age: 73
End: 2023-01-19
Attending: INTERNAL MEDICINE
Payer: MEDICARE

## 2023-01-19 VITALS
SYSTOLIC BLOOD PRESSURE: 126 MMHG | BODY MASS INDEX: 27.16 KG/M2 | WEIGHT: 194 LBS | HEIGHT: 71 IN | DIASTOLIC BLOOD PRESSURE: 84 MMHG | OXYGEN SATURATION: 97 % | HEART RATE: 76 BPM

## 2023-01-19 DIAGNOSIS — M77.12 LATERAL EPICONDYLITIS OF LEFT ELBOW: Primary | ICD-10-CM

## 2023-01-19 DIAGNOSIS — M77.12 LATERAL EPICONDYLITIS OF LEFT ELBOW: ICD-10-CM

## 2023-01-19 PROCEDURE — 73080 X-RAY EXAM OF ELBOW: CPT | Performed by: INTERNAL MEDICINE

## 2023-01-19 PROCEDURE — 99213 OFFICE O/P EST LOW 20 MIN: CPT | Performed by: INTERNAL MEDICINE

## 2023-01-19 PROCEDURE — 97110 THERAPEUTIC EXERCISES: CPT

## 2023-01-19 PROCEDURE — 1125F AMNT PAIN NOTED PAIN PRSNT: CPT | Performed by: INTERNAL MEDICINE

## 2023-01-19 PROCEDURE — 97140 MANUAL THERAPY 1/> REGIONS: CPT

## 2023-01-23 ENCOUNTER — OFFICE VISIT (OUTPATIENT)
Dept: PHYSICAL THERAPY | Facility: HOSPITAL | Age: 73
End: 2023-01-23
Attending: INTERNAL MEDICINE
Payer: MEDICARE

## 2023-01-23 PROCEDURE — 97110 THERAPEUTIC EXERCISES: CPT

## 2023-01-23 PROCEDURE — 97140 MANUAL THERAPY 1/> REGIONS: CPT

## 2023-01-25 ENCOUNTER — OFFICE VISIT (OUTPATIENT)
Dept: PHYSICAL THERAPY | Facility: HOSPITAL | Age: 73
End: 2023-01-25
Attending: INTERNAL MEDICINE
Payer: MEDICARE

## 2023-01-25 PROCEDURE — 97110 THERAPEUTIC EXERCISES: CPT

## 2023-01-25 PROCEDURE — 97140 MANUAL THERAPY 1/> REGIONS: CPT

## 2023-01-26 ENCOUNTER — APPOINTMENT (OUTPATIENT)
Dept: PHYSICAL THERAPY | Facility: HOSPITAL | Age: 73
End: 2023-01-26
Attending: INTERNAL MEDICINE
Payer: MEDICARE

## 2023-01-30 ENCOUNTER — OFFICE VISIT (OUTPATIENT)
Dept: PHYSICAL THERAPY | Facility: HOSPITAL | Age: 73
End: 2023-01-30
Attending: INTERNAL MEDICINE
Payer: MEDICARE

## 2023-01-30 PROCEDURE — 97140 MANUAL THERAPY 1/> REGIONS: CPT

## 2023-01-30 PROCEDURE — 97110 THERAPEUTIC EXERCISES: CPT

## 2023-02-02 ENCOUNTER — OFFICE VISIT (OUTPATIENT)
Dept: PHYSICAL THERAPY | Facility: HOSPITAL | Age: 73
End: 2023-02-02
Attending: INTERNAL MEDICINE
Payer: MEDICARE

## 2023-02-02 PROCEDURE — 97140 MANUAL THERAPY 1/> REGIONS: CPT

## 2023-02-02 PROCEDURE — 97110 THERAPEUTIC EXERCISES: CPT

## 2023-02-06 ENCOUNTER — OFFICE VISIT (OUTPATIENT)
Dept: PHYSICAL THERAPY | Facility: HOSPITAL | Age: 73
End: 2023-02-06
Attending: INTERNAL MEDICINE
Payer: MEDICARE

## 2023-02-06 PROCEDURE — 97140 MANUAL THERAPY 1/> REGIONS: CPT

## 2023-02-06 PROCEDURE — 97110 THERAPEUTIC EXERCISES: CPT

## 2023-02-07 ENCOUNTER — HOSPITAL ENCOUNTER (OUTPATIENT)
Dept: MRI IMAGING | Facility: HOSPITAL | Age: 73
Discharge: HOME | End: 2023-02-07
Attending: INTERNAL MEDICINE
Payer: MEDICARE

## 2023-02-07 DIAGNOSIS — M77.12 LATERAL EPICONDYLITIS OF LEFT ELBOW: ICD-10-CM

## 2023-02-07 PROCEDURE — 73218 MRI UPPER EXTREMITY W/O DYE: CPT | Performed by: INTERNAL MEDICINE

## 2023-02-09 ENCOUNTER — OFFICE VISIT (OUTPATIENT)
Dept: PHYSICAL THERAPY | Facility: HOSPITAL | Age: 73
End: 2023-02-09
Attending: INTERNAL MEDICINE
Payer: MEDICARE

## 2023-02-09 PROCEDURE — 97110 THERAPEUTIC EXERCISES: CPT

## 2023-02-13 ENCOUNTER — OFFICE VISIT (OUTPATIENT)
Dept: ORTHOPEDICS CLINIC | Facility: CLINIC | Age: 73
End: 2023-02-13
Payer: MEDICARE

## 2023-02-13 VITALS — WEIGHT: 194 LBS | BODY MASS INDEX: 27.16 KG/M2 | HEIGHT: 71 IN

## 2023-02-13 DIAGNOSIS — M77.12 LATERAL EPICONDYLITIS OF LEFT ELBOW: Primary | ICD-10-CM

## 2023-02-14 ENCOUNTER — OFFICE VISIT (OUTPATIENT)
Dept: PHYSICAL MEDICINE AND REHAB | Facility: CLINIC | Age: 73
End: 2023-02-14
Payer: MEDICARE

## 2023-02-14 VITALS — DIASTOLIC BLOOD PRESSURE: 76 MMHG | SYSTOLIC BLOOD PRESSURE: 130 MMHG | OXYGEN SATURATION: 95 % | HEART RATE: 103 BPM

## 2023-02-14 DIAGNOSIS — M47.812 ARTHROPATHY OF CERVICAL FACET JOINT: Primary | ICD-10-CM

## 2023-02-14 PROCEDURE — 99204 OFFICE O/P NEW MOD 45 MIN: CPT | Performed by: PHYSICAL MEDICINE & REHABILITATION

## 2023-02-14 RX ORDER — MELOXICAM 15 MG/1
15 TABLET ORAL DAILY
Qty: 14 TABLET | Refills: 0 | Status: SHIPPED | OUTPATIENT
Start: 2023-02-14 | End: 2023-02-28

## 2023-02-15 RX ORDER — ROSUVASTATIN CALCIUM 10 MG/1
10 TABLET, COATED ORAL NIGHTLY
Qty: 90 TABLET | Refills: 1 | Status: SHIPPED | OUTPATIENT
Start: 2023-02-15

## 2023-02-15 NOTE — TELEPHONE ENCOUNTER
Refill passed per [place] [facility] protocol. Requested Prescriptions   Pending Prescriptions Disp Refills    ROSUVASTATIN 10 MG Oral Tab [Pharmacy Med Name: Rosuvastatin Calcium 10 Mg Tab Nort] 90 tablet 0     Sig: Take 1 tablet (10 mg total) by mouth nightly.        Cholesterol Medication Protocol Passed - 2/15/2023  1:33 AM        Passed - ALT in past 12 months        Passed - LDL in past 12 months        Passed - Last ALT < 80     Lab Results   Component Value Date    ALT 30 10/07/2022             Passed - Last LDL < 130     Lab Results   Component Value Date    LDL 81 10/07/2022             Passed - In person appointment or virtual visit in the past 12 mos or appointment in next 3 mos     Recent Outpatient Visits              Yesterday Arthropathy of cervical facet joint    [address], [address], [place], [place]    Office Visit    2 days ago Lateral epicondylitis of left elbow    [address], 12 [facility], Ruslan Ramos MD    Office Visit    6 days ago     [address], PT    Office Visit    1 week ago     [facility], PT    Office Visit    1 week ago     [facility], PT    Office Visit          Future Appointments         Provider Department Appt Notes    In 1 month MD Mona Chu [address]    In 1 month Nicolette Hill MD [address], [address] [facility] FU    In 2 months Sd Gutierrez MD [address], [facility] follow up     In 8 months Irvin Mcclendon MD [address], [address], [place] 1 yr f/u                  Recent Outpatient Visits              Yesterday Arthropathy of cervical facet joint    [facility], [address], [place], [place]    Office Visit    2 days ago Lateral epicondylitis of left elbow    [facility], [facility], Debbi Chaudhry MD    Office Visit    6 days ago     [address] [facility], [address] [facility]    Office Visit    1 week ago     [facility], PT    Office Visit    1 week ago     [facility], PT    Office Visit          Future Appointments         Provider Department Appt Notes    In 1 month Janie Casarez  [facility] [address]    In 1 month Jannet Galicia MD [facility], [facility]    In 2 months Beau Mackenzie MD [address] [facility], [facility] follow up     In 8 months João Verdugo MD [address], [address], [place] 1 yr f/u

## 2023-02-16 ENCOUNTER — TELEPHONE (OUTPATIENT)
Dept: PHYSICAL MEDICINE AND REHAB | Facility: CLINIC | Age: 73
End: 2023-02-16

## 2023-03-02 ENCOUNTER — HOSPITAL ENCOUNTER (OUTPATIENT)
Dept: MRI IMAGING | Facility: HOSPITAL | Age: 73
Discharge: HOME | End: 2023-03-02
Attending: PHYSICAL MEDICINE & REHABILITATION
Payer: MEDICARE

## 2023-03-02 DIAGNOSIS — M47.812 ARTHROPATHY OF CERVICAL FACET JOINT: ICD-10-CM

## 2023-03-02 PROCEDURE — 72141 MRI NECK SPINE W/O DYE: CPT | Performed by: PHYSICAL MEDICINE & REHABILITATION

## 2023-03-07 ENCOUNTER — OFFICE VISIT (OUTPATIENT)
Dept: PHYSICAL MEDICINE AND REHAB | Facility: CLINIC | Age: 73
End: 2023-03-07
Payer: MEDICARE

## 2023-03-07 ENCOUNTER — TELEPHONE (OUTPATIENT)
Dept: PHYSICAL MEDICINE AND REHAB | Facility: CLINIC | Age: 73
End: 2023-03-07

## 2023-03-07 VITALS — BODY MASS INDEX: 26.88 KG/M2 | HEIGHT: 71 IN | OXYGEN SATURATION: 95 % | WEIGHT: 192 LBS | HEART RATE: 60 BPM

## 2023-03-07 DIAGNOSIS — M47.812 ARTHROPATHY OF CERVICAL FACET JOINT: Primary | ICD-10-CM

## 2023-03-07 PROCEDURE — 99214 OFFICE O/P EST MOD 30 MIN: CPT | Performed by: PHYSICAL MEDICINE & REHABILITATION

## 2023-03-07 PROCEDURE — 1125F AMNT PAIN NOTED PAIN PRSNT: CPT | Performed by: PHYSICAL MEDICINE & REHABILITATION

## 2023-03-07 NOTE — TELEPHONE ENCOUNTER
Patient has been scheduled for Bilateral C4-5, C5-6, C6-7 Facet joint injection on 03/13/2023 at the [facility] with Dr. Beba Angeles. -Anesthesia type: LOCAL. -If receiving MAC or IVC sedation patient will need to get COVID tested 3 days prior even if already vaccinated (order placed by [facility].)  -If scheduling [facility] covid testing required for all procedures whether patient is vaccinated or not. -Patient informed not to eat or drink anything after midnight the night prior to the procedure, if being sedated. -Patient was advised that if he/she jessica/s receive the covid vaccine it needs to be at least 2 weeks before or after the injection. -Medications and allergies reviewed. -Patient reminded to hold NSAIDs (Ibuprofen, ASA 81, Aleve, Naproxen, Mobic etc.) for 3 days prior to [place]  if BMI is greater than 35. For Cervical injections only hold multivitamins, Vitamin E, Fish Oil, Phentermine (Lomaira) for 7 days prior to injection and NSAIDS.  mg to be held for 7 days prior to injections.  -If patient is receiving MAC/IVCS Phentermine Dallie Díaz) will need to be held for 7 days prior to injection.  -If on blood thinner clearance has been received to hold this medication by provider.   -Patient informed he/she will need a  to and from procedure. -[facility] is located in the [facility] 1st floor. Patient may park in the yellow parking. Patient verbalized understanding and agrees with plan.  -----> Scheduled in Epic: Yes  -----> Scheduled in [employer]:  Yes

## 2023-03-07 NOTE — TELEPHONE ENCOUNTER
Per Medicare Guidelines -no authorization is required for Bilateral C4-5, C5-6, C6-7 Facet joint injection under fluoroscopy guidance CPT 15623-65, 14018X9    Status: Authorization is not required however may be subject to review once claim is submitted-Covered Benefit    Per CMS Guidelines-One to two levels, either unilateral or bilateral, are allowed per session per spine region. The need for a three or four-level procedure bilaterally may be considered under unique circumstances and with sufficient documentation of medical necessity on appeal. A session is a time period, which includes all procedures (i.e., medial branch block (MBB), intraarticular injections (IA), facet cyst ruptures, and RFA ablations that are performed during the same day. Frequency Limitation: For each covered spinal region no more than two (2) radiofrequency sessions will be reimbursed per rolling 12 months.

## 2023-03-13 ENCOUNTER — OFFICE VISIT (OUTPATIENT)
Dept: SURGERY | Facility: CLINIC | Age: 73
End: 2023-03-13
Payer: MEDICARE

## 2023-03-13 DIAGNOSIS — M47.812 ARTHROPATHY OF CERVICAL FACET JOINT: Primary | ICD-10-CM

## 2023-03-13 NOTE — PROCEDURES
[facility]    CERVICAL Z-JOINT/FACET INJECTION  NAME:  Jermaine Liao    MR #:    [id number] :  6/10/1950     PHYSICIAN:  Julien Bueno DO        Operative Report    DATE OF PROCEDURE: 3/13/2023   PREOPERATIVE DIAGNOSES: 1. Arthropathy of cervical facet joint        POSTOPERATIVE DIAGNOSES:   1. Arthropathy of cervical facet joint        PROCEDURES: bilateral C4-5, C5-6 and C6-7 Z-Joint Injection done under fluoroscopic guidance with contrast enhancement. SURGEON: Julien Bueno DO   ANESTHESIA: Local   INDICATIONS:      OPERATIVE PROCEDURE:  Written consent was obtained from the patient. The patient was brought into the operating room and placed in the prone position on the fluoroscopy table with pillow underneath the chest and shoulders. The patient's skin was cleaned and draped in a normal sterile fashion. Using AP fluoroscopy, the bilateral C4-5, C5-6 and C6-7 z-joints were identified. Skin was anesthetized with 1% PF lidocaine without epinephrine overlying each joint. Then, a 3-1/2 inch, 22-gauge spinal needle was inserted and directed towards the bilateral C4-5, C5-6 and C6-7 z-joint(s). When they were engaged, Omnipaque-240 contrast was used to obtain a good arthrogram indicating correct needle placement. Then, aspiration was performed. No blood, fluid, or air was aspirated. Then, each joint was injected with a 1 cc solution of 0.5 cc of 1% PF lidocaine without epinephrine and 0.5 cc of 1 mg/cc of 40 mg of Kenalog/cc. Then, the needles were removed. The patient's skin was cleaned. Band-Aids were applied. The patient was transferred to the cart and into [facility]. The patient was given discharge instructions and will follow up in the clinic as scheduled. Throughout the whole procedure, the patient's pulse oximetry and vital signs were monitored and they remained completely stable.   Also, throughout the whole procedure, prior to injection of any medication, aspiration was performed. No blood, fluid, or air was aspirated at anytime.     Robert Silver DO, FAAPMR & CAQSM  Physical Medicine and Rehabilitation/Sports Medicine  [facility] [place]

## 2023-03-27 ENCOUNTER — TELEPHONE (OUTPATIENT)
Dept: PHYSICAL MEDICINE AND REHAB | Facility: CLINIC | Age: 73
End: 2023-03-27

## 2023-03-27 ENCOUNTER — OFFICE VISIT (OUTPATIENT)
Dept: PHYSICAL MEDICINE AND REHAB | Facility: CLINIC | Age: 73
End: 2023-03-27
Payer: MEDICARE

## 2023-03-27 VITALS
WEIGHT: 190 LBS | BODY MASS INDEX: 26.6 KG/M2 | HEART RATE: 71 BPM | SYSTOLIC BLOOD PRESSURE: 130 MMHG | HEIGHT: 71 IN | DIASTOLIC BLOOD PRESSURE: 82 MMHG | OXYGEN SATURATION: 96 %

## 2023-03-27 DIAGNOSIS — M54.2 TRIGGER POINT OF NECK: Primary | ICD-10-CM

## 2023-03-27 PROCEDURE — 1125F AMNT PAIN NOTED PAIN PRSNT: CPT | Performed by: PHYSICAL MEDICINE & REHABILITATION

## 2023-03-27 PROCEDURE — 20553 NJX 1/MLT TRIGGER POINTS 3/>: CPT | Performed by: PHYSICAL MEDICINE & REHABILITATION

## 2023-03-27 PROCEDURE — 99214 OFFICE O/P EST MOD 30 MIN: CPT | Performed by: PHYSICAL MEDICINE & REHABILITATION

## 2023-03-27 RX ORDER — LIDOCAINE HYDROCHLORIDE 10 MG/ML
3 INJECTION, SOLUTION INFILTRATION; PERINEURAL ONCE
Status: COMPLETED | OUTPATIENT
Start: 2023-03-27 | End: 2023-03-27

## 2023-03-27 NOTE — TELEPHONE ENCOUNTER
Per Medicare Guidelines -no authorization is required for Bilateral cervical paraspinals, upper trapezius and levator scapulae trigger point injections CPT 20351    Status: Authorization is not required however may be subject to review once claim is submitted-Covered Benefit    inj done in office

## 2023-03-27 NOTE — PATIENT INSTRUCTIONS
Trigger point Injection Information  What to expect: The injection contains Lidocaine (which numbs the area)   You may have pain relief within hours of the injection due to the Lidocaine. It is also possible to have a slight increase in symptoms over the first few days, but that should resolve fairly quickly. How long will the injection last?: The length of response to an injection is variable. Literally a couple days to a couple weeks. Activity Recommendations: For the first 24 hours after injection, keep the area clean and dry. It is ok to shower but don't soak in a tub during that time. No vigorous activity such as running or heavy lifting for the first week but other than that you can gradually resume your normal activities immediately. If you have a significant decrease in pain, be careful not to do too much too soon. Again, the key is GRADUAL resumption of activites. Things to look out for: Common injection side effects include soreness at the injection site, bruising, flushing of the face or skin. Infection is very rare but please notify my office [facility] for [address] [place] [phone]) if you develop any fevers, drainage from the injection site, or severe increase in pain. If it is the weekend, go to an Emergency Room.       4 weeks video

## 2023-04-07 ENCOUNTER — OFFICE VISIT (OUTPATIENT)
Dept: OTOLARYNGOLOGY | Facility: CLINIC | Age: 73
End: 2023-04-07

## 2023-04-07 VITALS — WEIGHT: 190 LBS | BODY MASS INDEX: 26.6 KG/M2 | HEIGHT: 71 IN

## 2023-04-07 DIAGNOSIS — J30.9 ALLERGIC RHINITIS, UNSPECIFIED SEASONALITY, UNSPECIFIED TRIGGER: ICD-10-CM

## 2023-04-07 DIAGNOSIS — C44.311 BASAL CELL CARCINOMA OF NOSE: Primary | ICD-10-CM

## 2023-04-07 PROCEDURE — 99213 OFFICE O/P EST LOW 20 MIN: CPT | Performed by: OTOLARYNGOLOGY

## 2023-04-07 RX ORDER — MONTELUKAST SODIUM 10 MG/1
10 TABLET ORAL NIGHTLY
Qty: 30 TABLET | Refills: 3 | Status: SHIPPED | OUTPATIENT
Start: 2023-04-07

## 2023-04-07 RX ORDER — AZELASTINE 1 MG/ML
2 SPRAY, METERED NASAL 2 TIMES DAILY
Qty: 30 ML | Refills: 3 | Status: SHIPPED | OUTPATIENT
Start: 2023-04-07

## 2023-04-11 ENCOUNTER — OFFICE VISIT (OUTPATIENT)
Dept: INTERNAL MEDICINE CLINIC | Facility: CLINIC | Age: 73
End: 2023-04-11

## 2023-04-11 VITALS
WEIGHT: 189 LBS | BODY MASS INDEX: 26.46 KG/M2 | DIASTOLIC BLOOD PRESSURE: 78 MMHG | HEIGHT: 71 IN | OXYGEN SATURATION: 96 % | SYSTOLIC BLOOD PRESSURE: 128 MMHG | HEART RATE: 80 BPM

## 2023-04-11 DIAGNOSIS — E78.00 HYPERCHOLESTEROLEMIA: Primary | ICD-10-CM

## 2023-04-11 DIAGNOSIS — M54.2 CERVICALGIA: ICD-10-CM

## 2023-04-11 DIAGNOSIS — N52.03 COMBINED ARTERIAL INSUFFICIENCY AND CORPORO-VENOUS OCCLUSIVE ERECTILE DYSFUNCTION: ICD-10-CM

## 2023-04-11 PROCEDURE — 99214 OFFICE O/P EST MOD 30 MIN: CPT | Performed by: INTERNAL MEDICINE

## 2023-04-11 PROCEDURE — 1126F AMNT PAIN NOTED NONE PRSNT: CPT | Performed by: INTERNAL MEDICINE

## 2023-04-11 RX ORDER — SILDENAFIL CITRATE 20 MG/1
TABLET ORAL
Qty: 10 TABLET | Refills: 5 | Status: SHIPPED | OUTPATIENT
Start: 2023-04-11

## 2023-04-21 ENCOUNTER — OFFICE VISIT (OUTPATIENT)
Dept: DERMATOLOGY CLINIC | Facility: CLINIC | Age: 73
End: 2023-04-21

## 2023-04-21 DIAGNOSIS — D22.9 MULTIPLE BENIGN NEVI: ICD-10-CM

## 2023-04-21 DIAGNOSIS — Z85.828 HISTORY OF BASAL CELL CARCINOMA: ICD-10-CM

## 2023-04-21 DIAGNOSIS — D23.9 BENIGN NEOPLASM OF SKIN, UNSPECIFIED LOCATION: ICD-10-CM

## 2023-04-21 DIAGNOSIS — Z87.2 HISTORY OF ACTINIC KERATOSES: ICD-10-CM

## 2023-04-21 DIAGNOSIS — Z85.820 PERSONAL HISTORY OF MALIGNANT MELANOMA OF SKIN: ICD-10-CM

## 2023-04-21 DIAGNOSIS — L82.1 SEBORRHEIC KERATOSES: ICD-10-CM

## 2023-04-21 DIAGNOSIS — L82.0 INFLAMED SEBORRHEIC KERATOSIS: Primary | ICD-10-CM

## 2023-04-21 DIAGNOSIS — L81.4 SOLAR LENTIGO: ICD-10-CM

## 2023-04-21 PROCEDURE — 99213 OFFICE O/P EST LOW 20 MIN: CPT | Performed by: DERMATOLOGY

## 2023-04-27 ENCOUNTER — OFFICE VISIT (OUTPATIENT)
Dept: PHYSICAL MEDICINE AND REHAB | Facility: CLINIC | Age: 73
End: 2023-04-27
Payer: MEDICARE

## 2023-04-27 ENCOUNTER — TELEPHONE (OUTPATIENT)
Dept: PHYSICAL MEDICINE AND REHAB | Facility: CLINIC | Age: 73
End: 2023-04-27

## 2023-04-27 VITALS — HEART RATE: 79 BPM | OXYGEN SATURATION: 97 %

## 2023-04-27 DIAGNOSIS — M54.2 TRIGGER POINT OF NECK: Primary | ICD-10-CM

## 2023-04-27 DIAGNOSIS — M54.12 CERVICAL RADICULOPATHY: ICD-10-CM

## 2023-04-27 PROCEDURE — 99214 OFFICE O/P EST MOD 30 MIN: CPT | Performed by: PHYSICAL MEDICINE & REHABILITATION

## 2023-04-27 PROCEDURE — 1125F AMNT PAIN NOTED PAIN PRSNT: CPT | Performed by: PHYSICAL MEDICINE & REHABILITATION

## 2023-04-27 NOTE — TELEPHONE ENCOUNTER
Per Medicare Guidelines -no authorization is required for C7-T1 Interlaminar Epidural Steroid Injection under fluoroscopy guidance CPT 77213     Status: Authorization is not required however may be subject to review once claim is submitted-Covered Benefit

## 2023-04-27 NOTE — TELEPHONE ENCOUNTER
Patient has been scheduled for C7-T1 Interlaminar Epidural Steroid Injection on 05/01/23 at the [facility] with Dr. Ras Bermudez.   -Anesthesia type: Local.  -If scheduling [facility] covid testing required for all procedures whether patient is vaccinated or not. -Patient informed not to eat or drink anything after midnight the night prior to the procedure, if being sedated. (Patient informed to fast 12 hours prior to procedure with IVCS/MAC. )  -Patient was advised that if he/she does receive the covid vaccine it needs to be at least 2 weeks before or after the injection. -Medications and allergies reviewed. -Patient reminded to hold NSAIDs (Ibuprofen, ASA 81, Aleve, Naproxen, Mobic, Diclofenac, Etodolac, Celebrex etc.) for 3 days prior to [facility]  if BMI is greater than 35. For Cervical injections only hold multivitamins, Vitamin E, Fish Oil, Phentermine (Lomaira) for 7 days prior to injection and NSAIDS.  mg to be held for 7 days prior to injections.  -If patient is receiving MAC/IVCS Phentermine Ozie Ply) will need to be held for 7 days prior to injection.  -If on blood thinner clearance has been received to hold this medication by provider.   -Patient informed he/she will need a  to and from procedure. -[facility] is located in the [facility] 1st floor. Patient may park in the yellow or purple parking. Patient verbalized understanding and agrees with plan.  -----> Scheduled in Epic: Yes  -----> Scheduled in [employer]:  Yes

## 2023-05-01 ENCOUNTER — OFFICE VISIT (OUTPATIENT)
Dept: SURGERY | Facility: CLINIC | Age: 73
End: 2023-05-01
Payer: MEDICARE

## 2023-05-01 DIAGNOSIS — M54.12 CERVICAL RADICULOPATHY: Primary | ICD-10-CM

## 2023-05-01 NOTE — PROCEDURES
[facility]    CERVICAL INTERLAMINAR  NAME:  Moira Elias    MR #:    [id number] :  6/10/1950     PHYSICIAN:  Shanae Solomon. Ras Bermudez DO        Operative Report    DATE OF PROCEDURE: 2023   PREOPERATIVE DIAGNOSES: 1. Cervical radiculopathy        POSTOPERATIVE DIAGNOSES:   1. Cervical radiculopathy        PROCEDURES: C7-T1 inter laminar epidural steroid injection done under fluoroscopic guidance with contrast enhancement. SURGEON: Shanae Solomon. Ras Bermudez DO   ANESTHESIA: Local   INDICATIONS:      OPERATIVE PROCEDURE:  Written consent was obtained from the patient. The patient was brought into the operating room and placed in the prone position on the fluoroscopy table with pillow underneath the chest and shoulders. The patient's skin was cleaned and draped in a normal sterile fashion. Using AP fluoroscopy, the left C7 lamina was identified. Skin was anesthetized with 1% PF lidocaine without epinephrine. Then, a 3-1/2 inch, 20-gauge Tuohy needle was inserted and directed towards the left C7 lamina. When it was engaged, it was walked inferiorly and medially until it was felt to drop off the inferomedial aspect. Then, Omnipaque-240 contrast was used to obtain a good epidurogram indicating correct needle placement. Then, aspiration was performed. No blood, fluid, or air was aspirated. Then, the patient was injected with a 3 cc solution of 1.5 cc of normal sterile saline and 1.5 cc of 10 mg/cc of Dexamethasone. Then, the needle was removed. The patient's skin was cleaned. A Band-Aid was applied. The patient was transferred to the cart and into [facility]. The patient was given discharge instructions and will follow up in the clinic as scheduled. Throughout the whole procedure, the patient's pulse oximetry and vital signs were monitored and they remained completely stable. Also, throughout the whole procedure, prior to injection of any medication, aspiration was performed.   No blood, fluid, or air was aspirated at anytime.     Avery Hurst DO, FAAPMR & CAQSM  Physical Medicine and Rehabilitation/Sports Medicine  [facility]

## 2023-05-16 ENCOUNTER — TELEMEDICINE (OUTPATIENT)
Dept: PHYSICAL MEDICINE AND REHAB | Facility: CLINIC | Age: 73
End: 2023-05-16
Payer: MEDICARE

## 2023-05-16 DIAGNOSIS — M47.812 ARTHROPATHY OF CERVICAL FACET JOINT: Primary | ICD-10-CM

## 2023-05-16 PROCEDURE — 99213 OFFICE O/P EST LOW 20 MIN: CPT | Performed by: PHYSICAL MEDICINE & REHABILITATION

## 2023-06-09 ENCOUNTER — TELEPHONE (OUTPATIENT)
Facility: CLINIC | Age: 73
End: 2023-06-09

## 2023-06-09 DIAGNOSIS — K76.89 LIVER CYST: Primary | ICD-10-CM

## 2023-06-12 NOTE — TELEPHONE ENCOUNTER
I spoke to the patient, /name verified. The patient was informed he is due for repeat MRI abdomen. Central scheduling number given. The patient verbalized understanding by asking if his insurance will approve it. I notified the patient, he needs to schedule in order send for PA.

## 2023-07-13 ENCOUNTER — HOSPITAL ENCOUNTER (OUTPATIENT)
Dept: MRI IMAGING | Facility: HOSPITAL | Age: 73
Discharge: HOME | End: 2023-07-13
Attending: INTERNAL MEDICINE
Payer: MEDICARE

## 2023-07-13 DIAGNOSIS — K76.89 LIVER CYST: ICD-10-CM

## 2023-07-13 PROCEDURE — 74183 MRI ABD W/O CNTR FLWD CNTR: CPT | Performed by: INTERNAL MEDICINE

## 2023-07-13 PROCEDURE — A9575 INJ GADOTERATE MEGLUMI 0.1ML: HCPCS | Performed by: INTERNAL MEDICINE

## 2023-07-13 RX ORDER — GADOTERATE MEGLUMINE 376.9 MG/ML
20 INJECTION INTRAVENOUS
Status: COMPLETED | OUTPATIENT
Start: 2023-07-13 | End: 2023-07-13

## 2023-07-13 RX ADMIN — GADOTERATE MEGLUMINE 18 ML: 376.9 INJECTION INTRAVENOUS at 07:45:00

## 2023-08-08 RX ORDER — MONTELUKAST SODIUM 10 MG/1
10 TABLET ORAL NIGHTLY
Qty: 30 TABLET | Refills: 0 | Status: SHIPPED | OUTPATIENT
Start: 2023-08-08

## 2023-09-07 RX ORDER — MONTELUKAST SODIUM 10 MG/1
10 TABLET ORAL NIGHTLY
Qty: 30 TABLET | Refills: 0 | Status: SHIPPED | OUTPATIENT
Start: 2023-09-07

## 2023-09-30 ENCOUNTER — OFFICE VISIT (OUTPATIENT)
Dept: DERMATOLOGY CLINIC | Facility: CLINIC | Age: 73
End: 2023-09-30

## 2023-09-30 DIAGNOSIS — L57.8 SUN-DAMAGED SKIN: ICD-10-CM

## 2023-09-30 DIAGNOSIS — Z85.828 HISTORY OF BASAL CELL CARCINOMA: ICD-10-CM

## 2023-09-30 DIAGNOSIS — D22.9 MULTIPLE BENIGN NEVI: ICD-10-CM

## 2023-09-30 DIAGNOSIS — L81.4 SOLAR LENTIGO: ICD-10-CM

## 2023-09-30 DIAGNOSIS — D23.9 BENIGN NEOPLASM OF SKIN, UNSPECIFIED LOCATION: ICD-10-CM

## 2023-09-30 DIAGNOSIS — Z85.820 PERSONAL HISTORY OF MALIGNANT MELANOMA OF SKIN: ICD-10-CM

## 2023-09-30 DIAGNOSIS — Z87.2 HISTORY OF ACTINIC KERATOSES: ICD-10-CM

## 2023-09-30 DIAGNOSIS — L82.1 SEBORRHEIC KERATOSES: Primary | ICD-10-CM

## 2023-09-30 PROCEDURE — 99213 OFFICE O/P EST LOW 20 MIN: CPT | Performed by: DERMATOLOGY

## 2023-10-03 NOTE — PROGRESS NOTES
Sick again  Coughing occ productive chills, run down feels inuses are congested  Blood pressure 123/78, pulse 76, temperature 98.3 °F (36.8 °C), temperature source Oral, resp. rate 16, height 6' (1.829 m), weight 171 lb (77.565 kg).     HEENT-NC/AT [facility], Tranexamic Acid Counseling:  Patient advised of the small risk of bleeding problems with tranexamic acid. They were also instructed to call if they developed any nausea, vomiting or diarrhea. All of the patient's questions and concerns were addressed.

## 2023-10-09 NOTE — PROGRESS NOTES
Verónica Reyes is a 75year old male. HPI:     CC:  Patient presents with:  Full Skin Exam: Established pt, presents for 5 months skin exam, personal hx of AKs, BCC and melanoma. Denies any skin changes. No concerns today.          Allergies:  Penicillin G and Penicillins    HISTORY:    Past Medical History:   Diagnosis Date    Basal cell carcinoma 2017    left nose    Basal cell carcinoma of nose 2-17-17    BCC (basal cell carcinoma) 2021     left posterior lateral neck    BCC (basal cell carcinoma) 2022    right ala base    Calculus of kidney 2002    left    Chronic headaches     Chronic pansinusitis 6/28/2017    Chronic sinusitis     Congestion of nasal sinus     COVID-19 12/21/2020    Esophageal reflux     Ganglion of flexor tendon sheath of right thumb 2-17-17    High cholesterol     Melanoma ([address] 8/2016    left arm, stage I; .75 mm depth    Osteoarthritis     Plantar wart 06/26/2017    right heel    Pneumonia due to organism     2016    Visual impairment     Glasses      Past Surgical History:   Procedure Laterality Date    ADJ TISS [place] <10SQCM Right 2-17-17    Exc lesion of nose, V_Y flap    COLONOSCOPY      COLONOSCOPY      COLONOSCOPY N/A 12/26/2018    Procedure: COLONOSCOPY;  Surgeon: Alan Gabriel MD;  Location: [facility] ENDOSCOPY    EXC SKIN MALIG 2.1-3CM FACE,FACIAL  05/18/2022    EXCIS TENDON SHEATH LESN,HAND/FINGR Right 2-17-17    Exc ganglion R thumb    FOOT SURGERY Left 2006    Nerve procedure    FULL GRAFT PROC HEAD,FAC,HAND <20SQC  05/18/2022    NASAL SCOPY,REMV PART ETHMOID      NASAL SCOPY,RMV TISS MAXILL SINUS      REPAIR OF NASAL SEPTUM  1985    REPAIR OF NASAL SEPTUM      REPR CMPL WND HEAD,FAC,HAND 2.6-7.5  05/18/2022    SKIN SURGERY Left 10/07/16    left upper arm melanoma      Family History   Problem Relation Age of Onset    Dementia Mother     Seizure Disorder Mother     Hypertension Mother     Cancer Mother         skin    Cancer Father         Leukemia      Social History     Socioeconomic History    Marital status: Single    Number of children: 0   Occupational History    Occupation:    Tobacco Use    Smoking status: Former     Packs/day: 0.00     Years: 42.00     Additional pack years: 0.00     Total pack years: 0.00     Types: Cigarettes     Quit date: 3/25/2020     Years since quitting: 3.5     Passive exposure: Never    Smokeless tobacco: Never   Vaping Use    Vaping Use: Never used   Substance and Sexual Activity    Alcohol use: Yes     Alcohol/week: 0.0 standard drinks of alcohol     Comment: rarely    Drug use: No   Other Topics Concern    Caffeine Concern Yes     Comment: coffee, 3cups/day    Grew up on a farm No    History of tanning No    Outdoor occupation No    Pt has a pacemaker No    Pt has a defibrillator No    Reaction to local anesthetic No        Current Outpatient Medications   Medication Sig Dispense Refill    MONTELUKAST 10 MG Oral Tab TAKE 1 TABLET (10 MG TOTAL) BY MOUTH NIGHTLY 30 tablet 0    rosuvastatin 10 MG Oral Tab Take 1 tablet (10 mg total) by mouth nightly. 90 tablet 3    sildenafil 20 MG Oral Tab Take one tablet PO daily as needed 10 tablet 5    azelastine 0.1 % Nasal Solution 2 sprays by Nasal route 2 (two) times daily. 30 mL 3    acetaminophen 325 MG Oral Tab Take 2 tablets (650 mg total) by mouth every 6 (six) hours as needed for Pain. dutasteride 0.5 MG Oral Cap Take 1 capsule (0.5 mg total) by mouth daily.  (Patient not taking: Reported on 4/21/2023) 90 capsule 3     Allergies:     Penicillin G            HIVES    Comment:states he is not sure if it was from actual drug  Penicillins             HIVES    Past Medical History:   Diagnosis Date    Basal cell carcinoma 2017    left nose    Basal cell carcinoma of nose 2-17-17    BCC (basal cell carcinoma) 2021     left posterior lateral neck    BCC (basal cell carcinoma) 2022    right ala base    Calculus of kidney 2002    left    Chronic headaches     Chronic pansinusitis 6/28/2017    Chronic sinusitis     Congestion of nasal sinus     COVID-19 12/21/2020    Esophageal reflux     Ganglion of flexor tendon sheath of right thumb 2-17-17    High cholesterol     Melanoma ([address] 8/2016    left arm, stage I; .75 mm depth    Osteoarthritis     Plantar wart 06/26/2017    right heel    Pneumonia due to organism     2016    Visual impairment     Glasses     Past Surgical History:   Procedure Laterality Date    ADJ TISS [place] <10SQCM Right 2-17-17    Exc lesion of nose, V_Y flap    COLONOSCOPY      COLONOSCOPY      COLONOSCOPY N/A 12/26/2018    Procedure: COLONOSCOPY;  Surgeon: Markus Dalton MD;  Location: [facility] ENDOSCOPY    EXC SKIN MALIG 2.1-3CM FACE,FACIAL  05/18/2022    EXCIS TENDON SHEATH LESN,HAND/FINGR Right 2-17-17    Exc ganglion R thumb    FOOT SURGERY Left 2006    Nerve procedure    FULL GRAFT PROC HEAD,FAC,HAND <20SQC  05/18/2022    NASAL SCOPY,REMV PART ETHMOID      NASAL SCOPY,RMV TISS MAXILL SINUS      REPAIR OF NASAL SEPTUM  1985    REPAIR OF NASAL SEPTUM      REPR CMPL WND HEAD,FAC,HAND 2.6-7.5  05/18/2022    SKIN SURGERY Left 10/07/16    left upper arm melanoma     Social History    Socioeconomic History      Marital status: Single      Spouse name: Not on file      Number of children: 0      Years of education: Not on file      Highest education level: Not on file    Occupational History      Occupation:     Tobacco Use      Smoking status: Former        Packs/day: 0.00        Years: 42.00        Additional pack years: 0.00        Total pack years: 0.00        Types: Cigarettes        Quit date: 3/25/2020        Years since quitting: 3.5        Passive exposure: Never      Smokeless tobacco: Never    Vaping Use      Vaping Use: Never used    Substance and Sexual Activity      Alcohol use:  Yes        Alcohol/week: 0.0 standard drinks of alcohol        Comment: rarely      Drug use: No      Sexual activity: Not on file    Other Topics      Concerns:         Service: Not Asked        Blood Transfusions: Not Asked        Caffeine Concern: Yes          coffee, 3cups/day        Occupational Exposure: Not Asked        Hobby Hazards: Not Asked        Sleep Concern: Not Asked        Stress Concern: Not Asked        Weight Concern: Not Asked        Special Diet: Not Asked        Back Care: Not Asked        Exercise: Not Asked        Bike Helmet: Not Asked        Seat Belt: Not Asked        Self-Exams: Not Asked        Grew up on a farm: No        History of tanning: No        Outdoor occupation: No        Pt has a pacemaker: No        Pt has a defibrillator: No        Reaction to local anesthetic: No    Social History Narrative      Not on file    Social Determinants of Health  Financial Resource Strain: Not on file  Food Insecurity: Not on file  Transportation Needs: Not on file  Physical Activity: Not on file  Stress: Not on file  Social Connections: Not on file  Housing Stability: Not on file  Family History   Problem Relation Age of Onset    Dementia Mother     Seizure Disorder Mother     Hypertension Mother     Cancer Mother         skin    Cancer Father         Leukemia       There were no vitals filed for this visit. HPI:    Patient presents with:  Full Skin Exam: Established pt, presents for 5 months skin exam, personal hx of AKs, BCC and melanoma. Denies any skin changes. No concerns today. Patient here for routine follow-up  Follow-up patient with history of melanoma here for routine follow-up no particular concerns. Follow-up history of AK's, BCC, melanoma 0.75 mm 2016 left lateral upper arm most recent BCC at left posterior [id number]    Past notes/ records and appropriate/relevant lab results including pathology and past body maps reviewed. Including outside notes/ PCP notes as appropriate. Updated and new information noted in current visit. Patient presents with concerns above.     Patient has been in their usual state of health. History, medications, allergies reviewed as noted. ROS:  new relevant systemic complaints as noted       Physical Examination:     Well-developed well-nourished patient alert oriented in no acute distress. Exam total-body performed, including scalp, head, neck, face,nails, hair, external eyes, including conjunctival mucosa, eyelids, lips external ears, back, chest,/ breasts, axillae,  abdomen, arms, legs, palms. Multiple light to medium brown, well marginated, uniformly pigmented, macules and papules 6 mm and less scattered on exam. pigmented lesions examined with dermoscopy benign-appearing patterns. Waxy tannish keratotic papules scattered, cherry-red vascular papules scattered. See map today's date for lesions noted . Otherwise remarkable for lesions as noted on map. See details of examination  See Assessment /Plan for additional history and physical exam also:    Assessment / plan:    No orders of the defined types were placed in this encounter. Meds & Refills for this Visit:  Requested Prescriptions      No prescriptions requested or ordered in this encounter         Seborrheic keratoses  (primary encounter diagnosis)  Solar lentigo  History of actinic keratoses  Multiple benign nevi  Benign neoplasm of skin, unspecified location  Personal history of malignant melanoma of skin  History of basal cell carcinoma  Sun-damaged skin    See details on map. Remarkable for:    Numerous benign seborrheic keratoses reassurance. Continue sunscreen sun protection. Exam essentially unchanged no active AK's no new pigmented lesions suspicious there are some waxy tan papules more inflamed at posterior neck trial of cryo reassurance given continue sunscreen especially on posterior neck, hat. Melanoma 2016 follow-up at least annually plan recheck in 6 months. Actinic Keratoses. Precancerous nature discussed. Sun protection, sunscreen/ blocks encouraged .   Monitoring for new lesions. Sun damage additional recurrent and new actinic keratoses, skin cancers may occur in areas of prior actinic keratoses, related to past sun exposure to minimize current sun exposure. Sunscreen applied consistently regularly, reapplication and sun protection while driving recommended. Forehead lesion waxy tan papule consistent with benign seborrheic keratosis continue careful monitoring. Consider biopsy at follow-up if lesion does change. Further plans pending path exam otherwise unchanged    History melanoma 0.75 mm left lateral upper arm post excision doing well no adenopathy no recurrence    History BCC no recurrence healing well left neck    History of AK's, sun damage overall is doing well. Continue careful sun protection regular follow-up every 6 months most marked sun damage over the head neck arms hands    On the back numerous waxy tan keratotic papules lesions in areas of concern as noted reassurance given. Benign nature discussed. Possibility of cryo, alphahydroxy acids over-the-counter retinol's discussed. Benign-appearing nevi few lesions on the legs    Continue careful sun protection recheck 6 months or as needed  Please refer to map for specific lesions. See additional diagnoses. Pros cons of various therapies, risks benefits discussed. Pathophysiology discussed with patient. Therapeutic options reviewed. See  Medications in grid. Instructions reviewed at length. Benign nevi, seborrheic  keratoses, cherry angiomas:  Reassurance regarding other benign skin lesions. Signs and symptoms of skin cancer, ABCDE's of melanoma discussed with patient. Sunscreen use, sun protection, self exams reviewed. Followup as noted RTC routine checkup 6 mos - one year or p.r.n.     Encounter Times  Including precharting, reviewing chart, prior notes obtaining history: 5 minutes, medical exam :10 minutes, notes on body map, plan, counseling 10minutes My total time spent caring for the patient on the day of the encounter: 25 minutes       The patient indicates understanding of these issues and agrees to the plan. The patient is asked to return as noted in follow-up/ above. This note was generated using Dragon voice recognition software. Please contact me regarding any confusion resulting from errors in recognition. Note to patient and family: The [facility] makes medical notes like these available to patients. However, be advised this is a medical document. It is intended as [id number] communication and monitoring of a patient's care needs. It is written in medical language and may contain abbreviations or verbiage that are unfamiliar. It may appear blunt or direct. Medical documents are intended to carry relevant information, facts as evident and the clinical opinion of the practitioner.

## 2023-10-12 ENCOUNTER — OFFICE VISIT (OUTPATIENT)
Dept: INTERNAL MEDICINE CLINIC | Facility: CLINIC | Age: 73
End: 2023-10-12

## 2023-10-12 ENCOUNTER — LAB ENCOUNTER (OUTPATIENT)
Dept: LAB | Age: 73
End: 2023-10-12
Attending: INTERNAL MEDICINE
Payer: MEDICARE

## 2023-10-12 VITALS
DIASTOLIC BLOOD PRESSURE: 73 MMHG | HEART RATE: 75 BPM | RESPIRATION RATE: 13 BRPM | BODY MASS INDEX: 26.74 KG/M2 | WEIGHT: 191 LBS | OXYGEN SATURATION: 96 % | SYSTOLIC BLOOD PRESSURE: 123 MMHG | HEIGHT: 71 IN

## 2023-10-12 DIAGNOSIS — J30.2 SEASONAL ALLERGIES: ICD-10-CM

## 2023-10-12 DIAGNOSIS — E78.00 HYPERCHOLESTEROLEMIA: ICD-10-CM

## 2023-10-12 DIAGNOSIS — M79.671 RIGHT FOOT PAIN: ICD-10-CM

## 2023-10-12 DIAGNOSIS — Z85.828 HISTORY OF BASAL CELL CARCINOMA: ICD-10-CM

## 2023-10-12 DIAGNOSIS — Z00.00 ENCOUNTER FOR ANNUAL HEALTH EXAMINATION: ICD-10-CM

## 2023-10-12 DIAGNOSIS — N52.03 COMBINED ARTERIAL INSUFFICIENCY AND CORPORO-VENOUS OCCLUSIVE ERECTILE DYSFUNCTION: ICD-10-CM

## 2023-10-12 DIAGNOSIS — Z00.00 ENCOUNTER FOR ANNUAL HEALTH EXAMINATION: Primary | ICD-10-CM

## 2023-10-12 DIAGNOSIS — N40.0 ENLARGED PROSTATE: ICD-10-CM

## 2023-10-12 DIAGNOSIS — Z85.820 PERSONAL HISTORY OF MALIGNANT MELANOMA OF SKIN: ICD-10-CM

## 2023-10-12 DIAGNOSIS — Z12.11 COLON CANCER SCREENING: ICD-10-CM

## 2023-10-12 LAB
ALBUMIN SERPL-MCNC: 3.9 G/DL (ref 3.4–5)
ALBUMIN/GLOB SERPL: 1.2 {RATIO} (ref 1–2)
ALP LIVER SERPL-CCNC: 68 U/L
ALT SERPL-CCNC: 28 U/L
ANION GAP SERPL CALC-SCNC: 6 MMOL/L (ref 0–18)
AST SERPL-CCNC: 18 U/L (ref 15–37)
BASOPHILS # BLD AUTO: 0.08 X10(3) UL (ref 0–0.2)
BASOPHILS NFR BLD AUTO: 1 %
BILIRUB SERPL-MCNC: 0.7 MG/DL (ref 0.1–2)
BUN BLD-MCNC: 17 MG/DL (ref 7–18)
BUN/CREAT SERPL: 15.9 (ref 10–20)
CALCIUM BLD-MCNC: 9.3 MG/DL (ref 8.5–10.1)
CHLORIDE SERPL-SCNC: 108 MMOL/L (ref 98–112)
CHOLEST SERPL-MCNC: 146 MG/DL (ref ?–200)
CO2 SERPL-SCNC: 27 MMOL/L (ref 21–32)
CREAT BLD-MCNC: 1.07 MG/DL
DEPRECATED RDW RBC AUTO: 40.2 FL (ref 35.1–46.3)
EGFRCR SERPLBLD CKD-EPI 2021: 73 ML/MIN/1.73M2 (ref 60–?)
EOSINOPHIL # BLD AUTO: 0.1 X10(3) UL (ref 0–0.7)
EOSINOPHIL NFR BLD AUTO: 1.2 %
ERYTHROCYTE [DISTWIDTH] IN BLOOD BY AUTOMATED COUNT: 12.2 % (ref 11–15)
FASTING PATIENT LIPID ANSWER: YES
FASTING STATUS PATIENT QL REPORTED: YES
GLOBULIN PLAS-MCNC: 3.2 G/DL (ref 2.8–4.4)
GLUCOSE BLD-MCNC: 91 MG/DL (ref 70–99)
HCT VFR BLD AUTO: 49.3 %
HDLC SERPL-MCNC: 49 MG/DL (ref 40–59)
HGB BLD-MCNC: 16.7 G/DL
IMM GRANULOCYTES # BLD AUTO: 0.03 X10(3) UL (ref 0–1)
IMM GRANULOCYTES NFR BLD: 0.4 %
LDLC SERPL CALC-MCNC: 87 MG/DL (ref ?–100)
LYMPHOCYTES # BLD AUTO: 2.55 X10(3) UL (ref 1–4)
LYMPHOCYTES NFR BLD AUTO: 30.8 %
MCH RBC QN AUTO: 30.1 PG (ref 26–34)
MCHC RBC AUTO-ENTMCNC: 33.9 G/DL (ref 31–37)
MCV RBC AUTO: 88.8 FL
MONOCYTES # BLD AUTO: 0.6 X10(3) UL (ref 0.1–1)
MONOCYTES NFR BLD AUTO: 7.3 %
NEUTROPHILS # BLD AUTO: 4.91 X10 (3) UL (ref 1.5–7.7)
NEUTROPHILS # BLD AUTO: 4.91 X10(3) UL (ref 1.5–7.7)
NEUTROPHILS NFR BLD AUTO: 59.3 %
NONHDLC SERPL-MCNC: 97 MG/DL (ref ?–130)
OSMOLALITY SERPL CALC.SUM OF ELEC: 293 MOSM/KG (ref 275–295)
PLATELET # BLD AUTO: 242 10(3)UL (ref 150–450)
POTASSIUM SERPL-SCNC: 4.4 MMOL/L (ref 3.5–5.1)
PROT SERPL-MCNC: 7.1 G/DL (ref 6.4–8.2)
PSA SERPL-MCNC: 4.12 NG/ML (ref ?–4)
RBC # BLD AUTO: 5.55 X10(6)UL
SODIUM SERPL-SCNC: 141 MMOL/L (ref 136–145)
TRIGL SERPL-MCNC: 47 MG/DL (ref 30–149)
TSI SER-ACNC: 0.75 MIU/ML (ref 0.36–3.74)
VLDLC SERPL CALC-MCNC: 7 MG/DL (ref 0–30)
WBC # BLD AUTO: 8.3 X10(3) UL (ref 4–11)

## 2023-10-12 PROCEDURE — 84153 ASSAY OF PSA TOTAL: CPT

## 2023-10-12 PROCEDURE — 80061 LIPID PANEL: CPT

## 2023-10-12 PROCEDURE — 80053 COMPREHEN METABOLIC PANEL: CPT

## 2023-10-12 PROCEDURE — 84443 ASSAY THYROID STIM HORMONE: CPT

## 2023-10-12 PROCEDURE — 36415 COLL VENOUS BLD VENIPUNCTURE: CPT

## 2023-10-12 PROCEDURE — G0439 PPPS, SUBSEQ VISIT: HCPCS | Performed by: INTERNAL MEDICINE

## 2023-10-12 PROCEDURE — 99213 OFFICE O/P EST LOW 20 MIN: CPT | Performed by: INTERNAL MEDICINE

## 2023-10-12 PROCEDURE — 1125F AMNT PAIN NOTED PAIN PRSNT: CPT | Performed by: INTERNAL MEDICINE

## 2023-10-12 PROCEDURE — 85025 COMPLETE CBC W/AUTO DIFF WBC: CPT

## 2023-10-13 RX ORDER — MONTELUKAST SODIUM 10 MG/1
10 TABLET ORAL NIGHTLY
Qty: 90 TABLET | Refills: 1 | Status: SHIPPED | OUTPATIENT
Start: 2023-10-13

## 2023-10-13 NOTE — TELEPHONE ENCOUNTER
Refilled per protocol    Anti-histamine Medications:  Loratadine  Cetirizine  Azelastin  Fexofenadine  Levocetirizine  Corticosteroid Medications:  Fluticasone  Mometasone (Nasonex)  Leukotriene Receptor Antagonist:  Montelukast (Singulair)    Protocol Criteria:  Appointment scheduled in past 12 months  Refill: \"Per Protocol: No Cosign Required\"  Approval of this protocol is dependent on manual look up of last PROVIDER ASSESSMENT AND PLAN and patient compliance with PROVIDER ASSESSMENT AND PLAN follow up recommendations    Permitted Refill Frequency:  1 year (may provide 90 day supply with 3 refills)  Recent Outpatient Visits              Yesterday Encounter for annual health examination    Lyndsay Telles MD    Office Visit    1 week ago Seborrheic keratoses    [place] Ramandeep Samaniego MD    Office Visit    5 months ago Arthropathy of cervical facet joint    [facility], [address], [facility]    Telemedicine    5 months ago Cervical radiculopathy    [address] [facility]    Office Visit    5 months ago [facility] [employer], [address], [facility]    Office Visit          Future Appointments         Provider Department Appt Notes    In 3 weeks MD Raeann Myers, [address], [place] 1 yr f/u    In 5 months MD Raeann Meyers [place]     In 6 months Lord Oskar MD [facility], [address] [facility]

## 2023-10-17 ENCOUNTER — TELEPHONE (OUTPATIENT)
Dept: INTERNAL MEDICINE CLINIC | Facility: CLINIC | Age: 73
End: 2023-10-17

## 2023-10-17 DIAGNOSIS — D36.10 NEUROMA: Primary | ICD-10-CM

## 2023-10-17 DIAGNOSIS — M79.671 RIGHT FOOT PAIN: ICD-10-CM

## 2023-10-17 NOTE — TELEPHONE ENCOUNTER
Patient called requesting referral to podiatry. Examined last week for right foot pain, thought to be a neuroma. Patient has medicare and can self refer, podiatry contact info provided, he will call for appointment. Referral pended for signature to document diagnosis. Please sign off if appropriate.

## 2023-11-06 ENCOUNTER — LAB ENCOUNTER (OUTPATIENT)
Dept: LAB | Facility: HOSPITAL | Age: 73
End: 2023-11-06
Attending: UROLOGY
Payer: MEDICARE

## 2023-11-06 ENCOUNTER — OFFICE VISIT (OUTPATIENT)
Dept: SURGERY | Facility: CLINIC | Age: 73
End: 2023-11-06

## 2023-11-06 ENCOUNTER — TELEPHONE (OUTPATIENT)
Dept: SURGERY | Facility: CLINIC | Age: 73
End: 2023-11-06

## 2023-11-06 DIAGNOSIS — N40.1 BPH WITH OBSTRUCTION/LOWER URINARY TRACT SYMPTOMS: Primary | ICD-10-CM

## 2023-11-06 DIAGNOSIS — N13.8 BPH WITH OBSTRUCTION/LOWER URINARY TRACT SYMPTOMS: Primary | ICD-10-CM

## 2023-11-06 DIAGNOSIS — N40.1 BPH WITH OBSTRUCTION/LOWER URINARY TRACT SYMPTOMS: ICD-10-CM

## 2023-11-06 DIAGNOSIS — N13.8 BPH WITH OBSTRUCTION/LOWER URINARY TRACT SYMPTOMS: ICD-10-CM

## 2023-11-06 DIAGNOSIS — N20.0 KIDNEY STONES: ICD-10-CM

## 2023-11-06 LAB
BILIRUB UR QL: NEGATIVE
CLARITY UR: CLEAR
GLUCOSE UR-MCNC: NORMAL MG/DL
HGB UR QL STRIP.AUTO: NEGATIVE
KETONES UR-MCNC: NEGATIVE MG/DL
LEUKOCYTE ESTERASE UR QL STRIP.AUTO: NEGATIVE
NITRITE UR QL STRIP.AUTO: NEGATIVE
PH UR: 5 [PH] (ref 5–8)
PROT UR-MCNC: NEGATIVE MG/DL
SP GR UR STRIP: 1.01 (ref 1–1.03)
UROBILINOGEN UR STRIP-ACNC: NORMAL

## 2023-11-06 PROCEDURE — 99214 OFFICE O/P EST MOD 30 MIN: CPT | Performed by: UROLOGY

## 2023-11-06 PROCEDURE — 1126F AMNT PAIN NOTED NONE PRSNT: CPT | Performed by: UROLOGY

## 2023-11-06 PROCEDURE — 81003 URINALYSIS AUTO W/O SCOPE: CPT

## 2023-11-06 RX ORDER — DUTASTERIDE 0.5 MG/1
0.5 CAPSULE, LIQUID FILLED ORAL DAILY
Qty: 90 CAPSULE | Refills: 3 | Status: SHIPPED | OUTPATIENT
Start: 2023-11-06

## 2023-11-06 NOTE — TELEPHONE ENCOUNTER
Per pt was prescribed dutasteride today, asking if ok to take along with viagra? Please advise thank you.

## 2023-11-06 NOTE — PROGRESS NOTES
Latanya Weber is a 75year old male. HPI:   Patient presents with:  BPH  elevated psa      78-year-old male in follow-up to visit November 10, 2022 with a history of BPH, lower urinary tract symptoms for which she had previously been on dutasteride 0.5 mg daily with well-controlled symptoms. About 3 to 4 months ago he states he discontinued dutasteride. Its not clear to me why. He states his symptoms have mostly been stable. IPSS score today is 14 quality-of-life index is 2. No gross hematuria or dysuria. His PSA which last year had been normal at 0.78 (on a corrective scale 1.56) has increased October 12 2023-4.12 after dutasteride. He denies any bone pain or weight loss gross hematuria or dysuria.       HISTORY:  Past Medical History:   Diagnosis Date    Basal cell carcinoma 2017    left nose    Basal cell carcinoma of nose 2-17-17    BCC (basal cell carcinoma) 2021     left posterior lateral neck    BCC (basal cell carcinoma) 2022    right ala base    Calculus of kidney 2002    left    Chronic headaches     Chronic pansinusitis 6/28/2017    Chronic sinusitis     Congestion of nasal sinus     COVID-19 12/21/2020    Esophageal reflux     Ganglion of flexor tendon sheath of right thumb 2-17-17    High cholesterol     Melanoma ([facility] [address] 8/2016    left arm, stage I; .75 mm depth    Osteoarthritis     Plantar wart 06/26/2017    right heel    Pneumonia due to organism     2016    Visual impairment     Glasses      Past Surgical History:   Procedure Laterality Date    ADJ TISS [place] <10SQCM Right 2-17-17    Exc lesion of nose, V_Y flap    COLONOSCOPY      COLONOSCOPY      COLONOSCOPY N/A 12/26/2018    Procedure: COLONOSCOPY;  Surgeon: Lorena Osorio MD;  Location: [address] [place] [address] ENDOSCOPY    EXC SKIN MALIG 2.1-3CM FACE,FACIAL  05/18/2022    EXCIS TENDON SHEATH LESN,HAND/FINGR Right 2-17-17    Exc ganglion R thumb    FOOT SURGERY Left 2006    Nerve procedure    FULL GRAFT PROC HEAD,FAC,HAND <20SQC 05/18/2022    NASAL SCOPY,REMV PART ETHMOID      NASAL SCOPY,RMV TISS MAXILL SINUS      REPAIR OF NASAL SEPTUM  1985    REPAIR OF NASAL SEPTUM      REPR CMPL WND HEAD,FAC,HAND 2.6-7.5  05/18/2022    SKIN SURGERY Left 10/07/16    left upper arm melanoma      Family History   Problem Relation Age of Onset    Dementia Mother     Seizure Disorder Mother     Hypertension Mother     Cancer Mother         skin    Cancer Father         Leukemia      Social History:   Social History     Socioeconomic History    Marital status: Single    Number of children: 0   Occupational History    Occupation:    Tobacco Use    Smoking status: Former     Packs/day: 0.00     Years: 42.00     Additional pack years: 0.00     Total pack years: 0.00     Types: Cigarettes     Quit date: 3/25/2020     Years since quitting: 3.6     Passive exposure: Never    Smokeless tobacco: Never   Vaping Use    Vaping Use: Never used   Substance and Sexual Activity    Alcohol use: Yes     Alcohol/week: 0.0 standard drinks of alcohol     Comment: rarely    Drug use: No   Other Topics Concern    Caffeine Concern Yes     Comment: coffee, 3cups/day    Grew up on a farm No    History of tanning No    Outdoor occupation No    Pt has a pacemaker No    Pt has a defibrillator No    Reaction to local anesthetic No        Medications (Active prior to today's visit):  Current Outpatient Medications   Medication Sig Dispense Refill    dutasteride 0.5 MG Oral Cap Take 1 capsule (0.5 mg total) by mouth daily. 90 capsule 3    montelukast 10 MG Oral Tab TAKE 1 TABLET (10 MG TOTAL) BY MOUTH NIGHTLY 90 tablet 1    rosuvastatin 10 MG Oral Tab Take 1 tablet (10 mg total) by mouth nightly. 90 tablet 3    sildenafil 20 MG Oral Tab Take one tablet PO daily as needed 10 tablet 5    azelastine 0.1 % Nasal Solution 2 sprays by Nasal route 2 (two) times daily. 30 mL 3    dutasteride 0.5 MG Oral Cap Take 1 capsule (0.5 mg total) by mouth daily.  (Patient not taking: Reported on 4/21/2023) 90 capsule 3    acetaminophen 325 MG Oral Tab Take 2 tablets (650 mg total) by mouth every 6 (six) hours as needed for Pain. Allergies:    Penicillin G            HIVES    Comment:states he is not sure if it was from actual drug  Penicillins             HIVES      ROS:       PHYSICAL EXAM:   Digital prostate exam shows a normal sphincter tone, a prostate which is approximately 70 g smooth symmetric without masses or nodules. ASSESSMENT/PLAN:   Assessment   Bph with obstruction/lower urinary tract symptoms  (primary encounter diagnosis)  Kidney stones    Recommend:  - Resume dutasteride 0.5 mg daily.  - Repeat PSA in 3 to 4 months. Order was placed. - Urinalysis with microscopy to be done today. - Follow-up tentatively in 1 year pending the results of his PSA    I spent a total of 25 minutes with patient more than half the time in face-to-face discussion. Orders This Visit:  Orders Placed This Encounter      PSA Diagnostic [E]      Urinalysis, Routine      Meds This Visit:  Requested Prescriptions     Signed Prescriptions Disp Refills    dutasteride 0.5 MG Oral Cap 90 capsule 3     Sig: Take 1 capsule (0.5 mg total) by mouth daily.        Imaging & Referrals:  None     11/6/2023  Emiliano Du MD

## 2023-11-09 NOTE — TELEPHONE ENCOUNTER
- Consulted with YENY who stated that it was fine to take both, but that he suggests they not take them at the same time. LM on  stating that pt can take both medications (without stating the medication names), but that we suggest not to take them at the same time.     - I also sent pt a [facility] as it appears he is active on mychart

## 2023-11-17 ENCOUNTER — HOSPITAL ENCOUNTER (OUTPATIENT)
Dept: GENERAL RADIOLOGY | Facility: HOSPITAL | Age: 73
Discharge: HOME | End: 2023-11-17
Attending: PODIATRIST
Payer: MEDICARE

## 2023-11-17 ENCOUNTER — LAB ENCOUNTER (OUTPATIENT)
Dept: LAB | Facility: HOSPITAL | Age: 73
End: 2023-11-17
Attending: PODIATRIST
Payer: MEDICARE

## 2023-11-17 ENCOUNTER — OFFICE VISIT (OUTPATIENT)
Dept: PODIATRY CLINIC | Facility: CLINIC | Age: 73
End: 2023-11-17

## 2023-11-17 DIAGNOSIS — M79.674 PAIN IN RIGHT TOE(S): ICD-10-CM

## 2023-11-17 DIAGNOSIS — M19.071 ARTHRITIS OF JOINT OF LESSER TOE OF RIGHT FOOT: Primary | ICD-10-CM

## 2023-11-17 DIAGNOSIS — R60.0 EDEMA OF TOE: ICD-10-CM

## 2023-11-17 LAB — URATE SERPL-MCNC: 4.5 MG/DL

## 2023-11-17 PROCEDURE — 73660 X-RAY EXAM OF TOE(S): CPT | Performed by: PODIATRIST

## 2023-11-17 PROCEDURE — 99204 OFFICE O/P NEW MOD 45 MIN: CPT | Performed by: PODIATRIST

## 2023-11-17 PROCEDURE — 84550 ASSAY OF BLOOD/URIC ACID: CPT

## 2023-11-17 PROCEDURE — 36415 COLL VENOUS BLD VENIPUNCTURE: CPT

## 2023-11-17 RX ORDER — METHYLPREDNISOLONE 4 MG/1
TABLET ORAL
Qty: 1 EACH | Refills: 0 | Status: SHIPPED | OUTPATIENT
Start: 2023-11-17

## 2023-11-17 NOTE — PROGRESS NOTES
[facility], [facility] Podiatry  Progress Note    Gabriela Sharp is a 75year old male. Chief Complaint   Patient presents with    Foot Pain     Consult- R foot ,2nd toe- Former pt of [facility]- [address] 6/5/19. - Pt states he might of injured toe. Toe is swollen. Rates pain 5/10, worse when walking. HPI:     This is a pleasant male that presents to clinic today due to right 2nd toe swelling and pain. He is not sure if he injured it. He states he noticed it a few months ago. He has been wearing wider shoes. Allergies: Penicillin g and Penicillins   Current Outpatient Medications   Medication Sig Dispense Refill    methylPREDNISolone 4 MG Oral Tablet Therapy Pack Take as directed on pack 1 each 0    dutasteride 0.5 MG Oral Cap Take 1 capsule (0.5 mg total) by mouth daily. 90 capsule 3    montelukast 10 MG Oral Tab TAKE 1 TABLET (10 MG TOTAL) BY MOUTH NIGHTLY 90 tablet 1    rosuvastatin 10 MG Oral Tab Take 1 tablet (10 mg total) by mouth nightly. 90 tablet 3    sildenafil 20 MG Oral Tab Take one tablet PO daily as needed 10 tablet 5    azelastine 0.1 % Nasal Solution 2 sprays by Nasal route 2 (two) times daily. 30 mL 3    acetaminophen 325 MG Oral Tab Take 2 tablets (650 mg total) by mouth every 6 (six) hours as needed for Pain. dutasteride 0.5 MG Oral Cap Take 1 capsule (0.5 mg total) by mouth daily.  (Patient not taking: Reported on 4/21/2023) 90 capsule 3      Past Medical History:   Diagnosis Date    Basal cell carcinoma 2017    left nose    Basal cell carcinoma of nose 2-17-17    BCC (basal cell carcinoma) 2021     left posterior lateral neck    BCC (basal cell carcinoma) 2022    right ala base    Calculus of kidney 2002    left    Chronic headaches     Chronic pansinusitis 6/28/2017    Chronic sinusitis     Congestion of nasal sinus     COVID-19 12/21/2020    Esophageal reflux     Ganglion of flexor tendon sheath of right thumb 2-17-17    High cholesterol     Melanoma ([address] 8/2016    left arm, stage I; .75 mm depth    Osteoarthritis     Plantar wart 06/26/2017    right heel    Pneumonia due to organism     2016    Visual impairment     Glasses      Past Surgical History:   Procedure Laterality Date    ADJ TISS Elizabeth Jatinder <10SQCM Right 2-17-17    Exc lesion of nose, V_Y flap    COLONOSCOPY      COLONOSCOPY      COLONOSCOPY N/A 12/26/2018    Procedure: COLONOSCOPY;  Surgeon: Alan Gabriel MD;  Location: [address] [place] [address] ENDOSCOPY    EXC SKIN MALIG 2.1-3CM FACE,FACIAL  05/18/2022    EXCIS TENDON SHEATH LESN,HAND/FINGR Right 2-17-17    Exc ganglion R thumb    FOOT SURGERY Left 2006    Nerve procedure    FULL GRAFT PROC HEAD,FAC,HAND <20SQC  05/18/2022    NASAL SCOPY,REMV PART ETHMOID      NASAL SCOPY,RMV TISS MAXILL SINUS      REPAIR OF NASAL SEPTUM  1985    REPAIR OF NASAL SEPTUM      REPR CMPL WND HEAD,FAC,HAND 2.6-7.5  05/18/2022    SKIN SURGERY Left 10/07/16    left upper arm melanoma      Family History   Problem Relation Age of Onset    Dementia Mother     Seizure Disorder Mother     Hypertension Mother     Cancer Mother         skin    Cancer Father         Leukemia      Social History     Socioeconomic History    Marital status: Single    Number of children: 0   Occupational History    Occupation:    Tobacco Use    Smoking status: Former     Packs/day: 0.00     Years: 42.00     Additional pack years: 0.00     Total pack years: 0.00     Types: Cigarettes     Quit date: 3/25/2020     Years since quitting: 3.6     Passive exposure: Never    Smokeless tobacco: Never   Vaping Use    Vaping Use: Never used   Substance and Sexual Activity    Alcohol use:  Yes     Alcohol/week: 0.0 standard drinks of alcohol     Comment: rarely    Drug use: No   Other Topics Concern    Caffeine Concern Yes     Comment: coffee, 3cups/day    Grew up on a farm No    History of tanning No    Outdoor occupation No    Pt has a pacemaker No    Pt has a defibrillator No    Reaction to local anesthetic No           REVIEW OF SYSTEMS:   Denies nausea, fever, chills  No calf pain  No other muscle or joint aches  Denies chest pain or shortness of breath. EXAM:   There were no vitals taken for this visit. Constitutional:   Patient in no apparent distress. Well kept Of normal body habitus. Alert and oriented to person, place, and time. Integumentary examination:   There are no varicosities. Skin appears moist, warm, and supple with positive hair growth. There are no color changes. No open lesions. No macerations, No Hyperkeratotic lesions. Nails are of normal thickness and appearance. Vascular examination:   DP pulse is 2/4  PT pulse is 2/4  Capillary refill is immediate    Moderate right 2nd toe edema at the PIPJ    Neurological examination:   Vibratory (128-Hz tuning fork) sensation is present to right and is present to left. Sharp/dull is present to right and is present to left. Musculoskeletal examination:  Muscle Strength is 5/5. POP to right 2nd toe PIPJ  Lateral contracture on the right 2nd toe at the PIPJ      LABS & IMAGING:     Lab Results   Component Value Date    GLU 91 10/12/2023    BUN 17 10/12/2023    CREATSERUM 1.07 10/12/2023    BUNCREA 15.9 10/12/2023    ANIONGAP 6 10/12/2023    GFRAA 102 06/20/2022    GFRNAA 89 06/20/2022    CA 9.3 10/12/2023     10/12/2023    K 4.4 10/12/2023     10/12/2023    CO2 27.0 10/12/2023    OSMOCALC 293 10/12/2023        No results found for: \"EAG\", \"A1C\"     XR TOE(S) (MIN 2 VIEWS), RIGHT 2ND (CPT=73660)    Result Date: 11/17/2023  CONCLUSION:   No acute fracture or dislocation. Dictated by (CST): Declan Lion MD on 11/17/2023 at 1:18 PM     Finalized by (CST): Declan Lion MD on 11/17/2023 at 1:19 PM            ASSESSMENT AND PLAN:   Diagnoses and all orders for this visit:    Arthritis of joint of lesser toe of right foot    Edema of toe  -     XR TOE(S) (MIN 2 VIEWS), RIGHT 2ND (CPT=73660); Future  -     Uric Acid;  Future    Pain in right toe(s)  -     XR TOE(S) (MIN 2 VIEWS), RIGHT 2ND (CPT=73660); Future    Other orders  -     methylPREDNISolone 4 MG Oral Tablet Therapy Pack; Take as directed on pack        Plan:     Discussed the importance of supportive shoe gear and limited barefoot walking. Discussed conservative management and potential for formal PT. Discussed possible steroid injection. Discussed possible oral steroids if patient is not diabetic, otherwise use of NSAIDS if no history of GERD or stomach upset. Xray right 2nd toe: 11/17/23  BONES: No acute fracture or dislocation. Mild degenerative change seen of the interphalangeal joints. Discussed with pt that the right 2nd toe swelling and pain is most likely due to arthritis and hammertoe deformity. I explained that his swelling and arthritis could be due to gout. Prescribed medrol dose pack    Ordered uric acid    RTC 3-4 weeks. Will review uric acid with patient and see how medrol dose pack did. If pain/swelling continues may consider PIPJ fusion in the future. No follow-ups on file.     Alois Leyden, DPM  11/17/2023

## 2023-12-18 ENCOUNTER — OFFICE VISIT (OUTPATIENT)
Dept: PODIATRY CLINIC | Facility: CLINIC | Age: 73
End: 2023-12-18

## 2023-12-18 VITALS — DIASTOLIC BLOOD PRESSURE: 84 MMHG | HEART RATE: 80 BPM | SYSTOLIC BLOOD PRESSURE: 130 MMHG

## 2023-12-18 DIAGNOSIS — M79.674 PAIN IN RIGHT TOE(S): ICD-10-CM

## 2023-12-18 DIAGNOSIS — R60.0 EDEMA OF TOE: ICD-10-CM

## 2023-12-18 DIAGNOSIS — M19.071 ARTHRITIS OF JOINT OF LESSER TOE OF RIGHT FOOT: Primary | ICD-10-CM

## 2023-12-18 RX ORDER — TRIAMCINOLONE ACETONIDE 40 MG/ML
40 INJECTION, SUSPENSION INTRA-ARTICULAR; INTRAMUSCULAR ONCE
Status: COMPLETED | OUTPATIENT
Start: 2023-12-18 | End: 2023-12-18

## 2023-12-18 RX ADMIN — TRIAMCINOLONE ACETONIDE 40 MG: 40 INJECTION, SUSPENSION INTRA-ARTICULAR; INTRAMUSCULAR at 15:00:00

## 2023-12-18 NOTE — PROGRESS NOTES
[facility], [facility] Podiatry  Progress Note    Poornima Moss is a 75year old male. Chief Complaint   Patient presents with    Foot Pain     F/u Right foot ,2nd toe  Pain 3/10, swollen. Medrol pack did not relieved pain. HPI:     This is a pleasant male that presents to clinic today due to right 2nd toe swelling and pain f/u. He states the medrol dose pack did not really help. Allergies: Penicillin g and Penicillins   Current Outpatient Medications   Medication Sig Dispense Refill    dutasteride 0.5 MG Oral Cap Take 1 capsule (0.5 mg total) by mouth daily. 90 capsule 3    montelukast 10 MG Oral Tab TAKE 1 TABLET (10 MG TOTAL) BY MOUTH NIGHTLY 90 tablet 1    rosuvastatin 10 MG Oral Tab Take 1 tablet (10 mg total) by mouth nightly. 90 tablet 3    sildenafil 20 MG Oral Tab Take one tablet PO daily as needed 10 tablet 5    azelastine 0.1 % Nasal Solution 2 sprays by Nasal route 2 (two) times daily. 30 mL 3    dutasteride 0.5 MG Oral Cap Take 1 capsule (0.5 mg total) by mouth daily. 90 capsule 3    acetaminophen 325 MG Oral Tab Take 2 tablets (650 mg total) by mouth every 6 (six) hours as needed for Pain.         Past Medical History:   Diagnosis Date    Basal cell carcinoma 2017    left nose    Basal cell carcinoma of nose 2-17-17    BCC (basal cell carcinoma) 2021     left posterior lateral neck    BCC (basal cell carcinoma) 2022    right ala base    Calculus of kidney 2002    left    Chronic headaches     Chronic pansinusitis 6/28/2017    Chronic sinusitis     Congestion of nasal sinus     COVID-19 12/21/2020    Esophageal reflux     Ganglion of flexor tendon sheath of right thumb 2-17-17    High cholesterol     Melanoma ([address] 8/2016    left arm, stage I; .75 mm depth    Osteoarthritis     Plantar wart 06/26/2017    right heel    Pneumonia due to organism     2016    Visual impairment     Glasses      Past Surgical History:   Procedure Laterality Date    ADJ TISS XFER LID,NOS,EAR <10SQCM Right 2-17-17    Exc lesion of nose, V_Y flap    COLONOSCOPY      COLONOSCOPY      COLONOSCOPY N/A 12/26/2018    Procedure: COLONOSCOPY;  Surgeon: Gama Galindo MD;  Location: [address] [place] [address] ENDOSCOPY    EXC SKIN MALIG 2.1-3CM FACE,FACIAL  05/18/2022    EXCIS TENDON SHEATH LESN,HAND/FINGR Right 2-17-17    Exc ganglion R thumb    FOOT SURGERY Left 2006    Nerve procedure    FULL GRAFT PROC HEAD,FAC,HAND <20SQC  05/18/2022    NASAL SCOPY,REMV PART ETHMOID      NASAL SCOPY,RMV TISS MAXILL SINUS      REPAIR OF NASAL SEPTUM  1985    REPAIR OF NASAL SEPTUM      REPR CMPL WND HEAD,FAC,HAND 2.6-7.5  05/18/2022    SKIN SURGERY Left 10/07/16    left upper arm melanoma      Family History   Problem Relation Age of Onset    Dementia Mother     Seizure Disorder Mother     Hypertension Mother     Cancer Mother         skin    Cancer Father         Leukemia      Social History     Socioeconomic History    Marital status: Single    Number of children: 0   Occupational History    Occupation:    Tobacco Use    Smoking status: Former     Packs/day: 0.00     Years: 42.00     Additional pack years: 0.00     Total pack years: 0.00     Types: Cigarettes     Quit date: 3/25/2020     Years since quitting: 3.7     Passive exposure: Never    Smokeless tobacco: Never   Vaping Use    Vaping Use: Never used   Substance and Sexual Activity    Alcohol use: Yes     Alcohol/week: 0.0 standard drinks of alcohol     Comment: rarely    Drug use: No   Other Topics Concern    Caffeine Concern Yes     Comment: coffee, 3cups/day    Grew up on a farm No    History of tanning No    Outdoor occupation No    Pt has a pacemaker No    Pt has a defibrillator No    Reaction to local anesthetic No           REVIEW OF SYSTEMS:   Denies nausea, fever, chills  No calf pain  No other muscle or joint aches  Denies chest pain or shortness of breath. EXAM:   There were no vitals taken for this visit. Constitutional:   Patient in no apparent distress.  Well kept Of normal body habitus. Alert and oriented to person, place, and time. Integumentary examination:   There are no varicosities. Skin appears moist, warm, and supple with positive hair growth. There are no color changes. No open lesions. No macerations, No Hyperkeratotic lesions. Nails are of normal thickness and appearance. Vascular examination:   DP pulse is 2/4  PT pulse is 2/4  Capillary refill is immediate    Moderate right 2nd toe edema at the PIPJ    Neurological examination:   Vibratory (128-Hz tuning fork) sensation is present to right and is present to left. Sharp/dull is present to right and is present to left. Musculoskeletal examination:  Muscle Strength is 5/5. POP to right 2nd toe PIPJ  Lateral contracture on the right 2nd toe at the PIPJ      LABS & IMAGING:     Lab Results   Component Value Date    GLU 91 10/12/2023    BUN 17 10/12/2023    CREATSERUM 1.07 10/12/2023    BUNCREA 15.9 10/12/2023    ANIONGAP 6 10/12/2023    GFRAA 102 06/20/2022    GFRNAA 89 06/20/2022    CA 9.3 10/12/2023     10/12/2023    K 4.4 10/12/2023     10/12/2023    CO2 27.0 10/12/2023    OSMOCALC 293 10/12/2023        No results found for: \"EAG\", \"A1C\"     No results found. ASSESSMENT AND PLAN:   Diagnoses and all orders for this visit:    Arthritis of joint of lesser toe of right foot    Edema of toe    Pain in right toe(s)          Plan:     Discussed the importance of supportive shoe gear and limited barefoot walking. Discussed possible steroid injection. Discussed possible oral steroids if patient is not diabetic, otherwise use of NSAIDS if no history of GERD or stomach upset. Xray right 2nd toe: 11/17/23  BONES: No acute fracture or dislocation. Mild degenerative change seen of the interphalangeal joints.        Pt elects for 1st Right 2nd toe PIPJ steroid injection  PROCEDURE: 20600- Right 2nd toe PIPJ  After reviewing options for cortisone injection and risks/potential complications, pt has agreed to proceed. Using aseptic technique, injection was then given using 10mg of 40mg/ml Kenalog mixed with 1 cc of 0.5% marcaine plain using 25 gauge 1.5\" needle. Site of infiltration - Right 2nd toe PIPJ  Site covered with sterile bandaid  Pt tolerated procedure well and no complications encountered. Discussed with pt that the right 2nd toe swelling and pain is most likely due to arthritis and hammertoe deformity. Medrol dose pack did not help    Uric acid was normal 4.5    RTC 4 weeks for injection check. If pain/swelling continues may consider PIPJ fusion in the future. No follow-ups on file.     Antonio Blevins DPM  12/18/23

## 2023-12-18 NOTE — PROGRESS NOTES
Per verbal order from Elliot Ho, draw up 1ml of 0.5% Marcaine and 1ml of Kenalog 40 for cortisone injection to right foot Anh BLAIR LPN  Patient provided education handout for cortisone injection.

## 2024-01-15 ENCOUNTER — OFFICE VISIT (OUTPATIENT)
Dept: PODIATRY CLINIC | Facility: CLINIC | Age: 74
End: 2024-01-15

## 2024-01-15 DIAGNOSIS — R60.0 EDEMA OF TOE: ICD-10-CM

## 2024-01-15 DIAGNOSIS — M79.674 PAIN IN RIGHT TOE(S): ICD-10-CM

## 2024-01-15 DIAGNOSIS — M19.071 ARTHRITIS OF JOINT OF LESSER TOE OF RIGHT FOOT: Primary | ICD-10-CM

## 2024-01-15 PROCEDURE — 99213 OFFICE O/P EST LOW 20 MIN: CPT | Performed by: PODIATRIST

## 2024-01-15 NOTE — PROGRESS NOTES
[facility] Podiatry  Progress Note    Benjamin Ralph is a 73 year old male.   Chief Complaint   Patient presents with    Foot Pain     R foot f/u - pt here for injection check, given 12/18/23 - still has pain but better than before- pain only when walking for a while          HPI:     This is a pleasant male that presents to clinic today due to right 2nd toe swelling and pain f/u.  He states the injection helped about 70%.        Allergies: Penicillin g and Penicillins   Current Outpatient Medications   Medication Sig Dispense Refill    dutasteride 0.5 MG Oral Cap Take 1 capsule (0.5 mg total) by mouth daily. 90 capsule 3    montelukast 10 MG Oral Tab TAKE 1 TABLET (10 MG TOTAL) BY MOUTH NIGHTLY 90 tablet 1    rosuvastatin 10 MG Oral Tab Take 1 tablet (10 mg total) by mouth nightly. 90 tablet 3    sildenafil 20 MG Oral Tab Take one tablet PO daily as needed 10 tablet 5    azelastine 0.1 % Nasal Solution 2 sprays by Nasal route 2 (two) times daily. 30 mL 3    dutasteride 0.5 MG Oral Cap Take 1 capsule (0.5 mg total) by mouth daily. 90 capsule 3    acetaminophen 325 MG Oral Tab Take 2 tablets (650 mg total) by mouth every 6 (six) hours as needed for Pain.        Past Medical History:   Diagnosis Date    Basal cell carcinoma 2017    left nose    Basal cell carcinoma of nose 2-17-17    BCC (basal cell carcinoma) 2021     left posterior lateral neck    BCC (basal cell carcinoma) 2022    right ala base    Calculus of kidney 2002    left    Chronic headaches     Chronic pansinusitis 6/28/2017    Chronic sinusitis     Congestion of nasal sinus     COVID-19 12/21/2020    Esophageal reflux     Ganglion of flexor tendon sheath of right thumb 2-17-17    High cholesterol     Melanoma (HCC) 8/2016    left arm, stage I; .75 mm depth    Osteoarthritis     Plantar wart 06/26/2017    right heel    Pneumonia due to organism     2016    Visual impairment     Glasses      Past Surgical History:   Procedure Laterality Date     ADJ TISS XFER LID,NOS,EAR <10SQCM Right 2-17-17    Exc lesion of nose, V_Y flap    COLONOSCOPY      COLONOSCOPY      COLONOSCOPY N/A 12/26/2018    Procedure: COLONOSCOPY;  Surgeon: Isidoro Mosley MD;  Location: [facility] ENDOSCOPY    EXC SKIN MALIG 2.1-3CM FACE,FACIAL  05/18/2022    EXCIS TENDON SHEATH LESN,HAND/FINGR Right 2-17-17    Exc ganglion R thumb    FOOT SURGERY Left 2006    Nerve procedure    FULL GRAFT PROC HEAD,FAC,HAND <20SQC  05/18/2022    NASAL SCOPY,REMV PART ETHMOID      NASAL SCOPY,RMV TISS MAXILL SINUS      REPAIR OF NASAL SEPTUM  1985    REPAIR OF NASAL SEPTUM      REPR CMPL WND HEAD,FAC,HAND 2.6-7.5  05/18/2022    SKIN SURGERY Left 10/07/16    left upper arm melanoma      Family History   Problem Relation Age of Onset    Dementia Mother     Seizure Disorder Mother     Hypertension Mother     Cancer Mother         skin    Cancer Father         Leukemia      Social History     Socioeconomic History    Marital status: Single    Number of children: 0   Occupational History    Occupation:    Tobacco Use    Smoking status: Former     Packs/day: 0.00     Years: 42.00     Additional pack years: 0.00     Total pack years: 0.00     Types: Cigarettes     Quit date: 3/25/2020     Years since quitting: 3.8     Passive exposure: Never    Smokeless tobacco: Never   Vaping Use    Vaping Use: Never used   Substance and Sexual Activity    Alcohol use: Yes     Alcohol/week: 0.0 standard drinks of alcohol     Comment: rarely    Drug use: No   Other Topics Concern    Caffeine Concern Yes     Comment: coffee, 3cups/day    Grew up on a farm No    History of tanning No    Outdoor occupation No    Pt has a pacemaker No    Pt has a defibrillator No    Reaction to local anesthetic No           REVIEW OF SYSTEMS:   Denies nausea, fever, chills  No calf pain  No other muscle or joint aches  Denies chest pain or shortness of breath.      EXAM:   There were no vitals taken for this visit.    Constitutional:    Patient in no apparent distress. Well kept Of normal body habitus. Alert and oriented to person, place, and time.  Integumentary examination:   There are no varicosities.   Skin appears moist, warm, and supple with positive hair growth.   There are no color changes. No open lesions. No macerations, No Hyperkeratotic lesions. Nails are of normal thickness and appearance.  Vascular examination:   DP pulse is 2/4  PT pulse is 2/4  Capillary refill is immediate    Moderate right 2nd toe edema at the PIPJ, improved    Neurological examination:   Vibratory (128-Hz tuning fork) sensation is present to right and is present to left.  Sharp/dull is present to right and is present to left.  Musculoskeletal examination:  Muscle Strength is 5/5.    POP to right 2nd toe PIPJ, improved  Lateral contracture on the right 2nd toe at the PIPJ      LABS & IMAGING:     Lab Results   Component Value Date    GLU 91 10/12/2023    BUN 17 10/12/2023    CREATSERUM 1.07 10/12/2023    BUNCREA 15.9 10/12/2023    ANIONGAP 6 10/12/2023    GFRAA 102 06/20/2022    GFRNAA 89 06/20/2022    CA 9.3 10/12/2023     10/12/2023    K 4.4 10/12/2023     10/12/2023    CO2 27.0 10/12/2023    OSMOCALC 293 10/12/2023        No results found for: \"EAG\", \"A1C\"     No results found.     ASSESSMENT AND PLAN:   Diagnoses and all orders for this visit:    Arthritis of joint of lesser toe of right foot    Edema of toe    Pain in right toe(s)            Plan:     Discussed the importance of supportive shoe gear and limited barefoot walking.  Discussed possible steroid injection.  Discussed possible oral steroids if patient is not diabetic, otherwise use of NSAIDS if no history of GERD or stomach upset.      Xray right 2nd toe: 11/17/23  BONES: No acute fracture or dislocation.  Mild degenerative change seen of the interphalangeal joints.       Will hold off on 2nd Right 2nd toe PIPJ steroid injection      Discussed with pt that the right 2nd toe swelling  and pain is most likely due to arthritis and hammertoe deformity.        Medrol dose pack did not help    Uric acid was normal 4.5    RTC 3 months for right 2nd toe re evaluation.   If pain/swelling continues may consider PIPJ fusion in the future vs 2nd steroid injection.      No follow-ups on file.    German Pedersen, NILESH  1/15/24

## 2024-02-01 ENCOUNTER — TELEPHONE (OUTPATIENT)
Dept: PODIATRY CLINIC | Facility: CLINIC | Age: 74
End: 2024-02-01

## 2024-02-01 NOTE — TELEPHONE ENCOUNTER
Patient states that he is very concerned about having a blister on 2nd toe on right foot,a lot of pain and possible infection. Patient wants to know if doctor is able to see him sooner then first available.

## 2024-02-01 NOTE — TELEPHONE ENCOUNTER
Spoke with patient. Endorses that a week ago went to the gym and was wearing thong shoes and developed a blister between the right great and second toe that is not healing. Denies drainage, fever or chills. There is redness and mild swelling. He has been using neosporin. Educated on s/s infection and when to go to ED. Picture sent through [employer].    Meshulam patient. Please advise on recommendations if should be seen or be rx abx. Also forwarding [employer] message with picture

## 2024-02-02 ENCOUNTER — OFFICE VISIT (OUTPATIENT)
Dept: FAMILY MEDICINE CLINIC | Facility: CLINIC | Age: 74
End: 2024-02-02
Payer: MEDICARE

## 2024-02-02 VITALS
HEART RATE: 85 BPM | DIASTOLIC BLOOD PRESSURE: 72 MMHG | SYSTOLIC BLOOD PRESSURE: 122 MMHG | TEMPERATURE: 97 F | OXYGEN SATURATION: 96 % | HEIGHT: 71 IN | WEIGHT: 194 LBS | RESPIRATION RATE: 16 BRPM | BODY MASS INDEX: 27.16 KG/M2

## 2024-02-02 DIAGNOSIS — S91.104A OPEN WOUND OF SECOND TOE OF RIGHT FOOT, INITIAL ENCOUNTER: Primary | ICD-10-CM

## 2024-02-02 PROCEDURE — 99213 OFFICE O/P EST LOW 20 MIN: CPT | Performed by: PHYSICIAN ASSISTANT

## 2024-02-02 RX ORDER — SULFAMETHOXAZOLE AND TRIMETHOPRIM 800; 160 MG/1; MG/1
1 TABLET ORAL 2 TIMES DAILY
Qty: 20 TABLET | Refills: 0 | Status: SHIPPED | OUTPATIENT
Start: 2024-02-02 | End: 2024-02-12

## 2024-02-02 NOTE — PATIENT INSTRUCTIONS
Keep skin clean and dry   Do not wear footwear that will rub against area   Tylenol as needed for pain   Follow up with podiatry as scheduled   ED or IC for worsening redness, swelling, pain, discharge from wound, or fever

## 2024-02-02 NOTE — PROGRESS NOTES
CHIEF COMPLAINT:     Chief Complaint   Patient presents with    Skin Problem     Blister to R foot, 2nd toe, states he notices a hole   First noticed on Monday   OTC Neosporin  Denies discharge           HPI:    Benjamin Rlaph is a 73 year old male who presents for evaluation of blister on the right second toe for one week. Per patient he has had pain in that toe for some time and has been seeing podiatry for it. Blister started after wearing thonged flip flops- middle piece of shoe rubbed against bone and caused a blister. Over the past week blister has opened into a open wound and now skin around it is red. No wound discharge. No increased pain from baseline. No numbness. No fever. Feels well otherwise. Using neosporin . He has appointment with podiatry next week        Current Outpatient Medications   Medication Sig Dispense Refill    sulfamethoxazole-trimethoprim -160 MG Oral Tab per tablet Take 1 tablet by mouth 2 (two) times daily for 10 days. 20 tablet 0    mupirocin 2 % External Ointment Apply 1 Application topically 3 (three) times daily for 7 days. 1 each 0    dutasteride 0.5 MG Oral Cap Take 1 capsule (0.5 mg total) by mouth daily. 90 capsule 3    rosuvastatin 10 MG Oral Tab Take 1 tablet (10 mg total) by mouth nightly. 90 tablet 3    sildenafil 20 MG Oral Tab Take one tablet PO daily as needed 10 tablet 5    azelastine 0.1 % Nasal Solution 2 sprays by Nasal route 2 (two) times daily. 30 mL 3    dutasteride 0.5 MG Oral Cap Take 1 capsule (0.5 mg total) by mouth daily. 90 capsule 3    acetaminophen 325 MG Oral Tab Take 2 tablets (650 mg total) by mouth every 6 (six) hours as needed for Pain.      montelukast 10 MG Oral Tab TAKE 1 TABLET (10 MG TOTAL) BY MOUTH NIGHTLY (Patient not taking: Reported on 2/2/2024) 90 tablet 1      Past Medical History:   Diagnosis Date    Basal cell carcinoma 2017    left nose    Basal cell carcinoma of nose 2-17-17    BCC (basal cell carcinoma) 2021     left  posterior lateral neck    BCC (basal cell carcinoma) 2022    right ala base    Calculus of kidney 2002    left    Chronic headaches     Chronic pansinusitis 6/28/2017    Chronic sinusitis     Congestion of nasal sinus     COVID-19 12/21/2020    Esophageal reflux     Ganglion of flexor tendon sheath of right thumb 2-17-17    High cholesterol     Melanoma (HCC) 8/2016    left arm, stage I; .75 mm depth    Osteoarthritis     Plantar wart 06/26/2017    right heel    Pneumonia due to organism     2016    Visual impairment     Glasses      Past Surgical History:   Procedure Laterality Date    ADJ TISS XFER LID,NOS,EAR <10SQCM Right 2-17-17    Exc lesion of nose, V_Y flap    COLONOSCOPY      COLONOSCOPY      COLONOSCOPY N/A 12/26/2018    Procedure: COLONOSCOPY;  Surgeon: Isidoro Mosley MD;  Location: [facility] ENDOSCOPY    EXC SKIN MALIG 2.1-3CM FACE,FACIAL  05/18/2022    EXCIS TENDON SHEATH LESN,HAND/FINGR Right 2-17-17    Exc ganglion R thumb    FOOT SURGERY Left 2006    Nerve procedure    FULL GRAFT PROC HEAD,FAC,HAND <20SQC  05/18/2022    NASAL SCOPY,REMV PART ETHMOID      NASAL SCOPY,RMV TISS MAXILL SINUS      REPAIR OF NASAL SEPTUM  1985    REPAIR OF NASAL SEPTUM      REPR CMPL WND HEAD,FAC,HAND 2.6-7.5  05/18/2022    SKIN SURGERY Left 10/07/16    left upper arm melanoma      Family History   Problem Relation Age of Onset    Dementia Mother     Seizure Disorder Mother     Hypertension Mother     Cancer Mother         skin    Cancer Father         Leukemia      Social History     Socioeconomic History    Marital status: Single    Number of children: 0   Occupational History    Occupation:    Tobacco Use    Smoking status: Former     Packs/day: 0.00     Years: 42.00     Additional pack years: 0.00     Total pack years: 0.00     Types: Cigarettes     Quit date: 3/25/2020     Years since quitting: 3.8     Passive exposure: Never    Smokeless tobacco: Never   Vaping Use    Vaping Use: Never used    Substance and Sexual Activity    Alcohol use: Yes     Alcohol/week: 0.0 standard drinks of alcohol     Comment: rarely    Drug use: No   Other Topics Concern    Caffeine Concern Yes     Comment: coffee, 3cups/day    Grew up on a farm No    History of tanning No    Outdoor occupation No    Pt has a pacemaker No    Pt has a defibrillator No    Reaction to local anesthetic No         REVIEW OF SYSTEMS:   GENERAL: feels well otherwise  SKIN: Per HPI.   HEENT: Denies rhinorrhea, edema of the lips or swelling of throat.  CARDIOVASCULAR: Denies chest pains or palpitations.  LUNGS: Denies shortness of breath with exertion or rest. No cough or wheezing.  LYMPH: Denies enlargement of the lymph nodes.        EXAM:   /72   Pulse 85   Temp 97.3 °F (36.3 °C)   Resp 16   Ht 5' 11\" (1.803 m)   Wt 194 lb (88 kg)   SpO2 96%   BMI 27.06 kg/m²   Physical Exam  Constitutional:       Appearance: Normal appearance.   HENT:      Head: Normocephalic and atraumatic.   Cardiovascular:      Rate and Rhythm: Normal rate and regular rhythm.      Heart sounds: Normal heart sounds. No murmur heard.  Pulmonary:      Effort: Pulmonary effort is normal. No respiratory distress.      Breath sounds: Normal breath sounds. No wheezing or rhonchi.   Skin:     General: Skin is warm.      Findings: Wound present.      Comments: Open wound noted right 2nd toe- medial aspect of PIP joint over bony protrusion. Some tunneling noted. + mild surrounding erythema. + tenderness to the touch. No swelling. No streaking. Normal sensation. FROM in the toe with no pain. + 2 pedal pulses.    Neurological:      General: No focal deficit present.      Mental Status: He is alert.                 ASSESSMENT AND PLAN:   Benjamin Ralph is a 73 year old male who presents for evaluation of a rash. Findings are consistent with:    ASSESSMENT:  Encounter Diagnosis   Name Primary?    Open wound of second toe of right foot, initial encounter Yes       PLAN: Meds  as listed below.  Comfort measures as described in Patient Instructions.  Skin care discussed with patient.     Meds & Refills for this Visit:  Requested Prescriptions     Signed Prescriptions Disp Refills    sulfamethoxazole-trimethoprim -160 MG Oral Tab per tablet 20 tablet 0     Sig: Take 1 tablet by mouth 2 (two) times daily for 10 days.    mupirocin 2 % External Ointment 1 each 0     Sig: Apply 1 Application topically 3 (three) times daily for 7 days.       Risk and benefits of medication discussed.     The patient indicates understanding of these issues and agrees to the plan.      Patient Instructions   Keep skin clean and dry   Do not wear footwear that will rub against area   Tylenol as needed for pain   Follow up with podiatry as scheduled   ED or IC for worsening redness, swelling, pain, discharge from wound, or fever

## 2024-02-02 NOTE — TELEPHONE ENCOUNTER
Spoke with patient. Endorsed that condition is not changed/worsening. Advised about going to UC. Patient feels at this time that he can wait until his appointment which he found an earlier appointment through wait list on 2/9/24. I reeducated on s/s infection and urged if any of those occur to seek urgent care and to reach out if condition worsens. Verbalized understanding

## 2024-02-09 ENCOUNTER — HOSPITAL ENCOUNTER (OUTPATIENT)
Dept: GENERAL RADIOLOGY | Facility: HOSPITAL | Age: 74
Discharge: HOME | End: 2024-02-09
Attending: PODIATRIST
Payer: MEDICARE

## 2024-02-09 ENCOUNTER — OFFICE VISIT (OUTPATIENT)
Dept: PODIATRY CLINIC | Facility: CLINIC | Age: 74
End: 2024-02-09

## 2024-02-09 DIAGNOSIS — M19.071 ARTHRITIS OF JOINT OF LESSER TOE OF RIGHT FOOT: ICD-10-CM

## 2024-02-09 DIAGNOSIS — M79.674 PAIN IN RIGHT TOE(S): ICD-10-CM

## 2024-02-09 DIAGNOSIS — L97.512 RIGHT SECOND TOE ULCER, WITH FAT LAYER EXPOSED (HCC): Primary | ICD-10-CM

## 2024-02-09 DIAGNOSIS — L97.512 RIGHT SECOND TOE ULCER, WITH FAT LAYER EXPOSED (HCC): ICD-10-CM

## 2024-02-09 PROCEDURE — 73660 X-RAY EXAM OF TOE(S): CPT | Performed by: PODIATRIST

## 2024-02-09 PROCEDURE — 99214 OFFICE O/P EST MOD 30 MIN: CPT | Performed by: PODIATRIST

## 2024-02-09 NOTE — PROGRESS NOTES
[facility] Podiatry  Progress Note    Benjamin Ralph is a 73 year old male.   Chief Complaint   Patient presents with    Wound Care     Consult Right foot 2nd toe wound- onset 2 weeks ago - pain 6-9/10 worse when wearing shoes. visit to  on Feb 2nd, 2024 still taking antibiotics and applying ointment.          HPI:     This is a pleasant male that presents to clinic today due to wound on the right 2nd toe.  He states it started 2 weeks ago but is not draining.  He went to  1 week ago and was prescribed bactrim and has 2 more days left.  He is putting mupirocin ointment on the wound twice daily.        Allergies: Penicillin g and Penicillins   Current Outpatient Medications   Medication Sig Dispense Refill    sulfamethoxazole-trimethoprim -160 MG Oral Tab per tablet Take 1 tablet by mouth 2 (two) times daily for 10 days. 20 tablet 0    mupirocin 2 % External Ointment Apply 1 Application topically 3 (three) times daily for 7 days. 1 each 0    dutasteride 0.5 MG Oral Cap Take 1 capsule (0.5 mg total) by mouth daily. 90 capsule 3    montelukast 10 MG Oral Tab TAKE 1 TABLET (10 MG TOTAL) BY MOUTH NIGHTLY 90 tablet 1    rosuvastatin 10 MG Oral Tab Take 1 tablet (10 mg total) by mouth nightly. 90 tablet 3    sildenafil 20 MG Oral Tab Take one tablet PO daily as needed 10 tablet 5    azelastine 0.1 % Nasal Solution 2 sprays by Nasal route 2 (two) times daily. 30 mL 3    dutasteride 0.5 MG Oral Cap Take 1 capsule (0.5 mg total) by mouth daily. 90 capsule 3    acetaminophen 325 MG Oral Tab Take 2 tablets (650 mg total) by mouth every 6 (six) hours as needed for Pain.        Past Medical History:   Diagnosis Date    Basal cell carcinoma 2017    left nose    Basal cell carcinoma of nose 2-17-17    BCC (basal cell carcinoma) 2021     left posterior lateral neck    BCC (basal cell carcinoma) 2022    right ala base    Calculus of kidney 2002    left    Chronic headaches     Chronic pansinusitis 6/28/2017     Chronic sinusitis     Congestion of nasal sinus     COVID-19 12/21/2020    Esophageal reflux     Ganglion of flexor tendon sheath of right thumb 2-17-17    High cholesterol     Melanoma (HCC) 8/2016    left arm, stage I; .75 mm depth    Osteoarthritis     Plantar wart 06/26/2017    right heel    Pneumonia due to organism     2016    Visual impairment     Glasses      Past Surgical History:   Procedure Laterality Date    ADJ TISS XFER LID,NOS,EAR <10SQCM Right 2-17-17    Exc lesion of nose, V_Y flap    COLONOSCOPY      COLONOSCOPY      COLONOSCOPY N/A 12/26/2018    Procedure: COLONOSCOPY;  Surgeon: Isidoro Mosley MD;  Location: [facility] ENDOSCOPY    EXC SKIN MALIG 2.1-3CM FACE,FACIAL  05/18/2022    EXCIS TENDON SHEATH LESN,HAND/FINGR Right 2-17-17    Exc ganglion R thumb    FOOT SURGERY Left 2006    Nerve procedure    FULL GRAFT PROC HEAD,FAC,HAND <20SQC  05/18/2022    NASAL SCOPY,REMV PART ETHMOID      NASAL SCOPY,RMV TISS MAXILL SINUS      REPAIR OF NASAL SEPTUM  1985    REPAIR OF NASAL SEPTUM      REPR CMPL WND HEAD,FAC,HAND 2.6-7.5  05/18/2022    SKIN SURGERY Left 10/07/16    left upper arm melanoma      Family History   Problem Relation Age of Onset    Dementia Mother     Seizure Disorder Mother     Hypertension Mother     Cancer Mother         skin    Cancer Father         Leukemia      Social History     Socioeconomic History    Marital status: Single    Number of children: 0   Occupational History    Occupation:    Tobacco Use    Smoking status: Former     Packs/day: 0.00     Years: 42.00     Additional pack years: 0.00     Total pack years: 0.00     Types: Cigarettes     Quit date: 3/25/2020     Years since quitting: 3.8     Passive exposure: Never    Smokeless tobacco: Never   Vaping Use    Vaping Use: Never used   Substance and Sexual Activity    Alcohol use: Yes     Alcohol/week: 0.0 standard drinks of alcohol     Comment: rarely    Drug use: No   Other Topics Concern    Caffeine  Concern Yes     Comment: coffee, 3cups/day    Grew up on a farm No    History of tanning No    Outdoor occupation No    Pt has a pacemaker No    Pt has a defibrillator No    Reaction to local anesthetic No           REVIEW OF SYSTEMS:   Denies nausea, fever, chills  No calf pain  No other muscle or joint aches  Denies chest pain or shortness of breath.      EXAM:   There were no vitals taken for this visit.    Constitutional:   Patient in no apparent distress. Well kept Of normal body habitus. Alert and oriented to person, place, and time.  Vascular examination:   DP pulse is 2/4  PT pulse is 2/4  Capillary refill is immediate  Integumentary examination:   There are no varicosities.   Skin appears moist, warm, and supple with positive hair growth.     Ulceration:     Etiology of ulceration:  Pressure        Location & Measurements of each wound L x W x D in cm (before debridement if performed)     Wound A location: right 2nd toe medial PIPJ Length: 0.3 x Width: 0.2 x Depth: 0.2 cm       Description of the wound: granular  Description of exudate: none  no evidence of infection   no evidence of undermining   no evidence of tunneling   no evidence of reduced circulation  If evidence of infection is present document treatment/response:        Required for Pressure Ulcers-Stage of Ulcer (each wound)  Stage 1: Non-blanchable erythema of intact skin:   Stage 2: Partial-thickness skin loss with exposed dermis:   Stage 3: Full-thickness skin loss: yes  Stage 4: Full-thickness loss of skin & tissue:   Unstageable:     Neurological examination:   Vibratory (128-Hz tuning fork) sensation is present to right and is present to left.  Sharp/dull is present to right and is present to left.  Musculoskeletal examination:  Muscle Strength is 5/5.    POP to right 2nd toe PIPJ  Lateral contracture on the right 2nd toe at the PIPJ      LABS & IMAGING:     Lab Results   Component Value Date    GLU 91 10/12/2023    BUN 17 10/12/2023     CREATSERUM 1.07 10/12/2023    BUNCREA 15.9 10/12/2023    ANIONGAP 6 10/12/2023    GFRAA 102 06/20/2022    GFRNAA 89 06/20/2022    CA 9.3 10/12/2023     10/12/2023    K 4.4 10/12/2023     10/12/2023    CO2 27.0 10/12/2023    OSMOCALC 293 10/12/2023        No results found for: \"EAG\", \"A1C\"     No results found.     ASSESSMENT AND PLAN:   Diagnoses and all orders for this visit:    Right second toe ulcer, with fat layer exposed (HCC)    Arthritis of joint of lesser toe of right foot    Pain in right toe(s)              Plan:     Discussed in detail the etiology of ulceration.  Discussed the importance of good hygiene and following conservative care instructions.  Discussed the potential for infection and other risks, including osteomyelitis, if non-compliant. Patient verbalized understanding.  Discussed the potential for loss of limb/life.  Pt instructed on signs and symptoms of infection to watch for including N/F/V/C/SOB/CP, redness, increased drainage, malodor, etc. If any concern patient is to contact our office immediately or go to the Emergency Department. Pt acknowledge understanding.     Procedure Note: Debridement NOT PERFORMED TODAY  11042-Debridement, subcutaneous tissue (includes epidermis and dermis, if performed); first 20 square cm or less. (See overall size of wound to calculate debridement size)  Sharp excisional debridement of wound was performed to the level of and including subcutaneous tissue with #15 blade, dermal curette, or moistened gauze as listed below. Pt tolerated debridement well. Granular tissue/wound base was achieved with healthy bleeding noted. Bleeding was controlled with manual pressure and dry, sterile gauze. Non-viable tissue removed from wound bed with debridement.    Instrumentation Used:  dermal currette  Type of tissue removed: fibrotic, non-viable  Deepest Tissue Excised: Sub-cutaneous  Wound A: Yes  Wound B:  No  Wound C:  No   Was the removal of viable tissue  evidenced by active bleeding?  Yes   Percentage of wound requiring debridement (if entire wound is larger than 20 sq cm)  Wound A: 100 % Wound B: _ % Wound C: _ %     Measurements of each wound after debridement (if wound is enlarged): Same as measurements above.    2nd-9th Visit debridement   Status of wound since last visit:   Patient compliance with support relief/off-loading:          Expected duration of skilled wound care therapy: 1-3 months  Goal of therapy: complete healing  Good prognosis  Expected frequency of subsequent procedures: 1-2 weeks        Immediate post procedure topical wound care and follow-up instructions including offloading if applicable: hydrofera blue and bandaid, pt to change daily as above    Pt to apply foam toe spacer right 1st web space daily to offload the ulcer       Xray right 2nd toe: 2/9/24  No evidence of acute osseous abnormality.  If there is persistent clinical concern for osteomyelitis, correlation with MRI can be performed.   Moderate proximal 2nd interphalangeal osteoarthritis.           Discussed with pt that the right 2nd toe wound and pain is due to arthritis and hammertoe deformity causing rubbing against the hallux.        Pt to cont taking the bactrim he has 2 more days left    RTC 2 weeks for right 2nd toe wound care.   When healed will discuss PIPJ fusion.        No follow-ups on file.    German Pedersen, NILESH  2/9/24

## 2024-02-23 ENCOUNTER — OFFICE VISIT (OUTPATIENT)
Dept: PODIATRY CLINIC | Facility: CLINIC | Age: 74
End: 2024-02-23

## 2024-02-23 DIAGNOSIS — L03.031 CELLULITIS OF RIGHT TOE: ICD-10-CM

## 2024-02-23 DIAGNOSIS — M19.071 ARTHRITIS OF JOINT OF LESSER TOE OF RIGHT FOOT: ICD-10-CM

## 2024-02-23 DIAGNOSIS — R60.0 EDEMA OF TOE: ICD-10-CM

## 2024-02-23 DIAGNOSIS — L97.512 RIGHT SECOND TOE ULCER, WITH FAT LAYER EXPOSED (HCC): Primary | ICD-10-CM

## 2024-02-23 DIAGNOSIS — M79.674 PAIN IN RIGHT TOE(S): ICD-10-CM

## 2024-02-23 PROCEDURE — 99214 OFFICE O/P EST MOD 30 MIN: CPT | Performed by: PODIATRIST

## 2024-02-23 RX ORDER — DOXYCYCLINE HYCLATE 100 MG/1
100 CAPSULE ORAL 2 TIMES DAILY
Qty: 14 CAPSULE | Refills: 0 | Status: SHIPPED | OUTPATIENT
Start: 2024-02-23 | End: 2024-03-01

## 2024-02-23 NOTE — PROGRESS NOTES
[facility] Podiatry  Progress Note    Benjamin Ralph is a 73 year old male.   Chief Complaint   Patient presents with    Wound Care     R  foot 2nd toe wound f/u -  Rates pain 5-6/10. Not sure if any drainage.          HPI:     This is a pleasant male that presents to clinic today due to wound on the right 2nd toe f/u.  He has been changing the dressings daily but believes it is not improving.        Allergies: Penicillin g and Penicillins   Current Outpatient Medications   Medication Sig Dispense Refill    doxycycline 100 MG Oral Cap Take 1 capsule (100 mg total) by mouth 2 (two) times daily for 7 days. 14 capsule 0    dutasteride 0.5 MG Oral Cap Take 1 capsule (0.5 mg total) by mouth daily. 90 capsule 3    montelukast 10 MG Oral Tab TAKE 1 TABLET (10 MG TOTAL) BY MOUTH NIGHTLY 90 tablet 1    rosuvastatin 10 MG Oral Tab Take 1 tablet (10 mg total) by mouth nightly. 90 tablet 3    sildenafil 20 MG Oral Tab Take one tablet PO daily as needed 10 tablet 5    azelastine 0.1 % Nasal Solution 2 sprays by Nasal route 2 (two) times daily. 30 mL 3    dutasteride 0.5 MG Oral Cap Take 1 capsule (0.5 mg total) by mouth daily. 90 capsule 3    acetaminophen 325 MG Oral Tab Take 2 tablets (650 mg total) by mouth every 6 (six) hours as needed for Pain.        Past Medical History:   Diagnosis Date    Basal cell carcinoma 2017    left nose    Basal cell carcinoma of nose 2-17-17    BCC (basal cell carcinoma) 2021     left posterior lateral neck    BCC (basal cell carcinoma) 2022    right ala base    Calculus of kidney 2002    left    Chronic headaches     Chronic pansinusitis 6/28/2017    Chronic sinusitis     Congestion of nasal sinus     COVID-19 12/21/2020    Esophageal reflux     Ganglion of flexor tendon sheath of right thumb 2-17-17    High cholesterol     Melanoma (HCC) 8/2016    left arm, stage I; .75 mm depth    Osteoarthritis     Plantar wart 06/26/2017    right heel    Pneumonia due to organism     2016    Visual  impairment     Glasses      Past Surgical History:   Procedure Laterality Date    ADJ TISS XFER LID,NOS,EAR <10SQCM Right 2-17-17    Exc lesion of nose, V_Y flap    COLONOSCOPY      COLONOSCOPY      COLONOSCOPY N/A 12/26/2018    Procedure: COLONOSCOPY;  Surgeon: Isidoro Mosley MD;  Location: [facility] ENDOSCOPY    EXC SKIN MALIG 2.1-3CM FACE,FACIAL  05/18/2022    EXCIS TENDON SHEATH LESN,HAND/FINGR Right 2-17-17    Exc ganglion R thumb    FOOT SURGERY Left 2006    Nerve procedure    FULL GRAFT PROC HEAD,FAC,HAND <20SQC  05/18/2022    NASAL SCOPY,REMV PART ETHMOID      NASAL SCOPY,RMV TISS MAXILL SINUS      REPAIR OF NASAL SEPTUM  1985    REPAIR OF NASAL SEPTUM      REPR CMPL WND HEAD,FAC,HAND 2.6-7.5  05/18/2022    SKIN SURGERY Left 10/07/16    left upper arm melanoma      Family History   Problem Relation Age of Onset    Dementia Mother     Seizure Disorder Mother     Hypertension Mother     Cancer Mother         skin    Cancer Father         Leukemia      Social History     Socioeconomic History    Marital status: Single    Number of children: 0   Occupational History    Occupation:    Tobacco Use    Smoking status: Former     Packs/day: 0.00     Years: 42.00     Additional pack years: 0.00     Total pack years: 0.00     Types: Cigarettes     Quit date: 3/25/2020     Years since quitting: 3.9     Passive exposure: Never    Smokeless tobacco: Never   Vaping Use    Vaping Use: Never used   Substance and Sexual Activity    Alcohol use: Yes     Alcohol/week: 0.0 standard drinks of alcohol     Comment: rarely    Drug use: No   Other Topics Concern    Caffeine Concern Yes     Comment: coffee, 3cups/day    Grew up on a farm No    History of tanning No    Outdoor occupation No    Pt has a pacemaker No    Pt has a defibrillator No    Reaction to local anesthetic No           REVIEW OF SYSTEMS:   Denies nausea, fever, chills  No calf pain  No other muscle or joint aches  Denies chest pain or shortness of  breath.      EXAM:   There were no vitals taken for this visit.    Constitutional:   Patient in no apparent distress. Well kept Of normal body habitus. Alert and oriented to person, place, and time.  Vascular examination:   DP pulse is 2/4  PT pulse is 2/4  Capillary refill is immediate    Right 2nd toe edema    Integumentary examination:   There are no varicosities.   Skin appears moist, warm, and supple with positive hair growth.     Ulceration:     Etiology of ulceration:  Pressure        Location & Measurements of each wound L x W x D in cm (before debridement if performed)     Wound A location: right 2nd toe medial PIPJ Length: 0.5 x Width: 0.3 x Depth: 0.3 cm with localized cellulitis       Description of the wound: granular and fibrotic  Description of exudate: none  yes evidence of infection   no evidence of undermining   no evidence of tunneling   no evidence of reduced circulation  If evidence of infection is present document treatment/response: abx       Required for Pressure Ulcers-Stage of Ulcer (each wound)  Stage 1: Non-blanchable erythema of intact skin:   Stage 2: Partial-thickness skin loss with exposed dermis:   Stage 3: Full-thickness skin loss: yes  Stage 4: Full-thickness loss of skin & tissue:   Unstageable:     Neurological examination:   Vibratory (128-Hz tuning fork) sensation is present to right and is present to left.  Sharp/dull is present to right and is present to left.  Musculoskeletal examination:  Muscle Strength is 5/5.    POP to right 2nd toe PIPJ  Lateral contracture on the right 2nd toe at the PIPJ      LABS & IMAGING:     Lab Results   Component Value Date    GLU 91 10/12/2023    BUN 17 10/12/2023    CREATSERUM 1.07 10/12/2023    BUNCREA 15.9 10/12/2023    ANIONGAP 6 10/12/2023    GFRAA 102 06/20/2022    GFRNAA 89 06/20/2022    CA 9.3 10/12/2023     10/12/2023    K 4.4 10/12/2023     10/12/2023    CO2 27.0 10/12/2023    OSMOCALC 293 10/12/2023        No results  found for: \"EAG\", \"A1C\"     No results found.     ASSESSMENT AND PLAN:   Diagnoses and all orders for this visit:    Right second toe ulcer, with fat layer exposed (HCC)    Cellulitis of right toe    Arthritis of joint of lesser toe of right foot    Pain in right toe(s)    Edema of toe    Other orders  -     doxycycline 100 MG Oral Cap; Take 1 capsule (100 mg total) by mouth 2 (two) times daily for 7 days.                Plan:     Discussed in detail the etiology of ulceration.  Discussed the importance of good hygiene and following conservative care instructions.  Discussed the potential for infection and other risks, including osteomyelitis, if non-compliant. Patient verbalized understanding.  Discussed the potential for loss of limb/life.  Pt instructed on signs and symptoms of infection to watch for including N/F/V/C/SOB/CP, redness, increased drainage, malodor, etc. If any concern patient is to contact our office immediately or go to the Emergency Department. Pt acknowledge understanding.     Procedure Note: Debridement NOT PERFORMED TODAY  11042-Debridement, subcutaneous tissue (includes epidermis and dermis, if performed); first 20 square cm or less. (See overall size of wound to calculate debridement size)  Sharp excisional debridement of wound was performed to the level of and including subcutaneous tissue with #15 blade, dermal curette, or moistened gauze as listed below. Pt tolerated debridement well. Granular tissue/wound base was achieved with healthy bleeding noted. Bleeding was controlled with manual pressure and dry, sterile gauze. Non-viable tissue removed from wound bed with debridement.    Instrumentation Used:  dermal currette  Type of tissue removed: fibrotic, non-viable  Deepest Tissue Excised: Sub-cutaneous  Wound A: Yes  Wound B:  No  Wound C:  No   Was the removal of viable tissue evidenced by active bleeding?  Yes   Percentage of wound requiring debridement (if entire wound is larger than  20 sq cm)  Wound A: 100 % Wound B: _ % Wound C: _ %     Measurements of each wound after debridement (if wound is enlarged): Same as measurements above.    2nd-9th Visit debridement   Status of wound since last visit: worsening  Patient compliance with support relief/off-loading: yes         Expected duration of skilled wound care therapy: 1-3 months  Goal of therapy: complete healing  Good prognosis  Expected frequency of subsequent procedures: 1-2 weeks        Immediate post procedure topical wound care and follow-up instructions including offloading if applicable: puracol ag and bandaid, pt to change daily as above    Pt to apply foam toe spacer right 1st web space daily to offload the ulcer.  He refuses post op shoe today.        Xray right 2nd toe: 2/9/24  No evidence of acute osseous abnormality.  If there is persistent clinical concern for osteomyelitis, correlation with MRI can be performed.   Moderate proximal 2nd interphalangeal osteoarthritis.           Discussed with pt that the right 2nd toe wound and pain is due to arthritis and hammertoe deformity causing rubbing against the hallux.        Cultures of the wound taken today and sent to [facility]  Prescribed doxy x 7 days    RTC 1 weeks for right 2nd toe wound care and will review cultures.  If no improvement will order MRI.  When healed will discuss PIPJ fusion.        No follow-ups on file.    German Pedersen DPM  2/23/24

## 2024-02-27 ENCOUNTER — TELEPHONE (OUTPATIENT)
Dept: PODIATRY CLINIC | Facility: CLINIC | Age: 74
End: 2024-02-27

## 2024-02-27 NOTE — TELEPHONE ENCOUNTER
Pt would like 2/23 lab results, needs to know if he should be taking antibiotics. Please advise thank you.

## 2024-02-27 NOTE — TELEPHONE ENCOUNTER
Pt seen on 2/23/24 and culture taken. Results show-    Pt was rx'd Doxycycline for 7 days on 2/23. He has f/u on 3/1. Please advise if any concerns.

## 2024-02-28 ENCOUNTER — TELEPHONE (OUTPATIENT)
Dept: PODIATRY CLINIC | Facility: CLINIC | Age: 74
End: 2024-02-28

## 2024-02-28 RX ORDER — LEVOFLOXACIN 750 MG/1
750 TABLET, FILM COATED ORAL DAILY
Qty: 10 TABLET | Refills: 0 | Status: SHIPPED
Start: 2024-02-28 | End: 2024-03-09

## 2024-02-28 NOTE — TELEPHONE ENCOUNTER
Patient calling stating pharmacy system is down would like for medication to be call in  to pharmacy   Please advise

## 2024-02-28 NOTE — TELEPHONE ENCOUNTER
Called [facility] and left message for Levofloxacin orders per Dr Pedersen rx sent today. Disregard if they receive electronically or via fax. Call back if any q's.

## 2024-02-28 NOTE — TELEPHONE ENCOUNTER
I called patient and reviewed his culture results which did grow pseudomonas.  I told him to stop the doxy and did prescribe levofloxacin.  He can start that today.

## 2024-03-01 ENCOUNTER — OFFICE VISIT (OUTPATIENT)
Dept: PODIATRY CLINIC | Facility: CLINIC | Age: 74
End: 2024-03-01

## 2024-03-01 DIAGNOSIS — M19.071 ARTHRITIS OF JOINT OF LESSER TOE OF RIGHT FOOT: ICD-10-CM

## 2024-03-01 DIAGNOSIS — L97.512 RIGHT SECOND TOE ULCER, WITH FAT LAYER EXPOSED (HCC): Primary | ICD-10-CM

## 2024-03-01 DIAGNOSIS — R60.0 EDEMA OF TOE: ICD-10-CM

## 2024-03-01 DIAGNOSIS — M79.674 PAIN IN RIGHT TOE(S): ICD-10-CM

## 2024-03-01 DIAGNOSIS — L03.031 CELLULITIS OF RIGHT TOE: ICD-10-CM

## 2024-03-01 PROCEDURE — 99214 OFFICE O/P EST MOD 30 MIN: CPT | Performed by: PODIATRIST

## 2024-03-01 NOTE — PROGRESS NOTES
[facility] Podiatry  Progress Note    Benjamin Ralph is a 73 year old male.   Chief Complaint   Patient presents with    Wound Recheck     Right 2nd toe f/u - states hew is better - no drainage - still has pain rated as 2-7/10 on and off           HPI:     This is a pleasant male that presents to clinic today due to wound on the right 2nd toe f/u.  He has been changing the dressings daily but believes it is improving.        Allergies: Penicillin g and Penicillins   Current Outpatient Medications   Medication Sig Dispense Refill    levoFLOXacin 750 MG Oral Tab Take 1 tablet (750 mg total) by mouth daily for 10 days. 10 tablet 0    doxycycline 100 MG Oral Cap Take 1 capsule (100 mg total) by mouth 2 (two) times daily for 7 days. 14 capsule 0    dutasteride 0.5 MG Oral Cap Take 1 capsule (0.5 mg total) by mouth daily. 90 capsule 3    montelukast 10 MG Oral Tab TAKE 1 TABLET (10 MG TOTAL) BY MOUTH NIGHTLY 90 tablet 1    rosuvastatin 10 MG Oral Tab Take 1 tablet (10 mg total) by mouth nightly. 90 tablet 3    sildenafil 20 MG Oral Tab Take one tablet PO daily as needed 10 tablet 5    azelastine 0.1 % Nasal Solution 2 sprays by Nasal route 2 (two) times daily. 30 mL 3    dutasteride 0.5 MG Oral Cap Take 1 capsule (0.5 mg total) by mouth daily. 90 capsule 3    acetaminophen 325 MG Oral Tab Take 2 tablets (650 mg total) by mouth every 6 (six) hours as needed for Pain.        Past Medical History:   Diagnosis Date    Basal cell carcinoma 2017    left nose    Basal cell carcinoma of nose 2-17-17    BCC (basal cell carcinoma) 2021     left posterior lateral neck    BCC (basal cell carcinoma) 2022    right ala base    Calculus of kidney 2002    left    Chronic headaches     Chronic pansinusitis 6/28/2017    Chronic sinusitis     Congestion of nasal sinus     COVID-19 12/21/2020    Esophageal reflux     Ganglion of flexor tendon sheath of right thumb 2-17-17    High cholesterol     Melanoma (HCC) 8/2016    left arm,  stage I; .75 mm depth    Osteoarthritis     Plantar wart 06/26/2017    right heel    Pneumonia due to organism     2016    Visual impairment     Glasses      Past Surgical History:   Procedure Laterality Date    ADJ TISS XFER LID,NOS,EAR <10SQCM Right 2-17-17    Exc lesion of nose, V_Y flap    COLONOSCOPY      COLONOSCOPY      COLONOSCOPY N/A 12/26/2018    Procedure: COLONOSCOPY;  Surgeon: Isidoro Mosley MD;  Location: [facility] ENDOSCOPY    EXC SKIN MALIG 2.1-3CM FACE,FACIAL  05/18/2022    EXCIS TENDON SHEATH LESN,HAND/FINGR Right 2-17-17    Exc ganglion R thumb    FOOT SURGERY Left 2006    Nerve procedure    FULL GRAFT PROC HEAD,FAC,HAND <20SQC  05/18/2022    NASAL SCOPY,REMV PART ETHMOID      NASAL SCOPY,RMV TISS MAXILL SINUS      REPAIR OF NASAL SEPTUM  1985    REPAIR OF NASAL SEPTUM      REPR CMPL WND HEAD,FAC,HAND 2.6-7.5  05/18/2022    SKIN SURGERY Left 10/07/16    left upper arm melanoma      Family History   Problem Relation Age of Onset    Dementia Mother     Seizure Disorder Mother     Hypertension Mother     Cancer Mother         skin    Cancer Father         Leukemia      Social History     Socioeconomic History    Marital status: Single    Number of children: 0   Occupational History    Occupation:    Tobacco Use    Smoking status: Former     Packs/day: 0.00     Years: 42.00     Additional pack years: 0.00     Total pack years: 0.00     Types: Cigarettes     Quit date: 3/25/2020     Years since quitting: 3.9     Passive exposure: Never    Smokeless tobacco: Never   Vaping Use    Vaping Use: Never used   Substance and Sexual Activity    Alcohol use: Yes     Alcohol/week: 0.0 standard drinks of alcohol     Comment: rarely    Drug use: No   Other Topics Concern    Caffeine Concern Yes     Comment: coffee, 3cups/day    Grew up on a farm No    History of tanning No    Outdoor occupation No    Pt has a pacemaker No    Pt has a defibrillator No    Reaction to local anesthetic No            REVIEW OF SYSTEMS:   Denies nausea, fever, chills  No calf pain  No other muscle or joint aches  Denies chest pain or shortness of breath.      EXAM:   There were no vitals taken for this visit.    Constitutional:   Patient in no apparent distress. Well kept Of normal body habitus. Alert and oriented to person, place, and time.  Vascular examination:   DP pulse is 2/4  PT pulse is 2/4  Capillary refill is immediate    Right 2nd toe edema, improved    Integumentary examination:   There are no varicosities.   Skin appears moist, warm, and supple with positive hair growth.     Ulceration:     Etiology of ulceration:  Pressure        Location & Measurements of each wound L x W x D in cm (before debridement if performed)     Wound A location: right 2nd toe medial PIPJ Length: 0.3 x Width: 0.2 x Depth: 0.3 cm with improved cellulitis       Description of the wound: granular and fibrotic  Description of exudate: none  yes evidence of infection   no evidence of undermining   no evidence of tunneling   no evidence of reduced circulation  If evidence of infection is present document treatment/response: abx       Required for Pressure Ulcers-Stage of Ulcer (each wound)  Stage 1: Non-blanchable erythema of intact skin:   Stage 2: Partial-thickness skin loss with exposed dermis:   Stage 3: Full-thickness skin loss: yes  Stage 4: Full-thickness loss of skin & tissue:   Unstageable:     Neurological examination:   Vibratory (128-Hz tuning fork) sensation is present to right and is present to left.  Sharp/dull is present to right and is present to left.  Musculoskeletal examination:  Muscle Strength is 5/5.    POP to right 2nd toe PIPJ  Lateral contracture on the right 2nd toe at the PIPJ      LABS & IMAGING:     Lab Results   Component Value Date    GLU 91 10/12/2023    BUN 17 10/12/2023    CREATSERUM 1.07 10/12/2023    BUNCREA 15.9 10/12/2023    ANIONGAP 6 10/12/2023    GFRAA 102 06/20/2022    GFRNAA 89 06/20/2022    CA 9.3  10/12/2023     10/12/2023    K 4.4 10/12/2023     10/12/2023    CO2 27.0 10/12/2023    OSMOCALC 293 10/12/2023        No results found for: \"EAG\", \"A1C\"     No results found.     ASSESSMENT AND PLAN:   Diagnoses and all orders for this visit:    Right second toe ulcer, with fat layer exposed (HCC)    Cellulitis of right toe    Arthritis of joint of lesser toe of right foot    Pain in right toe(s)    Edema of toe                  Plan:     Discussed in detail the etiology of ulceration.  Discussed the importance of good hygiene and following conservative care instructions.  Discussed the potential for infection and other risks, including osteomyelitis, if non-compliant. Patient verbalized understanding.  Discussed the potential for loss of limb/life.  Pt instructed on signs and symptoms of infection to watch for including N/F/V/C/SOB/CP, redness, increased drainage, malodor, etc. If any concern patient is to contact our office immediately or go to the Emergency Department. Pt acknowledge understanding.     Procedure Note: Debridement NOT PERFORMED TODAY  11042-Debridement, subcutaneous tissue (includes epidermis and dermis, if performed); first 20 square cm or less. (See overall size of wound to calculate debridement size)  Sharp excisional debridement of wound was performed to the level of and including subcutaneous tissue with #15 blade, dermal curette, or moistened gauze as listed below. Pt tolerated debridement well. Granular tissue/wound base was achieved with healthy bleeding noted. Bleeding was controlled with manual pressure and dry, sterile gauze. Non-viable tissue removed from wound bed with debridement.    Instrumentation Used:  dermal currette  Type of tissue removed: fibrotic, non-viable  Deepest Tissue Excised: Sub-cutaneous  Wound A: Yes  Wound B:  No  Wound C:  No   Was the removal of viable tissue evidenced by active bleeding?  Yes   Percentage of wound requiring debridement (if entire  wound is larger than 20 sq cm)  Wound A: 100 % Wound B: _ % Wound C: _ %     Measurements of each wound after debridement (if wound is enlarged): Same as measurements above.    2nd-9th Visit debridement   Status of wound since last visit: improved  Patient compliance with support relief/off-loading: yes         Expected duration of skilled wound care therapy: 1-3 months  Goal of therapy: complete healing  Good prognosis  Expected frequency of subsequent procedures: 1-2 weeks        Immediate post procedure topical wound care and follow-up instructions including offloading if applicable: betadine ointment and gauze, pt to change daily as above    Pt to apply foam toe spacer right 1st web space daily to offload the ulcer.  He refuses post op shoe today.        Xray right 2nd toe: 2/9/24  No evidence of acute osseous abnormality.  If there is persistent clinical concern for osteomyelitis, correlation with MRI can be performed.   Moderate proximal 2nd interphalangeal osteoarthritis.           Discussed with pt that the right 2nd toe wound and pain is due to arthritis and hammertoe deformity causing rubbing against the hallux.        Cultures of the wound grew pseudomonas  Pt stopped the doxy and has started on levaquin  Pt has started the levaquin and has 8 more days left    RTC 1 weeks for right 2nd toe wound care.  If no improvement will order MRI.  When healed will discuss PIPJ fusion.        No follow-ups on file.    German Pedersen DPM  3/1/24

## 2024-03-08 ENCOUNTER — LAB ENCOUNTER (OUTPATIENT)
Dept: LAB | Facility: HOSPITAL | Age: 74
End: 2024-03-08
Attending: UROLOGY
Payer: MEDICARE

## 2024-03-08 ENCOUNTER — OFFICE VISIT (OUTPATIENT)
Dept: PODIATRY CLINIC | Facility: CLINIC | Age: 74
End: 2024-03-08

## 2024-03-08 DIAGNOSIS — N13.8 BPH WITH OBSTRUCTION/LOWER URINARY TRACT SYMPTOMS: ICD-10-CM

## 2024-03-08 DIAGNOSIS — M79.674 PAIN IN RIGHT TOE(S): ICD-10-CM

## 2024-03-08 DIAGNOSIS — M19.071 ARTHRITIS OF JOINT OF LESSER TOE OF RIGHT FOOT: ICD-10-CM

## 2024-03-08 DIAGNOSIS — L97.512 RIGHT SECOND TOE ULCER, WITH FAT LAYER EXPOSED (HCC): Primary | ICD-10-CM

## 2024-03-08 DIAGNOSIS — N40.1 BPH WITH OBSTRUCTION/LOWER URINARY TRACT SYMPTOMS: ICD-10-CM

## 2024-03-08 LAB — PSA SERPL-MCNC: 0.61 NG/ML (ref ?–4)

## 2024-03-08 PROCEDURE — 99214 OFFICE O/P EST MOD 30 MIN: CPT | Performed by: PODIATRIST

## 2024-03-08 PROCEDURE — 84153 ASSAY OF PSA TOTAL: CPT

## 2024-03-08 PROCEDURE — 36415 COLL VENOUS BLD VENIPUNCTURE: CPT

## 2024-03-08 NOTE — PROGRESS NOTES
[facility]iatry  Progress Note    Benjamin Ralph is a 73 year old male.   Chief Complaint   Patient presents with    Ulcer     F/u Right 2nd toe ulcer- pain 5/10 worse when wearing shoes.         HPI:     This is a pleasant male that presents to clinic today due to wound on the right 2nd toe f/u.  He has been changing the dressings daily and is taking the abx as prescribed.  He notes some improvement.        Allergies: Penicillin g and Penicillins   Current Outpatient Medications   Medication Sig Dispense Refill    levoFLOXacin 750 MG Oral Tab Take 1 tablet (750 mg total) by mouth daily for 10 days. 10 tablet 0    dutasteride 0.5 MG Oral Cap Take 1 capsule (0.5 mg total) by mouth daily. 90 capsule 3    montelukast 10 MG Oral Tab TAKE 1 TABLET (10 MG TOTAL) BY MOUTH NIGHTLY 90 tablet 1    rosuvastatin 10 MG Oral Tab Take 1 tablet (10 mg total) by mouth nightly. 90 tablet 3    sildenafil 20 MG Oral Tab Take one tablet PO daily as needed 10 tablet 5    azelastine 0.1 % Nasal Solution 2 sprays by Nasal route 2 (two) times daily. 30 mL 3    dutasteride 0.5 MG Oral Cap Take 1 capsule (0.5 mg total) by mouth daily. 90 capsule 3    acetaminophen 325 MG Oral Tab Take 2 tablets (650 mg total) by mouth every 6 (six) hours as needed for Pain.        Past Medical History:   Diagnosis Date    Basal cell carcinoma 2017    left nose    Basal cell carcinoma of nose 2-17-17    BCC (basal cell carcinoma) 2021     left posterior lateral neck    BCC (basal cell carcinoma) 2022    right ala base    Calculus of kidney 2002    left    Chronic headaches     Chronic pansinusitis 6/28/2017    Chronic sinusitis     Congestion of nasal sinus     COVID-19 12/21/2020    Esophageal reflux     Ganglion of flexor tendon sheath of right thumb 2-17-17    High cholesterol     Melanoma (HCC) 8/2016    left arm, stage I; .75 mm depth    Osteoarthritis     Plantar wart 06/26/2017    right heel    Pneumonia due to organism     2016    Visual  impairment     Glasses      Past Surgical History:   Procedure Laterality Date    ADJ TISS XFER LID,NOS,EAR <10SQCM Right 2-17-17    Exc lesion of nose, V_Y flap    COLONOSCOPY      COLONOSCOPY      COLONOSCOPY N/A 12/26/2018    Procedure: COLONOSCOPY;  Surgeon: Isidoro Mosley MD;  Location: [facility] ENDOSCOPY    EXC SKIN MALIG 2.1-3CM FACE,FACIAL  05/18/2022    EXCIS TENDON SHEATH LESN,HAND/FINGR Right 2-17-17    Exc ganglion R thumb    FOOT SURGERY Left 2006    Nerve procedure    FULL GRAFT PROC HEAD,FAC,HAND <20SQC  05/18/2022    NASAL SCOPY,REMV PART ETHMOID      NASAL SCOPY,RMV TISS MAXILL SINUS      REPAIR OF NASAL SEPTUM  1985    REPAIR OF NASAL SEPTUM      REPR CMPL WND HEAD,FAC,HAND 2.6-7.5  05/18/2022    SKIN SURGERY Left 10/07/16    left upper arm melanoma      Family History   Problem Relation Age of Onset    Dementia Mother     Seizure Disorder Mother     Hypertension Mother     Cancer Mother         skin    Cancer Father         Leukemia      Social History     Socioeconomic History    Marital status: Single    Number of children: 0   Occupational History    Occupation:    Tobacco Use    Smoking status: Former     Packs/day: 0.00     Years: 42.00     Additional pack years: 0.00     Total pack years: 0.00     Types: Cigarettes     Quit date: 3/25/2020     Years since quitting: 3.9     Passive exposure: Never    Smokeless tobacco: Never   Vaping Use    Vaping Use: Never used   Substance and Sexual Activity    Alcohol use: Yes     Alcohol/week: 0.0 standard drinks of alcohol     Comment: rarely    Drug use: No   Other Topics Concern    Caffeine Concern Yes     Comment: coffee, 3cups/day    Grew up on a farm No    History of tanning No    Outdoor occupation No    Pt has a pacemaker No    Pt has a defibrillator No    Reaction to local anesthetic No           REVIEW OF SYSTEMS:   Denies nausea, fever, chills  No calf pain  No other muscle or joint aches  Denies chest pain or shortness of  breath.      EXAM:   There were no vitals taken for this visit.    Constitutional:   Patient in no apparent distress. Well kept Of normal body habitus. Alert and oriented to person, place, and time.  Vascular examination:   DP pulse is 2/4  PT pulse is 2/4  Capillary refill is immediate    Right 2nd toe edema, improved    Integumentary examination:   There are no varicosities.   Skin appears moist, warm, and supple with positive hair growth.     Ulceration:     Etiology of ulceration:  Pressure        Location & Measurements of each wound L x W x D in cm (before debridement if performed)     Wound A location: right 2nd toe medial PIPJ Length: 0.3 x Width: 0.1 x Depth: 0.2 cm with resolved cellulitis       Description of the wound: granular and fibrotic  Description of exudate: none  no evidence of infection   no evidence of undermining   no evidence of tunneling   no evidence of reduced circulation  If evidence of infection is present document treatment/response:        Required for Pressure Ulcers-Stage of Ulcer (each wound)  Stage 1: Non-blanchable erythema of intact skin:   Stage 2: Partial-thickness skin loss with exposed dermis:   Stage 3: Full-thickness skin loss: yes  Stage 4: Full-thickness loss of skin & tissue:   Unstageable:     Neurological examination:   Vibratory (128-Hz tuning fork) sensation is present to right and is present to left.  Sharp/dull is present to right and is present to left.  Musculoskeletal examination:  Muscle Strength is 5/5.    POP to right 2nd toe PIPJ  Lateral contracture on the right 2nd toe at the PIPJ      LABS & IMAGING:     Lab Results   Component Value Date    GLU 91 10/12/2023    BUN 17 10/12/2023    CREATSERUM 1.07 10/12/2023    BUNCREA 15.9 10/12/2023    ANIONGAP 6 10/12/2023    GFRAA 102 06/20/2022    GFRNAA 89 06/20/2022    CA 9.3 10/12/2023     10/12/2023    K 4.4 10/12/2023     10/12/2023    CO2 27.0 10/12/2023    OSMOCALC 293 10/12/2023        No results  found for: \"EAG\", \"A1C\"     No results found.     ASSESSMENT AND PLAN:   Diagnoses and all orders for this visit:    Right second toe ulcer, with fat layer exposed (HCC)    Arthritis of joint of lesser toe of right foot    Pain in right toe(s)                    Plan:     Discussed in detail the etiology of ulceration.  Discussed the importance of good hygiene and following conservative care instructions.  Discussed the potential for infection and other risks, including osteomyelitis, if non-compliant. Patient verbalized understanding.  Discussed the potential for loss of limb/life.  Pt instructed on signs and symptoms of infection to watch for including N/F/V/C/SOB/CP, redness, increased drainage, malodor, etc. If any concern patient is to contact our office immediately or go to the Emergency Department. Pt acknowledge understanding.     Procedure Note: Debridement NOT PERFORMED TODAY  11042-Debridement, subcutaneous tissue (includes epidermis and dermis, if performed); first 20 square cm or less. (See overall size of wound to calculate debridement size)  Sharp excisional debridement of wound was performed to the level of and including subcutaneous tissue with #15 blade, dermal curette, or moistened gauze as listed below. Pt tolerated debridement well. Granular tissue/wound base was achieved with healthy bleeding noted. Bleeding was controlled with manual pressure and dry, sterile gauze. Non-viable tissue removed from wound bed with debridement.    Instrumentation Used:  dermal currette  Type of tissue removed: fibrotic, non-viable  Deepest Tissue Excised: Sub-cutaneous  Wound A: Yes  Wound B:  No  Wound C:  No   Was the removal of viable tissue evidenced by active bleeding?  Yes   Percentage of wound requiring debridement (if entire wound is larger than 20 sq cm)  Wound A: 100 % Wound B: _ % Wound C: _ %     Measurements of each wound after debridement (if wound is enlarged): Same as measurements above.    2nd-9th  Visit debridement   Status of wound since last visit: improved  Patient compliance with support relief/off-loading: yes         Expected duration of skilled wound care therapy: 1-3 months  Goal of therapy: complete healing  Good prognosis  Expected frequency of subsequent procedures: 1-2 weeks        Immediate post procedure topical wound care and follow-up instructions including offloading if applicable: hydrofera blue and bandaid, pt to change daily as above    Pt to apply foam toe spacer right 1st web space daily to offload the ulcer.  He refuses post op shoe today.        Xray right 2nd toe: 2/9/24  No evidence of acute osseous abnormality.  If there is persistent clinical concern for osteomyelitis, correlation with MRI can be performed.   Moderate proximal 2nd interphalangeal osteoarthritis.           Discussed with pt that the right 2nd toe wound and pain is due to arthritis and hammertoe deformity causing rubbing against the hallux.        Cultures of the wound grew pseudomonas  Pt has 1 more day left of the levaquin    RTC 1 weeks for right 2nd toe wound care.  If no improvement will order MRI.  When healed will discuss PIPJ fusion.        No follow-ups on file.    German Pedersen DPM  3/8/24

## 2024-03-15 ENCOUNTER — OFFICE VISIT (OUTPATIENT)
Dept: PODIATRY CLINIC | Facility: CLINIC | Age: 74
End: 2024-03-15

## 2024-03-15 DIAGNOSIS — L97.512 RIGHT SECOND TOE ULCER, WITH FAT LAYER EXPOSED (HCC): Primary | ICD-10-CM

## 2024-03-15 DIAGNOSIS — M79.674 PAIN IN RIGHT TOE(S): ICD-10-CM

## 2024-03-15 DIAGNOSIS — M19.071 ARTHRITIS OF JOINT OF LESSER TOE OF RIGHT FOOT: ICD-10-CM

## 2024-03-15 PROCEDURE — 99214 OFFICE O/P EST MOD 30 MIN: CPT | Performed by: PODIATRIST

## 2024-03-15 NOTE — PROGRESS NOTES
[facility] Podiatry  Progress Note    Benjamin Ralph is a 73 year old male.   Chief Complaint   Patient presents with    Ulcer     F/u Right 2nd toe ulcer - pain 2-8/10 worse when walking.         HPI:     This is a pleasant male that presents to clinic today due to chronic wound on the right 2nd toe f/u.  He has been changing the dressings daily and did finish the abx as prescribed.       Allergies: Penicillin g and Penicillins   Current Outpatient Medications   Medication Sig Dispense Refill    dutasteride 0.5 MG Oral Cap Take 1 capsule (0.5 mg total) by mouth daily. 90 capsule 3    montelukast 10 MG Oral Tab TAKE 1 TABLET (10 MG TOTAL) BY MOUTH NIGHTLY 90 tablet 1    rosuvastatin 10 MG Oral Tab Take 1 tablet (10 mg total) by mouth nightly. 90 tablet 3    sildenafil 20 MG Oral Tab Take one tablet PO daily as needed 10 tablet 5    azelastine 0.1 % Nasal Solution 2 sprays by Nasal route 2 (two) times daily. 30 mL 3    dutasteride 0.5 MG Oral Cap Take 1 capsule (0.5 mg total) by mouth daily. 90 capsule 3    acetaminophen 325 MG Oral Tab Take 2 tablets (650 mg total) by mouth every 6 (six) hours as needed for Pain.        Past Medical History:   Diagnosis Date    Basal cell carcinoma 2017    left nose    Basal cell carcinoma of nose 2-17-17    BCC (basal cell carcinoma) 2021     left posterior lateral neck    BCC (basal cell carcinoma) 2022    right ala base    Calculus of kidney 2002    left    Chronic headaches     Chronic pansinusitis 6/28/2017    Chronic sinusitis     Congestion of nasal sinus     COVID-19 12/21/2020    Esophageal reflux     Ganglion of flexor tendon sheath of right thumb 2-17-17    High cholesterol     Melanoma (HCC) 8/2016    left arm, stage I; .75 mm depth    Osteoarthritis     Plantar wart 06/26/2017    right heel    Pneumonia due to organism     2016    Visual impairment     Glasses      Past Surgical History:   Procedure Laterality Date    ADJ TISS XFER LID,NOS,EAR <10SQCM Right  2-17-17    Exc lesion of nose, V_Y flap    COLONOSCOPY      COLONOSCOPY      COLONOSCOPY N/A 12/26/2018    Procedure: COLONOSCOPY;  Surgeon: Isidoro Mosley MD;  Location: [facility] ENDOSCOPY    EXC SKIN MALIG 2.1-3CM FACE,FACIAL  05/18/2022    EXCIS TENDON SHEATH LESN,HAND/FINGR Right 2-17-17    Exc ganglion R thumb    FOOT SURGERY Left 2006    Nerve procedure    FULL GRAFT PROC HEAD,FAC,HAND <20SQC  05/18/2022    NASAL SCOPY,REMV PART ETHMOID      NASAL SCOPY,RMV TISS MAXILL SINUS      REPAIR OF NASAL SEPTUM  1985    REPAIR OF NASAL SEPTUM      REPR CMPL WND HEAD,FAC,HAND 2.6-7.5  05/18/2022    SKIN SURGERY Left 10/07/16    left upper arm melanoma      Family History   Problem Relation Age of Onset    Dementia Mother     Seizure Disorder Mother     Hypertension Mother     Cancer Mother         skin    Cancer Father         Leukemia      Social History     Socioeconomic History    Marital status: Single    Number of children: 0   Occupational History    Occupation:    Tobacco Use    Smoking status: Former     Packs/day: 0.00     Years: 42.00     Additional pack years: 0.00     Total pack years: 0.00     Types: Cigarettes     Quit date: 3/25/2020     Years since quitting: 3.9     Passive exposure: Never    Smokeless tobacco: Never   Vaping Use    Vaping Use: Never used   Substance and Sexual Activity    Alcohol use: Yes     Alcohol/week: 0.0 standard drinks of alcohol     Comment: rarely    Drug use: No   Other Topics Concern    Caffeine Concern Yes     Comment: coffee, 3cups/day    Grew up on a farm No    History of tanning No    Outdoor occupation No    Pt has a pacemaker No    Pt has a defibrillator No    Reaction to local anesthetic No           REVIEW OF SYSTEMS:   Denies nausea, fever, chills  No calf pain  No other muscle or joint aches  Denies chest pain or shortness of breath.      EXAM:   There were no vitals taken for this visit.    Constitutional:   Patient in no apparent distress. Well  kept Of normal body habitus. Alert and oriented to person, place, and time.  Vascular examination:   DP pulse is 2/4  PT pulse is 2/4  Capillary refill is immediate    Right 2nd toe edema, improved    Integumentary examination:   There are no varicosities.   Skin appears moist, warm, and supple with positive hair growth.     Ulceration:     Etiology of ulceration:  Pressure        Location & Measurements of each wound L x W x D in cm (before debridement if performed)     Wound A location: right 2nd toe medial PIPJ Length: 0.3 x Width: 0.3 x Depth: 0.3 cm with exposed capsule       Description of the wound: granular and fibrotic  Description of exudate: none  no evidence of infection   no evidence of undermining   no evidence of tunneling   no evidence of reduced circulation  If evidence of infection is present document treatment/response:        Required for Pressure Ulcers-Stage of Ulcer (each wound)  Stage 1: Non-blanchable erythema of intact skin:   Stage 2: Partial-thickness skin loss with exposed dermis:   Stage 3: Full-thickness skin loss: yes  Stage 4: Full-thickness loss of skin & tissue:   Unstageable:     Neurological examination:   Vibratory (128-Hz tuning fork) sensation is present to right and is present to left.  Sharp/dull is present to right and is present to left.  Musculoskeletal examination:  Muscle Strength is 5/5.    POP to right 2nd toe PIPJ  Lateral contracture on the right 2nd toe at the PIPJ      LABS & IMAGING:     Lab Results   Component Value Date    GLU 91 10/12/2023    BUN 17 10/12/2023    CREATSERUM 1.07 10/12/2023    BUNCREA 15.9 10/12/2023    ANIONGAP 6 10/12/2023    GFRAA 102 06/20/2022    GFRNAA 89 06/20/2022    CA 9.3 10/12/2023     10/12/2023    K 4.4 10/12/2023     10/12/2023    CO2 27.0 10/12/2023    OSMOCALC 293 10/12/2023        No results found for: \"EAG\", \"A1C\"     No results found.     ASSESSMENT AND PLAN:   Diagnoses and all orders for this visit:    Right  second toe ulcer, with fat layer exposed (HCC)    Arthritis of joint of lesser toe of right foot    Pain in right toe(s)                      Plan:     Discussed in detail the etiology of ulceration.  Discussed the importance of good hygiene and following conservative care instructions.  Discussed the potential for infection and other risks, including osteomyelitis, if non-compliant. Patient verbalized understanding.  Discussed the potential for loss of limb/life.  Pt instructed on signs and symptoms of infection to watch for including N/F/V/C/SOB/CP, redness, increased drainage, malodor, etc. If any concern patient is to contact our office immediately or go to the Emergency Department. Pt acknowledge understanding.     Procedure Note: Debridement NOT PERFORMED TODAY  11042-Debridement, subcutaneous tissue (includes epidermis and dermis, if performed); first 20 square cm or less. (See overall size of wound to calculate debridement size)  Sharp excisional debridement of wound was performed to the level of and including subcutaneous tissue with #15 blade, dermal curette, or moistened gauze as listed below. Pt tolerated debridement well. Granular tissue/wound base was achieved with healthy bleeding noted. Bleeding was controlled with manual pressure and dry, sterile gauze. Non-viable tissue removed from wound bed with debridement.    Instrumentation Used:  dermal currette  Type of tissue removed: fibrotic, non-viable  Deepest Tissue Excised: Sub-cutaneous  Wound A: Yes  Wound B:  No  Wound C:  No   Was the removal of viable tissue evidenced by active bleeding?  Yes   Percentage of wound requiring debridement (if entire wound is larger than 20 sq cm)  Wound A: 100 % Wound B: _ % Wound C: _ %     Measurements of each wound after debridement (if wound is enlarged): Same as measurements above.    2nd-9th Visit debridement   Status of wound since last visit: not improved  Patient compliance with support relief/off-loading:  yes         Expected duration of skilled wound care therapy: 1-3 months  Goal of therapy: complete healing  Good prognosis  Expected frequency of subsequent procedures: 1-2 weeks        Immediate post procedure topical wound care and follow-up instructions including offloading if applicable: puracol and bandaid, pt to change daily as above    Pt to apply foam toe spacer right 1st web space daily to offload the ulcer.  He refuses post op shoe today.        Xray right 2nd toe: 2/9/24  No evidence of acute osseous abnormality.  If there is persistent clinical concern for osteomyelitis, correlation with MRI can be performed.   Moderate proximal 2nd interphalangeal osteoarthritis.           Discussed with pt that the right 2nd toe wound and pain is due to arthritis and hammertoe deformity causing rubbing against the hallux.        Cultures of the wound grew pseudomonas  Pt finished course of levaquin      Ordered STAT MRI right foot     RTC 1 weeks for right 2nd toe wound care and will review MRI results.  If no signs of osteomyelitis will proceed with scheduling PIPJ fusion surgery.  Will prescribe levaquin post op.        No follow-ups on file.    German Pedersen DPM  3/15/24

## 2024-03-19 ENCOUNTER — TELEPHONE (OUTPATIENT)
Dept: PODIATRY CLINIC | Facility: CLINIC | Age: 74
End: 2024-03-19

## 2024-03-19 ENCOUNTER — HOSPITAL ENCOUNTER (OUTPATIENT)
Dept: MRI IMAGING | Age: 74
Discharge: HOME | End: 2024-03-19
Attending: PODIATRIST
Payer: MEDICARE

## 2024-03-19 DIAGNOSIS — M19.071 ARTHRITIS OF JOINT OF LESSER TOE OF RIGHT FOOT: ICD-10-CM

## 2024-03-19 DIAGNOSIS — L97.512 RIGHT SECOND TOE ULCER, WITH FAT LAYER EXPOSED (HCC): ICD-10-CM

## 2024-03-19 PROCEDURE — 73718 MRI LOWER EXTREMITY W/O DYE: CPT | Performed by: PODIATRIST

## 2024-03-19 NOTE — TELEPHONE ENCOUNTER
Please call patient and schedule appt for MRI discussion on 3/22/24 OK to double book at 8:30AM.  After this has been done you can go ahead and cancel his 3/22 and 4/15 appts.

## 2024-03-20 ENCOUNTER — TELEPHONE (OUTPATIENT)
Dept: PODIATRY CLINIC | Facility: CLINIC | Age: 74
End: 2024-03-20

## 2024-03-20 DIAGNOSIS — M86.9 OSTEOMYELITIS OF SECOND TOE OF RIGHT FOOT (HCC): Primary | ICD-10-CM

## 2024-03-20 NOTE — TELEPHONE ENCOUNTER
Called patient and informed him that his MRI did reveal osteomyelitis of the 2nd toe.  He does want to try IV abx and is not interested in amputation at this time.  Referred patient to ID.  [place] to have his office call patient to schedule appt for discussion of IV abx therapy to treat bone infection.

## 2024-03-22 ENCOUNTER — OFFICE VISIT (OUTPATIENT)
Dept: PODIATRY CLINIC | Facility: CLINIC | Age: 74
End: 2024-03-22

## 2024-03-22 DIAGNOSIS — L97.512 RIGHT SECOND TOE ULCER, WITH FAT LAYER EXPOSED (HCC): ICD-10-CM

## 2024-03-22 DIAGNOSIS — M19.071 ARTHRITIS OF JOINT OF LESSER TOE OF RIGHT FOOT: ICD-10-CM

## 2024-03-22 DIAGNOSIS — M86.9 OSTEOMYELITIS OF SECOND TOE OF RIGHT FOOT (HCC): Primary | ICD-10-CM

## 2024-03-22 DIAGNOSIS — M79.674 PAIN IN RIGHT TOE(S): ICD-10-CM

## 2024-03-22 PROCEDURE — 99214 OFFICE O/P EST MOD 30 MIN: CPT | Performed by: PODIATRIST

## 2024-03-22 RX ORDER — LEVOFLOXACIN 750 MG/1
TABLET, FILM COATED ORAL
COMMUNITY
Start: 2024-03-21

## 2024-03-22 RX ORDER — DOXYCYCLINE HYCLATE 100 MG/1
CAPSULE ORAL
COMMUNITY
Start: 2024-03-21

## 2024-03-22 NOTE — PROGRESS NOTES
[facility] Podiatry  Progress Note    Benjamin Ralph is a 73 year old male.   Chief Complaint   Patient presents with    Ulcer     Right 2nd toe f/u - states he has some pain rated as 6/10 on and off - no numbness or tingling          HPI:     This is a pleasant male that presents to clinic today due to chronic wound on the right 2nd toe f/u.  He has been changing the dressings daily.  He did see ID and is taking oral abx.        Allergies: Penicillin g and Penicillins   Current Outpatient Medications   Medication Sig Dispense Refill    doxycycline 100 MG Oral Cap       levoFLOXacin 750 MG Oral Tab       dutasteride 0.5 MG Oral Cap Take 1 capsule (0.5 mg total) by mouth daily. 90 capsule 3    montelukast 10 MG Oral Tab TAKE 1 TABLET (10 MG TOTAL) BY MOUTH NIGHTLY 90 tablet 1    rosuvastatin 10 MG Oral Tab Take 1 tablet (10 mg total) by mouth nightly. 90 tablet 3    sildenafil 20 MG Oral Tab Take one tablet PO daily as needed 10 tablet 5    azelastine 0.1 % Nasal Solution 2 sprays by Nasal route 2 (two) times daily. 30 mL 3    dutasteride 0.5 MG Oral Cap Take 1 capsule (0.5 mg total) by mouth daily. 90 capsule 3    acetaminophen 325 MG Oral Tab Take 2 tablets (650 mg total) by mouth every 6 (six) hours as needed for Pain.        Past Medical History:   Diagnosis Date    Basal cell carcinoma 2017    left nose    Basal cell carcinoma of nose 2-17-17    BCC (basal cell carcinoma) 2021     left posterior lateral neck    BCC (basal cell carcinoma) 2022    right ala base    Calculus of kidney 2002    left    Chronic headaches     Chronic pansinusitis 6/28/2017    Chronic sinusitis     Congestion of nasal sinus     COVID-19 12/21/2020    Esophageal reflux     Ganglion of flexor tendon sheath of right thumb 2-17-17    High cholesterol     Melanoma (HCC) 8/2016    left arm, stage I; .75 mm depth    Osteoarthritis     Plantar wart 06/26/2017    right heel    Pneumonia due to organism     2016    Visual impairment      Glasses      Past Surgical History:   Procedure Laterality Date    ADJ TISS XFER LID,NOS,EAR <10SQCM Right 2-17-17    Exc lesion of nose, V_Y flap    COLONOSCOPY      COLONOSCOPY      COLONOSCOPY N/A 12/26/2018    Procedure: COLONOSCOPY;  Surgeon: Isidoro Mosley MD;  Location: [facility] ENDOSCOPY    EXC SKIN MALIG 2.1-3CM FACE,FACIAL  05/18/2022    EXCIS TENDON SHEATH LESN,HAND/FINGR Right 2-17-17    Exc ganglion R thumb    FOOT SURGERY Left 2006    Nerve procedure    FULL GRAFT PROC HEAD,FAC,HAND <20SQC  05/18/2022    NASAL SCOPY,REMV PART ETHMOID      NASAL SCOPY,RMV TISS MAXILL SINUS      REPAIR OF NASAL SEPTUM  1985    REPAIR OF NASAL SEPTUM      REPR CMPL WND HEAD,FAC,HAND 2.6-7.5  05/18/2022    SKIN SURGERY Left 10/07/16    left upper arm melanoma      Family History   Problem Relation Age of Onset    Dementia Mother     Seizure Disorder Mother     Hypertension Mother     Cancer Mother         skin    Cancer Father         Leukemia      Social History     Socioeconomic History    Marital status: Single    Number of children: 0   Occupational History    Occupation:    Tobacco Use    Smoking status: Former     Packs/day: 0.00     Years: 42.00     Additional pack years: 0.00     Total pack years: 0.00     Types: Cigarettes     Quit date: 3/25/2020     Years since quitting: 3.9     Passive exposure: Never    Smokeless tobacco: Never   Vaping Use    Vaping Use: Never used   Substance and Sexual Activity    Alcohol use: Yes     Alcohol/week: 0.0 standard drinks of alcohol     Comment: rarely    Drug use: No   Other Topics Concern    Caffeine Concern Yes     Comment: coffee, 3cups/day    Grew up on a farm No    History of tanning No    Outdoor occupation No    Pt has a pacemaker No    Pt has a defibrillator No    Reaction to local anesthetic No           REVIEW OF SYSTEMS:   Denies nausea, fever, chills  No calf pain  No other muscle or joint aches  Denies chest pain or shortness of  breath.      EXAM:   There were no vitals taken for this visit.    Constitutional:   Patient in no apparent distress. Well kept Of normal body habitus. Alert and oriented to person, place, and time.  Vascular examination:   DP pulse is 2/4  PT pulse is 2/4  Capillary refill is immediate    Right 2nd toe edema, improved    Integumentary examination:   There are no varicosities.   Skin appears moist, warm, and supple with positive hair growth.     Ulceration:     Etiology of ulceration:  Pressure        Location & Measurements of each wound L x W x D in cm (before debridement if performed)     Wound A location: right 2nd toe medial PIPJ Length: 0.3 x Width: 0.3 x Depth: 0.3 cm with exposed capsule       Description of the wound: granular and fibrotic  Description of exudate: none  no evidence of infection   no evidence of undermining   no evidence of tunneling   no evidence of reduced circulation  If evidence of infection is present document treatment/response:        Required for Pressure Ulcers-Stage of Ulcer (each wound)  Stage 1: Non-blanchable erythema of intact skin:   Stage 2: Partial-thickness skin loss with exposed dermis:   Stage 3: Full-thickness skin loss: yes  Stage 4: Full-thickness loss of skin & tissue:   Unstageable:     Neurological examination:   Vibratory (128-Hz tuning fork) sensation is present to right and is present to left.  Sharp/dull is present to right and is present to left.  Musculoskeletal examination:  Muscle Strength is 5/5.    POP to right 2nd toe PIPJ  Lateral contracture on the right 2nd toe at the PIPJ      LABS & IMAGING:     Lab Results   Component Value Date    GLU 91 10/12/2023    BUN 17 10/12/2023    CREATSERUM 1.07 10/12/2023    BUNCREA 15.9 10/12/2023    ANIONGAP 6 10/12/2023    GFRAA 102 06/20/2022    GFRNAA 89 06/20/2022    CA 9.3 10/12/2023     10/12/2023    K 4.4 10/12/2023     10/12/2023    CO2 27.0 10/12/2023    OSMOCALC 293 10/12/2023        No results  found for: \"EAG\", \"A1C\"     No results found.     ASSESSMENT AND PLAN:   Diagnoses and all orders for this visit:    Osteomyelitis of second toe of right foot (HCC)    Right second toe ulcer, with fat layer exposed (HCC)    Arthritis of joint of lesser toe of right foot    Pain in right toe(s)                        Plan:     Discussed in detail the etiology of ulceration.  Discussed the importance of good hygiene and following conservative care instructions.  Discussed the potential for infection and other risks, including osteomyelitis, if non-compliant. Patient verbalized understanding.  Discussed the potential for loss of limb/life.  Pt instructed on signs and symptoms of infection to watch for including N/F/V/C/SOB/CP, redness, increased drainage, malodor, etc. If any concern patient is to contact our office immediately or go to the Emergency Department. Pt acknowledge understanding.     Procedure Note: Debridement NOT PERFORMED TODAY  11042-Debridement, subcutaneous tissue (includes epidermis and dermis, if performed); first 20 square cm or less. (See overall size of wound to calculate debridement size)  Sharp excisional debridement of wound was performed to the level of and including subcutaneous tissue with #15 blade, dermal curette, or moistened gauze as listed below. Pt tolerated debridement well. Granular tissue/wound base was achieved with healthy bleeding noted. Bleeding was controlled with manual pressure and dry, sterile gauze. Non-viable tissue removed from wound bed with debridement.    Instrumentation Used:  dermal currette  Type of tissue removed: fibrotic, non-viable  Deepest Tissue Excised: Sub-cutaneous  Wound A: Yes  Wound B:  No  Wound C:  No   Was the removal of viable tissue evidenced by active bleeding?  Yes   Percentage of wound requiring debridement (if entire wound is larger than 20 sq cm)  Wound A: 100 % Wound B: _ % Wound C: _ %     Measurements of each wound after debridement (if  wound is enlarged): Same as measurements above.    2nd-9th Visit debridement   Status of wound since last visit: not improved  Patient compliance with support relief/off-loading: yes         Expected duration of skilled wound care therapy: 1-3 months  Goal of therapy: complete healing  Good prognosis  Expected frequency of subsequent procedures: 1-2 weeks        Immediate post procedure topical wound care and follow-up instructions including offloading if applicable: puracol and bandaid, pt to change daily as above    Pt to apply foam toe spacer right 1st web space daily to offload the ulcer.  He refuses post op shoe.        MRI Right foot: 3/19/24  Osteomyelitis of the 2nd digit middle and proximal phalanges as described.           Discussed with pt that the right 2nd toe wound and pain is due to arthritis and hammertoe deformity causing rubbing against the hallux.        Cultures of the wound grew pseudomonas  Pt is seeing Dr. Valencia and started on oral levaquin and doxy for 4 weeks in total.  He has f/u appt on 4/25 with [facility].      Did discuss with pt that if the abx therapy does not resolve the infection/wound he will need amputation of the toe.      RTC 2-3 weeks for right 2nd toe wound care f/u.        No follow-ups on file.    German Pedersen, NILESH  3/22/24

## 2024-04-10 ENCOUNTER — TELEPHONE (OUTPATIENT)
Dept: PODIATRY CLINIC | Facility: CLINIC | Age: 74
End: 2024-04-10

## 2024-04-10 ENCOUNTER — OFFICE VISIT (OUTPATIENT)
Dept: PODIATRY CLINIC | Facility: CLINIC | Age: 74
End: 2024-04-10

## 2024-04-10 DIAGNOSIS — M79.674 PAIN IN RIGHT TOE(S): ICD-10-CM

## 2024-04-10 DIAGNOSIS — M19.071 ARTHRITIS OF JOINT OF LESSER TOE OF RIGHT FOOT: ICD-10-CM

## 2024-04-10 DIAGNOSIS — L97.512 RIGHT SECOND TOE ULCER, WITH FAT LAYER EXPOSED (HCC): ICD-10-CM

## 2024-04-10 DIAGNOSIS — M86.9 OSTEOMYELITIS OF SECOND TOE OF RIGHT FOOT (HCC): Primary | ICD-10-CM

## 2024-04-10 PROCEDURE — 99214 OFFICE O/P EST MOD 30 MIN: CPT | Performed by: PODIATRIST

## 2024-04-10 NOTE — TELEPHONE ENCOUNTER
S/W patient and informed of scheduled sx. At this time, pt and I have gone over pre op instructions as well as pov appts. Patient has expressed verbal understanding and had no questions at this time. Patient was advised to reach me directly at [phone] with any questions or concerns in regards to scheduled sx or the povs I have scheduled for him. Managed care orders have been placed at this time and sx is on Dr. Pedersen's calendar.

## 2024-04-10 NOTE — TELEPHONE ENCOUNTER
Procedure:   right 2nd toe amputation at the Metatarsal phalangeal joint  CPT code:   Length of Surgery: 20 minutes  Any Instruments:   Call patient: ASAP  Anesthesia: Local  Location: [facility]  Assistance: none  Pacemaker: No  Anticoagulants: No  Nickel Allergy: No  Latex Allergy: No  Diagnosis/ICD Code:   No diagnosis found.

## 2024-04-10 NOTE — PROGRESS NOTES
[facility] Podiatry  Progress Note    Benjamin Ralph is a 73 year old male.   Chief Complaint   Patient presents with    Wound Care     R 2nd toe f/u - Pt here for  wound care - Still some drainage. Pain with shoes on. No recent fever or chills.          HPI:     This is a pleasant male that presents to clinic today due to chronic wound on the right 2nd toe with bone infection f/u.  He has been changing the dressings daily.  He did see ID and is taking oral abx.  He believes it has not improved and would like to proceed with surgery.        Allergies: Penicillin g and Penicillins   Current Outpatient Medications   Medication Sig Dispense Refill    doxycycline 100 MG Oral Cap       levoFLOXacin 750 MG Oral Tab       dutasteride 0.5 MG Oral Cap Take 1 capsule (0.5 mg total) by mouth daily. 90 capsule 3    montelukast 10 MG Oral Tab TAKE 1 TABLET (10 MG TOTAL) BY MOUTH NIGHTLY 90 tablet 1    rosuvastatin 10 MG Oral Tab Take 1 tablet (10 mg total) by mouth nightly. 90 tablet 3    sildenafil 20 MG Oral Tab Take one tablet PO daily as needed 10 tablet 5    azelastine 0.1 % Nasal Solution 2 sprays by Nasal route 2 (two) times daily. 30 mL 3    dutasteride 0.5 MG Oral Cap Take 1 capsule (0.5 mg total) by mouth daily. 90 capsule 3    acetaminophen 325 MG Oral Tab Take 2 tablets (650 mg total) by mouth every 6 (six) hours as needed for Pain.        Past Medical History:    Basal cell carcinoma    left nose    Basal cell carcinoma of nose    BCC (basal cell carcinoma)     left posterior lateral neck    BCC (basal cell carcinoma)    right ala base    Calculus of kidney    left    Chronic headaches    Chronic pansinusitis    Chronic sinusitis    Congestion of nasal sinus    COVID-19    Esophageal reflux    Ganglion of flexor tendon sheath of right thumb    High cholesterol    Melanoma (HCC)    left arm, stage I; .75 mm depth    Osteoarthritis    Plantar wart    right heel    Pneumonia due to organism    2016    Visual  impairment    Glasses      Past Surgical History:   Procedure Laterality Date    Adj tiss xfer lid,nos,ear <10sqcm Right 17    Exc lesion of nose, V_Y flap    Colonoscopy      Colonoscopy      Colonoscopy N/A 2018    Procedure: COLONOSCOPY;  Surgeon: Isidoro Mosley MD;  Location: [facility] ENDOSCOPY    Exc skin malig 2.1-3cm face,facial  2022    Excis tendon sheath lesn,hand/fingr Right 17    Exc ganglion R thumb    Foot surgery Left     Nerve procedure    Full graft proc head,fac,hand <20sqc  2022    Nasal scopy,remv part ethmoid      Nasal scopy,rmv tiss maxill sinus      Repair of nasal septum  1985    Repair of nasal septum      Repr cmpl wnd head,fac,hand 2.6-7.5  2022    Skin surgery Left 10/07/16    left upper arm melanoma      Family History   Problem Relation Age of Onset    Dementia Mother     Seizure Disorder Mother     Hypertension Mother     Cancer Mother         skin    Cancer Father         Leukemia      Social History     Socioeconomic History    Marital status: Single    Number of children: 0   Occupational History    Occupation:    Tobacco Use    Smoking status: Former     Current packs/day: 0.00     Types: Cigarettes     Quit date: 3/25/1978     Years since quittin.0     Passive exposure: Never    Smokeless tobacco: Never   Vaping Use    Vaping status: Never Used   Substance and Sexual Activity    Alcohol use: Yes     Alcohol/week: 0.0 standard drinks of alcohol     Comment: rarely    Drug use: No   Other Topics Concern    Caffeine Concern Yes     Comment: coffee, 3cups/day    Grew up on a farm No    History of tanning No    Outdoor occupation No    Pt has a pacemaker No    Pt has a defibrillator No    Reaction to local anesthetic No           REVIEW OF SYSTEMS:   Denies nausea, fever, chills  No calf pain  No other muscle or joint aches  Denies chest pain or shortness of breath.      EXAM:   There were no vitals taken for this  visit.    Constitutional:   Patient in no apparent distress. Well kept Of normal body habitus. Alert and oriented to person, place, and time.  Vascular examination:   DP pulse is 2/4  PT pulse is 2/4  Capillary refill is immediate    Right 2nd toe edema, improved    Integumentary examination:   There are no varicosities.   Skin appears moist, warm, and supple with positive hair growth.     Ulceration:     Etiology of ulceration:  Pressure        Location & Measurements of each wound L x W x D in cm (before debridement if performed)     Wound A location: right 2nd toe medial PIPJ Length: 0.3 x Width: 0.3 x Depth: 0.3 cm with exposed capsule       Description of the wound: granular and fibrotic  Description of exudate: none  no evidence of infection   no evidence of undermining   no evidence of tunneling   no evidence of reduced circulation  If evidence of infection is present document treatment/response:        Required for Pressure Ulcers-Stage of Ulcer (each wound)  Stage 1: Non-blanchable erythema of intact skin:   Stage 2: Partial-thickness skin loss with exposed dermis:   Stage 3: Full-thickness skin loss: yes  Stage 4: Full-thickness loss of skin & tissue:   Unstageable:     Neurological examination:   Vibratory (128-Hz tuning fork) sensation is present to right and is present to left.  Sharp/dull is present to right and is present to left.  Musculoskeletal examination:  Muscle Strength is 5/5.    POP to right 2nd toe PIPJ  Lateral contracture on the right 2nd toe at the PIPJ      LABS & IMAGING:     Lab Results   Component Value Date    GLU 91 10/12/2023    BUN 17 10/12/2023    CREATSERUM 1.07 10/12/2023    BUNCREA 15.9 10/12/2023    ANIONGAP 6 10/12/2023    GFRAA 102 06/20/2022    GFRNAA 89 06/20/2022    CA 9.3 10/12/2023     10/12/2023    K 4.4 10/12/2023     10/12/2023    CO2 27.0 10/12/2023    OSMOCALC 293 10/12/2023        No results found for: \"EAG\", \"A1C\"     No results found.     ASSESSMENT  AND PLAN:   Diagnoses and all orders for this visit:    Osteomyelitis of second toe of right foot (HCC)    Right second toe ulcer, with fat layer exposed (HCC)    Arthritis of joint of lesser toe of right foot    Pain in right toe(s)                          Plan:     Discussed in detail the etiology of ulceration.  Discussed the importance of good hygiene and following conservative care instructions.  Discussed the potential for infection and other risks, including osteomyelitis, if non-compliant. Patient verbalized understanding.  Discussed the potential for loss of limb/life.  Pt instructed on signs and symptoms of infection to watch for including N/F/V/C/SOB/CP, redness, increased drainage, malodor, etc. If any concern patient is to contact our office immediately or go to the Emergency Department. Pt acknowledge understanding.        Immediate post procedure topical wound care and follow-up instructions including offloading if applicable: betadine ointment and bandaid, pt to change daily as above    Pt to apply foam toe spacer right 1st web space daily to offload the ulcer.  He refuses post op shoe.        MRI Right foot: 3/19/24  Osteomyelitis of the 2nd digit middle and proximal phalanges as described.       Discussed with pt that the right 2nd toe wound and pain is due to arthritis and hammertoe deformity causing rubbing against the hallux.        Cultures of the wound grew pseudomonas  Pt is seeing Dr. Valencia and is on oral levaquin and doxy for 4 weeks in total, he has 9 more days left.  He has f/u appt on 4/25 with [facility].      Due to no improvement of the wound/osteomyelitis recommended right 2nd toe amputation, pt agrees and would like to proceed.        Will proceed with right 2nd toe amputation at the MPJ  Surgery will be under local  Surgery scheduled for 4/18  Pt will continue taking the abx prescribed by ID    The patient has been advised of the approximate disability involved for these procedures.  In addition, the patient has been advised as to the alternatives of care, including continued conservative care as well as surgical procedures. The patient has failed all conservative treatment at this point. The patient understands that if surgical procedures are performed, there are risks and complications that could occur, including but not limited to: hematoma formation, damage to the nervous system, seroma formation, development of a DVT or phlebitis, infection, painful scar tissue formation, stiffness, limited motion, delayed-union, non-union, mal-union, impaired healing, increased chance of swelling, swelling, reaction to implanted biomaterials, over-correction, under-correction with recurrence of the deformities, continued pain, loss of limb, loss of life, and the possibility that future surgery may need to be performed. The patient was given the opportunity to ask questions which were answered. The patient voiced no concerns and will consider all these options. Patient desires surgical intervention. No guarantees were given or implied. Pt consented freely to surgical intervention.  I spent approximately 30 minutes discussing the surgical procedures at length. I reviewed in detail the procedure to be performed, postoperative course, disability to be expected, healing time, prognosis and the importance of their following the recommended postoperative care. Possible complications discussed included but not limited to recurrence of deformity, postoperative pain, swelling, stiffness, rejection of the implant if utilized, return to surgery, infection, residual pain, possible blood clot formation, bleeding, etc. Possible complications relating to over activity and limitations during the postoperative period were reviewed. The patient has responsibilities to help insure successful outcome of the surgery. Postoperative oral and written instructions were reviewed and postoperative course of treatment discussed in  detail. Management of postoperative pain was discussed. All questions related to preoperative, operative and postoperative course of treatment were answered to their understanding and satisfaction. RTO and follow up after surgery is necessary and expected and agreed to by the patient. The patient acknowledged understanding of the above and agrees to follow all instructions and protocols. No guarantee or warranty was given or implied as to the results of the surgery.       No follow-ups on file.    German Pedersen DPM  4/10/24

## 2024-04-12 ENCOUNTER — OFFICE VISIT (OUTPATIENT)
Dept: INTERNAL MEDICINE CLINIC | Facility: CLINIC | Age: 74
End: 2024-04-12
Payer: MEDICARE

## 2024-04-12 VITALS
SYSTOLIC BLOOD PRESSURE: 130 MMHG | HEART RATE: 78 BPM | RESPIRATION RATE: 18 BRPM | HEIGHT: 71 IN | WEIGHT: 196 LBS | BODY MASS INDEX: 27.44 KG/M2 | OXYGEN SATURATION: 95 % | DIASTOLIC BLOOD PRESSURE: 82 MMHG

## 2024-04-12 DIAGNOSIS — N40.0 ENLARGED PROSTATE: ICD-10-CM

## 2024-04-12 DIAGNOSIS — M86.9 OSTEOMYELITIS OF RIGHT FOOT, UNSPECIFIED TYPE (HCC): Primary | ICD-10-CM

## 2024-04-12 PROCEDURE — 99214 OFFICE O/P EST MOD 30 MIN: CPT | Performed by: INTERNAL MEDICINE

## 2024-04-12 PROCEDURE — G2211 COMPLEX E/M VISIT ADD ON: HCPCS | Performed by: INTERNAL MEDICINE

## 2024-04-12 PROCEDURE — 96127 BRIEF EMOTIONAL/BEHAV ASSMT: CPT | Performed by: INTERNAL MEDICINE

## 2024-04-12 NOTE — PROGRESS NOTES
Patient ID: Benjamin Ralph is a 73 year old male.  Chief Complaint   Patient presents with    Follow - Up        HISTORY OF PRESENT ILLNESS:   HPI  Patient presents for above.  Here for 6-month follow-up.    Main issue is he developed osteomyelitis of his right second toe.  Developed a blister several months ago that was nonhealing.  Went to podiatry and infectious disease.  Had MRI done as well which showed osteomyelitis.  Patient has been on antibiotics for several weeks now without any improvement.  He is now scheduled for right second toe amputation in 1 week.  Denies fevers, chills, nausea, vomiting, diarrhea.    History of BPH with elevated PSA.  Recently saw urology and PSA has normalized.  Currently taking dutasteride.    Review of Systems   Constitutional: Negative.    HENT: Negative.     Eyes: Negative.    Respiratory: Negative.     Cardiovascular: Negative.    Gastrointestinal: Negative.    Endocrine: Negative.    Genitourinary: Negative.    Musculoskeletal: Negative.    Skin:  Positive for wound.   Allergic/Immunologic: Negative.    Neurological: Negative.    Hematological: Negative.    Psychiatric/Behavioral: Negative.       MEDICAL HISTORY:     Past Medical History:    Basal cell carcinoma    left nose    Basal cell carcinoma of nose    BCC (basal cell carcinoma)     left posterior lateral neck    BCC (basal cell carcinoma)    right ala base    Calculus of kidney    left    Chronic headaches    Chronic pansinusitis    Chronic sinusitis    Congestion of nasal sinus    COVID-19    Esophageal reflux    Ganglion of flexor tendon sheath of right thumb    High cholesterol    Melanoma (HCC)    left arm, stage I; .75 mm depth    Osteoarthritis    Plantar wart    right heel    Pneumonia due to organism    2016    Visual impairment    Glasses       Past Surgical History:   Procedure Laterality Date    Adj tiss xfer lid,nos,ear <10sqcm Right 2-17-17    Exc lesion of nose, V_Y flap    Colonoscopy       Colonoscopy      Colonoscopy N/A 2018    Procedure: COLONOSCOPY;  Surgeon: Isidoro Mosley MD;  Location: [facility] ENDOSCOPY    Exc skin malig 2.1-3cm face,facial  2022    Excis tendon sheath lesn,hand/fingr Right 2-17-17    Exc ganglion R thumb    Foot surgery Left     Nerve procedure    Full graft proc head,fac,hand <20sqc  2022    Nasal scopy,remv part ethmoid      Nasal scopy,rmv tiss maxill sinus      Repair of nasal septum  1985    Repair of nasal septum      Repr cmpl wnd head,fac,hand 2.6-7.5  2022    Skin surgery Left 10/07/16    left upper arm melanoma         Current Outpatient Medications:     doxycycline 100 MG Oral Cap, , Disp: , Rfl:     levoFLOXacin 750 MG Oral Tab, , Disp: , Rfl:     dutasteride 0.5 MG Oral Cap, Take 1 capsule (0.5 mg total) by mouth daily., Disp: 90 capsule, Rfl: 3    montelukast 10 MG Oral Tab, TAKE 1 TABLET (10 MG TOTAL) BY MOUTH NIGHTLY, Disp: 90 tablet, Rfl: 1    rosuvastatin 10 MG Oral Tab, Take 1 tablet (10 mg total) by mouth nightly., Disp: 90 tablet, Rfl: 3    sildenafil 20 MG Oral Tab, Take one tablet PO daily as needed, Disp: 10 tablet, Rfl: 5    acetaminophen 325 MG Oral Tab, Take 2 tablets (650 mg total) by mouth every 6 (six) hours as needed for Pain., Disp: , Rfl:     Allergies:  Allergies   Allergen Reactions    Penicillin G HIVES     states he is not sure if it was from actual drug    Penicillins HIVES       Social History     Socioeconomic History    Marital status: Single     Spouse name: Not on file    Number of children: 0    Years of education: Not on file    Highest education level: Not on file   Occupational History    Occupation:    Tobacco Use    Smoking status: Former     Current packs/day: 0.00     Types: Cigarettes     Quit date: 3/25/1978     Years since quittin.0     Passive exposure: Never    Smokeless tobacco: Never   Vaping Use    Vaping status: Never Used   Substance and Sexual Activity    Alcohol  use: Yes     Alcohol/week: 0.0 standard drinks of alcohol     Comment: rarely    Drug use: No    Sexual activity: Not Currently   Other Topics Concern     Service Not Asked    Blood Transfusions Not Asked    Caffeine Concern Yes     Comment: coffee, 3cups/day    Occupational Exposure Not Asked    Hobby Hazards Not Asked    Sleep Concern Not Asked    Stress Concern Not Asked    Weight Concern Not Asked    Special Diet Not Asked    Back Care Not Asked    Exercise Not Asked    Bike Helmet Not Asked    Seat Belt Not Asked    Self-Exams Not Asked    Grew up on a farm No    History of tanning No    Outdoor occupation No    Pt has a pacemaker No    Pt has a defibrillator No    Reaction to local anesthetic No   Social History Narrative    Not on file     Social Determinants of Health     Financial Resource Strain: Not on file   Food Insecurity: Not on file   Transportation Needs: Not on file   Physical Activity: Not on file   Stress: Not on file   Social Connections: Not on file   Housing Stability: Not on file           PHYSICAL EXAM:     Vitals:    04/12/24 0740   BP: 130/82   Pulse: 78   Resp: 18   SpO2: 95%   Weight: 196 lb (88.9 kg)   Height: 5' 11\" (1.803 m)       Body mass index is 27.34 kg/m².    Physical Exam  Constitutional:       Appearance: Normal appearance.   Eyes:      General: No scleral icterus.  Cardiovascular:      Rate and Rhythm: Normal rate and regular rhythm.      Pulses: Normal pulses.      Heart sounds: Normal heart sounds. No murmur heard.  Pulmonary:      Effort: Pulmonary effort is normal. No respiratory distress.      Breath sounds: Normal breath sounds. No stridor. No wheezing or rhonchi.   Neurological:      Mental Status: He is alert.   Psychiatric:         Mood and Affect: Mood normal.         Behavior: Behavior normal.           ASSESSMENT/PLAN:   1. Osteomyelitis of right foot, unspecified type (HCC)  Scheduled for right second toe amputation in 1 week.  Okay to take medications  leading into surgery.    2. Enlarged prostate  PSA is normalized.  Follow-up with urology per their request.    Return in about 6 months (around 10/12/2024) for Complete physical.    This note was prepared using Dragon Medical voice recognition dictation software. As a result errors may occur. When identified these errors have been corrected. While every attempt is made to correct errors during dictation discrepancies may still exist.    Felix Metzger MD  4/12/2024

## 2024-04-15 RX ORDER — MONTELUKAST SODIUM 10 MG/1
10 TABLET ORAL NIGHTLY
Qty: 30 TABLET | Refills: 0 | Status: SHIPPED | OUTPATIENT
Start: 2024-04-15

## 2024-04-15 NOTE — TELEPHONE ENCOUNTER
Patient returning my p/c and had questions about insurance. Informed patient that sx has been authorized at this time, patient expressed verbal understanding and no further questions about insurance. Patient asked about any PAT requirements and informed patient that none was needed for this sx, patient expressed verbal understanding and no further questions at this time in regards to PAT. Patient was advised that sx center will contact him day before sx to confirm arrival time.

## 2024-04-15 NOTE — TELEPHONE ENCOUNTER
Returning call in regards to vm left by patient while I was out inquiring about sx scheduled for this Thursday. Informed patient on how to located pre op letter on '[employer] which has a lot of general info about sx. Informed patient he can reach me today at [phone] with any other questions or concerns in regards to sx.

## 2024-04-18 PROCEDURE — 88311 DECALCIFY TISSUE: CPT | Performed by: PODIATRIST

## 2024-04-18 PROCEDURE — 88305 TISSUE EXAM BY PATHOLOGIST: CPT | Performed by: PODIATRIST

## 2024-04-20 ENCOUNTER — TELEPHONE (OUTPATIENT)
Dept: PODIATRY CLINIC | Facility: CLINIC | Age: 74
End: 2024-04-20

## 2024-04-22 ENCOUNTER — TELEPHONE (OUTPATIENT)
Dept: ORTHOPEDICS CLINIC | Facility: CLINIC | Age: 74
End: 2024-04-22

## 2024-04-22 ENCOUNTER — TELEPHONE (OUTPATIENT)
Dept: PODIATRY CLINIC | Facility: CLINIC | Age: 74
End: 2024-04-22

## 2024-04-22 NOTE — TELEPHONE ENCOUNTER
Called patient and left a message with the office phone number . Will try once more to reach him/ post op nurse

## 2024-04-22 NOTE — TELEPHONE ENCOUNTER
Procedure date:4/18/2024  How are you feeling? / My foot is not bad ,it's my knee. I have bad arthritis. That was his main complaint through the entire conversation  Any bleeding?/no    Is the dressing dry & intact?/yes   Level of pain? /2 on the foot  6 on the knee  Character of pain: not bad  Onset of pain:2/19/2024  Aggravating factors:/ hanging my foot down to walk    Duration of pain:not very long  Alleviating factors:elevation  Are you taking the prescribed medication?   Medication Quantity Refills Start End   HYDROcodone-acetaminophen 5-325 MG Oral Tab 20 tablet      Occasional norco. I suggested using tylenol and alternating  with norco to help the foot and knee. He thinks it is his arthritis. He has been putting a heating pad and ice on his knee which was his main concern. He also will put on a knee support.I reminded him of the 4000mg in 24 hours of tylenol including the 325mg in the norco  Are you following all of the PO instructions? appetite fine    Other Comments:He seemed pleased with the suggestions. We also discussed placement of ice on the ankle and foot    Follow-up appt  date: 4/24/2024    Pt was advised if they have any concerns after hours to call our office and they would be directed to on call physician/done.

## 2024-04-24 ENCOUNTER — OFFICE VISIT (OUTPATIENT)
Dept: PODIATRY CLINIC | Facility: CLINIC | Age: 74
End: 2024-04-24
Payer: MEDICARE

## 2024-04-24 ENCOUNTER — TELEPHONE (OUTPATIENT)
Dept: ORTHOPEDICS CLINIC | Facility: CLINIC | Age: 74
End: 2024-04-24

## 2024-04-24 DIAGNOSIS — Z89.421 HISTORY OF AMPUTATION OF LESSER TOE, RIGHT (HCC): Primary | ICD-10-CM

## 2024-04-24 PROCEDURE — 99213 OFFICE O/P EST LOW 20 MIN: CPT | Performed by: PODIATRIST

## 2024-04-24 NOTE — PROGRESS NOTES
[facility] Podiatry  Progress Note    Benjamin Ralph is a 73 year old male.   Chief Complaint   Patient presents with    Post-Op     1 st POV Right second toe amputation Sx 04/18/24. No pain         HPI:     This is a pleasant male that presents to clinic today 1 week S/P right 2nd toe amputation.  He states denies any pain today.  He has kept dressings CDI and is wearing the post op shoe.        Allergies: Penicillin g and Penicillins   Current Outpatient Medications   Medication Sig Dispense Refill    montelukast 10 MG Oral Tab TAKE 1 TABLET BY MOUTH NIGHTLY 30 tablet 0    doxycycline 100 MG Oral Cap       levoFLOXacin 750 MG Oral Tab       dutasteride 0.5 MG Oral Cap Take 1 capsule (0.5 mg total) by mouth daily. 90 capsule 3    rosuvastatin 10 MG Oral Tab Take 1 tablet (10 mg total) by mouth nightly. 90 tablet 3    sildenafil 20 MG Oral Tab Take one tablet PO daily as needed 10 tablet 5    acetaminophen 325 MG Oral Tab Take 2 tablets (650 mg total) by mouth every 6 (six) hours as needed for Pain.        Past Medical History:    Basal cell carcinoma    left nose    Basal cell carcinoma of nose    BCC (basal cell carcinoma)     left posterior lateral neck    BCC (basal cell carcinoma)    right ala base    Calculus of kidney    left    Chronic headaches    Chronic pansinusitis    Chronic sinusitis    Congestion of nasal sinus    COVID-19    Esophageal reflux    Ganglion of flexor tendon sheath of right thumb    High cholesterol    Melanoma (HCC)    left arm, stage I; .75 mm depth    Osteoarthritis    Plantar wart    right heel    Pneumonia due to organism    2016    Visual impairment    Glasses      Past Surgical History:   Procedure Laterality Date    Adj tiss xfer lid,nos,ear <10sqcm Right 2-17-17    Exc lesion of nose, V_Y flap    Colonoscopy      Colonoscopy      Colonoscopy N/A 12/26/2018    Procedure: COLONOSCOPY;  Surgeon: Isidoro Mosley MD;  Location: [facility] ENDOSCOPY    Exc skin malig 2.1-3cm  face,facial  2022    Excis tendon sheath lesn,hand/fingr Right 2-17-17    Exc ganglion R thumb    Foot surgery Left     Nerve procedure    Full graft proc head,fac,hand <20sqc  2022    Nasal scopy,remv part ethmoid      Nasal scopy,rmv tiss maxill sinus      Repair of nasal septum  1985    Repair of nasal septum      Repr cmpl wnd head,fac,hand 2.6-7.5  2022    Skin surgery Left 10/07/16    left upper arm melanoma      Family History   Problem Relation Age of Onset    Dementia Mother     Seizure Disorder Mother     Hypertension Mother     Cancer Mother         skin    Cancer Father         Leukemia      Social History     Socioeconomic History    Marital status: Single    Number of children: 0   Occupational History    Occupation:    Tobacco Use    Smoking status: Former     Current packs/day: 0.00     Types: Cigarettes     Quit date: 3/25/1978     Years since quittin.1     Passive exposure: Never    Smokeless tobacco: Never   Vaping Use    Vaping status: Never Used   Substance and Sexual Activity    Alcohol use: Yes     Alcohol/week: 0.0 standard drinks of alcohol     Comment: rarely    Drug use: No    Sexual activity: Not Currently   Other Topics Concern    Caffeine Concern Yes     Comment: coffee, 3cups/day    Grew up on a farm No    History of tanning No    Outdoor occupation No    Pt has a pacemaker No    Pt has a defibrillator No    Reaction to local anesthetic No           REVIEW OF SYSTEMS:   Denies nausea, fever, chills  No calf pain  No other muscle or joint aches  Denies chest pain or shortness of breath.      EXAM:   There were no vitals taken for this visit.    Constitutional:   Patient in no apparent distress. Well kept Of normal body habitus. Alert and oriented to person, place, and time.  Vascular examination:   DP pulse is 2/4  PT pulse is 2/4  Capillary refill is immediate    Integumentary examination:   There are no varicosities.   Skin appears moist,  warm, and supple with positive hair growth.     Right 2nd toe amputation incision site is healing well with sutures intact and no SOI    Neurological examination:   Vibratory (128-Hz tuning fork) sensation is present to right and is present to left.  Sharp/dull is present to right and is present to left.  Musculoskeletal examination:  Muscle Strength is 5/5.    Right 2nd toe amputation      LABS & IMAGING:     Lab Results   Component Value Date    GLU 91 10/12/2023    BUN 17 10/12/2023    CREATSERUM 1.07 10/12/2023    BUNCREA 15.9 10/12/2023    ANIONGAP 6 10/12/2023    GFRAA 102 06/20/2022    GFRNAA 89 06/20/2022    CA 9.3 10/12/2023     10/12/2023    K 4.4 10/12/2023     10/12/2023    CO2 27.0 10/12/2023    OSMOCALC 293 10/12/2023        No results found for: \"EAG\", \"A1C\"     No results found.     ASSESSMENT AND PLAN:   Diagnoses and all orders for this visit:    History of amputation of lesser toe, right (HCC)                  Plan:     Evaluated Right 2nd toe amputation incision site which is healing well with no SOI    Applied DSD to right foot, pt to keep CDI  Pt to be WBAT short distances right foot with post op shoe    Path Right 2nd toe 4/18/24 revealed no evidence of osteomyelitis      Cultures of the wound grew pseudomonas  Pt was seeing Dr. Valencia and finished oral levaquin and doxy for 4 weeks in total.       RTC 1 week for suture removal          No follow-ups on file.    German Pedersen DPM  4/24/24

## 2024-04-24 NOTE — TELEPHONE ENCOUNTER
Called patient and talked with him about his arthritis pain. He is going to switch to ibuprofen and heat. He thinks he might have twisted his knee some with the surgical shoe. States he was given an orthopedic name and will see Dr Pedersen on the 4-30-24  /post op nurse

## 2024-04-26 ENCOUNTER — HOSPITAL ENCOUNTER (OUTPATIENT)
Dept: GENERAL RADIOLOGY | Facility: HOSPITAL | Age: 74
Discharge: HOME | End: 2024-04-26
Attending: STUDENT IN AN ORGANIZED HEALTH CARE EDUCATION/TRAINING PROGRAM
Payer: MEDICARE

## 2024-04-26 ENCOUNTER — OFFICE VISIT (OUTPATIENT)
Dept: ORTHOPEDICS CLINIC | Facility: CLINIC | Age: 74
End: 2024-04-26
Payer: MEDICARE

## 2024-04-26 VITALS — SYSTOLIC BLOOD PRESSURE: 148 MMHG | DIASTOLIC BLOOD PRESSURE: 97 MMHG | HEART RATE: 96 BPM

## 2024-04-26 DIAGNOSIS — M25.561 RIGHT KNEE PAIN, UNSPECIFIED CHRONICITY: Primary | ICD-10-CM

## 2024-04-26 DIAGNOSIS — M25.561 RIGHT KNEE PAIN, UNSPECIFIED CHRONICITY: ICD-10-CM

## 2024-04-26 PROCEDURE — 73564 X-RAY EXAM KNEE 4 OR MORE: CPT | Performed by: STUDENT IN AN ORGANIZED HEALTH CARE EDUCATION/TRAINING PROGRAM

## 2024-04-26 PROCEDURE — 99204 OFFICE O/P NEW MOD 45 MIN: CPT | Performed by: STUDENT IN AN ORGANIZED HEALTH CARE EDUCATION/TRAINING PROGRAM

## 2024-04-26 PROCEDURE — 20610 DRAIN/INJ JOINT/BURSA W/O US: CPT | Performed by: STUDENT IN AN ORGANIZED HEALTH CARE EDUCATION/TRAINING PROGRAM

## 2024-04-26 RX ORDER — MELOXICAM 15 MG/1
15 TABLET ORAL DAILY
Qty: 30 TABLET | Refills: 0 | Status: SHIPPED | OUTPATIENT
Start: 2024-04-26

## 2024-04-26 RX ORDER — IBUPROFEN 400 MG/1
400 TABLET ORAL EVERY 6 HOURS PRN
COMMUNITY

## 2024-04-26 RX ORDER — TRIAMCINOLONE ACETONIDE 40 MG/ML
40 INJECTION, SUSPENSION INTRA-ARTICULAR; INTRAMUSCULAR ONCE
Status: COMPLETED | OUTPATIENT
Start: 2024-04-26 | End: 2024-04-26

## 2024-04-26 RX ADMIN — TRIAMCINOLONE ACETONIDE 40 MG: 40 INJECTION, SUSPENSION INTRA-ARTICULAR; INTRAMUSCULAR at 13:53:00

## 2024-04-26 NOTE — PROGRESS NOTES
Per verbal order from Dr. Lockhart draw up 3ml of 0.5% Marcaine & 2ml 1% lidocaine and 1ml of Kenalog 40 for cortisone injection to right knee. Sinai BLAIR RN    Patient provided education handout for cortisone injection.

## 2024-04-28 PROBLEM — M25.561 RIGHT KNEE PAIN: Status: ACTIVE | Noted: 2024-04-28

## 2024-04-28 NOTE — PROCEDURES
Procedure Note Corticosteroid Injection  _______________________________________________________________________________________________________________  Patient Name: Benjamin Ralph   Date: 4/26/2024     Based on history, physical exam, and available diagnostic testing, the patient diagnosis appears consistent with intraarticular pathology. We discussed the use of a corticosteroid injection for therapeutic/confirmatory diagnostic purposes. The risks, benefits, and potential complications of corticosteroid injection(s) were discussed in detail. The risks include, but are not limited to: pain, infection, bleeding/hematoma, swelling, flare response, incomplete resolution of symptoms, possible need for further injections or subsequent surgical intervention, subcutaneous fat atrophy, skin pigmentation changes, and elevation of blood sugar. The patient verbalized an understanding and agrees to the injection(s).     Under sterile prep, approximately:  1 mL Kenalog 40mg/ml, 3 mL marcaine plain, 2 mL lidocaine plain    was injected into: Right  knee joint     This procedure was performed without ultrasound guidance    This procedure was well tolerated. The patient understands that the injection could take several days to take effect.    The patient was not sedated and fully conscious for the injection.  Prior to the injection, verification followed the Universal Protocol in the following manner:  [X] A \"Time Out\" was performed prior to the procedure to confirm patient identification, injection site, and preparation of equipment.  [X] The patient was identified using two patient identifiers.  [X] The procedure site was appropriately prepped    Keny Lockhart MD  Orthopaedic Surgery  4/26/2024

## 2024-04-28 NOTE — PROGRESS NOTES
Orthopaedic Surgery New Patient Visit  _____________________________________________________________________________________________________  _____________________________________________________________________________________________________    DATE OF VISIT: 2024     CHIEF COMPLAINT:   Chief Complaint   Patient presents with    Knee Pain     Consult - onset one week ago - does not remember specific injury - using ice, heat, ibuprofen with minimal relief that is fleeting - patient of Dr. Pedersen for right second toe amputation - pain is constant, worse when stands, rated between 6-9/10 - not sleeping well and has no improvement        HISTORY OF PRESENT ILLNESS: Benjamin Ralph is a 73 year old male who presents to the clinic for right knee pain.  He reports the pain onset approximately 1 to 2 weeks ago.  He recently underwent second toe amputation with Dr. Pedersen and has been wearing a boot on that side, which he feels may affect his gait.  He denies any recent injuries to his knee.  He denies any mechanical symptoms inside of the knee.  He denies neurologic symptoms associated with his knee pain.  He has never attempted physical therapy course for this knee but he is working with therapy for his foot.    SOCIAL HISTORY  Social History     Socioeconomic History    Marital status: Single     Spouse name: Not on file    Number of children: 0    Years of education: Not on file    Highest education level: Not on file   Occupational History    Occupation:    Tobacco Use    Smoking status: Former     Current packs/day: 0.00     Types: Cigarettes     Quit date: 3/25/1978     Years since quittin.1     Passive exposure: Never    Smokeless tobacco: Never   Vaping Use    Vaping status: Never Used   Substance and Sexual Activity    Alcohol use: Yes     Alcohol/week: 0.0 standard drinks of alcohol     Comment: rarely    Drug use: No    Sexual activity: Not Currently   Other Topics Concern      Service Not Asked    Blood Transfusions Not Asked    Caffeine Concern Yes     Comment: coffee, 3cups/day    Occupational Exposure Not Asked    Hobby Hazards Not Asked    Sleep Concern Not Asked    Stress Concern Not Asked    Weight Concern Not Asked    Special Diet Not Asked    Back Care Not Asked    Exercise Not Asked    Bike Helmet Not Asked    Seat Belt Not Asked    Self-Exams Not Asked    Grew up on a farm No    History of tanning No    Outdoor occupation No    Pt has a pacemaker No    Pt has a defibrillator No    Reaction to local anesthetic No   Social History Narrative    Not on file     Social Determinants of Health     Financial Resource Strain: Not on file   Food Insecurity: Not on file   Transportation Needs: Not on file   Physical Activity: Not on file   Stress: Not on file   Social Connections: Not on file   Housing Stability: Not on file        PAST MEDICAL HISTORY  Past Medical History:    Basal cell carcinoma    left nose    Basal cell carcinoma of nose    BCC (basal cell carcinoma)     left posterior lateral neck    BCC (basal cell carcinoma)    right ala base    Calculus of kidney    left    Chronic headaches    Chronic pansinusitis    Chronic sinusitis    Congestion of nasal sinus    COVID-19    Esophageal reflux    Ganglion of flexor tendon sheath of right thumb    High cholesterol    Melanoma (HCC)    left arm, stage I; .75 mm depth    Osteoarthritis    Plantar wart    right heel    Pneumonia due to organism    2016    Visual impairment    Glasses        PAST SURGICAL HISTORY  Past Surgical History:   Procedure Laterality Date    Adj tiss xfer lid,nos,ear <10sqcm Right 2-17-17    Exc lesion of nose, V_Y flap    Colonoscopy      Colonoscopy      Colonoscopy N/A 12/26/2018    Procedure: COLONOSCOPY;  Surgeon: Isidoro Mosley MD;  Location: [facility] ENDOSCOPY    Exc skin malig 2.1-3cm face,facial  05/18/2022    Excis tendon sheath lesn,hand/fingr Right 2-17-17    Exc ganglion R thumb     Foot surgery Left 2006    Nerve procedure    Full graft proc head,fac,hand <20sqc  05/18/2022    Nasal scopy,remv part ethmoid      Nasal scopy,rmv tiss maxill sinus      Repair of nasal septum  1985    Repair of nasal septum      Repr cmpl wnd head,fac,hand 2.6-7.5  05/18/2022    Skin surgery Left 10/07/16    left upper arm melanoma        MEDICATIONS  * Reviewed   ibuprofen 400 MG Oral Tab Take 1 tablet (400 mg total) by mouth every 6 (six) hours as needed for Pain.      Meloxicam 15 MG Oral Tab Take 1 tablet (15 mg total) by mouth daily. 30 tablet 0    dutasteride 0.5 MG Oral Cap Take 1 capsule (0.5 mg total) by mouth daily. 90 capsule 3    rosuvastatin 10 MG Oral Tab Take 1 tablet (10 mg total) by mouth nightly. 90 tablet 3    sildenafil 20 MG Oral Tab Take one tablet PO daily as needed 10 tablet 5    acetaminophen 325 MG Oral Tab Take 2 tablets (650 mg total) by mouth every 6 (six) hours as needed for Pain.          ALLERGIES  Allergies   Allergen Reactions    Penicillin G HIVES     states he is not sure if it was from actual drug    Penicillins HIVES        FAMILY HISTORY  Family History   Problem Relation Age of Onset    Dementia Mother     Seizure Disorder Mother     Hypertension Mother     Cancer Mother         skin    Cancer Father         Leukemia        REVIEW OF SYSTEMS  A 14 point review of systems was performed. Pertinent positives and negatives noted in the HPI.    PHYSICAL EXAM  BP (!) 148/97 (BP Location: Left arm, Patient Position: Sitting, Cuff Size: adult)   Pulse 96      Constitutional: The patient is well-developed, well-nourished, in no acute distress.  Neurological: Alert and oriented to person, place, and time.  Psychiatric: Mood and affect normal.  Head: Normocephalic and atraumatic.  Cardiovascular: regular rate by palpation  Pulmonary/Chest: Effort normal. No respiratory distress. Breathing non-labored  Abdominal: Abdomen exhibits no distension.   Right  KNEE  INSPECTION/PALPATION  No  readily apparent visual abnormalities of the knee.   No ecchymosis or erythema  No effusion  No breaks in skin. Skin is euthermic.  Neutral  alignment on standing, slightly antalgic gait  TTP to medial joint line, TTP to lateral joint line  ROM  0-130 degrees  KNEE STRENGTH  Flexion: 5/5  Extension: 5/5  STABILITY  1A lachman, stable anterior drawer  Stable posterior drawer  Stable to varus and valgus stress at 0 and 30 degrees  SILT Saima/Saph/SPN/DPN/T  2+ DP/PT pulse     RESULTS    Lab Results   Component Value Date    WBC 8.3 10/12/2023    HGB 16.7 10/12/2023    .0 10/12/2023      Lab Results   Component Value Date    GLU 91 10/12/2023    BUN 17 10/12/2023    CREATSERUM 1.07 10/12/2023    GFRNAA 89 06/20/2022    GFRAA 102 06/20/2022        IMAGING  I independently viewed and interpreted the imaging. Radiologist interpretation is available in the imaging report.  X-ray: Plain films of the right knee knee including AP, lateral, and sunrise weightbearing views were reviewed. These demonstrate no acute osseous abnormalities.  The patella is centrally positioned within the femoral trochlea.  There are mild degenerative changes in the patellofemoral joint.  There are mild degenerative changes in the medial knee compartment and mild degenerative changes in the lateral knee compartment of the tibiofemoral joint.  There are no  loose bodies evident.  There is no evidence of Segond fracture or other fractures.     XR KNEE, COMPLETE (4 OR MORE VIEWS), RIGHT (CPT=73564)    Result Date: 4/26/2024  PROCEDURE: XR KNEE, COMPLETE (4 OR MORE VIEWS), RIGHT (CPT=73564)  COMPARISON: [facility], X KNEE RT, 9/25/2012, 2:59 PM.  INDICATIONS: Deep right knee pain for 1 week; no known trauma.  TECHNIQUE: 4 views were obtained.    FINDINGS/CONCLUSION:          No acute fracture or dislocation.  No joint effusion.  Mild degenerative joint disease.  Mild vascular calcifications.   Incidental note is made of  mild left knee degenerative joint disease.    elm-remote    Dictated by (CST): Guillermo Zavala MD on 4/26/2024 at 3:37 PM     Finalized by (CST): Guillermo Zavala MD on 4/26/2024 at 3:39 PM             ASSESSMENT/PLAN: Benjaimn Ralph is a 73 year old male who presents to the Orthopaedic surgery clinic today for right knee pain which is consistent with a probable flare of arthritis in his right knee joint.  His baseline arthritis is relatively mild but this exacerbation may be secondary to his recent toe amputation, his shoewear modifications, and increased inflammatory status.  We discussed interventions including nonoperative management and injections.  After discussion of therapy, anti-inflammatories, and injections, the patient opted to proceed with anti-inflammatories and a steroid injection.     Discussed the history, physical exam, treatment to date, and reviewed relevant imaging an studies with the patient.  WEIGHT BEARING STATUS: Weightbearing as tolerated  RANGE-OF-MOTION LIMITATIONS: as tolerated  NEW PRESCRIPTIONS:  We discussed medications for this condition including patient current regimen. Based on this discussion we have added/re-ordered meloxicam  IMAGING ORDERED: none  CONSULTS PLACED: We discussed the role of therapy for this condition including previous/ongoing therapy and specialist services. Based on this discussion we have opted not to place a consultation to other specialists/therapy  PROSTHESES/ORTHOTICS: the patient does not need prostheses/orthoses for the current condition  PROCEDURES: steroid injection right knee    FOLLOW-UP: as needed    RADIOGRAPHS AT NEXT VISIT: none    I have personally seen Benjamin Ralph and discussed in detail their plan of care. Prior to departure, they indicated agreement with and understanding of their plan of care and their follow-up as documented herein this note. Please note that this note was written in combination with voice recognition/dictation  software and there is a possibility of transcription errors which were not identified at the time of note submission. If clarification is necessary, please contact the author or clinic staff.    Keny Lockhart MD  Orthopaedic Surgery  4/26/2024

## 2024-04-28 NOTE — PATIENT INSTRUCTIONS
Dear Benjamin Ralph ,    It was a pleasure seeing you today. I saw you for your right knee joint pain. Today we discussed the diagnosis, the natural history of this process, management options including conservative management, pain management including activity modification, icing, elevation, and medications, and anticipated follow-up plans.    I ordered the following for you:     - Medications: Meloxicam    Please don't hesitate to reach out if you have any questions or concerns. We will plan to follow-up next as needed for return of or worsening of pain/dysfunction.     Sincerely,  Dr. Keny Lockhart

## 2024-04-30 ENCOUNTER — OFFICE VISIT (OUTPATIENT)
Dept: PODIATRY CLINIC | Facility: CLINIC | Age: 74
End: 2024-04-30

## 2024-04-30 DIAGNOSIS — Z89.421 HISTORY OF AMPUTATION OF LESSER TOE, RIGHT (HCC): Primary | ICD-10-CM

## 2024-04-30 PROCEDURE — 99213 OFFICE O/P EST LOW 20 MIN: CPT | Performed by: PODIATRIST

## 2024-04-30 NOTE — PROGRESS NOTES
[facility] Podiatry  Progress Note    Benjamin Ralph is a 73 year old male.   Chief Complaint   Patient presents with    Post-Op     S/p Right 2nd toe amputation from 4/18/24 f/u - states he is good - no pain - has the dressing intact - here for suture removal          HPI:     This is a pleasant male that presents to clinic today 2 week S/P right 2nd toe amputation.  He states denies any pain today.  He has kept dressings CDI and is wearing the post op shoe.        Allergies: Penicillin g and Penicillins   Current Outpatient Medications   Medication Sig Dispense Refill    ibuprofen 400 MG Oral Tab Take 1 tablet (400 mg total) by mouth every 6 (six) hours as needed for Pain.      Meloxicam 15 MG Oral Tab Take 1 tablet (15 mg total) by mouth daily. 30 tablet 0    dutasteride 0.5 MG Oral Cap Take 1 capsule (0.5 mg total) by mouth daily. 90 capsule 3    rosuvastatin 10 MG Oral Tab Take 1 tablet (10 mg total) by mouth nightly. 90 tablet 3    sildenafil 20 MG Oral Tab Take one tablet PO daily as needed 10 tablet 5    acetaminophen 325 MG Oral Tab Take 2 tablets (650 mg total) by mouth every 6 (six) hours as needed for Pain.        Past Medical History:    Basal cell carcinoma    left nose    Basal cell carcinoma of nose    BCC (basal cell carcinoma)     left posterior lateral neck    BCC (basal cell carcinoma)    right ala base    Calculus of kidney    left    Chronic headaches    Chronic pansinusitis    Chronic sinusitis    Congestion of nasal sinus    COVID-19    Esophageal reflux    Ganglion of flexor tendon sheath of right thumb    High cholesterol    Melanoma (HCC)    left arm, stage I; .75 mm depth    Osteoarthritis    Plantar wart    right heel    Pneumonia due to organism    2016    Visual impairment    Glasses      Past Surgical History:   Procedure Laterality Date    Adj tiss xfer lid,nos,ear <10sqcm Right 2-17-17    Exc lesion of nose, V_Y flap    Colonoscopy      Colonoscopy      Colonoscopy N/A  2018    Procedure: COLONOSCOPY;  Surgeon: Isidoro Mosley MD;  Location: [facility] ENDOSCOPY    Exc skin malig 2.1-3cm face,facial  2022    Excis tendon sheath lesn,hand/fingr Right 2-17-17    Exc ganglion R thumb    Foot surgery Left     Nerve procedure    Full graft proc head,fac,hand <20sqc  2022    Nasal scopy,remv part ethmoid      Nasal scopy,rmv tiss maxill sinus      Repair of nasal septum  1985    Repair of nasal septum      Repr cmpl wnd head,fac,hand 2.6-7.5  2022    Skin surgery Left 10/07/16    left upper arm melanoma      Family History   Problem Relation Age of Onset    Dementia Mother     Seizure Disorder Mother     Hypertension Mother     Cancer Mother         skin    Cancer Father         Leukemia      Social History     Socioeconomic History    Marital status: Single    Number of children: 0   Occupational History    Occupation:    Tobacco Use    Smoking status: Former     Current packs/day: 0.00     Types: Cigarettes     Quit date: 3/25/1978     Years since quittin.1     Passive exposure: Never    Smokeless tobacco: Never   Vaping Use    Vaping status: Never Used   Substance and Sexual Activity    Alcohol use: Yes     Alcohol/week: 0.0 standard drinks of alcohol     Comment: rarely    Drug use: No    Sexual activity: Not Currently   Other Topics Concern    Caffeine Concern Yes     Comment: coffee, 3cups/day    Grew up on a farm No    History of tanning No    Outdoor occupation No    Pt has a pacemaker No    Pt has a defibrillator No    Reaction to local anesthetic No           REVIEW OF SYSTEMS:   Denies nausea, fever, chills  No calf pain  No other muscle or joint aches  Denies chest pain or shortness of breath.      EXAM:   There were no vitals taken for this visit.    Constitutional:   Patient in no apparent distress. Well kept Of normal body habitus. Alert and oriented to person, place, and time.  Vascular examination:   DP pulse is 2/4  PT  pulse is 2/4  Capillary refill is immediate    Integumentary examination:   There are no varicosities.   Skin appears moist, warm, and supple with positive hair growth.     Right 2nd toe amputation incision site is healing well with sutures intact and no SOI    Neurological examination:   Vibratory (128-Hz tuning fork) sensation is present to right and is present to left.  Sharp/dull is present to right and is present to left.  Musculoskeletal examination:  Muscle Strength is 5/5.    Right 2nd toe amputation      LABS & IMAGING:     Lab Results   Component Value Date    GLU 91 10/12/2023    BUN 17 10/12/2023    CREATSERUM 1.07 10/12/2023    BUNCREA 15.9 10/12/2023    ANIONGAP 6 10/12/2023    GFRAA 102 06/20/2022    GFRNAA 89 06/20/2022    CA 9.3 10/12/2023     10/12/2023    K 4.4 10/12/2023     10/12/2023    CO2 27.0 10/12/2023    OSMOCALC 293 10/12/2023        No results found for: \"EAG\", \"A1C\"     No results found.     ASSESSMENT AND PLAN:   Diagnoses and all orders for this visit:    History of amputation of lesser toe, right (HCC)                    Plan:     Evaluated Right 2nd toe amputation incision site which is healing well with no SOI    Removed sutures without incident  Pt may get right foot wet in shower but not to submerge in water  Pt to be WBAT short distances right foot with post op shoe    Path Right 2nd toe 4/18/24 revealed no evidence of osteomyelitis      Cultures of the wound grew pseudomonas  Pt was seeing Dr. Valencia and finished oral levaquin and doxy for 4 weeks in total.       RTC 2 weeks for final post op and if improved will transition to supportive tennis shoes.            No follow-ups on file.    German Pedersen, NILESH  4/30/24

## 2024-05-13 ENCOUNTER — TELEPHONE (OUTPATIENT)
Dept: ORTHOPEDICS CLINIC | Facility: CLINIC | Age: 74
End: 2024-05-13

## 2024-05-13 DIAGNOSIS — M17.11 PRIMARY OSTEOARTHRITIS OF RIGHT KNEE: Primary | ICD-10-CM

## 2024-05-13 NOTE — TELEPHONE ENCOUNTER
Pt message 5-11-24.  Pt has an appointment on 5-17-24 follow up knee pain.  Should pt get mri prior to the appointment.  Please call

## 2024-05-13 NOTE — TELEPHONE ENCOUNTER
Spoke with patient. States that he has been elevating and icing, using knee brace, meloxicam with no relief after 3 weeks. Has an appointment on Friday, 5/17 and was wondering about next steps and further imaging. I did explain that it was unlikely he would get in for an MRI prior to being seen on Friday, but that I would forward message to MD for recommendations in the meantime.

## 2024-05-14 ENCOUNTER — OFFICE VISIT (OUTPATIENT)
Dept: PODIATRY CLINIC | Facility: CLINIC | Age: 74
End: 2024-05-14

## 2024-05-14 DIAGNOSIS — Z89.421 HISTORY OF AMPUTATION OF LESSER TOE, RIGHT (HCC): Primary | ICD-10-CM

## 2024-05-14 PROCEDURE — 99213 OFFICE O/P EST LOW 20 MIN: CPT | Performed by: PODIATRIST

## 2024-05-14 NOTE — TELEPHONE ENCOUNTER
Keny Lockhart MD Mencke, Shereen, RN; [facility] Clinical Staff11 hours ago (8:30 PM)       He has arthritis in his knee and is unfortunately experiencing a flare of arthritis in the knee. Usually a steroid works, which I injected at his last visit, but it sounds like it is not having the desired affect. An MRI will not provide additional information as he has no mechanical symptoms. We could try a gel injection instead but would need to get that approved first.         Called patient and informed him per Dr Lockhart message and recommendations. He was agreeable to gel injection. Will submit PA request for Durolane gel injection and advised to call back on 5/16 to confirm approved prior to 5/17 appointment otherwise may need to reschedule. He verbalized understanding.

## 2024-05-14 NOTE — PROGRESS NOTES
[facility] Podiatry  Progress Note    Benjamin Ralph is a 73 year old male.   Chief Complaint   Patient presents with    Post-Op     POV Right 2nd toe amputation sx 4/18/24. No pain.         HPI:     This is a pleasant male that presents to clinic today 4 week S/P right 2nd toe amputation.  He states denies any pain today.  He is still wearing the post op shoe and denies any issues.        Allergies: Penicillin g and Penicillins   Current Outpatient Medications   Medication Sig Dispense Refill    ibuprofen 400 MG Oral Tab Take 1 tablet (400 mg total) by mouth every 6 (six) hours as needed for Pain.      Meloxicam 15 MG Oral Tab Take 1 tablet (15 mg total) by mouth daily. 30 tablet 0    dutasteride 0.5 MG Oral Cap Take 1 capsule (0.5 mg total) by mouth daily. 90 capsule 3    rosuvastatin 10 MG Oral Tab Take 1 tablet (10 mg total) by mouth nightly. 90 tablet 3    sildenafil 20 MG Oral Tab Take one tablet PO daily as needed 10 tablet 5    acetaminophen 325 MG Oral Tab Take 2 tablets (650 mg total) by mouth every 6 (six) hours as needed for Pain.        Past Medical History:    Basal cell carcinoma    left nose    Basal cell carcinoma of nose    BCC (basal cell carcinoma)     left posterior lateral neck    BCC (basal cell carcinoma)    right ala base    Calculus of kidney    left    Chronic headaches    Chronic pansinusitis    Chronic sinusitis    Congestion of nasal sinus    COVID-19    Esophageal reflux    Ganglion of flexor tendon sheath of right thumb    High cholesterol    Melanoma (HCC)    left arm, stage I; .75 mm depth    Osteoarthritis    Plantar wart    right heel    Pneumonia due to organism    2016    Visual impairment    Glasses      Past Surgical History:   Procedure Laterality Date    Adj tiss xfer lid,nos,ear <10sqcm Right 2-17-17    Exc lesion of nose, V_Y flap    Colonoscopy      Colonoscopy      Colonoscopy N/A 12/26/2018    Procedure: COLONOSCOPY;  Surgeon: Isidoro Mosley MD;   Location: [facility] ENDOSCOPY    Exc skin malig 2.1-3cm face,facial  2022    Excis tendon sheath lesn,hand/fingr Right 2-17-17    Exc ganglion R thumb    Foot surgery Left     Nerve procedure    Full graft proc head,fac,hand <20sqc  2022    Nasal scopy,remv part ethmoid      Nasal scopy,rmv tiss maxill sinus      Repair of nasal septum  1985    Repair of nasal septum      Repr cmpl wnd head,fac,hand 2.6-7.5  2022    Skin surgery Left 10/07/16    left upper arm melanoma      Family History   Problem Relation Age of Onset    Dementia Mother     Seizure Disorder Mother     Hypertension Mother     Cancer Mother         skin    Cancer Father         Leukemia      Social History     Socioeconomic History    Marital status: Single    Number of children: 0   Occupational History    Occupation:    Tobacco Use    Smoking status: Former     Current packs/day: 0.00     Types: Cigarettes     Quit date: 3/25/1978     Years since quittin.1     Passive exposure: Never    Smokeless tobacco: Never   Vaping Use    Vaping status: Never Used   Substance and Sexual Activity    Alcohol use: Yes     Alcohol/week: 0.0 standard drinks of alcohol     Comment: rarely    Drug use: No    Sexual activity: Not Currently   Other Topics Concern    Caffeine Concern Yes     Comment: coffee, 3cups/day    Grew up on a farm No    History of tanning No    Outdoor occupation No    Pt has a pacemaker No    Pt has a defibrillator No    Reaction to local anesthetic No           REVIEW OF SYSTEMS:   Denies nausea, fever, chills  No calf pain  No other muscle or joint aches  Denies chest pain or shortness of breath.      EXAM:   There were no vitals taken for this visit.    Constitutional:   Patient in no apparent distress. Well kept Of normal body habitus. Alert and oriented to person, place, and time.  Vascular examination:   DP pulse is 2/4  PT pulse is 2/4  Capillary refill is immediate    Integumentary examination:    There are no varicosities.   Skin appears moist, warm, and supple with positive hair growth.     Right 2nd toe amputation incision site is well healed    Neurological examination:   Vibratory (128-Hz tuning fork) sensation is present to right and is present to left.  Sharp/dull is present to right and is present to left.  Musculoskeletal examination:  Muscle Strength is 5/5.    Right 2nd toe amputation      LABS & IMAGING:     Lab Results   Component Value Date    GLU 91 10/12/2023    BUN 17 10/12/2023    CREATSERUM 1.07 10/12/2023    BUNCREA 15.9 10/12/2023    ANIONGAP 6 10/12/2023    GFRAA 102 06/20/2022    GFRNAA 89 06/20/2022    CA 9.3 10/12/2023     10/12/2023    K 4.4 10/12/2023     10/12/2023    CO2 27.0 10/12/2023    OSMOCALC 293 10/12/2023        No results found for: \"EAG\", \"A1C\"     No results found.     ASSESSMENT AND PLAN:   Diagnoses and all orders for this visit:    History of amputation of lesser toe, right (HCC)                      Plan:     Evaluated Right 2nd toe amputation incision site which has healed    Pt may transition to regular supportive tennis shoes but to avoid any excessive ambulation and barefoot walking for another 2 weeks.      Path Right 2nd toe 4/18/24 revealed no evidence of osteomyelitis      Cultures of the wound grew pseudomonas  Pt was seeing Dr. Valencia and finished oral levaquin and doxy for 4 weeks in total.       RTC PRN          No follow-ups on file.    German Pedersen DPM  5/14/24

## 2024-05-16 PROBLEM — M17.11 OSTEOARTHRITIS OF RIGHT KNEE: Status: ACTIVE | Noted: 2024-05-16

## 2024-05-17 ENCOUNTER — OFFICE VISIT (OUTPATIENT)
Dept: ORTHOPEDICS CLINIC | Facility: CLINIC | Age: 74
End: 2024-05-17

## 2024-05-17 VITALS — SYSTOLIC BLOOD PRESSURE: 132 MMHG | HEART RATE: 76 BPM | DIASTOLIC BLOOD PRESSURE: 89 MMHG

## 2024-05-17 DIAGNOSIS — M76.891 HAMSTRING TENDINITIS OF RIGHT THIGH: ICD-10-CM

## 2024-05-17 DIAGNOSIS — M17.11 PRIMARY OSTEOARTHRITIS OF RIGHT KNEE: Primary | ICD-10-CM

## 2024-05-17 RX ORDER — TRIAMCINOLONE ACETONIDE 40 MG/ML
20 INJECTION, SUSPENSION INTRA-ARTICULAR; INTRAMUSCULAR ONCE
Status: COMPLETED | OUTPATIENT
Start: 2024-05-17 | End: 2024-05-17

## 2024-05-17 RX ADMIN — TRIAMCINOLONE ACETONIDE 20 MG: 40 INJECTION, SUSPENSION INTRA-ARTICULAR; INTRAMUSCULAR at 11:11:00

## 2024-05-17 NOTE — PROCEDURES
Procedure Note Corticosteroid Injection  _______________________________________________________________________________________________________________  Patient Name: Benjamin Ralph   Date: 5/17/2024     Based on history, physical exam, and available diagnostic testing, the patient diagnosis appears consistent with pathology in the hamstring insertion. We discussed the use of a corticosteroid injection for therapeutic/confirmatory diagnostic purposes. The risks, benefits, and potential complications of corticosteroid injection(s) were discussed in detail. The risks include, but are not limited to: pain, infection, bleeding/hematoma, swelling, flare response, incomplete resolution of symptoms, possible need for further injections or subsequent surgical intervention, subcutaneous fat atrophy, skin pigmentation changes, and elevation of blood sugar. The patient verbalized an understanding and agrees to the injection(s).     Under sterile prep, approximately:  0.5 mL Kenalog 40mg/ml, 1 mL marcaine plain,     was injected into: Right hamstring tendon insertion    This procedure was performed without ultrasound guidance    This procedure was well tolerated. The patient understands that the injection could take several days to take effect.    The patient was not sedated and fully conscious for the injection.  Prior to the injection, verification followed the Universal Protocol in the following manner:  [X] A \"Time Out\" was performed prior to the procedure to confirm patient identification, injection site, and preparation of equipment.  [X] The patient was identified using two patient identifiers.  [X] The procedure site was appropriately prepped    Keny Lockhart MD  Orthopaedic Surgery  5/17/2024

## 2024-05-17 NOTE — PROGRESS NOTES
Orthopaedic Surgery Follow-up Patient Visit  _______________________________________________________________________________________________________________  _______________________________________________________________________________________________________________    DATE OF VISIT: 2024     CHIEF COMPLAINT: Follow-up right knee    HISTORY OF PRESENT ILLNESS: Benjamin Ralph is a 73 year old male who presents to the clinic for follow-up for his right knee. At his last visit, he underwent CSI into the right knee. Since last visit, he reports that he achieved no improvement in his pain after the intra-articular steroid injection.  He reports that pain now is primarily in the medial aspect of the proximal tibia overlying the insertion of the hamstring tendons.  He denies any fevers or chills.  He denies repeat injuries.  He denies any mechanical symptoms.  Pain is variable but is as high as 6-8 out of 10 and is made worse with standing.    SOCIAL HISTORY  Social History     Socioeconomic History    Marital status: Single     Spouse name: Not on file    Number of children: 0    Years of education: Not on file    Highest education level: Not on file   Occupational History    Occupation:    Tobacco Use    Smoking status: Former     Current packs/day: 0.00     Types: Cigarettes     Quit date: 3/25/1978     Years since quittin.1     Passive exposure: Never    Smokeless tobacco: Never   Vaping Use    Vaping status: Never Used   Substance and Sexual Activity    Alcohol use: Yes     Alcohol/week: 0.0 standard drinks of alcohol     Comment: rarely    Drug use: No    Sexual activity: Not Currently   Other Topics Concern     Service Not Asked    Blood Transfusions Not Asked    Caffeine Concern Yes     Comment: coffee, 3cups/day    Occupational Exposure Not Asked    Hobby Hazards Not Asked    Sleep Concern Not Asked    Stress Concern Not Asked    Weight Concern Not Asked    Special Diet  Not Asked    Back Care Not Asked    Exercise Not Asked    Bike Helmet Not Asked    Seat Belt Not Asked    Self-Exams Not Asked    Grew up on a farm No    History of tanning No    Outdoor occupation No    Pt has a pacemaker No    Pt has a defibrillator No    Reaction to local anesthetic No   Social History Narrative    Not on file     Social Determinants of Health     Financial Resource Strain: Not on file   Food Insecurity: Not on file   Transportation Needs: Not on file   Physical Activity: Not on file   Stress: Not on file   Social Connections: Not on file   Housing Stability: Not on file        PAST MEDICAL HISTORY  Past Medical History:    Basal cell carcinoma    left nose    Basal cell carcinoma of nose    BCC (basal cell carcinoma)     left posterior lateral neck    BCC (basal cell carcinoma)    right ala base    Calculus of kidney    left    Chronic headaches    Chronic pansinusitis    Chronic sinusitis    Congestion of nasal sinus    COVID-19    Esophageal reflux    Ganglion of flexor tendon sheath of right thumb    High cholesterol    Melanoma (HCC)    left arm, stage I; .75 mm depth    Osteoarthritis    Plantar wart    right heel    Pneumonia due to organism    2016    Visual impairment    Glasses        PAST SURGICAL HISTORY  Past Surgical History:   Procedure Laterality Date    Adj tiss xfer lid,nos,ear <10sqcm Right 2-17-17    Exc lesion of nose, V_Y flap    Colonoscopy      Colonoscopy      Colonoscopy N/A 12/26/2018    Procedure: COLONOSCOPY;  Surgeon: Isidoro Mosley MD;  Location: [facility] ENDOSCOPY    Exc skin malig 2.1-3cm face,facial  05/18/2022    Excis tendon sheath lesn,hand/fingr Right 2-17-17    Exc ganglion R thumb    Foot surgery Left 2006    Nerve procedure    Full graft proc head,fac,hand <20sqc  05/18/2022    Nasal scopy,remv part ethmoid      Nasal scopy,rmv tiss maxill sinus      Repair of nasal septum  1985    Repair of nasal septum      Repr cmpl wnd head,fac,hand 2.6-7.5   05/18/2022    Skin surgery Left 10/07/16    left upper arm melanoma        MEDICATIONS  Reviewed   diclofenac 1 % External Gel Apply 4 g topically 4 (four) times daily. 300 g 0        ALLERGIES  Allergies   Allergen Reactions    Penicillin G HIVES     states he is not sure if it was from actual drug    Penicillins HIVES        FAMILY HISTORY  Family History   Problem Relation Age of Onset    Dementia Mother     Seizure Disorder Mother     Hypertension Mother     Cancer Mother         skin    Cancer Father         Leukemia        REVIEW OF SYSTEMS  A 14 point review of systems was performed. Pertinent positives and negatives noted in the HPI.    PHYSICAL EXAM  /89   Pulse 76      Constitutional: The patient is well-developed, well-nourished, in no acute distress.  Neurological: Alert and oriented to person, place, and time.  Psychiatric: Mood and affect normal.  Head: Normocephalic and atraumatic.  Cardiovascular: regular rate by palpation  Pulmonary/Chest: Effort normal. No respiratory distress. Breathing non-labored  Abdominal: Abdomen exhibits no distension.   Right  KNEE  INSPECTION/PALPATION  No readily apparent visual abnormalities of the knee.   No ecchymosis or erythema  No effusion  No breaks in skin. Skin is euthermic.  Neutral  alignment on standing, slightly antalgic gait  NTTP to medial joint line, NTTP to lateral joint line.  Focally tender to palpation about hamstring insertion  ROM  0-130 degrees  KNEE STRENGTH  Flexion: 5/5  Extension: 5/5  STABILITY  1A lachman, stable anterior drawer  Stable posterior drawer  Stable to varus and valgus stress at 0 and 30 degrees  SILT Saima/Saph/SPN/DPN/T  2+ DP/PT pulse     IMAGING  I independently viewed and interpreted the imaging. Radiologist interpretation is available in the imaging report.  X-ray: Plain films of the right knee knee including AP, lateral, and sunrise weightbearing views were reviewed. These demonstrate no acute osseous abnormalities.   The patella is centrally positioned within the femoral trochlea.  There are mild degenerative changes in the patellofemoral joint.  There are mild degenerative changes in the medial knee compartment and mild degenerative changes in the lateral knee compartment of the tibiofemoral joint.  There are no  loose bodies evident.  There is no evidence of Segond fracture or other fractures.     XR KNEE, COMPLETE (4 OR MORE VIEWS), RIGHT (CPT=73564)    Result Date: 4/26/2024  PROCEDURE: XR KNEE, COMPLETE (4 OR MORE VIEWS), RIGHT (CPT=73564)  COMPARISON: [facility], X KNEE RT, 9/25/2012, 2:59 PM.  INDICATIONS: Deep right knee pain for 1 week; no known trauma.  TECHNIQUE: 4 views were obtained.    FINDINGS/CONCLUSION:          No acute fracture or dislocation.  No joint effusion.  Mild degenerative joint disease.  Mild vascular calcifications.   Incidental note is made of mild left knee degenerative joint disease.    [facility]    Dictated by (CST): Guillermo Zavala MD on 4/26/2024 at 3:37 PM     Finalized by (CST): Guillermo Zavala MD on 4/26/2024 at 3:39 PM            ASSESSMENT/PLAN: Benjamin Ralph is a 73 year old male who presents to the Orthopaedic surgery clinic today for follow-up.  He did not have any improvement with injection into the knee joint.  Will now perform an injection into the hamstring insertion and also send him for therapy for hamstring tendinopathy.  We will provide him with diclofenac gel for topical treatment in addition to the injection therapy.  He will follow-up as needed    We discussed the diagnosis, changes, and therapies performed to date including possible treatments and their associated risks/benefits.  WEIGHT BEARING STATUS: Weightbearing as tolerated  RANGE-OF-MOTION LIMITATIONS: as tolerated  NEW PRESCRIPTIONS:  We discussed medications for this condition including patient current regimen. Based on this discussion we have added/re-ordered diclofenac gel  IMAGING  ORDERED: MRI right knee to evaluate hamstring insertion  CONSULTS PLACED: We discussed the role of therapy and/or additional specialty evaluation/intervention for this condition including previous/ongoing therapy and specialist services. Based on this discussion we have consulted physical therapy  PROSTHESES/ORTHOTICS: the patient does not need prostheses/orthoses for the current condition  PROCEDURES: steroid injection hamstring insertion    FOLLOW-UP: after trial of therapy    RADIOGRAPHS AT NEXT VISIT: none    I have personally seen Benjamin RENE Ramo and discussed in detail their plan of care. Prior to departure, they indicated agreement with and understanding of their plan of care and their follow-up as documented herein this note. Please note that this note was written in combination with voice recognition/dictation software and there is a possibility of transcription errors which were not identified at the time of note submission. If clarification is necessary, please contact the author or clinic staff.    Keny Lockhart MD  Orthopaedic Surgery  5/16/2024

## 2024-05-17 NOTE — PROGRESS NOTES
Per verbal order from Dr. Lockhart draw up 1ml of 0.5% Marcaine and 0.5 ml of Kenalog 40 for cortisone injection to right knee. Echo Macedo    Patient provided education handout for cortisone injection.

## 2024-05-17 NOTE — PATIENT INSTRUCTIONS
Dear Benjamin Ralph ,    It was a pleasure seeing you today. I saw you for your right knee pain. Today we discussed the problem, the necessity for additional imaging to clarify/confirm the diagnosis, management options including conservative management, therapy, pain management including activity modification, icing, elevation, and medications, and anticipated follow-up plans.    I ordered the following for you:   - Imaging: MRI right knee  - Referrals: Physical therapy  - Medications: Diclofenac gel    Please don't hesitate to reach out if you have any questions or concerns. We will plan to follow-up next after a trial of therapy.     Sincerely,  Dr. Keny Lockhart

## 2024-05-23 ENCOUNTER — OFFICE VISIT (OUTPATIENT)
Dept: PHYSICAL THERAPY | Age: 74
End: 2024-05-23

## 2024-05-23 DIAGNOSIS — M17.11 PRIMARY OSTEOARTHRITIS OF RIGHT KNEE: Primary | ICD-10-CM

## 2024-05-23 DIAGNOSIS — M76.891 HAMSTRING TENDINITIS OF RIGHT THIGH: ICD-10-CM

## 2024-05-23 PROCEDURE — 97162 PT EVAL MOD COMPLEX 30 MIN: CPT

## 2024-05-23 PROCEDURE — 97110 THERAPEUTIC EXERCISES: CPT

## 2024-05-23 NOTE — PROGRESS NOTES
LOWER EXTREMITY EVALUATION:     Diagnosis:   Primary osteoarthritis of right knee (M17.11)  Hamstring tendinitis of right thigh (M76.891)      Referring Provider: Keny Lockhart  Date of Evaluation:    5/23/2024    Precautions:  None Next MD visit:   none scheduled  Date of Surgery: n/a     PATIENT SUMMARY   Benjamin Ralph is a 73 year old male who presents to therapy today with complaints of one month history of insidious onset R knee pain.  He had surgery (amputation of R 2nd toe due to osteomyelitis early April 2024).  Post op, he was wearing a CamWalker on the R lower leg/foot/ankle and likely this caused irritation to his R knee and hamstring.  He reports no past R knee problems.  Current functional limitations include interrupted sleep, difficulty arising after sitting > 10', difficulty up/down stairs, limping with walking, limited upright activity tolerance..     Benjamin describes prior level of function fit and active. Pt goals include elimination of pain, restoration of normal functional activity tolerance..  Past medical history was reviewed with Benjamin. Significant findings include B knee mild OA, recent R 2nd toe amputation due to osteomyelitis.    ASSESSMENT  Benjamin presents to physical therapy evaluation with primary c/o R medial knee pain. The results of the objective tests and measures show tight HS R>L, TTP R medial joint line, pain with knee stability testing.  Functional deficits include but are not limited to interrupted sleep, difficulty arising after sitting > 10', difficulty up/down stairs, limping with walking, limited upright activity tolerance.  Signs and symptoms are consistent with diagnosis of Primary osteoarthritis of right knee (M17.11), Hamstring tendinitis of right thigh (M76.891). Pt and PT discussed evaluation findings, pathology, POC and HEP.  Pt voiced understanding and performs HEP correctly without reported pain. Skilled Physical Therapy is medically necessary to address  the above impairments and reach functional goals.     OBJECTIVE:   Observation: Thin gentleman, obviously antalgic with R LE pain, limping out of waiting room chair and while walking throughout clinic.  Palpation: TTP R knee medial joint line.  No TTP B hamstring origin or insertions, at pes anserine regions, at R knee lat joint line or patella.    AROM: (* denotes performed with pain)  - B LE's grossly WNL's    Accessory motion: mild limitations in pat/fem glides B.    Flexibility:  Hamstrings: R 45; L 60  Quads: R 130; L 120    Strength/MMT: (* denotes performed with pain)  Hip Knee   Flexion: R 4+/5; L 4+/5  Extension: R 4/5; L 4/5  Abduction: R 4/5; L 4/5   Flexion: R 5/5; L 5/5  Extension: R 5/5; L 5/5        Special tests:   - ligamentous stress tests: knee varus, valgus, ant and post drawer all demonstrate good stability, though R valgus stress produces medial knee joint pain.  - Davi positive R, negative L    Gait: pt ambulates on level ground with antalgia    Today’s Treatment and Response:   Pt education was provided on exam findings, treatment diagnosis, treatment plan, expectations, and prognosis. Pt was also provided recommendations for activity modifications, possible soreness after evaluation, modalities as needed [ice/heat], and importance of remaining active.  Patient was instructed in and issued a HEP for:   Access Code: [email or web address]  URL: [email or web address]  Date: 05/23/2024  Prepared by: Estevan Pisano    Exercises  - Seated Hamstring Stretch  - 1 x daily - 7 x weekly - 3 sets - 10 reps - 30 hold  - Seated Table Hamstring Stretch  - 1 x daily - 7 x weekly - 3 sets - 10 reps - 30 hold  - Standing Hamstring Stretch on Chair  - 1 x daily - 7 x weekly - 3 sets - 10 reps - 30 hold  - Seated Hamstring Stretch with Chair  - 1 x daily - 7 x weekly - 3 sets - 10 reps - 30 hold    Charges: PT Eval Moderate Complexity, Ther Ex x2      Total Timed Treatment: 30 min     Total Treatment  Time: 45 min     Based on clinical rationale and outcome measures, this evaluation involved Moderate Complexity decision making due to 1-2 personal factors/comorbidities, 3 body structures involved/activity limitations, and evolving symptoms including changing pain levels.  PLAN OF CARE:    Goals: (to be met in 10 visits)  Pt report min/no R knee pain  Pt report no functional limitations with R knee  Pt demonstrate [place] with HEP  Pt able to walk painfree without deviation    Frequency / Duration: Patient will be seen for 2 x/week or a total of 10 visits over a 90 day period. Treatment will include: Gait training, Manual Therapy, Neuromuscular Re-education, Therapeutic Exercise, and Home Exercise Program instruction    Education or treatment limitation: None  Rehab Potential:good    LEFS Score  No data recorded    Patient/Family/Caregiver was advised of these findings, precautions, and treatment options and has agreed to actively participate in planning and for this course of care.    Thank you for your referral. Please co-sign or sign and return this letter via fax as soon as possible to [phone]. If you have any questions, please contact me at Dept: [phone]    Sincerely,  Electronically signed by therapist: Estevan Pisano, PT  Physician's certification required: Yes  I certify the need for these services furnished under this plan of treatment and while under my care.    X___________________________________________________ Date____________________    Certification From: 5/23/2024  To:8/21/2024

## 2024-05-28 ENCOUNTER — HOSPITAL ENCOUNTER (OUTPATIENT)
Dept: MRI IMAGING | Age: 74
Discharge: HOME | End: 2024-05-28
Attending: STUDENT IN AN ORGANIZED HEALTH CARE EDUCATION/TRAINING PROGRAM
Payer: MEDICARE

## 2024-05-28 DIAGNOSIS — M17.11 PRIMARY OSTEOARTHRITIS OF RIGHT KNEE: ICD-10-CM

## 2024-05-28 DIAGNOSIS — M76.891 HAMSTRING TENDINITIS OF RIGHT THIGH: ICD-10-CM

## 2024-05-28 PROCEDURE — 73721 MRI JNT OF LWR EXTRE W/O DYE: CPT | Performed by: STUDENT IN AN ORGANIZED HEALTH CARE EDUCATION/TRAINING PROGRAM

## 2024-05-29 ENCOUNTER — OFFICE VISIT (OUTPATIENT)
Dept: PHYSICAL THERAPY | Age: 74
End: 2024-05-29

## 2024-05-29 PROCEDURE — 97110 THERAPEUTIC EXERCISES: CPT

## 2024-05-29 NOTE — PROGRESS NOTES
Diagnosis:   Primary osteoarthritis of right knee (M17.11)  Hamstring tendinitis of right thigh (M76.891)       Referring Provider: Keny Lockhart  Date of Evaluation:    5/23/2024    Precautions:  None Next MD visit:   none scheduled  Date of Surgery: n/a   Insurance Primary/Secondary: MEDICARE / [employer]BS [place] INDEMNITY     # Auth Visits: n/a            Subjective: Slightly improved.  Had MRI done - shows meniscal tear.  Has not yet f/u w/ [place].  Pain: -    Objective: See below treatment grid.    Assessment: Mild improvement initially.  Good tolerance for hip strengthening challenges.      Goals:   Pt report min/no R knee pain  Pt report no functional limitations with R knee  Pt demonstrate [place] with HEP  Pt able to walk painfree without deviation    Plan: Progress LE strengthening as able.  Encouraged bicycling and pool walking, as well as continued icing.  Date: 5/29/2024  TX#: 2 Date:                 TX#: 3/ Date:                 TX#: 4/ Date:                 TX#: 5/ Date:   Tx#: 6/   Ther Ex (40')  - NuStep UE/LE L5 x5'  - seated HS stretch  - supine SLR 3x10 R, L  - Prone SLR 3x10 R, L  - Sidelying SLR 3x10 R, L  - LAQ  - Shuttle B leg press 5b x30                            HEP: [employer] Access Code: [email or web address]     Charges: Ther Ex x3       Total Timed Treatment: 40 min  Total Treatment Time: 40 min

## 2024-05-31 ENCOUNTER — OFFICE VISIT (OUTPATIENT)
Dept: PHYSICAL THERAPY | Age: 74
End: 2024-05-31

## 2024-05-31 ENCOUNTER — OFFICE VISIT (OUTPATIENT)
Dept: ORTHOPEDICS CLINIC | Facility: CLINIC | Age: 74
End: 2024-05-31

## 2024-05-31 DIAGNOSIS — S83.203D OTHER TEAR OF MENISCUS OF RIGHT KNEE AS CURRENT INJURY, UNSPECIFIED MENISCUS, SUBSEQUENT ENCOUNTER: Primary | ICD-10-CM

## 2024-05-31 PROBLEM — S83.206A TEAR OF MENISCUS OF RIGHT KNEE AS CURRENT INJURY: Status: ACTIVE | Noted: 2024-05-31

## 2024-05-31 PROCEDURE — 97110 THERAPEUTIC EXERCISES: CPT

## 2024-05-31 PROCEDURE — 99214 OFFICE O/P EST MOD 30 MIN: CPT | Performed by: STUDENT IN AN ORGANIZED HEALTH CARE EDUCATION/TRAINING PROGRAM

## 2024-05-31 NOTE — PROGRESS NOTES
Diagnosis:   Primary osteoarthritis of right knee (M17.11)  Hamstring tendinitis of right thigh (M76.891)       Referring Provider: Keny Lockhart  Date of Evaluation:    5/23/2024    Precautions:  None Next MD visit:   5/31/24  Date of Surgery: n/a   Insurance Primary/Secondary: MEDICARE / [place] INDEMNITY     # Auth Visits: n/a            Subjective: Patient reports his pain comes and goes, does not bother him when he sits. Denies any pain currently. Not sure if he is able to get in the pool yet due to his foot surgery. Will see foot surgeon next week and will ask.   Pain: 0/10    Objective: See below treatment grid.    Assessment: Progressed quad and hip strengthening to improve biomechanics and decrease stress across the knee during gait and stair negotiation. No increased pain reported post session.      Goals:   Pt report min/no R knee pain  Pt report no functional limitations with R knee  Pt demonstrate [place] with HEP  Pt able to walk painfree without deviation    Plan: Progress LE strengthening as able.  Patient to check with foot surgeon regarding pool walking.   Date: 5/29/2024  TX#: 2 Date:5/31/2024                TX#: 3 Date:                 TX#: 4/ Date:                 TX#: 5/ Date:   Tx#: 6/   Ther Ex (40')  - NuStep UE/LE L5 x5'  - seated HS stretch  - supine SLR 3x10 R, L  - Prone SLR 3x10 R, L  - Sidelying SLR 3x10 R, L  - LAQ  - Shuttle B leg press 5b x30 TherEx (40')  NuStep UE/LE L5 x 5'  Supine SLR 3x10 R, L  SAQ over bolster 3x10, 5\" holds  S/L SLR 3x10 R, L  LAQ with 2# 2x10, 5\" holds  Shuttle B leg press 6b x30  Shuttle single leg press 3b x 30 R, L  Standing hip abd/ext 2x10 ea R,L with YTB  Seated HS stretch                               HEP: [employer] Access Code: [email or web address]     Charges: Ther Ex x3       Total Timed Treatment: 40 min  Total Treatment Time: 40 min

## 2024-05-31 NOTE — PROGRESS NOTES
Orthopaedic Surgery Follow-up Patient Visit  _______________________________________________________________________________________________________________  _______________________________________________________________________________________________________________    DATE OF VISIT: 2024     CHIEF COMPLAINT: Follow-up right knee    HISTORY OF PRESENT ILLNESS: Benjamin Ralph is a 73 year old male who presents to the clinic for follow-up for his right knee symptoms. Since last visit, he has been working with physical therapy and is nearly out of therapy visits.  He reports that he is experiencing some improvement with PT.  He reports his mechanical symptoms are improving.  He did not achieve any relief from his medial sided knee injection.  He is now interested in undergoing gel injection in the near future.  He would also like to continue working with physical therapy.  He denies any recent traumatic injuries.  Pain is currently rated as 3-4 out of 10    SOCIAL HISTORY  Social History     Socioeconomic History    Marital status: Single     Spouse name: Not on file    Number of children: 0    Years of education: Not on file    Highest education level: Not on file   Occupational History    Occupation:    Tobacco Use    Smoking status: Former     Current packs/day: 0.00     Types: Cigarettes     Quit date: 3/25/1978     Years since quittin.2     Passive exposure: Never    Smokeless tobacco: Never   Vaping Use    Vaping status: Never Used   Substance and Sexual Activity    Alcohol use: Yes     Alcohol/week: 0.0 standard drinks of alcohol     Comment: rarely    Drug use: No    Sexual activity: Not Currently   Other Topics Concern     Service Not Asked    Blood Transfusions Not Asked    Caffeine Concern Yes     Comment: coffee, 3cups/day    Occupational Exposure Not Asked    Hobby Hazards Not Asked    Sleep Concern Not Asked    Stress Concern Not Asked    Weight Concern Not Asked     Special Diet Not Asked    Back Care Not Asked    Exercise Not Asked    Bike Helmet Not Asked    Seat Belt Not Asked    Self-Exams Not Asked    Grew up on a farm No    History of tanning No    Outdoor occupation No    Pt has a pacemaker No    Pt has a defibrillator No    Reaction to local anesthetic No   Social History Narrative    Not on file     Social Determinants of Health     Financial Resource Strain: Not on file   Food Insecurity: Not on file   Transportation Needs: Not on file   Physical Activity: Not on file   Stress: Not on file   Social Connections: Not on file   Housing Stability: Not on file        PAST MEDICAL HISTORY  Past Medical History:    Basal cell carcinoma    left nose    Basal cell carcinoma of nose    BCC (basal cell carcinoma)     left posterior lateral neck    BCC (basal cell carcinoma)    right ala base    Calculus of kidney    left    Chronic headaches    Chronic pansinusitis    Chronic sinusitis    Congestion of nasal sinus    COVID-19    Esophageal reflux    Ganglion of flexor tendon sheath of right thumb    High cholesterol    Melanoma (HCC)    left arm, stage I; .75 mm depth    Osteoarthritis    Plantar wart    right heel    Pneumonia due to organism    2016    Visual impairment    Glasses        PAST SURGICAL HISTORY  Past Surgical History:   Procedure Laterality Date    Adj tiss xfer lid,nos,ear <10sqcm Right 2-17-17    Exc lesion of nose, V_Y flap    Colonoscopy      Colonoscopy      Colonoscopy N/A 12/26/2018    Procedure: COLONOSCOPY;  Surgeon: Isidoro Mosley MD;  Location: [facility] ENDOSCOPY    Exc skin malig 2.1-3cm face,facial  05/18/2022    Excis tendon sheath lesn,hand/fingr Right 2-17-17    Exc ganglion R thumb    Foot surgery Left 2006    Nerve procedure    Full graft proc head,fac,hand <20sqc  05/18/2022    Nasal scopy,remv part ethmoid      Nasal scopy,rmv tiss maxill sinus      Repair of nasal septum  1985    Repair of nasal septum      Repr cmpl wnd head,fac,hand  2.6-7.5  05/18/2022    Skin surgery Left 10/07/16    left upper arm melanoma        MEDICATIONS  Reviewed   diclofenac 1 % External Gel Apply 4 g topically 4 (four) times daily. 300 g 0    ibuprofen 400 MG Oral Tab Take 1 tablet (400 mg total) by mouth every 6 (six) hours as needed for Pain.      dutasteride 0.5 MG Oral Cap Take 1 capsule (0.5 mg total) by mouth daily. 90 capsule 3    rosuvastatin 10 MG Oral Tab Take 1 tablet (10 mg total) by mouth nightly. 90 tablet 3    sildenafil 20 MG Oral Tab Take one tablet PO daily as needed 10 tablet 5    acetaminophen 325 MG Oral Tab Take 2 tablets (650 mg total) by mouth every 6 (six) hours as needed for Pain.          ALLERGIES  Allergies   Allergen Reactions    Penicillin G HIVES     states he is not sure if it was from actual drug    Penicillins HIVES        FAMILY HISTORY  Family History   Problem Relation Age of Onset    Dementia Mother     Seizure Disorder Mother     Hypertension Mother     Cancer Mother         skin    Cancer Father         Leukemia        REVIEW OF SYSTEMS  A 14 point review of systems was performed. Pertinent positives and negatives noted in the HPI.    PHYSICAL EXAM  There were no vitals taken for this visit.     Constitutional: The patient is well-developed, well-nourished, in no acute distress.  Neurological: Alert and oriented to person, place, and time.  Psychiatric: Mood and affect normal.  Head: Normocephalic and atraumatic.  Cardiovascular: regular rate by palpation  Pulmonary/Chest: Effort normal. No respiratory distress. Breathing non-labored  Abdominal: Abdomen exhibits no distension.   Right  KNEE  INSPECTION/PALPATION  No readily apparent visual abnormalities of the knee.   No ecchymosis or erythema  Mild effusion.   No breaks in skin. Skin is euthermic.  Neutral alignment on standing  Slightly antalgic gait  TTP  to medial joint line, NTTP to lateral joint line  ROM  0-130 degrees  KNEE STRENGTH  Flexion: 5/5  Extension:  5/5  STABILITY  1A lachman, stable anterior drawer  Stable posterior drawer  Stable to varus and valgus stress at 0 and 30 degrees  1 quadrant of lateral patellar translation  Negative J sign, negative apprehension  PERTINENT FINDINGS  Positive Davi    Positive Thessaly  Negative Patellar grind  SILT Saima/Saph/SPN/DPN/T  2+ DP/PT pulse, foot wwp     IMAGING  I independently viewed and interpreted the imaging. Radiologist interpretation is available in the imaging report.    MRI: MRI of the right knee was reviewed.  This demonstrates degenerative changes of the knee in all 3 compartments.  There is complex tearing of the meniscus, primarily in the posterior horn and body of the medial meniscus but also somewhat present in the lateral horn.  Tears are complex throughout the medial meniscus.  The ACL, PCL, and collateral ligaments are all intact.  MRI KNEE, RIGHT (OPR=27916)    Result Date: 5/28/2024  PROCEDURE:  MRI KNEE, RIGHT (CPT=73721)  COMPARISON:  None.  INDICATIONS:  M76.891 Hamstring tendinitis of right thigh M17.11 Primary osteoarthritis of right knee  TECHNIQUE:  Axial, coronal, and sagittal proton density with and without fat saturation images were obtained.  PATIENT STATED HISTORY: (As transcribed by Technologist)  Patient states medial right knee pain.    FINDINGS:  LIGAMENTS:          The ACL, PCL, patellar retinacula, and collateral ligament complexes are intact. MENISCI:            Complex tear of the medial meniscus involving the body and posterior horn, comprised of radial and oblique components.  The meniscal root insertions are intact.  No meniscal extrusion.  Normal lateral meniscus. TENDONS:            The tendinous insertions about the knee are intact without significant tendinosis or tears. MUSCULATURE:        No evidence of strain, edema, or atrophy. BONY COMPARTMENTS:  No fracture or malalignment.  Moderate subchondral marrow edema/contusion noted peripherally within the medial tibial  plateau, subjacent to the aforementioned complex meniscal tear.  Broad thinning and partial-thickness fissuring of the articular cartilage along the central and posterior weight-bearing surfaces of the medial femoral condyle.  Articular cartilage is preserved within the lateral and patellofemoral compartments. SYNOVIUM:           Small knee joint effusion.  No intra-articular bodies.  No organized Dooley cyst.  The popliteal neurovascular bundle is within normal limits.             CONCLUSION:   1. Complex tear of the body and posterior horn of the medial meniscus comprised of radial and oblique components.  Normal lateral meniscus.  2. Normal cruciate and collateral ligaments.  3. Moderate subchondral marrow edema/contusion peripherally within the medial tibial plateau, subjacent to the aforementioned complex meniscal tear.  Broad thinning and partial-thickness fissuring of the articular cartilage along the central and posterior weight-bearing surfaces of the medial femoral condyle.  4. Small knee joint effusion.    LOCATION:  [facility]          Dictated by (CST): Angelica Braxton MD on 5/28/2024 at 10:42 AM     Finalized by (CST): Angelica Braxton MD on 5/28/2024 at 10:53 AM         ASSESSMENT/PLAN: Benjamin Ralph is a 73 year old male who presents to the Orthopaedic surgery clinic today for follow-up for his right knee.  He has substantial tearing of his medial meniscus but has improvement with physical therapy.  His meniscal tearing is likely degenerative in nature.  We discussed that he would most likely benefit from continued physical therapy but he may achieve some improvement with gel injection.  He would like to undergo gel injection in the near future for pain relief while working through physical therapy to prevent necessity for surgery.  We discussed that operative intervention could include arthroscopic or open procedures.  Patient understands and for now, we will continue down the nonoperative pathway    We  discussed the diagnosis, changes, and therapies performed to date including possible treatments and their associated risks/benefits.  WEIGHT BEARING STATUS: Weightbearing as tolerated  RANGE-OF-MOTION LIMITATIONS: as tolerated  NEW PRESCRIPTIONS:  We discussed medications for this condition including patient current regimen. Based on this discussion we have added/re-ordered no additional medications  IMAGING ORDERED: none  CONSULTS PLACED: We discussed the role of therapy and/or additional specialty evaluation/intervention for this condition including previous/ongoing therapy and specialist services. Based on this discussion we have consulted renewal of physical therapy  PROSTHESES/ORTHOTICS: the patient does not need prostheses/orthoses for the current condition  PROCEDURES: none    FOLLOW-UP:  For gel injection    RADIOGRAPHS AT NEXT VISIT: none    I have personally seen Benjamin ANJU Ralph and discussed in detail their plan of care. Prior to departure, they indicated agreement with and understanding of their plan of care and their follow-up as documented herein this note. Please note that this note was written in combination with voice recognition/dictation software and there is a possibility of transcription errors which were not identified at the time of note submission. If clarification is necessary, please contact the author or clinic staff.    Keny Lockhart MD  Orthopaedic Surgery  5/31/2024

## 2024-06-03 ENCOUNTER — TELEPHONE (OUTPATIENT)
Dept: ORTHOPEDICS CLINIC | Facility: CLINIC | Age: 74
End: 2024-06-03

## 2024-06-03 ENCOUNTER — OFFICE VISIT (OUTPATIENT)
Dept: PHYSICAL THERAPY | Age: 74
End: 2024-06-03
Payer: MEDICARE

## 2024-06-03 PROCEDURE — 97110 THERAPEUTIC EXERCISES: CPT

## 2024-06-03 NOTE — PROGRESS NOTES
Diagnosis:   Primary osteoarthritis of right knee (M17.11)  Hamstring tendinitis of right thigh (M76.891)       Referring Provider: Keny Lockhart  Date of Evaluation:    5/23/2024    Precautions:  None Next MD visit:   5/31/24  Date of Surgery: n/a   Insurance Primary/Secondary: MEDICARE / [employer] INDEMNITY     # Auth Visits: n/a            Subjective: Therapy seems to be helping a little.   Wants to do Duralane injection later this week.  Pain: 0/10    Objective: See below treatment grid.    Assessment:  Continued medial joint line pain R knee.    Goals:   Pt report min/no R knee pain  Pt report no functional limitations with R knee  Pt demonstrate [place] with HEP  Pt able to walk painfree without deviation    Plan: Progress LE strengthening as able.   Date: 5/29/2024  TX#: 2 Date:5/31/2024                TX#: 3 Date:  6/3/2024             TX#: 4 Date:                 TX#: 5/ Date:   Tx#: 6/   Ther Ex (40')  - NuStep UE/LE L5 x5'  - seated HS stretch  - supine SLR 3x10 R, L  - Prone SLR 3x10 R, L  - Sidelying SLR 3x10 R, L  - LAQ  - Shuttle B leg press 5b x30 TherEx (40')  NuStep UE/LE L5 x 5'  Supine SLR 3x10 R, L  SAQ over bolster 3x10, 5\" holds  S/L SLR 3x10 R, L  LAQ with 2# 2x10, 5\" holds  Shuttle B leg press 6b x30  Shuttle single leg press 3b x 30 R, L  Standing hip abd/ext 2x10 ea R,L with YTB  Seated HS stretch     TherEx (40')  NuStep UE/LE L5 x 5'  Supine SLR 3x10 R, L  Prone SLR 3x10 R, L  SAQ over bolster 3x10, 5\" holds  S/L SLR 3x10 R, L  LAQ with 2# 2x10, 5\" holds  Shuttle B leg press 6b x30  Shuttle single leg press 3b x 30 R, L  Standing hip abd/ext 2x10 ea R,L with YTB  Seated HS stretch                          HEP: [employer] Access Code: [email or web address]     Charges: Ther Ex x3       Total Timed Treatment: 40 min  Total Treatment Time: 40 min

## 2024-06-03 NOTE — TELEPHONE ENCOUNTER
Verified patient's coverage, no authorization needed for Durolane Injection.   Okay to schedule.

## 2024-06-05 ENCOUNTER — OFFICE VISIT (OUTPATIENT)
Dept: PHYSICAL THERAPY | Age: 74
End: 2024-06-05
Payer: MEDICARE

## 2024-06-05 PROCEDURE — 97110 THERAPEUTIC EXERCISES: CPT

## 2024-06-05 NOTE — PROGRESS NOTES
Diagnosis:   Primary osteoarthritis of right knee (M17.11)  Hamstring tendinitis of right thigh (M76.891)       Referring Provider: Keny Lockhart  Date of Evaluation:    5/23/2024    Precautions:  None Next MD visit:   5/31/24  Date of Surgery: n/a   Insurance Primary/Secondary: MEDICARE / [place]DEMNITY     # Auth Visits: n/a            Subjective: Hasn't yet heard of finalization of injection schedule for this Friday, but likely.  Knee starts out the day a bit stiff, but then loosens up with activity.  Pain: -    Objective: See below treatment grid.    Assessment:  Pt continues to need cueing for exercise techniques, but is improving subjectively as well as with exercise tolerance.    Goals:   Pt report min/no R knee pain  Pt report no functional limitations with R knee  Pt demonstrate [place] with HEP  Pt able to walk painfree without deviation    Plan: Progress LE strengthening as able.   Date: 5/29/2024  TX#: 2 Date:5/31/2024                TX#: 3 Date:  6/3/2024             TX#: 4 Date:   6/5/2024              TX#: 5 Date:   Tx#: 6/   Ther Ex (40')  - NuStep UE/LE L5 x5'  - seated HS stretch  - supine SLR 3x10 R, L  - Prone SLR 3x10 R, L  - Sidelying SLR 3x10 R, L  - LAQ  - Shuttle B leg press 5b x30 TherEx (40')  NuStep UE/LE L5 x 5'  Supine SLR 3x10 R, L  SAQ over bolster 3x10, 5\" holds  S/L SLR 3x10 R, L  LAQ with 2# 2x10, 5\" holds  Shuttle B leg press 6b x30  Shuttle single leg press 3b x 30 R, L  Standing hip abd/ext 2x10 ea R,L with YTB  Seated HS stretch     TherEx (40')  NuStep UE/LE L5 x 5'  Supine SLR 3x10 R, L  Prone SLR 3x10 R, L  SAQ over bolster 3x10, 5\" holds  S/L SLR 3x10 R, L  LAQ with 2# 2x10, 5\" holds  Shuttle B leg press 6b x30  Shuttle single leg press 3b x 30 R, L  Standing hip abd/ext 2x10 ea R,L with YTB  Seated HS stretch TherEx (40')  NuStep UE/LE L5 x 6'  Supine SLR 3x10 R, L  Prone SLR 3x10 R, L  SAQ over bolster 3x10, 5\" holds  S/L SLR 3x10 R, L  LAQ with 2# 2x10, 5\"  holds  Shuttle B leg press 6b x30  Shuttle single leg press 3b x 30 R, L  Standing hip abd/ext 2x10 ea R,L with YTB  Seated HS stretch                         HEP: [employer] Access Code: [email or web address]     Charges: Ther Ex x3       Total Timed Treatment: 40 min  Total Treatment Time: 40 min

## 2024-06-07 ENCOUNTER — OFFICE VISIT (OUTPATIENT)
Dept: ORTHOPEDICS CLINIC | Facility: CLINIC | Age: 74
End: 2024-06-07

## 2024-06-07 VITALS — DIASTOLIC BLOOD PRESSURE: 88 MMHG | SYSTOLIC BLOOD PRESSURE: 144 MMHG | HEART RATE: 68 BPM

## 2024-06-07 DIAGNOSIS — M23.321 DERANGEMENT OF POSTERIOR HORN OF MEDIAL MENISCUS OF RIGHT KNEE: Primary | ICD-10-CM

## 2024-06-07 NOTE — PROCEDURES
Procedure Note Gel Injection  _______________________________________________________________________________________________________________  Patient Name: Benjamin Ralph   Date: 6/7/2024     Based on history, physical exam, and available diagnostic testing, the patient diagnosis appears consistent with intraarticular pathology. We discussed the use of a gel injection for therapeutic purposes. The risks, benefits, and potential complications of gel injection(s) were discussed in detail. The risks include, but are not limited to: pain, infection, bleeding/hematoma, swelling, flare response, incomplete resolution of symptoms, possible need for further injections, or subsequent surgical intervention. The patient verbalized an understanding and agrees to the injection(s).     Under sterile prep, a Durolane hyaluronic acid injection    was injected into: Right knee joint     This procedure was performed without ultrasound guidance    This procedure was well tolerated. The patient understands that the injection could take several days to take effect.    The patient was not sedated and fully conscious for the injection.  Prior to the injection, verification followed the Universal Protocol in the following manner:  [X] A \"Time Out\" was performed prior to the procedure to confirm patient identification, injection site, and preparation of equipment.  [X] The patient was identified using two patient identifiers.  [X] The procedure site was appropriately prepped    Keny Lockhart MD  Orthopaedic Surgery  6/7/2024

## 2024-06-07 NOTE — PROGRESS NOTES
Orthopaedic Surgery Follow-up Patient Visit  _______________________________________________________________________________________________________________  _______________________________________________________________________________________________________________    DATE OF VISIT: 2024     CHIEF COMPLAINT: Follow-up right knee    HISTORY OF PRESENT ILLNESS: Benjamin Ralph is a 73 year old male who presents to the clinic for follow-up for his right knee after obtaining MRI. Since last visit, he obtained his MRI and we had discussed the results through messaging and telephone.  The patient reports continued pain with intermittent mechanical symptoms he is now interested in obtaining a gel injection for his knee.  He has not achieved any benefit from steroid injection into the knee.  He feels he has made some mild progress with physical therapy.  He denies any new injuries    SOCIAL HISTORY  Social History     Socioeconomic History    Marital status: Single     Spouse name: Not on file    Number of children: 0    Years of education: Not on file    Highest education level: Not on file   Occupational History    Occupation:    Tobacco Use    Smoking status: Former     Current packs/day: 0.00     Types: Cigarettes     Quit date: 3/25/1978     Years since quittin.2     Passive exposure: Never    Smokeless tobacco: Never   Vaping Use    Vaping status: Never Used   Substance and Sexual Activity    Alcohol use: Yes     Alcohol/week: 0.0 standard drinks of alcohol     Comment: rarely    Drug use: No    Sexual activity: Not Currently   Other Topics Concern     Service Not Asked    Blood Transfusions Not Asked    Caffeine Concern Yes     Comment: coffee, 3cups/day    Occupational Exposure Not Asked    Hobby Hazards Not Asked    Sleep Concern Not Asked    Stress Concern Not Asked    Weight Concern Not Asked    Special Diet Not Asked    Back Care Not Asked    Exercise Not Asked    Bike  Helmet Not Asked    Seat Belt Not Asked    Self-Exams Not Asked    Grew up on a farm No    History of tanning No    Outdoor occupation No    Pt has a pacemaker No    Pt has a defibrillator No    Reaction to local anesthetic No   Social History Narrative    Not on file     Social Determinants of Health     Financial Resource Strain: Not on file   Food Insecurity: Not on file   Transportation Needs: Not on file   Physical Activity: Not on file   Stress: Not on file   Social Connections: Not on file   Housing Stability: Not on file        PAST MEDICAL HISTORY  Past Medical History:    Basal cell carcinoma    left nose    Basal cell carcinoma of nose    BCC (basal cell carcinoma)     left posterior lateral neck    BCC (basal cell carcinoma)    right ala base    Calculus of kidney    left    Chronic headaches    Chronic pansinusitis    Chronic sinusitis    Congestion of nasal sinus    COVID-19    Esophageal reflux    Ganglion of flexor tendon sheath of right thumb    High cholesterol    Melanoma (HCC)    left arm, stage I; .75 mm depth    Osteoarthritis    Plantar wart    right heel    Pneumonia due to organism    2016    Visual impairment    Glasses        PAST SURGICAL HISTORY  Past Surgical History:   Procedure Laterality Date    Adj tiss xfer lid,nos,ear <10sqcm Right 2-17-17    Exc lesion of nose, V_Y flap    Colonoscopy      Colonoscopy      Colonoscopy N/A 12/26/2018    Procedure: COLONOSCOPY;  Surgeon: Isidoro Mosley MD;  Location: [facility] ENDOSCOPY    Exc skin malig 2.1-3cm face,facial  05/18/2022    Excis tendon sheath lesn,hand/fingr Right 2-17-17    Exc ganglion R thumb    Foot surgery Left 2006    Nerve procedure    Full graft proc head,fac,hand <20sqc  05/18/2022    Nasal scopy,remv part ethmoid      Nasal scopy,rmv tiss maxill sinus      Repair of nasal septum  1985    Repair of nasal septum      Repr cmpl wnd head,fac,hand 2.6-7.5  05/18/2022    Skin surgery Left 10/07/16    left upper arm melanoma         MEDICATIONS  Reviewed  No outpatient medications have been marked as taking for the 6/7/24 encounter (Appointment) with Keny Lockhart MD.        ALLERGIES  Allergies   Allergen Reactions    Penicillin G HIVES     states he is not sure if it was from actual drug    Penicillins HIVES        FAMILY HISTORY  Family History   Problem Relation Age of Onset    Dementia Mother     Seizure Disorder Mother     Hypertension Mother     Cancer Mother         skin    Cancer Father         Leukemia        REVIEW OF SYSTEMS  A 14 point review of systems was performed. Pertinent positives and negatives noted in the HPI.    PHYSICAL EXAM  There were no vitals taken for this visit.     Constitutional: The patient is well-developed, well-nourished, in no acute distress.  Neurological: Alert and oriented to person, place, and time.  Psychiatric: Mood and affect normal.  Head: Normocephalic and atraumatic.  Cardiovascular: regular rate by palpation  Pulmonary/Chest: Effort normal. No respiratory distress. Breathing non-labored  Abdominal: Abdomen exhibits no distension.   Right  KNEE  INSPECTION/PALPATION  No readily apparent visual abnormalities of the knee.   No ecchymosis or erythema  No effusion.   No breaks in skin. Skin is euthermic.  Neutral alignment on standing  Slightly antalgic gait  TTP  to medial joint line, NTTP to lateral joint line  ROM  0-130 degrees  KNEE STRENGTH  Flexion: 5/5  Extension: 5/5  STABILITY  1A lachman, stable anterior drawer  Stable posterior drawer  Stable to varus and valgus stress at 0 and 30 degrees  1 quadrant of lateral patellar translation  Negative J sign, negative apprehension  PERTINENT FINDINGS  Positive Davi    Positive Thessaly  Negative Patellar grind  SILT Saima/Saph/SPN/DPN/T  2+ DP/PT pulse, foot wwp     IMAGING  I independently viewed and interpreted the imaging. Radiologist interpretation is available in the imaging report.  MRI: MRI of the right knee demonstrates posterior horn  and body medial meniscus tears including a radial component in the posterior horn.  ACL, PCL, MCL, and LCL intact.  Lateral meniscus overall appears diffusely intact.  There are some degenerative changes within the knee, most notably in the medial compartment.    MRI KNEE, RIGHT (OIH=62263)    Result Date: 5/28/2024  PROCEDURE:  MRI KNEE, RIGHT (CPT=73721)  COMPARISON:  None.  INDICATIONS:  M76.891 Hamstring tendinitis of right thigh M17.11 Primary osteoarthritis of right knee  TECHNIQUE:  Axial, coronal, and sagittal proton density with and without fat saturation images were obtained.  PATIENT STATED HISTORY: (As transcribed by Technologist)  Patient states medial right knee pain.    FINDINGS:  LIGAMENTS:          The ACL, PCL, patellar retinacula, and collateral ligament complexes are intact. MENISCI:            Complex tear of the medial meniscus involving the body and posterior horn, comprised of radial and oblique components.  The meniscal root insertions are intact.  No meniscal extrusion.  Normal lateral meniscus. TENDONS:            The tendinous insertions about the knee are intact without significant tendinosis or tears. MUSCULATURE:        No evidence of strain, edema, or atrophy. BONY COMPARTMENTS:  No fracture or malalignment.  Moderate subchondral marrow edema/contusion noted peripherally within the medial tibial plateau, subjacent to the aforementioned complex meniscal tear.  Broad thinning and partial-thickness fissuring of the articular cartilage along the central and posterior weight-bearing surfaces of the medial femoral condyle.  Articular cartilage is preserved within the lateral and patellofemoral compartments. SYNOVIUM:           Small knee joint effusion.  No intra-articular bodies.  No organized Dooley cyst.  The popliteal neurovascular bundle is within normal limits.             CONCLUSION:   1. Complex tear of the body and posterior horn of the medial meniscus comprised of radial and  oblique components.  Normal lateral meniscus.  2. Normal cruciate and collateral ligaments.  3. Moderate subchondral marrow edema/contusion peripherally within the medial tibial plateau, subjacent to the aforementioned complex meniscal tear.  Broad thinning and partial-thickness fissuring of the articular cartilage along the central and posterior weight-bearing surfaces of the medial femoral condyle.  4. Small knee joint effusion.    LOCATION:  [facility]          Dictated by (CST): Angelica Braxton MD on 5/28/2024 at 10:42 AM     Finalized by (CST): Angelica Braxton MD on 5/28/2024 at 10:53 AM         ASSESSMENT/PLAN: Benjamin Ralph is a 73 year old male who presents to the Orthopaedic surgery clinic today for follow-up for a right knee medial meniscus tear with intermittent mechanical symptoms.  He has not achieved any substantial improvement from steroid injection but is overall improving slightly with therapy.  He would like to proceed with injection today with gel and then depending on whether this provides reasonable benefit, will continue to attempt to progress with therapy versus ultimately may necessitate operative intervention with possible meniscal debridement    We discussed the diagnosis, changes, and therapies performed to date including possible treatments and their associated risks/benefits.  WEIGHT BEARING STATUS: Weightbearing as tolerated  RANGE-OF-MOTION LIMITATIONS: as tolerated  NEW PRESCRIPTIONS:  We discussed medications for this condition including patient current regimen. Based on this discussion we have added/re-ordered no additional medications  IMAGING ORDERED: none  CONSULTS PLACED: no new consultations  PROSTHESES/ORTHOTICS: the patient does not need prostheses/orthoses for the current condition  PROCEDURES:  Gel injection right knee    FOLLOW-UP: as needed    RADIOGRAPHS AT NEXT VISIT: none    I have personally seen Benjamin Ralph and discussed in detail their plan of care. Prior to  departure, they indicated agreement with and understanding of their plan of care and their follow-up as documented herein this note. Please note that this note was written in combination with voice recognition/dictation software and there is a possibility of transcription errors which were not identified at the time of note submission. If clarification is necessary, please contact the author or clinic staff.    Keny Lockhart MD  Orthopaedic Surgery  6/7/2024

## 2024-06-10 ENCOUNTER — APPOINTMENT (OUTPATIENT)
Dept: PHYSICAL THERAPY | Age: 74
End: 2024-06-10
Payer: MEDICARE

## 2024-06-19 ENCOUNTER — OFFICE VISIT (OUTPATIENT)
Dept: PHYSICAL THERAPY | Age: 74
End: 2024-06-19
Payer: MEDICARE

## 2024-06-19 PROCEDURE — 97110 THERAPEUTIC EXERCISES: CPT

## 2024-06-19 NOTE — PROGRESS NOTES
Diagnosis:   Primary osteoarthritis of right knee (M17.11)  Hamstring tendinitis of right thigh (M76.891)       Referring Provider: Keny Lockhart  Date of Evaluation:    5/23/2024    Precautions:  None Next MD visit:   5/31/24  Date of Surgery: n/a   Insurance Primary/Secondary: MEDICARE / [place] INDEMNITY     # Auth Visits: n/a            Subjective: Had injection almost two weeks ago, but no change at all with symptoms.  Continues to wear knee sleeve with good comfort.  Pain: -    Objective: See below treatment grid.    Assessment:  Continued symptoms of internal derangement.      Goals:   Pt report min/no R knee pain  Pt report no functional limitations with R knee  Pt demonstrate [place] with HEP  Pt able to walk painfree without deviation    Plan: Progress LE strengthening as able. Will f/u with Ortho next week.  Date: 5/29/2024  TX#: 2 Date:5/31/2024                TX#: 3 Date:  6/3/2024             TX#: 4 Date:   6/5/2024              TX#: 5 Date: 9/19/2024  Tx#: 6    Ther Ex (40')  - NuStep UE/LE L5 x5'  - seated HS stretch  - supine SLR 3x10 R, L  - Prone SLR 3x10 R, L  - Sidelying SLR 3x10 R, L  - LAQ  - Shuttle B leg press 5b x30 TherEx (40')  NuStep UE/LE L5 x 5'  Supine SLR 3x10 R, L  SAQ over bolster 3x10, 5\" holds  S/L SLR 3x10 R, L  LAQ with 2# 2x10, 5\" holds  Shuttle B leg press 6b x30  Shuttle single leg press 3b x 30 R, L  Standing hip abd/ext 2x10 ea R,L with YTB  Seated HS stretch     TherEx (40')  NuStep UE/LE L5 x 5'  Supine SLR 3x10 R, L  Prone SLR 3x10 R, L  SAQ over bolster 3x10, 5\" holds  S/L SLR 3x10 R, L  LAQ with 2# 2x10, 5\" holds  Shuttle B leg press 6b x30  Shuttle single leg press 3b x 30 R, L  Standing hip abd/ext 2x10 ea R,L with YTB  Seated HS stretch TherEx (40')  NuStep UE/LE L5 x 6'  Supine SLR 3x10 R, L  Prone SLR 3x10 R, L  SAQ over bolster 3x10, 5\" holds  S/L SLR 3x10 R, L  LAQ with 2# 2x10, 5\" holds  Shuttle B leg press 6b x30  Shuttle single leg press 3b x 30 R,  L  Standing hip abd/ext 2x10 ea R,L with YTB  Seated HS stretch TherEx (40')  NuStep UE/LE L5 x 6'  Supine SLR 3x10 R, L  Prone SLR 3x10 R, L  SAQ over bolster 3x10, 5\" holds  S/L SLR 3x10 R, L  LAQ with 3# 2x10, 5\" holds  Shuttle B leg press 7b x30  Shuttle single leg press 4b x 30 R, L  Seated HS stretch                            HEP: [facility] Access Code: [email or web address]     Charges: Ther Ex x3       Total Timed Treatment: 40 min  Total Treatment Time: 40 min

## 2024-06-21 ENCOUNTER — OFFICE VISIT (OUTPATIENT)
Dept: PHYSICAL THERAPY | Age: 74
End: 2024-06-21
Payer: MEDICARE

## 2024-06-21 PROCEDURE — 97110 THERAPEUTIC EXERCISES: CPT

## 2024-06-21 NOTE — PROGRESS NOTES
Diagnosis:   Primary osteoarthritis of right knee (M17.11)  Hamstring tendinitis of right thigh (M76.891)       Referring Provider: Keny Lockhart  Date of Evaluation:    5/23/2024    Precautions:  None Next MD visit:   5/31/24  Date of Surgery: n/a   Insurance Primary/Secondary: [place]IC[place] / [place]DEMN[place]     # Auth Visits: n/a            Subjective: Still no change since the injection was received.  Continued knee pain.  Pain: -    Objective: See below treatment grid.    Assessment:  Continued and consistent pain.  Pt describes only minor/temporary improvement with ice, none with topicals, none with injection.    Goals:   Pt report min/no R knee pain  Pt report no functional limitations with R knee  Pt demonstrate [place] with HEP  Pt able to walk painfree without deviation    Plan: Progress LE strengthening as able. Will f/u with Ortho next week to consider next steps..  Date: 5/29/2024  TX#: 2 Date:5/31/2024                TX#: 3 Date:  6/3/2024             TX#: 4 Date:   6/5/2024              TX#: 5 Date: 6/19/2024  Tx#: 6 Date: 6/21/2024  Tx#: 7   Ther Ex (40')  - NuStep UE/LE L5 x5'  - seated HS stretch  - supine SLR 3x10 R, L  - Prone SLR 3x10 R, L  - Sidelying SLR 3x10 R, L  - LAQ  - Shuttle B leg press 5b x30 TherEx (40')  NuStep UE/LE L5 x 5'  Supine SLR 3x10 R, L  SAQ over bolster 3x10, 5\" holds  S/L SLR 3x10 R, L  LAQ with 2# 2x10, 5\" holds  Shuttle B leg press 6b x30  Shuttle single leg press 3b x 30 R, L  Standing hip abd/ext 2x10 ea R,L with YTB  Seated HS stretch     TherEx (40')  NuStep UE/LE L5 x 5'  Supine SLR 3x10 R, L  Prone SLR 3x10 R, L  SAQ over bolster 3x10, 5\" holds  S/L SLR 3x10 R, L  LAQ with 2# 2x10, 5\" holds  Shuttle B leg press 6b x30  Shuttle single leg press 3b x 30 R, L  Standing hip abd/ext 2x10 ea R,L with YTB  Seated HS stretch TherEx (40')  NuStep UE/LE L5 x 6'  Supine SLR 3x10 R, L  Prone SLR 3x10 R, L  SAQ over bolster 3x10, 5\" holds  S/L SLR 3x10 R, L  LAQ with 2#  2x10, 5\" holds  Shuttle B leg press 6b x30  Shuttle single leg press 3b x 30 R, L  Standing hip abd/ext 2x10 ea R,L with YTB  Seated HS stretch TherEx (40')  NuStep UE/LE L5 x 6'  Supine SLR 3x10 R, L  Prone SLR 3x10 R, L  SAQ over bolster 3x10, 5\" holds  S/L SLR 3x10 R, L  LAQ with 3# 2x10, 5\" holds  Shuttle B leg press 7b x30  Shuttle single leg press 4b x 30 R, L  Seated HS stretch TherEx (40')  NuStep UE/LE L5 x 6'  Supine SLR 3x10 R, L  Prone SLR 3x10 R, L  SAQ 3# over bolster 3x10, 5\" holds  S/L SLR 3x10 R, L  LAQ with 3# 2x10, 5\" holds  Shuttle B leg press 6b x30  Shuttle single leg press 4b x 30 R, L  Seated HS stretch                           HEP: [facility] Access Code: [email or web address]     Charges: Ther Ex x3       Total Timed Treatment: 40 min  Total Treatment Time: 40 min

## 2024-06-25 ENCOUNTER — TELEPHONE (OUTPATIENT)
Dept: ORTHOPEDICS CLINIC | Facility: CLINIC | Age: 74
End: 2024-06-25

## 2024-06-25 ENCOUNTER — OFFICE VISIT (OUTPATIENT)
Dept: ORTHOPEDICS CLINIC | Facility: CLINIC | Age: 74
End: 2024-06-25

## 2024-06-25 DIAGNOSIS — M23.321 DERANGEMENT OF POSTERIOR HORN OF MEDIAL MENISCUS OF RIGHT KNEE: ICD-10-CM

## 2024-06-25 DIAGNOSIS — Z01.818 PREOP EXAM FOR INTERNAL MEDICINE: ICD-10-CM

## 2024-06-25 DIAGNOSIS — S83.203D OTHER TEAR OF MENISCUS OF RIGHT KNEE AS CURRENT INJURY, UNSPECIFIED MENISCUS, SUBSEQUENT ENCOUNTER: Primary | ICD-10-CM

## 2024-06-25 DIAGNOSIS — M23.321 DERANGEMENT OF POSTERIOR HORN OF MEDIAL MENISCUS OF RIGHT KNEE: Primary | ICD-10-CM

## 2024-06-25 PROCEDURE — 99215 OFFICE O/P EST HI 40 MIN: CPT | Performed by: STUDENT IN AN ORGANIZED HEALTH CARE EDUCATION/TRAINING PROGRAM

## 2024-06-25 NOTE — TELEPHONE ENCOUNTER
Ortho Surgery Request  Surgery: Right knee arthroscopy with medial meniscus debridement   Surgical Assistant: Hoang Werner, otherwise none needed  Surgery Day Request: 30 July and afternoon versus anytime 31 July  Estimated Procedure Length: 40 min  Anesthesia type: General + Block adductor canal  Position: Supine   Special Needs: None  Equipment: Torpedo  Location:[facility] OR  Post Op Visit Date: 2 weeks  CPT Code: arthroscopic assisted debridement of medial OR lateral meniscus 74896    ICD Code:M23.321  Medical Clearance Needed: Medical  Preadmission Testing Orders:  Anesthesia testing protocols and anesthesia pre op orders will be used  Additional PAT orders:  Pre OP Orders:  Use [facility] procedure driven order set in addition to anesthesia protocol  Additional Pre Op Orders:  PCN Allergy:  Yes  If Yes:   Proceed with PCN/Cephalosporin recommend alternative antibiotic for PCN allergy  Admit:  Hospital outpatient surgery

## 2024-06-26 ENCOUNTER — OFFICE VISIT (OUTPATIENT)
Dept: PHYSICAL THERAPY | Age: 74
End: 2024-06-26
Payer: MEDICARE

## 2024-06-26 PROCEDURE — 97110 THERAPEUTIC EXERCISES: CPT

## 2024-06-26 NOTE — PROGRESS NOTES
Diagnosis:   Primary osteoarthritis of right knee (M17.11)  Hamstring tendinitis of right thigh (M76.891)       Referring Provider: Keny Lockhart  Date of Evaluation:    5/23/2024    Precautions:  None Next MD visit:   5/31/24  Date of Surgery: n/a   Insurance Primary/Secondary: [place] / [place]     # Auth Visits: n/a            Subjective: No change.  Saw dr who agreed with plan for arthroscopic surgery, likely end of July.  Pain: -    Objective: See below treatment grid.    Assessment:  Slowly increasing strength with good flexibility, but continued symptoms.  Discussed whether to continue PT between now and likely surgical date and pt choose to continue after the holiday week.    Goals:   Pt report min/no R knee pain  Pt report no functional limitations with R knee  Pt demonstrate [place] with HEP  Pt able to walk painfree without deviation    Plan: Progress LE strengthening as able.   Date:   6/5/2024              TX#: 5 Date: 6/19/2024  Tx#: 6 Date: 6/21/2024  Tx#: 7 Date: 6/26/2024  Tx#: 8 Date:   Tx#:  Date:   Tx#:    TherEx (40')  NuStep UE/LE L5 x 6'  Supine SLR 3x10 R, L  Prone SLR 3x10 R, L  SAQ over bolster 3x10, 5\" holds  S/L SLR 3x10 R, L  LAQ with 2# 2x10, 5\" holds  Shuttle B leg press 6b x30  Shuttle single leg press 3b x 30 R, L  Standing hip abd/ext 2x10 ea R,L with YTB  Seated HS stretch TherEx (40')  NuStep UE/LE L5 x 6'  Supine SLR 3x10 R, L  Prone SLR 3x10 R, L  SAQ over bolster 3x10, 5\" holds  S/L SLR 3x10 R, L  LAQ with 3# 2x10, 5\" holds  Shuttle B leg press 7b x30  Shuttle single leg press 4b x 30 R, L  Seated HS stretch TherEx (40')  NuStep UE/LE L5 x 6'  Supine SLR 3x10 R, L  Prone SLR 3x10 R, L  SAQ 3# over bolster 3x10, 5\" holds  S/L SLR 3x10 R, L  LAQ with 3# 2x10, 5\" holds  Shuttle B leg press 6b x30  Shuttle single leg press 4b x 30 R, L  Seated HS stretch TherEx (40')  NuStep UE/LE L5 x 6'  Supine SLR 1# 3x10 R, L  Prone SLR 1# 3x10 R, L  SAQ 1# over bolster 3x10, 5\"  holds  S/L SLR 1# 3x10 R, L  LAQ with 1# 2x10, 5\" holds  Shuttle B leg press 8b x30  Shuttle single leg press 5b x 30 R, L  Seated HS stretch                             HEP: [facility] Access Code: [email or web address]     Charges: Ther Ex x3       Total Timed Treatment: 40 min  Total Treatment Time: 40 min

## 2024-06-26 NOTE — PATIENT INSTRUCTIONS
KNEE PATHOLOGY INFORMATION PACKET  Dr. Keny Lockhart  Note: this information packet goes into detail regarding surgical treatment, but is also relevant to understanding your diagnosis and non-operative treatment options  THE KNEE JOINT  The knee is a flexible hinge joint. It is made up of the femur, the tibia, and the patella (the kneecap). We sometimes will refer to the knee as a group of three joints which include the medial tibiofemoral joint, the lateral tibiofemoral joint, and the patellofemoral joint. Despite being a dynamic and flexible joint, the knee is extremely stable in normal situations and can support extremely heavy loads while moving smoothly. Therefore, in addition to the bony support, the knee joint is supported by multiple structures:    KNEE PAIN  Because the entire weight of the body is lifted and carried through the knee, injury to any of these structures may cause pain, instability, and loss of knee function. Common injuries and reasons for knee pain include tears in the meniscus, ligaments, or tendons, tendinitis, and arthritis. Often, more than one of these will occur at the same time.    Meniscal Tears  Tears of the meniscus are common, especially in active persons. However, they become more common as we age or develop arthritis in the knee. Meniscus tears can occur from trauma (a fall, sports injury, motor vehicle collision) or from degeneration over time.  There are several patterns and locations of meniscus tears, which may affect the symptoms  and treatment options.       Ligament Tears  The knee is joint is stabilized under our body weights largely by the large ligaments. The major ligaments are the anterior cruciate ligament (ACL), posterior cruciate ligament (PCL), medial collateral ligament (MCL), and lateral collateral ligament (LCL), and the medial patellofemoral ligament (MPFL). Sometimes these injuries can be a mild but painful sprain. Other times, they can be a complete  rupture that leaves the knee feeling unstable.    ACL Tear  Dislocations  Knee Dislocations are uncommon without a major injury (such as a motor vehicle collision). This occurs when many ligaments are injured in the knee and the lower leg separates from the thigh. Most often, when someone is told they had a knee dislocation, they actually mean a kneecap (patella) dislocation. These can be painful, particularly when the kneecap returns to its normal position. In general, these are accompanied by an injury to the medial patellofemoral ligament (MPFL), making them more likely to occur in the future.  Bursitis  Anywhere in the body where structures move relative to one another, there are lubricating sacs (bursa) that allow free gliding movement. One of the more irritating of these is the prepatellar bursa located in front of the kneecap. Bursitis can occur by itself or when there is inflammation from other injuries.   Cartilage Injuries & Arthritis  As with any other joint in the body, the knee has cartilage that can get damaged or can develop arthritis. Arthritis occurs when enough cartilage between bones is lost, and the bones are directly in contact. Cartilage injuries and arthritis can occur in the medial or lateral tibiofemoral joints or in the patellofemoral joint. The location and type of injury can be evaluated by specific exam maneuvers and/or imaging studies.     Cartilage Injury  CAUSES OF KNEE PAIN  There are multiple potential causes of knee pain. These can be divided between acute causes and degenerative causes.  Acute   Acute knee pain typically occurs from a trauma such as a fall, patella dislocation, twisting injury, or an accident. When these events occur, the structures that stabilize the knee are challenged and can become injured. These may occur with other injuries including broken bones (fractures). Some of these injuries can cause swelling and blocks to knee motion which may require therapy or  other intervention to improve.     Degenerative   Degenerative knee injuries may be a result of normal aging or from overuse. With age and use, tendons, joints, and meniscal tissue break down. In younger age, these structures have better blood supply and are more likely to heal. However, with repeated stresses and overuse such as frequent heavy lifting or running with a load, these injuries may also occur at any age. In an athletic population, degenerative injuries are very common.     SYMPTOMS   Knee injuries have many symptoms that can be specific to a certain cause or overlap. Symptoms that are common to many types of knee issues include:  Pain when sitting or standing for a long period or when descending stairs  Crepitus (crackling sound or sensation) when moving the knee  Sudden weakness accompanied by pain, causing the sensation that the knee is untrustworthy  There are also some common associations with specific knee issues though you may experience these symptoms with other knee issues. Tears of the cruciate ligaments (ACL/PCL) typically cause the knee to feel unstable. Meniscus tears can result in pain or “mechanical” symptoms such as the knee catching or locking up while using it, sometimes requiring you to “shake it loose”.   Symptoms in the knee are often mild at first and associated with the activities that caused them. Over time, these symptoms may become more severe, become more noticeable at rest or with more activities, and stop responding to over-the-counter pain relievers.    DIAGNOSIS OF KNEE INJURIES  Your physician will perform a history and physical examination. This examination can identify other injuries or identify other sources of pain. Knee examination will include assessment of you motion, strength, and the function of your nerves. Your physician will perform specific tests to try to assess for specific injuries.   You will often have X-rays performed before you see your Orthopaedic  surgeon. These X-rays will help us to assess the alignment of the knee, whether there are any fractures or predisposing factors to injury, and whether there is any arthritis. While X-rays do not show the muscles and tendons, some signs on these X-rays can also be used to identify an injury to these structures. Your physician may also order a magnetic resonance imaging (MRI) scan to evaluate the soft tissue structures in your knee. This can show meniscus and ligament tears, cartilage injuries, bursitis, and subtle bone injury.  These scans are not required in all patients.     TREATMENT  The treatment for knee pain is often dependent on the specifics and duration of pain and symptoms.   The main, early treatment method for most knee pain is physical therapy for range of motion exercises and strengthening of the supporting musculature. We will often recommend physical therapy in combination with activity modification (avoiding painful activities) and anti-inflammatory medications (e.g., acetaminophen, ibuprofen). There is no specific duration of therapy, though in most cases you should be able to notice meaningful improvement within 2-3 months.   Your physician may recommend a corticosteroid injection for relief of pain symptoms. This works by decreasing the body's inflammatory and pain response and may help relieve pain enough to make therapy easier to perform. However, there are some downsides to injections into the knee and some injuries such as unstable meniscus tears and ligament tears may worsen. Your physician should discuss the risks and benefits with you beforehand. In general, you should not undergo multiple knee injections as these may lead to long-term cartilage damage.   In patients with symptoms that have not improved with the above management options, we will often recommend surgery. We also strongly recommend surgery for patients with knee instability, loose bodies, or unstable meniscus tears as these  are unlikely to improve without surgery and treatment for these is less successful with prolonged delays.     ________________________________________________________________________________________  ________________________________________________________________________________________  OPERATIVE INTERVENTION  KNEE SURGERY  For many causes of knee pain, the main treatment is a procedure called a knee arthroscopy. This procedure involves using a camera placed inside your joint to guide tools to address the damaged structures inside the knee. During the surgery, your surgeon will inspect the structures inside of the knee and take pictures to discuss with you at your first post-operative appointment. Occasionally, an open surgery may be necessary though this is less common and your surgeon will discuss this with you if it is necessary. Depending on the source of your pain, specific procedures will be included in your surgery, the most common of which are:       Meniscus debridement or repair - Your surgeon will either remove small areas of torn/irreparable tissue or use a very strong type of permanent suture to repair the meniscus back to its appropriate position. This may be performed as a combination of debridement of smaller tears and repair of larger ones. Depending on the severity and location of the tear, this may require additional incisions.    Meniscus Repairs    Ligament reconstruction - Your surgeon will use tendon tissue (either your own or donated tendon) as a graft to reconstruct the torn ligament. This will usually be secured in place with metal buttons or absorbable interference screws. This procedure also usually involves an additional, slightly larger incision. The choice of the graft will be based on your discussion with your surgeon. Typically, we use autograft (your own tissue) if you are younger and/or involved in competitive sports.    ACL Reconstruction  Synovial debridement - Your surgeon  will use a shaving instrument to remove the inflamed and painful tissue inside the knee.    Cartilage restoration procedure- Your surgeon will use specific surgical techniques to restore the damaged cartilage in your knee. For small lesions, this may involve using a pointed instrument to allow marrow cells and blood flow from your bone to access the damaged cartilage and heal the cartilage with a different type of cartilage. For larger lesions, this may involve using graft cartilage with or without bone backing to fill in the defect.    Graft Cartilage Transplant  Osteotomy- Your surgeon will make a cut in your bone to change the alignment of your knee joint or the alignment of how your kneecap moves. Your surgeon will then use metal hardware to secure the bone in its new alignment.    KNEE ARTHROPLASTY    For patients with severe symptoms from arthritis, a joint replacement surgery (arthroplasty) may be an option. This procedure involves making an incision over the front of the knee, removing the joint surfaces, and replacing them with metal and polyethylene (plastic) components. Because this surgery is best performed by a specialist who performs high volumes of knee replacements and your surgeon specializes more in joint preservation surgery, he will often refer you to another specialist if this is considered necessary.    Knee Replacement      RISKS OF SURGERY  Arthroscopic and Sports-type knee surgeries are overall very safe, but there are some risks to be aware of. Some of these risks depend on the type of anesthesia used as some patients may react to certain types of anesthesia. Often these surgeries can be done with a nerve block to decrease the amount of anesthetic required. Other potential risks of the surgery include:  Pain - Some amount of pain is normal after any surgery but should be manageable with your post-operative pain protocol. While this pain almost always resolves within the first few weeks,  some activities may remain bothersome and painful until your rehabilitation process is nearly complete. If you have severe pain not controlled on your postoperative pain regimen, you should let your surgeon know.  Infection - Infection after knee surgery is very uncommon and, when it does occur, is typically superficial (only involving the outside region of the wound). Most often this can be managed with a brief course of antibiotics. In rare cases, the knee may need to undergo another arthroscopic procedure to wash it out. To avoid this risk, you will be given a wash for skin decontamination, which removes some of the more problematic bacteria. On the day of surgery you will also be given antibiotics and your skin will be thoroughly cleaned in the operating room.  Bleeding - Any time an incision is made to the skin, there will be some bleeding. You will typically lose a very minimal amount during this surgery. Additionally, your surgeon will often use a tourniquet during surgery to further decrease the amount of blood loss. This will sometimes cause some additional thigh pain.   Nerve Injuries - Injuries to the nerves of the knee are uncommon but very serious. Several important nerves pass through the back of your knee. The nerves are delicate and sensitive and can become injured even without cutting them. While many times, injured nerves may recover to some extent, you may be referred to a nerve specialist if an injury occurs to check on the healing and anticipated recovery.  Stiffness - Any time a surgery is performed, the body attempts to heal by creating scar tissue. While this is very important as this process drives the healing of your repaired structures, it also sets the knee up for stiffness. It will be important to work with your therapist to progress motion to prevent stiffness while protecting your surgical repair. If substantial stiffness does occur, additional interventions such as steroid injections  and/or knee manipulation can be performed.   Persistent Symptoms - Occasionally symptoms may persist or even recur after surgery. This may be due to failure of the repair sutures, poor structural quality of the tissue, new injuries (e.g., a fall onto the knee during recovery), or progression of arthritis. If this occurs, you may work with your surgeon and therapist to determine the cause of these issues and whether additional surgical interventions are necessary. To minimize the risk, you should carefully adhere to the rehabilitation protocol.    REHABILITATION AFTER SURGERY  The course of recovery following surgery depends somewhat upon the type of procedure the surgeon performs. You may be in a knee brace for up to 12 weeks following the surgery, though this is usually closer to 6 weeks. Patients can move their ankles immediately after surgery and the worst of the post-operative pain will begin to subside three to seven days after surgery. A supervised therapy program is initiated one to two weeks after the operation. Full range of motion usually returns after six to eight weeks. Generally, you will be able to return to running progressively, starting at around 12 weeks following surgery. Strength usually returns in three to six months, though in some cases you will not achieve symmetric strength for upwards of two years. You may return to driving as soon as you are not taking any narcotics and you feel you can control the vehicle in case of an emergency. This is typically easier when your left knee is the one that underwent surgery. Return to work depends on the specific nature and demands of that activity but can take upwards of three months for heavy laborers.     LONG-TERM SUCCESS  Success rates are variable, depending on the issue being treated. Ligament reconstruction for ligament tears are extremely reliable. Meniscal debridement and repairs are somewhat variable and depend on your age and health status.  However, with continued participation in high-risk activities (contact sports), there is a risk of recurrence/failure. Your surgeon will attempt to limit the failure risks by using modern, evidence-based techniques and modifying them based on your knee anatomy and occupational demands.      BATHING & SHAVING BEFORE SURGERY  Do not shave your knee or leg in the days leading up to your surgery. The skin irritation from a razer actually increases the likelihood of infection. Instead, your surgical team will use a set of clippers to trim hair in the operating room. To decrease your infection risk further, you will be provided with special cleaning soap that you should use the nights prior to surgery during bathing. Be sure to thoroughly apply this to your knee.     BATHING AFTER SURGERY   You should remove your knee dressings after three to four days from surgery. There will be tape and/or purple glue underneath and this may have a small amount of blood in it, which is normal. Do not remove the tape or glue - they will eventually come off on their own. You may shower/bathe starting as soon as the day of surgery provided you are able to safely maneuver into the shower/bath and you are able to keep your dressings mostly dry. If they do become saturated, you should remove them. Once the dressings are removed, you may shower like normal provided you are able to maintain your weightbearing and motion restrictions, but you should not allow direct pressure from the shower onto the knee until after your first follow-up visit. You may also prefer to use a shower chair. You should also avoid scrubbing the wound.       MEDICATIONS AFTER SURGERY  Your post-operative regimen will include medications for pain, nausea, and bowel health. Blood clots after knee surgery are uncommon, however your surgeon will start you on a course of anticoagulation 4 weeks after surgery to decrease this as much as possible. To minimize narcotic use and  establish better pain control, we employ a multimodal pain approach. This protocol combines the use of scheduled acetaminophen, gabapentin, and narcotics.  We will often use anti-inflammatories (NSAID's such as ibuprofen, celecoxib, and/or naproxen) to further assist with pain control thus allowing for a lower dose of narcotic to be used. Dosing of medications will vary from patient to patient based on their needs and past medical history, so please refer to your discharge medication list.     If you take any other medications at baseline, please discuss these with your surgeon, pharmacist, or primary care manager. In general, you can restart most of your home medications once you are discharged. If you take any pain medications on a regular basis, discontinue these during the early postoperative period and resume them as needed after you are done with your post-operative medications. If you are diabetic, you may resume your normal glucose control regimen at home if you are eating without issues. If you have issues tolerating oral intake, you should decrease your medication by half unless specifically instructed otherwise.      ICE PACK AFTER SURGERY   You will usually receive either an ice machine or several ice packs after surgery. Ice machines require a large amount of ice, so you may want to “prepare” for surgery by purchasing 20 pounds of ice cubes from the store or stocking up on frozen water bottles that you can re-freeze between uses. You should use ice frequently in the first week after surgery. If you receive a nerve block from anesthesia, ensure you can feel the skin (the block has worn off) to prevent frostbite injuries. Ice bags/packs/PolarCare/IceMan should be used for about 20 minutes every 3-4 hours during waking hours. It may be used more often at first if pain/inflammation are intense. It is often helpful to protect your skin from frostbite with a washcloth, towel, or ace wrap between the ice  bag/pack and your skin.    ________________________________________________________________________________________  ________________________________________________________________________________________  ADDITIONAL RESOURCES  If you have additional questions about shoulder stabilization surgery, you may contact your surgeon directly or you may consider reading more online. Some useful and accurate websites include:    [email or web address].[email or web address].org  [email or web address]      INFORMATION ABOUT YOUR SURGEON  Dr. Keny Lockhart is an Orthopaedic surgeon with advanced skills in orthopaedic sports and trauma surgery. He completed his training at [employer] [facility] [employer] [facility]. 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

## 2024-06-26 NOTE — PROGRESS NOTES
Orthopaedic Surgery Follow-up Patient Visit  _______________________________________________________________________________________________________________  _______________________________________________________________________________________________________________    DATE OF VISIT: 2024     CHIEF COMPLAINT: Follow-up right knee    Chief Complaint   Patient presents with    Knee Pain     R knee f/u- had Durolane injection on 2024 w/ no improvement- rates pain 6-10/10 on and off        HISTORY OF PRESENT ILLNESS: Benjamin Ralph is a 74 year old male who presents to the clinic for follow-up for his right knee pain.  At his last visit, he underwent Durolane injection into the knee with no significant improvement in the symptoms.  He continues to have mechanical symptoms in the knee, continuous pain on the medial and lateral aspects of the knee, and giving way of the knee with some buckling.  He denies subhash instability of the knee.  He denies any neurologic symptoms.  He denies any new injuries.  Pain has not been present for several months and progressively worsening despite therapy, injections, cryotherapy, and anti-inflammatory medications.  He is now interested in considering operative intervention.    SOCIAL HISTORY  Social History     Socioeconomic History    Marital status: Single     Spouse name: Not on file    Number of children: 0    Years of education: Not on file    Highest education level: Not on file   Occupational History    Occupation:    Tobacco Use    Smoking status: Former     Current packs/day: 0.00     Types: Cigarettes     Quit date: 3/25/1978     Years since quittin.2     Passive exposure: Never    Smokeless tobacco: Never   Vaping Use    Vaping status: Never Used   Substance and Sexual Activity    Alcohol use: Yes     Alcohol/week: 0.0 standard drinks of alcohol     Comment: rarely    Drug use: No    Sexual activity: Not Currently   Other Topics Concern      Service Not Asked    Blood Transfusions Not Asked    Caffeine Concern Yes     Comment: coffee, 3cups/day    Occupational Exposure Not Asked    Hobby Hazards Not Asked    Sleep Concern Not Asked    Stress Concern Not Asked    Weight Concern Not Asked    Special Diet Not Asked    Back Care Not Asked    Exercise Not Asked    Bike Helmet Not Asked    Seat Belt Not Asked    Self-Exams Not Asked    Grew up on a farm No    History of tanning No    Outdoor occupation No    Pt has a pacemaker No    Pt has a defibrillator No    Reaction to local anesthetic No   Social History Narrative    Not on file     Social Determinants of Health     Financial Resource Strain: Not on file   Food Insecurity: Not on file   Transportation Needs: Not on file   Physical Activity: Not on file   Stress: Not on file   Social Connections: Not on file   Housing Stability: Not on file        PAST MEDICAL HISTORY  Past Medical History:    Basal cell carcinoma    left nose    Basal cell carcinoma of nose    BCC (basal cell carcinoma)     left posterior lateral neck    BCC (basal cell carcinoma)    right ala base    Calculus of kidney    left    Chronic headaches    Chronic pansinusitis    Chronic sinusitis    Congestion of nasal sinus    COVID-19    Esophageal reflux    Ganglion of flexor tendon sheath of right thumb    High cholesterol    Melanoma (HCC)    left arm, stage I; .75 mm depth    Osteoarthritis    Plantar wart    right heel    Pneumonia due to organism    2016    Visual impairment    Glasses        PAST SURGICAL HISTORY  Past Surgical History:   Procedure Laterality Date    Adj tiss xfer lid,nos,ear <10sqcm Right 2-17-17    Exc lesion of nose, V_Y flap    Colonoscopy      Colonoscopy      Colonoscopy N/A 12/26/2018    Procedure: COLONOSCOPY;  Surgeon: Isidoro Mosley MD;  Location: [facility] ENDOSCOPY    Exc skin malig 2.1-3cm face,facial  05/18/2022    Excis tendon sheath lesn,hand/fingr Right 2-17-17    Exc ganglion R thumb     Foot surgery Left 2006    Nerve procedure    Full graft proc head,fac,hand <20sqc  05/18/2022    Nasal scopy,remv part ethmoid      Nasal scopy,rmv tiss maxill sinus      Repair of nasal septum  1985    Repair of nasal septum      Repr cmpl wnd head,fac,hand 2.6-7.5  05/18/2022    Skin surgery Left 10/07/16    left upper arm melanoma        MEDICATIONS  Reviewed  No outpatient medications have been marked as taking for the 6/25/24 encounter (Office Visit) with Keny Lockhart MD.        ALLERGIES  Allergies   Allergen Reactions    Penicillin G HIVES     states he is not sure if it was from actual drug    Penicillins HIVES        FAMILY HISTORY  Family History   Problem Relation Age of Onset    Dementia Mother     Seizure Disorder Mother     Hypertension Mother     Cancer Mother         skin    Cancer Father         Leukemia        REVIEW OF SYSTEMS  A 14 point review of systems was performed. Pertinent positives and negatives noted in the HPI.    PHYSICAL EXAM  There were no vitals taken for this visit.     Constitutional: The patient is well-developed, well-nourished, in no acute distress.  Neurological: Alert and oriented to person, place, and time.  Psychiatric: Mood and affect normal.  Head: Normocephalic and atraumatic.  Cardiovascular: regular rate by palpation  Pulmonary/Chest: Effort normal. No respiratory distress. Breathing non-labored  Abdominal: Abdomen exhibits no distension.   Right  KNEE  INSPECTION/PALPATION  No readily apparent visual abnormalities of the knee.   No ecchymosis or erythema  No effusion.   No breaks in skin. Skin is euthermic.  Neutral alignment on standing  Slightly antalgic gait  TTP  to medial joint line,  very mildly tender  to lateral joint line  ROM  0-130 degrees  KNEE STRENGTH  Flexion: 5/5  Extension: 5/5  STABILITY  1A lachman, stable anterior drawer  Stable posterior drawer  Stable to varus and valgus stress at 0 and 30 degrees  1 quadrant of lateral patellar  translation  Negative J sign, negative apprehension  PERTINENT FINDINGS  Positive Davi    Positive Thessaly  Negative Patellar grind  SILT Saima/Saph/SPN/DPN/T  2+ DP/PT pulse, foot wwp     IMAGING  I independently viewed and interpreted the imaging. Radiologist interpretation is available in the imaging report.  X-ray: Plain films of the right knee knee including AP, lateral, and sunrise nonweightbearing views were reviewed. These demonstrate no acute osseous abnormalities.  The patella is centrally positioned within the femoral trochlea.  There are minimal to no degenerative changes in the patellofemoral joint.  There are mild degenerative changes in the medial knee compartment and minimal to no degenerative changes in the lateral knee compartment of the tibiofemoral joint.  There are no  loose bodies evident.  There is no evidence of Segond fracture or other fractures.   MRI: MRI of the right knee demonstrates complex tear of the posterior horn and body of the medial meniscus.  There is subarticular fissuring within the medial femoral condyle.  The cruciate and collateral ligaments are intact.  The lateral meniscus and lateral articular surfaces are intact.  There is mild chondromalacia present in the medial and lateral patella facets  MRI KNEE, RIGHT (ZKB=74944)    Result Date: 5/28/2024  PROCEDURE:  MRI KNEE, RIGHT (CPT=73721)  COMPARISON:  None.  INDICATIONS:  M76.891 Hamstring tendinitis of right thigh M17.11 Primary osteoarthritis of right knee  TECHNIQUE:  Axial, coronal, and sagittal proton density with and without fat saturation images were obtained.  PATIENT STATED HISTORY: (As transcribed by Technologist)  Patient states medial right knee pain.    FINDINGS:  LIGAMENTS:          The ACL, PCL, patellar retinacula, and collateral ligament complexes are intact. MENISCI:            Complex tear of the medial meniscus involving the body and posterior horn, comprised of radial and oblique components.  The  meniscal root insertions are intact.  No meniscal extrusion.  Normal lateral meniscus. TENDONS:            The tendinous insertions about the knee are intact without significant tendinosis or tears. MUSCULATURE:        No evidence of strain, edema, or atrophy. BONY COMPARTMENTS:  No fracture or malalignment.  Moderate subchondral marrow edema/contusion noted peripherally within the medial tibial plateau, subjacent to the aforementioned complex meniscal tear.  Broad thinning and partial-thickness fissuring of the articular cartilage along the central and posterior weight-bearing surfaces of the medial femoral condyle.  Articular cartilage is preserved within the lateral and patellofemoral compartments. SYNOVIUM:           Small knee joint effusion.  No intra-articular bodies.  No organized Dooley cyst.  The popliteal neurovascular bundle is within normal limits.             CONCLUSION:   1. Complex tear of the body and posterior horn of the medial meniscus comprised of radial and oblique components.  Normal lateral meniscus.  2. Normal cruciate and collateral ligaments.  3. Moderate subchondral marrow edema/contusion peripherally within the medial tibial plateau, subjacent to the aforementioned complex meniscal tear.  Broad thinning and partial-thickness fissuring of the articular cartilage along the central and posterior weight-bearing surfaces of the medial femoral condyle.  4. Small knee joint effusion.    LOCATION:  [facility]          Dictated by (CST): Angelica Braxton MD on 5/28/2024 at 10:42 AM     Finalized by (CST): Angelica Braxton MD on 5/28/2024 at 10:53 AM         ASSESSMENT/PLAN: Benjamin Ralph is a 74 year old male who presents to the Orthopaedic surgery clinic today for follow-up For his right knee pain which is consistent on history, examination, and imaging with a complex medial meniscus tear and potentially some chondral injury to the medial femoral condyle.  This is been refractory to conservativ management  and is functionally and lifestyle limiting. Based on history, physical exam, and available imaging, the patient diagnosis is consistent with chronic medial meniscus tear. The management options for this process were discussed with the patient to include conservative and surgical options. Given the patient's age, functional status, and findings, I recommend arthroscopic debridement.      - General risks of surgery reviewed including risks of pain, bleeding, infection, scarring, damage to surrounding structures, need for further procedures, blood transfusion, VTE, risks of anesthesia, failure to improve symptoms, and recurrence of disease.    -  Patient will be placed on ERAS pathway. Written and verbal pre-op instructions given, including information regarding pre-op diet, utilization of chlorhexidine, and expectations for post-op activity. Discussed expected course of recovery and post-op activity restrictions.  - Discussed discharge pathway.  - Discussed postoperative pain management and  expectation that patient may require narcotic medication as an adjunct to multi-modal analgesic therapy.  - All questions were answered and patient desires to proceed.   - Plan to sign consents on the day of surgery.   - Preoperative testing needed: defer to PCM  - See below for specific surgical planning details:      Surgical Planning:  Procedure: Right knee arthroscopic assisted medial meniscal debridement and chondroplasty  PMH considerations: Noncontributory  PSH considerations: Noncontributory  Drug Allergies: Penicillin  Anesthesia issues:  no FHx or personal problems with anesthesia  Planned positioning: Supine  Bleeding Issues:  no personal of FHx of bleeding or clotting problems   Pre-op consultations needed: PCM  Additional pre-op imaging needed: None  Counseled for smoking cessation to improve OR outcome: N/A  Special equipment needed in the operating room: Torpedo meniscus shaver  Post-op pain regimen:  icing,  elevation, Tylenol, celebrex, gabapentin, oxyodone  Informed Consent: to be signed on day of surgery    I have personally seen Benjamin Ralph and discussed in detail their plan of care. Prior to departure, they indicated agreement with and understanding of their plan of care and their follow-up as documented herein this note. Please note that this note was written in combination with voice recognition/dictation software and there is a possibility of transcription errors which were not identified at the time of note submission. If clarification is necessary, please contact the author or clinic staff.    Keny Lockhart MD  Orthopaedic Surgery  6/25/2024

## 2024-06-27 ENCOUNTER — TELEPHONE (OUTPATIENT)
Dept: ORTHOPEDICS CLINIC | Facility: CLINIC | Age: 74
End: 2024-06-27

## 2024-06-28 ENCOUNTER — OFFICE VISIT (OUTPATIENT)
Dept: PHYSICAL THERAPY | Age: 74
End: 2024-06-28
Payer: MEDICARE

## 2024-06-28 PROCEDURE — 97110 THERAPEUTIC EXERCISES: CPT

## 2024-06-28 NOTE — PROGRESS NOTES
Diagnosis:   Primary osteoarthritis of right knee (M17.11)  Hamstring tendinitis of right thigh (M76.891)       Referring Provider: Keny Lockhart  Date of Evaluation:    5/23/2024    Precautions:  None Next MD visit:   5/31/24  Date of Surgery: n/a   Insurance Primary/Secondary: MEDICARE / [employer] [place] INDEMNITY     # Auth Visits: n/a            Subjective: No change.  The 1# wt last time was fine. Has left messages for the doctor's office, awaiting surgery date.  Pain: -    Objective: See below treatment grid.    Assessment:  Continuing to advance LE strengthening as able.    Goals:   Pt report min/no R knee pain  Pt report no functional limitations with R knee  Pt demonstrate Sylvester with HEP  Pt able to walk painfree without deviation    Plan: Progress LE strengthening as able.   Date:   6/5/2024              TX#: 5 Date: 6/19/2024  Tx#: 6 Date: 6/21/2024  Tx#: 7 Date: 6/26/2024  Tx#: 8 Date: 6/28/2024  Tx#: 9 Date:   Tx#:    TherEx (40')  NuStep UE/LE L5 x 6'  Supine SLR 3x10 R, L  Prone SLR 3x10 R, L  SAQ over bolster 3x10, 5\" holds  S/L SLR 3x10 R, L  LAQ with 2# 2x10, 5\" holds  Shuttle B leg press 6b x30  Shuttle single leg press 3b x 30 R, L  Standing hip abd/ext 2x10 ea R,L with YTB  Seated HS stretch TherEx (40')  NuStep UE/LE L5 x 6'  Supine SLR 3x10 R, L  Prone SLR 3x10 R, L  SAQ over bolster 3x10, 5\" holds  S/L SLR 3x10 R, L  LAQ with 3# 2x10, 5\" holds  Shuttle B leg press 7b x30  Shuttle single leg press 4b x 30 R, L  Seated HS stretch TherEx (40')  NuStep UE/LE L5 x 6'  Supine SLR 3x10 R, L  Prone SLR 3x10 R, L  SAQ 3# over bolster 3x10, 5\" holds  S/L SLR 3x10 R, L  LAQ with 3# 2x10, 5\" holds  Shuttle B leg press 6b x30  Shuttle single leg press 4b x 30 R, L  Seated HS stretch TherEx (40')  NuStep UE/LE L5 x 6'  Supine SLR 1# 3x10 R, L  Prone SLR 1# 3x10 R, L  SAQ 1# over bolster 3x10, 5\" holds  S/L SLR 1# 3x10 R, L  LAQ with 1# 2x10, 5\" holds  Shuttle B leg press 8b x30  Shuttle single leg press 5b x  30 R, L  Seated HS stretch TherEx (40')  NuStep UE/LE L5 x 6'  Supine SLR 2# 3x10 R, L  Prone SLR 2# 3x10 R, L  Prone HS curl 2# 3x10 R, L  SAQ 2# over bolster 3x10, 5\" holds  S/L SLR 2# 3x10 R, L  LAQ with 2# 2x10, 5\" holds  Shuttle B leg press 8b x30  Shuttle single leg press 6b x 30 R, L  Seated HS stretch                            HEP: Callystro Access Code: [email or web address]     Charges: Ther Ex x3       Total Timed Treatment: 40 min  Total Treatment Time: 40 min

## 2024-07-01 NOTE — TELEPHONE ENCOUNTER
Type of surgery:  Right knee arthroscopy with medial mensicus debridement  Date: 7/31/24  Location: [facility]  Medical Clearance:      *Medical: Yes      *Dental:      *Other:  Prior Authorization Status: Pending   Workers Comp:  Medacta/Domo:  [place]: Yes  POV: 8/16/24

## 2024-07-01 NOTE — TELEPHONE ENCOUNTER
Spoke to patient and relayed  Dr. Metzger's message below. Patient verbalized understanding and had no further questions.

## 2024-07-01 NOTE — TELEPHONE ENCOUNTER
Labs and EKG have been ordered ahead of time.  He can get these done prior to his appointment if he would like and can go over results to facilitate matters.

## 2024-07-01 NOTE — TELEPHONE ENCOUNTER
Spoke with patient to inform him that his surgery with Dr. Lockhart will be scheduled for 7/31. Patient was reminded that Medical clearance is needed within 30 days of surgical date. Failure to complete all testing in a timely manner will cause surgery to be delayed and/or rescheduled. Patient understood and had no further questions at this time.     A Pre-Op visit with your Primary Care Doctor  needs to be completed within 30 days of   surgery.  The following tests will need be completed:  ? EKG - Good for 1 year  ? CBC/CMP - Good for 3 months

## 2024-07-05 ENCOUNTER — LAB ENCOUNTER (OUTPATIENT)
Dept: LAB | Facility: HOSPITAL | Age: 74
End: 2024-07-05
Attending: INTERNAL MEDICINE
Payer: MEDICARE

## 2024-07-05 DIAGNOSIS — S83.203D OTHER TEAR OF MENISCUS OF RIGHT KNEE AS CURRENT INJURY, UNSPECIFIED MENISCUS, SUBSEQUENT ENCOUNTER: ICD-10-CM

## 2024-07-05 DIAGNOSIS — Z01.818 PREOP EXAM FOR INTERNAL MEDICINE: ICD-10-CM

## 2024-07-05 DIAGNOSIS — M23.321 DERANGEMENT OF POSTERIOR HORN OF MEDIAL MENISCUS OF RIGHT KNEE: ICD-10-CM

## 2024-07-05 LAB
ALBUMIN SERPL-MCNC: 4.4 G/DL (ref 3.2–4.8)
ALBUMIN/GLOB SERPL: 1.9 {RATIO} (ref 1–2)
ALP LIVER SERPL-CCNC: 64 U/L
ALT SERPL-CCNC: 15 U/L
ANION GAP SERPL CALC-SCNC: 5 MMOL/L (ref 0–18)
AST SERPL-CCNC: 16 U/L (ref ?–34)
ATRIAL RATE: 68 BPM
BASOPHILS # BLD AUTO: 0.05 X10(3) UL (ref 0–0.2)
BASOPHILS NFR BLD AUTO: 0.6 %
BILIRUB SERPL-MCNC: 0.4 MG/DL (ref 0.2–1.1)
BUN BLD-MCNC: 15 MG/DL (ref 9–23)
BUN/CREAT SERPL: 16.7 (ref 10–20)
CALCIUM BLD-MCNC: 9.6 MG/DL (ref 8.7–10.4)
CHLORIDE SERPL-SCNC: 109 MMOL/L (ref 98–112)
CO2 SERPL-SCNC: 28 MMOL/L (ref 21–32)
CREAT BLD-MCNC: 0.9 MG/DL
DEPRECATED RDW RBC AUTO: 43.3 FL (ref 35.1–46.3)
EGFRCR SERPLBLD CKD-EPI 2021: 89 ML/MIN/1.73M2 (ref 60–?)
EOSINOPHIL # BLD AUTO: 0.21 X10(3) UL (ref 0–0.7)
EOSINOPHIL NFR BLD AUTO: 2.6 %
ERYTHROCYTE [DISTWIDTH] IN BLOOD BY AUTOMATED COUNT: 12.8 % (ref 11–15)
FASTING STATUS PATIENT QL REPORTED: YES
GLOBULIN PLAS-MCNC: 2.3 G/DL (ref 2–3.5)
GLUCOSE BLD-MCNC: 104 MG/DL (ref 70–99)
HCT VFR BLD AUTO: 49.7 %
HGB BLD-MCNC: 16.6 G/DL
IMM GRANULOCYTES # BLD AUTO: 0.02 X10(3) UL (ref 0–1)
IMM GRANULOCYTES NFR BLD: 0.3 %
LYMPHOCYTES # BLD AUTO: 2.19 X10(3) UL (ref 1–4)
LYMPHOCYTES NFR BLD AUTO: 27.4 %
MCH RBC QN AUTO: 30.9 PG (ref 26–34)
MCHC RBC AUTO-ENTMCNC: 33.4 G/DL (ref 31–37)
MCV RBC AUTO: 92.4 FL
MONOCYTES # BLD AUTO: 0.58 X10(3) UL (ref 0.1–1)
MONOCYTES NFR BLD AUTO: 7.3 %
NEUTROPHILS # BLD AUTO: 4.93 X10 (3) UL (ref 1.5–7.7)
NEUTROPHILS # BLD AUTO: 4.93 X10(3) UL (ref 1.5–7.7)
NEUTROPHILS NFR BLD AUTO: 61.8 %
OSMOLALITY SERPL CALC.SUM OF ELEC: 295 MOSM/KG (ref 275–295)
P AXIS: 42 DEGREES
P-R INTERVAL: 184 MS
PLATELET # BLD AUTO: 224 10(3)UL (ref 150–450)
POTASSIUM SERPL-SCNC: 4.4 MMOL/L (ref 3.5–5.1)
PROT SERPL-MCNC: 6.7 G/DL (ref 5.7–8.2)
Q-T INTERVAL: 396 MS
QRS DURATION: 90 MS
QTC CALCULATION (BEZET): 421 MS
R AXIS: 0 DEGREES
RBC # BLD AUTO: 5.38 X10(6)UL
SODIUM SERPL-SCNC: 142 MMOL/L (ref 136–145)
T AXIS: 40 DEGREES
VENTRICULAR RATE: 68 BPM
WBC # BLD AUTO: 8 X10(3) UL (ref 4–11)

## 2024-07-05 PROCEDURE — 93005 ELECTROCARDIOGRAM TRACING: CPT

## 2024-07-05 PROCEDURE — 93010 ELECTROCARDIOGRAM REPORT: CPT | Performed by: INTERNAL MEDICINE

## 2024-07-05 PROCEDURE — 36415 COLL VENOUS BLD VENIPUNCTURE: CPT

## 2024-07-05 PROCEDURE — 85025 COMPLETE CBC W/AUTO DIFF WBC: CPT

## 2024-07-05 PROCEDURE — 80053 COMPREHEN METABOLIC PANEL: CPT

## 2024-07-09 ENCOUNTER — OFFICE VISIT (OUTPATIENT)
Dept: INTERNAL MEDICINE CLINIC | Facility: CLINIC | Age: 74
End: 2024-07-09

## 2024-07-09 ENCOUNTER — TELEPHONE (OUTPATIENT)
Dept: INTERNAL MEDICINE CLINIC | Facility: CLINIC | Age: 74
End: 2024-07-09

## 2024-07-09 ENCOUNTER — TELEPHONE (OUTPATIENT)
Dept: ORTHOPEDICS CLINIC | Facility: CLINIC | Age: 74
End: 2024-07-09

## 2024-07-09 VITALS
DIASTOLIC BLOOD PRESSURE: 83 MMHG | HEART RATE: 88 BPM | SYSTOLIC BLOOD PRESSURE: 136 MMHG | BODY MASS INDEX: 28.06 KG/M2 | RESPIRATION RATE: 18 BRPM | HEIGHT: 70 IN | WEIGHT: 196 LBS

## 2024-07-09 DIAGNOSIS — S83.241S TEAR OF MEDIAL MENISCUS OF RIGHT KNEE, CURRENT, UNSPECIFIED TEAR TYPE, SEQUELA: ICD-10-CM

## 2024-07-09 DIAGNOSIS — Z01.818 PREOP EXAM FOR INTERNAL MEDICINE: Primary | ICD-10-CM

## 2024-07-09 DIAGNOSIS — E78.00 HYPERCHOLESTEROLEMIA: ICD-10-CM

## 2024-07-09 PROBLEM — M25.561 RIGHT KNEE PAIN: Status: RESOLVED | Noted: 2024-04-28 | Resolved: 2024-07-09

## 2024-07-09 PROCEDURE — 99214 OFFICE O/P EST MOD 30 MIN: CPT | Performed by: INTERNAL MEDICINE

## 2024-07-09 PROCEDURE — G2211 COMPLEX E/M VISIT ADD ON: HCPCS | Performed by: INTERNAL MEDICINE

## 2024-07-09 NOTE — PROGRESS NOTES
Patient ID: Benjamin Ralph is a 74 year old male.  Chief Complaint   Patient presents with    Pre-Op Exam     Right knee arthroscopy scheduled on 7/31/24 with Dr. Lockhart        HISTORY OF PRESENT ILLNESS:   HPI  Pt presents for preoperative clearance.  Planned surgery - Right knee arthroscopy with medial meniscus debridement.  Type of anesthesia - Unknown  Surgeon - Dr. Keny Lockhart  Date of surgery - 7/31/2024  Cardiac history - No  Myocardial infarction in past 6 months - No  Bleeding disorders - No  Taking blood thinners - Yes (NSAIDs for pain control).  Chest pain or shortness of breath with ADLs - No  Number of alcoholic beverages consumed weekly - 0  Tobacco consumption - No  Had labs/EKG done prior to visit.    Review of Systems  Ten point review of systems otherwise negative with the exception of HPI and assessment and plan.    MEDICAL HISTORY:     Past Medical History:    Basal cell carcinoma    left nose    Basal cell carcinoma of nose    BCC (basal cell carcinoma)     left posterior lateral neck    BCC (basal cell carcinoma)    right ala base    Calculus of kidney    left    Chronic headaches    Chronic pansinusitis    Chronic sinusitis    Congestion of nasal sinus    COVID-19    Esophageal reflux    Ganglion of flexor tendon sheath of right thumb    High cholesterol    Melanoma (HCC)    left arm, stage I; .75 mm depth    Osteoarthritis    Plantar wart    right heel    Pneumonia due to organism    2016    Visual impairment    Glasses       Past Surgical History:   Procedure Laterality Date    Adj tiss xfer lid,nos,ear <10sqcm Right 2-17-17    Exc lesion of nose, V_Y flap    Colonoscopy      Colonoscopy      Colonoscopy N/A 12/26/2018    Procedure: COLONOSCOPY;  Surgeon: Isidoro Mosley MD;  Location: [facility] ENDOSCOPY    Exc skin malig 2.1-3cm face,facial  05/18/2022    Excis tendon sheath ayahhand/fingr Right 2-17-17    Exc ganglion R thumb    Foot surgery Left 2006    Nerve procedure    Full  graft proc head,fac,hand <20sqc  2022    Nasal scopy,remv part ethmoid      Nasal scopy,rmv tiss maxill sinus      Repair of nasal septum  1985    Repair of nasal septum      Repr cmpl wnd head,fac,hand 2.6-7.5  2022    Skin surgery Left 10/07/16    left upper arm melanoma         Current Outpatient Medications:     diclofenac 1 % External Gel, Apply 4 g topically 4 (four) times daily., Disp: 300 g, Rfl: 0    ibuprofen 400 MG Oral Tab, Take 1 tablet (400 mg total) by mouth every 6 (six) hours as needed for Pain., Disp: , Rfl:     dutasteride 0.5 MG Oral Cap, Take 1 capsule (0.5 mg total) by mouth daily., Disp: 90 capsule, Rfl: 3    rosuvastatin 10 MG Oral Tab, Take 1 tablet (10 mg total) by mouth nightly., Disp: 90 tablet, Rfl: 3    sildenafil 20 MG Oral Tab, Take one tablet PO daily as needed, Disp: 10 tablet, Rfl: 5    acetaminophen 325 MG Oral Tab, Take 2 tablets (650 mg total) by mouth every 6 (six) hours as needed for Pain., Disp: , Rfl:     Allergies:  Allergies   Allergen Reactions    Penicillin G HIVES     states he is not sure if it was from actual drug    Penicillins HIVES       Social History     Socioeconomic History    Marital status: Single     Spouse name: Not on file    Number of children: 0    Years of education: Not on file    Highest education level: Not on file   Occupational History    Occupation:    Tobacco Use    Smoking status: Former     Current packs/day: 0.00     Types: Cigarettes     Quit date: 3/25/1978     Years since quittin.3     Passive exposure: Never    Smokeless tobacco: Never   Vaping Use    Vaping status: Never Used   Substance and Sexual Activity    Alcohol use: Yes     Alcohol/week: 0.0 standard drinks of alcohol     Comment: rarely    Drug use: No    Sexual activity: Not Currently   Other Topics Concern     Service Not Asked    Blood Transfusions Not Asked    Caffeine Concern Yes     Comment: coffee, 3cups/day    Occupational  Exposure Not Asked    Hobby Hazards Not Asked    Sleep Concern Not Asked    Stress Concern Not Asked    Weight Concern Not Asked    Special Diet Not Asked    Back Care Not Asked    Exercise Not Asked    Bike Helmet Not Asked    Seat Belt Not Asked    Self-Exams Not Asked    Grew up on a farm No    History of tanning No    Outdoor occupation No    Pt has a pacemaker No    Pt has a defibrillator No    Reaction to local anesthetic No   Social History Narrative    Not on file     Social Determinants of Health     Financial Resource Strain: Not on file   Food Insecurity: Not on file   Transportation Needs: Not on file   Physical Activity: Not on file   Stress: Not on file   Social Connections: Not on file   Housing Stability: Not on file           PHYSICAL EXAM:     Vitals:    07/09/24 1012   BP: 136/83   Pulse: 88   Resp: 18   Weight: 196 lb (88.9 kg)   Height: 5' 10\" (1.778 m)       Body mass index is 28.12 kg/m².    Physical Exam  Constitutional:       Appearance: Normal appearance.   Eyes:      General: No scleral icterus.  Cardiovascular:      Rate and Rhythm: Normal rate and regular rhythm.      Pulses: Normal pulses.      Heart sounds: Normal heart sounds. No murmur heard.  Pulmonary:      Effort: Pulmonary effort is normal. No respiratory distress.      Breath sounds: Normal breath sounds. No stridor. No wheezing or rhonchi.   Musculoskeletal:      Right knee: Decreased range of motion. Tenderness present over the medial joint line.   Neurological:      Mental Status: He is alert.   Psychiatric:         Mood and Affect: Mood normal.         Behavior: Behavior normal.       Lab Results   Component Value Date    WBC 8.0 07/05/2024    RBC 5.38 07/05/2024    HGB 16.6 07/05/2024    HCT 49.7 07/05/2024    MCV 92.4 07/05/2024    MCH 30.9 07/05/2024    MCHC 33.4 07/05/2024    RDW 12.8 07/05/2024    .0 07/05/2024    MPV 7.7 10/22/2018     Lab Results   Component Value Date     (H) 07/05/2024    BUN 15  07/05/2024    BUNCREA 16.7 07/05/2024    CREATSERUM 0.90 07/05/2024    ANIONGAP 5 07/05/2024    GFRNAA 89 06/20/2022    GFRAA 102 06/20/2022    CA 9.6 07/05/2024    OSMOCALC 295 07/05/2024    ALKPHO 64 07/05/2024    AST 16 07/05/2024    ALT 15 07/05/2024    ALKPHOS 55 09/30/2016    BILT 0.4 07/05/2024    TP 6.7 07/05/2024    ALB 4.4 07/05/2024    GLOBULIN 2.3 07/05/2024    AGRATIO 1.7 09/30/2016     07/05/2024    K 4.4 07/05/2024     07/05/2024    CO2 28.0 07/05/2024           ASSESSMENT/PLAN:   1. Preop exam for internal medicine  Labs and EKG done prior to visit.  Patient is cleared to proceed with surgery without further testing.  Patient to discuss with the surgeon when he needs to stop his NSAIDs.  Clearance is effective for 30 days from 7/9/2024.    2. Tear of medial meniscus of right knee, current, unspecified tear type, sequela  As per #1.    3. Hypercholesterolemia  As per #1.    Return in about 3 months (around 10/9/2024) for Complete physical.    This note was prepared using Dragon Medical voice recognition dictation software. As a result errors may occur. When identified these errors have been corrected. While every attempt is made to correct errors during dictation discrepancies may still exist.    Felix Metzger MD  7/9/2024

## 2024-07-09 NOTE — TELEPHONE ENCOUNTER
Patient is calling to see if surgery can be moved up. Is in a lot of pain. Is currently scheduled for 7/31

## 2024-07-09 NOTE — TELEPHONE ENCOUNTER
Spoke with patient to inform him that we do not have a sooner surgery date available at this time.

## 2024-07-10 ENCOUNTER — OFFICE VISIT (OUTPATIENT)
Dept: PHYSICAL THERAPY | Age: 74
End: 2024-07-10
Payer: MEDICARE

## 2024-07-10 ENCOUNTER — TELEPHONE (OUTPATIENT)
Dept: ORTHOPEDICS CLINIC | Facility: CLINIC | Age: 74
End: 2024-07-10

## 2024-07-10 PROCEDURE — 97140 MANUAL THERAPY 1/> REGIONS: CPT

## 2024-07-10 PROCEDURE — 97110 THERAPEUTIC EXERCISES: CPT

## 2024-07-10 NOTE — TELEPHONE ENCOUNTER
Spoke with patient and he states he has a lot of right knee pain. Surgery for right knee is scheduled on 7/31/24. He has tried tylenol, ibuprofen, aleve and diclofenac gel without relief. He has tried rest, ice, elevation. I asked him if still has mobic from April and he states he still has some and not sure if it will help but he will try. Advised patient to not take with any other anti-inflammatories(NSAIDS) such as ibuprofen/advil, aleve/naproxen, diclofenac/Voltaren, etodolac, celebrex. Stop medication if stomach upset occurs. Advised he can also take OTC tylenol per label directions. Advised to stop all NSAIDs one week prior to surgery and he can only take tylenol 1 week prior to surgery. He will call back if symptoms do not improve or worsen.

## 2024-07-10 NOTE — TELEPHONE ENCOUNTER
Patient states his knee is in a lot of pain and is asking what to do about the pain before his 7/31 surgery. He is asking if he can be prescribed something for the pain too. Please call.

## 2024-07-10 NOTE — PROGRESS NOTES
Diagnosis:   Primary osteoarthritis of right knee (M17.11)  Hamstring tendinitis of right thigh (M76.891)       Referring Provider: No ref. provider found  Date of Evaluation:    5/23/2024    Precautions:  None Next MD visit:   5/31/24  Date of Surgery: n/a   Insurance Primary/Secondary: MEDICARE / [place]DEMN[place]     # Auth Visits: n/a            Subjective: If anything, his symptoms lately have been more constant pain.  Today is really bad, maybe because of the rainy weather lately.  He continues to work, but struggles to find comfort in any position.  He is eager for surgery which is now scheduled 7/31/2024.  Pain: -    Objective: See below treatment grid.    Assessment:  Limited with all activities today, but pt did describe relief with long leg traction.    Goals:   Pt report min/no R knee pain  Pt report no functional limitations with R knee  Pt demonstrate [place] with HEP  Pt able to walk painfree without deviation    Plan: Progress LE strengthening as able.   Date: 6/26/2024  Tx#: 8 Date: 6/28/2024  Tx#: 9 Date: 7/10/2024  Tx#: 10   TherEx (40')  NuStep UE/LE L5 x 6'  Supine SLR 1# 3x10 R, L  Prone SLR 1# 3x10 R, L  SAQ 1# over bolster 3x10, 5\" holds  S/L SLR 1# 3x10 R, L  LAQ with 1# 2x10, 5\" holds  Shuttle B leg press 8b x30  Shuttle single leg press 5b x 30 R, L  Seated HS stretch TherEx (40')  NuStep UE/LE L5 x 6'  Supine SLR 2# 3x10 R, L  Prone SLR 2# 3x10 R, L  Prone HS curl 2# 3x10 R, L  SAQ 2# over bolster 3x10, 5\" holds  S/L SLR 2# 3x10 R, L  LAQ with 2# 2x10, 5\" holds  Shuttle B leg press 8b x30  Shuttle single leg press 6b x 30 R, L  Seated HS stretch TherEx (30')  NuStep UE/LE L5 x 6'  Supine SLR 0# 3x10 R, L  Prone SLR 0# 3x10 R, L  Prone HS curl 0# 3x10 R, L  SAQ 0# over bolster 3x10, 5\" holds  S/L SLR 0# 3x10 R, L  LAQ with 0# 2x10, 5\" holds  Shuttle B leg press 7b x30  Shuttle single leg press 5b x 30 R, L  Seated HS stretch     Manual (10')  - patellar mobs  - STM patellar tendon and  at joint lines  - long leg traction             HEP: [employer] Access Code: [email or web address]     Charges: Ther Ex x2, Manual       Total Timed Treatment: 40 min  Total Treatment Time: 40 min

## 2024-07-17 ENCOUNTER — OFFICE VISIT (OUTPATIENT)
Dept: PHYSICAL THERAPY | Age: 74
End: 2024-07-17
Payer: MEDICARE

## 2024-07-17 PROCEDURE — 97110 THERAPEUTIC EXERCISES: CPT

## 2024-07-17 PROCEDURE — 97140 MANUAL THERAPY 1/> REGIONS: CPT

## 2024-07-17 NOTE — PROGRESS NOTES
Diagnosis:   Primary osteoarthritis of right knee (M17.11)  Hamstring tendinitis of right thigh (M76.891)       Referring Provider: No ref. provider found  Date of Evaluation:    5/23/2024    Precautions:  None Next MD visit:   5/31/24  Date of Surgery: n/a   Insurance Primary/Secondary: MEDIC[place] / [place]MN[place]     # Auth Visits: n/a            Subjective: Relatively unchanged, so much better than the last session he was here.  Surgery still scheduled for 7/31/2024.  Pain: -    Objective: See below treatment grid.    Assessment:  Improved mobility and transfers today vs last session, but still significant pain, especially end range flexion.    Goals:   Pt report min/no R knee pain  Pt report no functional limitations with R knee  Pt demonstrate [place] with HEP  Pt able to walk painfree without deviation    Plan: Maximize LE strengthening as able in preparation for surgery.   Date: 6/26/2024  Tx#: 8 Date: 6/28/2024  Tx#: 9 Date: 7/10/2024  Tx#: 10 Date: 7/17/2024  Tx#: 11   TherEx (40')  NuStep UE/LE L5 x 6'  Supine SLR 1# 3x10 R, L  Prone SLR 1# 3x10 R, L  SAQ 1# over bolster 3x10, 5\" holds  S/L SLR 1# 3x10 R, L  LAQ with 1# 2x10, 5\" holds  Shuttle B leg press 8b x30  Shuttle single leg press 5b x 30 R, L  Seated HS stretch TherEx (40')  NuStep UE/LE L5 x 6'  Supine SLR 2# 3x10 R, L  Prone SLR 2# 3x10 R, L  Prone HS curl 2# 3x10 R, L  SAQ 2# over bolster 3x10, 5\" holds  S/L SLR 2# 3x10 R, L  LAQ with 2# 2x10, 5\" holds  Shuttle B leg press 8b x30  Shuttle single leg press 6b x 30 R, L  Seated HS stretch TherEx (30')  NuStep UE/LE L5 x 6'  Supine SLR 0# 3x10 R, L  Prone SLR 0# 3x10 R, L  Prone HS curl 0# 3x10 R, L  SAQ 0# over bolster 3x10, 5\" holds  S/L SLR 0# 3x10 R, L  LAQ with 0# 2x10, 5\" holds  Shuttle B leg press 7b x30  Shuttle single leg press 5b x 30 R, L  Seated HS stretch TherEx (30')  NuStep UE/LE L5 x 6'  Supine SLR 2# 3x10 R, L  Prone SLR 2# 3x10 R, L  Prone HS curl 2# 3x10 R, L  SAQ 0# over  [place] 3x10, 5\" holds  S/L SLR 2# 3x10 R, L  LAQ with 2# 2x10, 5\" holds  Shuttle B leg press 7b x30  Shuttle single leg press 5b x 30 R, L  Seated HS stretch     Manual (10')  - patellar mobs  - STM patellar tendon and at joint lines  - long leg traction Manual (10')  - patellar mobs  - STM patellar tendon and at joint lines  - long leg traction               HEP: [employer] Access Code: [email or web address]     Charges: Ther Ex x2, Manual       Total Timed Treatment: 40 min  Total Treatment Time: 40 min

## 2024-07-19 ENCOUNTER — OFFICE VISIT (OUTPATIENT)
Dept: PHYSICAL THERAPY | Age: 74
End: 2024-07-19
Payer: MEDICARE

## 2024-07-19 PROCEDURE — 97110 THERAPEUTIC EXERCISES: CPT

## 2024-07-19 PROCEDURE — 97140 MANUAL THERAPY 1/> REGIONS: CPT

## 2024-07-19 NOTE — PROGRESS NOTES
Diagnosis:   Primary osteoarthritis of right knee (M17.11)  Hamstring tendinitis of right thigh (M76.891)       Referring Provider: No ref. provider found  Date of Evaluation:    5/23/2024    Precautions:  None Next MD visit:   5/31/24  Date of Surgery: n/a   Insurance Primary/Secondary: [place]IC[place] / [place]     # Auth Visits: n/a            Subjective: Of his good and bad days, today is average. Surgery still scheduled for 7/31/2024.  Pain: -    Objective: See below treatment grid.    Assessment:  Still severely limited with upright WB activity tolerance due to R knee pain.    Goals:   Pt report min/no R knee pain  Pt report no functional limitations with R knee  Pt demonstrate [place] with HEP  Pt able to walk painfree without deviation    Plan: Maximize LE strengthening as able in preparation for surgery.   Date: 6/26/2024  Tx#: 8 Date: 6/28/2024  Tx#: 9 Date: 7/10/2024  Tx#: 10 Date: 7/17/2024  Tx#: 11 Date: 7/19/2024  Tx#: 12   TherEx (40')  NuStep UE/LE L5 x 6'  Supine SLR 1# 3x10 R, L  Prone SLR 1# 3x10 R, L  SAQ 1# over bolster 3x10, 5\" holds  S/L SLR 1# 3x10 R, L  LAQ with 1# 2x10, 5\" holds  Shuttle B leg press 8b x30  Shuttle single leg press 5b x 30 R, L  Seated HS stretch TherEx (40')  NuStep UE/LE L5 x 6'  Supine SLR 2# 3x10 R, L  Prone SLR 2# 3x10 R, L  Prone HS curl 2# 3x10 R, L  SAQ 2# over bolster 3x10, 5\" holds  S/L SLR 2# 3x10 R, L  LAQ with 2# 2x10, 5\" holds  Shuttle B leg press 8b x30  Shuttle single leg press 6b x 30 R, L  Seated HS stretch TherEx (30')  NuStep UE/LE L5 x 6'  Supine SLR 0# 3x10 R, L  Prone SLR 0# 3x10 R, L  Prone HS curl 0# 3x10 R, L  SAQ 0# over bolster 3x10, 5\" holds  S/L SLR 0# 3x10 R, L  LAQ with 0# 2x10, 5\" holds  Shuttle B leg press 7b x30  Shuttle single leg press 5b x 30 R, L  Seated HS stretch TherEx (30')  NuStep UE/LE L5 x 6'  Supine SLR 2# 3x10 R, L  Prone SLR 2# 3x10 R, L  Prone HS curl 2# 3x10 R, L  SAQ 0# over bolster 3x10, 5\" holds  S/L SLR 2# 3x10  R, L  LAQ with 2# 2x10, 5\" holds  Shuttle B leg press 7b x30  Shuttle single leg press 5b x 30 R, L  Seated HS stretch TherEx (30')  NuStep UE/LE L5 x 6'  Supine SLR 2# 3x10 R, L  Prone SLR 2# 3x10 R, L  Prone HS curl 2# 3x10 R, L  SAQ 0# over bolster 3x10, 5\" holds  S/L SLR 2# 3x10 R, L  LAQ with 2# 2x10, 5\" holds  Shuttle B leg press 6b x30  Shuttle single leg press 4b x 30 R, L  Seated HS stretch     Manual (10')  - patellar mobs  - STM patellar tendon and at joint lines  - long leg traction Manual (10')  - patellar mobs  - STM patellar tendon and at joint lines  - long leg traction Manual (10')  - patellar mobs  - STM patellar tendon and at joint lines  - long leg traction       Per pt's request, discussed the use of crutches in the unlikely event that he should use them post-op.          HEP: [facility] Access Code: [email or web address]     Charges: Ther Ex x2, Manual       Total Timed Treatment: 40 min  Total Treatment Time: 40 min

## 2024-07-23 RX ORDER — VIT C/B6/B5/MAGNESIUM/HERB 173 50-5-6-5MG
500 CAPSULE ORAL DAILY
COMMUNITY

## 2024-07-24 ENCOUNTER — OFFICE VISIT (OUTPATIENT)
Dept: PHYSICAL THERAPY | Age: 74
End: 2024-07-24
Payer: MEDICARE

## 2024-07-24 PROCEDURE — 97110 THERAPEUTIC EXERCISES: CPT

## 2024-07-24 PROCEDURE — 97140 MANUAL THERAPY 1/> REGIONS: CPT

## 2024-07-24 NOTE — PROGRESS NOTES
Diagnosis:   Primary osteoarthritis of right knee (M17.11)  Hamstring tendinitis of right thigh (M76.891)       Referring Provider: Keny Lockhart  Date of Evaluation:    5/23/2024    Precautions:  None Next MD visit:   5/31/24  Date of Surgery: n/a   Insurance Primary/Secondary: [place]IC[place] / [place]     # Auth Visits: n/a            Subjective: Researched [facility] post op arthroscopy exercises to review here. Surgery still scheduled for 7/31/2024.  Knee pain and limping persist.  Pain: -    Objective: See below treatment grid.    Assessment:  Reviewed [facility] HEP - good program.  Pt a bit anxious and eager for surgery.    Goals:   Pt report min/no R knee pain  Pt report no functional limitations with R knee  Pt demonstrate [place] with HEP  Pt able to walk painfree without deviation    Plan: One final pre-op visit later this week.  Date: 6/26/2024  Tx#: 8 Date: 6/28/2024  Tx#: 9 Date: 7/10/2024  Tx#: 10 Date: 7/17/2024  Tx#: 11 Date: 7/19/2024  Tx#: 12 Date: 7/24/2024  Tx#: 13    TherEx (40')  NuStep UE/LE L5 x 6'  Supine SLR 1# 3x10 R, L  Prone SLR 1# 3x10 R, L  SAQ 1# over bolster 3x10, 5\" holds  S/L SLR 1# 3x10 R, L  LAQ with 1# 2x10, 5\" holds  Shuttle B leg press 8b x30  Shuttle single leg press 5b x 30 R, L  Seated HS stretch TherEx (40')  NuStep UE/LE L5 x 6'  Supine SLR 2# 3x10 R, L  Prone SLR 2# 3x10 R, L  Prone HS curl 2# 3x10 R, L  SAQ 2# over bolster 3x10, 5\" holds  S/L SLR 2# 3x10 R, L  LAQ with 2# 2x10, 5\" holds  Shuttle B leg press 8b x30  Shuttle single leg press 6b x 30 R, L  Seated HS stretch TherEx (30')  NuStep UE/LE L5 x 6'  Supine SLR 0# 3x10 R, L  Prone SLR 0# 3x10 R, L  Prone HS curl 0# 3x10 R, L  SAQ 0# over bolster 3x10, 5\" holds  S/L SLR 0# 3x10 R, L  LAQ with 0# 2x10, 5\" holds  Shuttle B leg press 7b x30  Shuttle single leg press 5b x 30 R, L  Seated HS stretch TherEx (30')  NuStep UE/LE L5 x 6'  Supine SLR 2# 3x10 R, L  Prone SLR 2# 3x10 R, L  Prone HS curl 2# 3x10 R, L  SAQ 0# over  bolster 3x10, 5\" holds  S/L SLR 2# 3x10 R, L  LAQ with 2# 2x10, 5\" holds  Shuttle B leg press 7b x30  Shuttle single leg press 5b x 30 R, L  Seated HS stretch TherEx (30')  NuStep UE/LE L5 x 6'  Supine SLR 2# 3x10 R, L  Prone SLR 2# 3x10 R, L  Prone HS curl 2# 3x10 R, L  SAQ 0# over bolster 3x10, 5\" holds  S/L SLR 2# 3x10 R, L  LAQ with 2# 2x10, 5\" holds  Shuttle B leg press 6b x30  Shuttle single leg press 4b x 30 R, L  Seated HS stretch TherEx (30')  NuStep UE/LE L5 x 6'  Supine SLR 2# 3x10 R, L  Prone SLR 2# 3x10 R, L  Prone HS curl 2# 3x10 R, L  SAQ 0# over bolster 3x10, 5\" holds  S/L SLR 2# 3x10 R, L  LAQ with 2# 2x10, 5\" holds  Shuttle B leg press 5b x30  Shuttle single leg press 5b x 30 R, L  Seated HS stretch      Manual (10')  - patellar mobs  - STM patellar tendon and at joint lines  - long leg traction Manual (10')  - patellar mobs  - STM patellar tendon and at joint lines  - long leg traction Manual (10')  - patellar mobs  - STM patellar tendon and at joint lines  - long leg traction Manual (10')  - patellar mobs  - STM patellar tendon and at joint lines  - long leg traction        Per pt's request, discussed the use of crutches in the unlikely event that he should use them post-op.              HEP: [facility] Access Code: [email or web address]     Charges: Ther Ex x2, Manual       Total Timed Treatment: 40 min  Total Treatment Time: 40 min

## 2024-07-26 ENCOUNTER — OFFICE VISIT (OUTPATIENT)
Dept: PHYSICAL THERAPY | Age: 74
End: 2024-07-26
Payer: MEDICARE

## 2024-07-26 PROCEDURE — 97110 THERAPEUTIC EXERCISES: CPT

## 2024-07-26 PROCEDURE — 97140 MANUAL THERAPY 1/> REGIONS: CPT

## 2024-07-26 NOTE — PROGRESS NOTES
Diagnosis:   Primary osteoarthritis of right knee (M17.11)  Hamstring tendinitis of right thigh (M76.891)       Referring Provider: Keny Lockhart  Date of Evaluation:    5/23/2024    Precautions:  None Next MD visit:   5/31/24  Date of Surgery: n/a   Insurance Primary/Secondary: [place]IC[place] / [place]     # Auth Visits: n/a            Subjective: Symptoms persisting.  Pain: -    Objective: See below treatment grid.    Assessment:  Pt has good understanding of HEP, pre-op plan and is prepared for icing and HEP post-op.    Goals: Goals not me.    Plan: DC to HEP and pt scheduled for arthroscopic surgery next week Wed.  Date: 7/10/2024  Tx#: 10 Date: 7/17/2024  Tx#: 11 Date: 7/19/2024  Tx#: 12 Date: 7/24/2024  Tx#: 13 Date: 7/26/2024  Tx#: 14   TherEx (30')  NuStep UE/LE L5 x 6'  Supine SLR 0# 3x10 R, L  Prone SLR 0# 3x10 R, L  Prone HS curl 0# 3x10 R, L  SAQ 0# over bolster 3x10, 5\" holds  S/L SLR 0# 3x10 R, L  LAQ with 0# 2x10, 5\" holds  Shuttle B leg press 7b x30  Shuttle single leg press 5b x 30 R, L  Seated HS stretch TherEx (30')  NuStep UE/LE L5 x 6'  Supine SLR 2# 3x10 R, L  Prone SLR 2# 3x10 R, L  Prone HS curl 2# 3x10 R, L  SAQ 0# over bolster 3x10, 5\" holds  S/L SLR 2# 3x10 R, L  LAQ with 2# 2x10, 5\" holds  Shuttle B leg press 7b x30  Shuttle single leg press 5b x 30 R, L  Seated HS stretch TherEx (30')  NuStep UE/LE L5 x 6'  Supine SLR 2# 3x10 R, L  Prone SLR 2# 3x10 R, L  Prone HS curl 2# 3x10 R, L  SAQ 0# over bolster 3x10, 5\" holds  S/L SLR 2# 3x10 R, L  LAQ with 2# 2x10, 5\" holds  Shuttle B leg press 6b x30  Shuttle single leg press 4b x 30 R, L  Seated HS stretch TherEx (30')  NuStep UE/LE L5 x 6'  Supine SLR 2# 3x10 R, L  Prone SLR 2# 3x10 R, L  Prone HS curl 2# 3x10 R, L  SAQ 0# over bolster 3x10, 5\" holds  S/L SLR 2# 3x10 R, L  LAQ with 2# 2x10, 5\" holds  Shuttle B leg press 5b x30  Shuttle single leg press 5b x 30 R, L  Seated HS stretch TherEx (30')  NuStep UE/LE L5 x 6'  Supine SLR 2# 3x10  R, L  Prone SLR 2# 3x10 R, L  Prone HS curl 2# 3x10 R, L  SAQ 0# over bolster 3x10, 5\" holds  S/L SLR 2# 3x10 R, L  LAQ with 2# 2x10, 5\" holds  Shuttle B leg press 6b x30  Shuttle single leg press 5b x 30 R, L  Seated HS stretch   Manual (10')  - patellar mobs  - STM patellar tendon and at joint lines  - long leg traction Manual (10')  - patellar mobs  - STM patellar tendon and at joint lines  - long leg traction Manual (10')  - patellar mobs  - STM patellar tendon and at joint lines  - long leg traction Manual (10')  - patellar mobs  - STM patellar tendon and at joint lines  - long leg traction Manual (10')  - patellar mobs  - STM patellar tendon and at joint lines  - long leg traction     Per pt's request, discussed the use of crutches in the unlikely event that he should use them post-op.            HEP: "[employer] Access Code: [email or web address]     Charges: Ther Ex x2, Manual       Total Timed Treatment: 40 min  Total Treatment Time: 40 min

## 2024-07-29 RX ORDER — ROSUVASTATIN CALCIUM 10 MG/1
10 TABLET, COATED ORAL NIGHTLY
Qty: 90 TABLET | Refills: 3 | Status: SHIPPED | OUTPATIENT
Start: 2024-07-29

## 2024-07-29 NOTE — TELEPHONE ENCOUNTER
Refill Per Protocol     Requested Prescriptions   Pending Prescriptions Disp Refills    ROSUVASTATIN 10 MG Oral Tab [Pharmacy Med Name: Rosuvastatin Calcium 10 Mg Tab Nort] 90 tablet 0     Sig: Take 1 tablet (10 mg total) by mouth nightly.       Cholesterol Medication Protocol Passed - 7/26/2024  3:01 AM        Passed - ALT < 80     Lab Results   Component Value Date    ALT 15 07/05/2024             Passed - ALT resulted within past year        Passed - Lipid panel within past 12 months     Lab Results   Component Value Date    CHOLEST 146 10/12/2023    TRIG 47 10/12/2023    HDL 49 10/12/2023    LDL 87 10/12/2023    VLDL 7 10/12/2023    NONHDLC 97 10/12/2023             Passed - In person appointment or virtual visit in the past 12 mos or appointment in next 3 mos     Recent Outpatient Visits              3 days ago     [facility] in [place] Shuert, Jeffrey, PT    Office Visit    5 days ago     [facility] in [place] Shuert, Jeffrey, PT    Office Visit    1 week ago     [facility] in [place] Shuert, Jeffrey, PT    Office Visit    1 week ago     [facility] in [place] Shuert, Jeffrey, PT    Office Visit    2 weeks ago     [facility] in [place] Shuert, Jeffrey, PT    Office Visit          Future Appointments         Provider Department Appt Notes    In 2 weeks Keny Lockhart MD [facility] 1st POV Right knee arthroscopy 7/31    In 2 months Felix Metzger MD [facility] last px 10/12/23    In 3 months David Moore MD [facility] 1 yr f/u                           Future Appointments         Provider Department Appt Notes    In 2 weeks Keny Lockhart MD [facility] 1st POV Right knee arthroscopy 7/31    In 2 months Felix Metzger MD [facility]  last px 10/12/23    In 3 months David Moore MD [facility] 1 yr f/u          Recent Outpatient Visits              3 days ago     [facility] in [place] ShEstevan kaur, PT    Office Visit    5 days ago     [facility] in [place] ShEstevan kaur, PT    Office Visit    1 week ago     [facility] in [place] ShEstevan kaur, PT    Office Visit    1 week ago     [facility] [place]    Office Visit    2 weeks ago     [facility] in [place] ShEstevan kaur, PT    Office Visit

## 2024-07-30 NOTE — DISCHARGE INSTRUCTIONS
Meniscus Debridement versus Repair    WEIGHT BEARING: Your weightbearing status will be dependent on whether you underwent a debridement or repair.  If you underwent a debridement of your knee meniscus, you may begin to bear weight as tolerated on your knee.  This may be difficult at first.  If you underwent a meniscal repair, you should not bear weight on your operative extremity at first. Depending on the type of tear, you will either remain nonweightbearing or progress slowly but ONLY with your hinged brace locked in full extension. Again, this will be dependent on the type of meniscus tear you sustained (your surgeon will indicate which one).     Radial, complex, and root tears: Nonweightbearing for 6 weeks. Then progress weightbearing slowly (0-25% of weight in weeks 6-7, 25-50% of weight in weeks 8-9, 50-75% of weight in weeks 10-11) with full weightbearing at 12 weeks.    Bucket handle, vertical, and longitudinal tears: Progress weightbearing slowly (0-25% of weight in first 2 weeks, 25-50% of weight in weeks 3 and 4, 50-75% of weight in weeks 5 and 6)    While you will be allowed to progress to putting full weight on the leg usually after 6-12 weeks from the surgery, you should continue the brace until you have restored strength in your leg, which would be at minimum 4 months from surgery.    RANGE OF MOTION: If you underwent a meniscal debridement, you will be allowed to range your knee as tolerated.  Motion is important to prevent postoperative stiffness.    If you underwent a repair, you will have been given a hinged knee brace to wear at all times after surgery. This brace has a lock in it that will protect your motion. When you are standing/walking with weight on the leg, you should keep the brace locked in full extension (knee straight). If you are nonweightbearing, you may lock the knee in a slightly bent position to allow easier crutch use.     When you are sitting down or lying down, you may open  the locks to allow motion from 0 to 90 degrees. Motion is important to avoid post-operative stiffness.     After 4 weeks from surgery, you will be allowed to increase motion from 0 to 120 degrees. When you have regained confidence in your leg, regained your knee motion, and are able to put full weight on the knee with minimal to no pain, you may discontinue the brace entirely.     RETURN TO RUNNING: Return to running will progress slowly and may start after you have graduated from your brace wear and/or have regained strength and confidence in your knee.  This may occur as soon as 1 week after a meniscal debridement and as soon as 3 to 4 months after repair. You should start with in-line running in slow, controlled intervals on a smooth surface or treadmill. You will typically be allowed to start running outside around 1 month after debridement and 6 months after repair. Return to full running on all surfaces will depend on several factors including your knee motion and strength. Your physical therapist should be able to help you determine when you are safe to progress to this level.    PAIN MEDICATIONS:   For many people, post-operative pain can be managed with simple measures, such as elevation of the operative extremity above the level of the heart, cryotherapy and ice, compression, etc. DO NOT UNDERESTIMATE THE IMPORTANCE OF ELEVATION. When your operative site is in a dependent position (below the heart), you will notice significantly more swelling/throbbing/pain.   In addition to these measures, we have prescribed pain medications. To minimize narcotic use and establish better pain control, we employ a multimodal pain approach. This protocol combines the use of scheduled acetaminophen, gabapentin, and narcotics.  We will often use anti-inflammatories (NSAID's such as ibuprofen, celecoxib, and/or naproxen) to further assist with pain control thus allowing for a lower dose of narcotic to be used. Dosing of  medications will vary from patient to patient based on their needs and past medical history, so please refer to your discharge medication list.  Opiate pain medications (oxycodone) will only be refilled after an in-person visit. For all other medication refills, please contact us using the contact information below    You have been prescribed:    Acetaminophen (Tylenol) 500mg - Take 2 tabs by mouth every 8 hours for pain. Do not take more than 4000mg each day. Tylenol should be taken as a first-line, scheduled pain medication. If you have liver problems, please discontinue and notify your surgeon.    Celecoxib (Celebrex) 100mg or 200mg (dosage dependent on your weight, age, and kidney function) - Take 1 tablet by mouth every 12 hours for pain. NSAIDs (Ibuprofen, Naproxen, Celecoxib) should be taken as a first-line pain medication like acetaminophen. NSAIDs are non-steroidal anti-inflammatory medications. They reduce inflammation in the affected area. Please take NSAIDs as prescribed, with food, to avoid stomach ulcers. If you have a history of stomach ulcers or GI bleeding, please discontinue and notify your surgeon.    Gabapentin 300mg - Take 1 tab by mouth three times daily for the first ten days after surgery. This medication helps control nerve sensitivity after surgery    Oxycodone IR 5mg - Take one tablet by mouth as often as every 4 hours as needed for breakthrough pain. Take this medication as your breakthrough pain medication, after maximizing other pain medications. Opiate pain medications (Oxycodone, Hydrocodone, Oxycontin) are prescribed as a second-line pain medication for moderate to severe post-operative pain. Opiates are associated with dependency and addiction if taken for a prolonged period. For this reason, it is best to use opiates ONLY as needed.    Ondansetron (Zofran) 4mg - Dissolve 1 tab under your tongue every 8 hours as needed for nausea.    Senna-Docusate 8.6mg-50mg - Take 2 tabs by mouth  every evening for constipation prevention. You should be having a bowel movement every 2-3 days.  If not, then proceed with over-the-counter laxatives (Docusate/Miralax), enemas, or suppositories to fix the constipation.  All of these are over the counter and can be discussed in more detail with your pharmacist. If you are having multiple bowel movements daily, you should discontinue this medication.    Aspirin EC 325mg - Take 1 tab by mouth daily until you are out (30 days) to help prevent blood clots.  Vitamin C 500mg - Take 1 tablet by mouth twice daily for 6 weeks after surgery. This may help with nerve irritation     Example Medication Schedule  Medication Morning   0600 Mid-Morning   1000 Early Afternoon   1400 Late Afternoon   1800 Evening   2200   Acetaminophen x  x  x   Celecoxib x    x   Gabapentin x  x  x   Oxycodone If needed If needed If needed If needed If needed   Zofran  If needed  If needed    Senna-Docusate     x   Aspirin x       Vitamin C x    x   Ice x x x x x   *Adjust as needed    ICE PACK/CRYOTHERAPY: Use frequently over the next 7-10 days.  Ice bags/packs/bladder should be used for 20-30 minutes every 3-4 hours during waking hours. Protect your skin from frostbite with a washcloth, towel, or ace wrap between the ice bag/pack and your skin.    DRESSINGS/WOUND CARE:   You may remove your knee dressing after 3 days. There is tape/glue underneath and this may have a small amount of blood in it. This is normal. Do not peel off the tape/glue--it will eventually become less adherent--when this happens and it begins to come off, you may remove the rest of the tape.  Follow the bathing instructions below.  On the first day or two after surgery you may notice some clear or slightly red drainage from your knee. Your knee was filled with fluid during the surgery and this drainage is normal in the first days following. This should stop by 3 days after surgery.   If you have increased drainage, incision  redness, foul smell, or fevers - inform the office.  You may need to be evaluated in the office earlier than your follow-up appointment.    BATHING:   You should not get the surgical dressings wet. We recommend you either avoid showering for the first 3 days or make modifications such as wrapping the knee in a plastic impermeable barrier. After 3 days, when you remove the surgical dressing, you may shower. Consider using a waterproof/shower chair in the shower to more easily and safely observe your weightbearing precautions.  Do not peel off the glue on your knee or scrub your wound site. You may allow warm soapy water to wash over the wound. Try to avoid direct water pressure aimed at your surgical site.  You should keep your surgical site clean and dry when possible until you are seen for your postoperative visit.   Do not soak/submerge the wound/incision, use hot tubs or swimming pools, oceans, lakes, ponds until 4 weeks after surgery   Following these guidelines will help avoid any wound healing issues and/or infections.    PHYSICAL/OCCUPATIONAL THERAPY (PT/OT): To maximize surgical benefit, PT/OT is often necessary. If you do not have an appointment, you should contact PT/OT to set up an appointment within the first week after surgery.   DVT PREVENTION:   Deep vein thrombosis (DVT) or blood clots sometimes occur following surgery. It is important to understand the signs/symptoms and methods to avoid DVTs.   Symptoms of DVT include swelling, throbbing and cramping in the extremity, swollen veins that are hard or sore. DVTs can travel to the lungs and cause a pulmonary embolus (PE) and death. Symptoms of a pulmonary embolus include chest pain and shortness of breath. If you experience any of these symptoms you should contact the clinic immediately and/or go to the nearest emergency room.   To prevent blood clots, you should move your extremities and joints, limited by the restrictions above. Perform foot pumps,  ankle circles, leg lifts, etc. to circulate blood in the extremity.   If you have been prescribed Aspirin, please take it as prescribed for DVT prophylaxis. If you plan to travel in a car or plane for long periods (> 1 hour), contact your surgeon regarding the need for DVT prophylaxis. If you have a history of blood clots, and have not been prescribed a medication, contact your surgeon immediately.    DRIVING: Driving after your surgery is dependent on several factors. You may not drive if you are taking opiate pain medications. You may not drive if you're physically unable to steer with 2 hands and press the brake with full force. If you are unsure whether you are safe to drive, you should visit your local DMV. We are not medically or legally responsible for an accident caused by unsafe driving.     POST-OPERATIVE APPOINTMENT: Your first post-operative appointment is scheduled for 10-14 days after the procedure. If you do not have an appointment scheduled yet, please contact our scheduling office.    SPECIAL INSTRUCTIONS: If you experience fevers greater than 100.4°F on two consecutive measurements or any fever over 101°F, chest pain, difficulty breathing, confusion, or an allergic reaction, return to your nearest emergency department as soon as possible.     CONTACT INFORMATION: For any questions or concerns, please contact our nursing staff. You may also contact your surgeon via [facility].      Keny Lockhart MD  Department of Orthopaedics    HOME INSTRUCTIONS  [facility] HOME CARE INSTRUCTIONS: POST-OP ANESTHESIA  The medication that you received for sedation or general anesthesia can last up to 24 hours. Your judgment and reflexes may be altered, even if you feel like your normal self.      We Recommend:   Do not drive any motor vehicle or bicycle   Avoid mowing the lawn, playing sports, or working with power tools/applicances (power saws, electric knives or mixers)   That you have someone stay with you on your  first night home   Do not drink alcohol or take sleeping pills or tranquilizers   Do not sign legal documents within 24 hours of your procedure   If you had a nerve block for your surgery, take extra care not to put any pressure on your arm or hand for 24 hours    It is normal:  For you to have a sore throat if you had a breathing tube during surgery (while you were asleep!). The sore throat should get better within 48 hours. You can gargle with warm salt water (1/2 tsp in 4 oz warm water) or use a throat lozenge for comfort  To feel muscle aches or soreness especially in the abdomen, chest or neck. The achy feeling should go away in the next 24 hours  To feel weak, sleepy or \"wiped out\". Your should start feeling better in the next 24 hours.   To experience mild discomforts such as sore lip or tongue, headache, cramps, gas pains or a bloated feeling in your abdomen.   To experience mild back pain or soreness for a day or two if you had spinal or epidural anesthesia.   If you had laparoscopic surgery, to feel shoulder pain or discomfort on the day of surgery.   For some patients to have nausea after surgery/anesthesia    If you feel nausea or experience vomiting:   Try to move around less.   Eat less than usual or drink only liquids until the next morning   Nausea should resolve in about 24 hours    If you have a problem when you are at home:    Call your surgeons office   Discharge Instructions: After Your Surgery  You’ve just had surgery. During surgery, you were given medicine called anesthesia to keep you relaxed and free of pain. After surgery, you may have some pain or nausea. This is common. Here are some tips for feeling better and getting well after surgery.   Going home  Your healthcare provider will show you how to take care of yourself when you go home. They'll also answer your questions. Have an adult family member or friend drive you home. For the first 24 hours after your surgery:   Don't drive or use  heavy equipment.  Don't make important decisions or sign legal papers.  Take medicines as directed.  Don't drink alcohol.  Have someone stay with you, if needed. They can watch for problems and help keep you safe.  Be sure to go to all follow-up visits with your healthcare provider. And rest after your surgery for as long as your provider tells you to.   Coping with pain  If you have pain after surgery, pain medicine will help you feel better. Take it as directed, before pain becomes severe. Also, ask your healthcare provider or pharmacist about other ways to control pain. This might be with heat, ice, or relaxation. And follow any other instructions your surgeon or nurse gives you.      Stay on schedule with your medicine.     Tips for taking pain medicine  To get the best relief possible, remember these points:   Pain medicines can upset your stomach. Taking them with a little food may help.  Most pain relievers taken by mouth need at least 20 to 30 minutes to start to work.  Don't wait till your pain becomes severe before you take your medicine. Try to time your medicine so that you can take it before starting an activity. This might be before you get dressed, go for a walk, or sit down for dinner.  Constipation is a common side effect of some pain medicines. Call your healthcare provider before taking any medicines such as laxatives or stool softeners to help ease constipation. Also ask if you should skip any foods. Drinking lots of fluids and eating foods such as fruits and vegetables that are high in fiber can also help. Remember, don't take laxatives unless your surgeon has prescribed them.  Drinking alcohol and taking pain medicine can cause dizziness and slow your breathing. It can even be deadly. Don't drink alcohol while taking pain medicine.  Pain medicine can make you react more slowly to things. Don't drive or run machinery while taking pain medicine.  Your healthcare provider may tell you to take  acetaminophen to help ease your pain. Ask them how much you're supposed to take each day. Acetaminophen or other pain relievers may interact with your prescription medicines or other over-the-counter (OTC) medicines. Some prescription medicines have acetaminophen and other ingredients in them. Using both prescription and OTC acetaminophen for pain can cause you to accidentally overdose. Read the labels on your OTC medicines with care. This will help you to clearly know the list of ingredients, how much to take, and any warnings. It may also help you not take too much acetaminophen. If you have questions or don't understand the information, ask your pharmacist or healthcare provider to explain it to you before you take the OTC medicine.   Managing nausea  Some people have an upset stomach (nausea) after surgery. This is often because of anesthesia, pain, or pain medicine, less movement of food in the stomach, or the stress of surgery. These tips will help you handle nausea and eat healthy foods as you get better. If you were on a special food plan before surgery, ask your healthcare provider if you should follow it while you get better. Check with your provider on how your eating should progress. It may depend on the surgery you had. These general tips may help:   Don't push yourself to eat. Your body will tell you when to eat and how much.  Start off with clear liquids and soup. They're easier to digest.  Next try semi-solid foods as you feel ready. These include mashed potatoes, applesauce, and gelatin.  Slowly move to solid foods. Don’t eat fatty, rich, or spicy foods at first.  Don't force yourself to have 3 large meals a day. Instead eat smaller amounts more often.  Take pain medicines with a small amount of solid food, such as crackers or toast. This helps prevent nausea.  When to call your healthcare provider  Call your healthcare provider right away if you have any of these:   You still have too much pain, or  the pain gets worse, after taking the medicine. The medicine may not be strong enough. Or there may be a complication from the surgery.  You feel too sleepy, dizzy, or groggy. The medicine may be too strong.  Side effects such as nausea or vomiting. Your healthcare provider may advise taking other medicines to .  Skin changes such as rash, itching, or hives. This may mean you have an allergic reaction. Your provider may advise taking other medicines.  The incision looks different (for instance, part of it opens up).  Bleeding or fluid leaking from the incision site, and weren't told to expect that.  Fever of 100.4°F (38°C) or higher, or as directed by your provider.  Call 911  Call 911 right away if you have:   Trouble breathing  Facial swelling    If you have obstructive sleep apnea   You were given anesthesia medicine during surgery to keep you comfortable and free of pain. After surgery, you may have more apnea spells because of this medicine and other medicines you were given. The spells may last longer than normal.    At home:  Keep using the continuous positive airway pressure (CPAP) device when you sleep. Unless your healthcare provider tells you not to, use it when you sleep, day or night. CPAP is a common device used to treat obstructive sleep apnea.  Talk with your provider before taking any pain medicine, muscle relaxants, or sedatives. Your provider will tell you about the possible dangers of taking these medicines.  Contact your provider if your sleeping changes a lot even when taking medicines as directed.  [employer] last reviewed this educational content on 10/1/2021  © 2751-9810 [employer]. All rights reserved. This information is not intended as a substitute for professional medical care. Always follow your healthcare professional's instructions.      Patient Discharge Instructions

## 2024-07-31 ENCOUNTER — APPOINTMENT (OUTPATIENT)
Dept: PHYSICAL THERAPY | Age: 74
End: 2024-07-31
Payer: MEDICARE

## 2024-07-31 ENCOUNTER — HOSPITAL ENCOUNTER (OUTPATIENT)
Facility: HOSPITAL | Age: 74
Setting detail: HOSPITAL OUTPATIENT SURGERY
Discharge: HOME | End: 2024-07-31
Attending: STUDENT IN AN ORGANIZED HEALTH CARE EDUCATION/TRAINING PROGRAM | Admitting: STUDENT IN AN ORGANIZED HEALTH CARE EDUCATION/TRAINING PROGRAM
Payer: MEDICARE

## 2024-07-31 ENCOUNTER — ANESTHESIA EVENT (OUTPATIENT)
Dept: SURGERY | Facility: HOSPITAL | Age: 74
End: 2024-07-31
Payer: MEDICARE

## 2024-07-31 ENCOUNTER — ANESTHESIA (OUTPATIENT)
Dept: SURGERY | Facility: HOSPITAL | Age: 74
End: 2024-07-31
Payer: MEDICARE

## 2024-07-31 VITALS
WEIGHT: 191 LBS | OXYGEN SATURATION: 94 % | TEMPERATURE: 98 F | RESPIRATION RATE: 16 BRPM | BODY MASS INDEX: 27.35 KG/M2 | HEART RATE: 86 BPM | SYSTOLIC BLOOD PRESSURE: 154 MMHG | HEIGHT: 70 IN | DIASTOLIC BLOOD PRESSURE: 73 MMHG

## 2024-07-31 DIAGNOSIS — M23.321 DERANGEMENT OF POSTERIOR HORN OF MEDIAL MENISCUS OF RIGHT KNEE: Primary | ICD-10-CM

## 2024-07-31 DIAGNOSIS — S83.203D COMPLEX TEAR OF MENISCUS OF RIGHT KNEE AS CURRENT INJURY, UNSPECIFIED MENISCUS, SUBSEQUENT ENCOUNTER: ICD-10-CM

## 2024-07-31 PROCEDURE — 3E0T33Z INTRODUCTION OF ANTI-INFLAMMATORY INTO PERIPHERAL NERVES AND PLEXI, PERCUTANEOUS APPROACH: ICD-10-PCS | Performed by: STUDENT IN AN ORGANIZED HEALTH CARE EDUCATION/TRAINING PROGRAM

## 2024-07-31 PROCEDURE — 3E0T3BZ INTRODUCTION OF ANESTHETIC AGENT INTO PERIPHERAL NERVES AND PLEXI, PERCUTANEOUS APPROACH: ICD-10-PCS | Performed by: STUDENT IN AN ORGANIZED HEALTH CARE EDUCATION/TRAINING PROGRAM

## 2024-07-31 PROCEDURE — 0SBC4ZZ EXCISION OF RIGHT KNEE JOINT, PERCUTANEOUS ENDOSCOPIC APPROACH: ICD-10-PCS | Performed by: STUDENT IN AN ORGANIZED HEALTH CARE EDUCATION/TRAINING PROGRAM

## 2024-07-31 PROCEDURE — 29880 ARTHRS KNE SRG MNISECTMY M&L: CPT | Performed by: STUDENT IN AN ORGANIZED HEALTH CARE EDUCATION/TRAINING PROGRAM

## 2024-07-31 RX ORDER — ONDANSETRON 2 MG/ML
INJECTION INTRAMUSCULAR; INTRAVENOUS AS NEEDED
Status: DISCONTINUED | OUTPATIENT
Start: 2024-07-31 | End: 2024-07-31 | Stop reason: SURG

## 2024-07-31 RX ORDER — ACETAMINOPHEN 500 MG
1000 TABLET ORAL EVERY 8 HOURS SCHEDULED
Status: CANCELLED | OUTPATIENT
Start: 2024-07-31

## 2024-07-31 RX ORDER — MORPHINE SULFATE 10 MG/ML
6 INJECTION, SOLUTION INTRAMUSCULAR; INTRAVENOUS EVERY 10 MIN PRN
Status: DISCONTINUED | OUTPATIENT
Start: 2024-07-31 | End: 2024-07-31

## 2024-07-31 RX ORDER — ASPIRIN 325 MG
325 TABLET, DELAYED RELEASE (ENTERIC COATED) ORAL DAILY
Qty: 30 TABLET | Refills: 0 | Status: SHIPPED | OUTPATIENT
Start: 2024-07-31 | End: 2024-08-30

## 2024-07-31 RX ORDER — DEXAMETHASONE SODIUM PHOSPHATE 4 MG/ML
VIAL (ML) INJECTION AS NEEDED
Status: DISCONTINUED | OUTPATIENT
Start: 2024-07-31 | End: 2024-07-31 | Stop reason: SURG

## 2024-07-31 RX ORDER — MORPHINE SULFATE 4 MG/ML
2 INJECTION, SOLUTION INTRAMUSCULAR; INTRAVENOUS EVERY 10 MIN PRN
Status: DISCONTINUED | OUTPATIENT
Start: 2024-07-31 | End: 2024-07-31

## 2024-07-31 RX ORDER — HYDROMORPHONE HYDROCHLORIDE 1 MG/ML
0.2 INJECTION, SOLUTION INTRAMUSCULAR; INTRAVENOUS; SUBCUTANEOUS EVERY 2 HOUR PRN
Status: CANCELLED | OUTPATIENT
Start: 2024-07-31

## 2024-07-31 RX ORDER — ONDANSETRON 4 MG/1
4 TABLET, ORALLY DISINTEGRATING ORAL EVERY 8 HOURS PRN
Qty: 10 TABLET | Refills: 0 | Status: SHIPPED | OUTPATIENT
Start: 2024-07-31

## 2024-07-31 RX ORDER — ACETAMINOPHEN 500 MG
1000 TABLET ORAL ONCE
Status: COMPLETED | OUTPATIENT
Start: 2024-07-31 | End: 2024-07-31

## 2024-07-31 RX ORDER — DEXAMETHASONE SODIUM PHOSPHATE 10 MG/ML
INJECTION, SOLUTION INTRAMUSCULAR; INTRAVENOUS
Status: COMPLETED | OUTPATIENT
Start: 2024-07-31 | End: 2024-07-31

## 2024-07-31 RX ORDER — OXYCODONE HYDROCHLORIDE 5 MG/1
5 TABLET ORAL EVERY 4 HOURS PRN
Qty: 12 TABLET | Refills: 0 | Status: SHIPPED | OUTPATIENT
Start: 2024-07-31

## 2024-07-31 RX ORDER — SODIUM CHLORIDE, SODIUM LACTATE, POTASSIUM CHLORIDE, CALCIUM CHLORIDE 600; 310; 30; 20 MG/100ML; MG/100ML; MG/100ML; MG/100ML
INJECTION, SOLUTION INTRAVENOUS CONTINUOUS
Status: DISCONTINUED | OUTPATIENT
Start: 2024-07-31 | End: 2024-07-31

## 2024-07-31 RX ORDER — GABAPENTIN 300 MG/1
300 CAPSULE ORAL EVERY 8 HOURS
Qty: 30 CAPSULE | Refills: 0 | Status: SHIPPED | OUTPATIENT
Start: 2024-07-31

## 2024-07-31 RX ORDER — OXYCODONE HYDROCHLORIDE 5 MG/1
2.5 TABLET ORAL EVERY 4 HOURS PRN
Status: CANCELLED | OUTPATIENT
Start: 2024-07-31

## 2024-07-31 RX ORDER — ROPIVACAINE HYDROCHLORIDE 5 MG/ML
INJECTION, SOLUTION EPIDURAL; INFILTRATION; PERINEURAL
Status: COMPLETED | OUTPATIENT
Start: 2024-07-31 | End: 2024-07-31

## 2024-07-31 RX ORDER — ONDANSETRON 2 MG/ML
4 INJECTION INTRAMUSCULAR; INTRAVENOUS EVERY 6 HOURS PRN
Status: CANCELLED | OUTPATIENT
Start: 2024-07-31

## 2024-07-31 RX ORDER — NALOXONE HYDROCHLORIDE 0.4 MG/ML
0.08 INJECTION, SOLUTION INTRAMUSCULAR; INTRAVENOUS; SUBCUTANEOUS AS NEEDED
Status: DISCONTINUED | OUTPATIENT
Start: 2024-07-31 | End: 2024-07-31

## 2024-07-31 RX ORDER — ASCORBIC ACID 500 MG
500 TABLET ORAL 2 TIMES DAILY
Qty: 84 TABLET | Refills: 0 | Status: SHIPPED | OUTPATIENT
Start: 2024-07-31

## 2024-07-31 RX ORDER — HYDROMORPHONE HYDROCHLORIDE 1 MG/ML
0.4 INJECTION, SOLUTION INTRAMUSCULAR; INTRAVENOUS; SUBCUTANEOUS EVERY 2 HOUR PRN
Status: CANCELLED | OUTPATIENT
Start: 2024-07-31

## 2024-07-31 RX ORDER — CELECOXIB 100 MG/1
100 CAPSULE ORAL 2 TIMES DAILY
Qty: 60 CAPSULE | Refills: 0 | Status: SHIPPED | OUTPATIENT
Start: 2024-07-31 | End: 2024-08-30

## 2024-07-31 RX ORDER — HYDROMORPHONE HYDROCHLORIDE 1 MG/ML
0.4 INJECTION, SOLUTION INTRAMUSCULAR; INTRAVENOUS; SUBCUTANEOUS EVERY 5 MIN PRN
Status: DISCONTINUED | OUTPATIENT
Start: 2024-07-31 | End: 2024-07-31

## 2024-07-31 RX ORDER — HYDROMORPHONE HYDROCHLORIDE 1 MG/ML
0.2 INJECTION, SOLUTION INTRAMUSCULAR; INTRAVENOUS; SUBCUTANEOUS EVERY 5 MIN PRN
Status: DISCONTINUED | OUTPATIENT
Start: 2024-07-31 | End: 2024-07-31

## 2024-07-31 RX ORDER — MORPHINE SULFATE 4 MG/ML
4 INJECTION, SOLUTION INTRAMUSCULAR; INTRAVENOUS EVERY 10 MIN PRN
Status: DISCONTINUED | OUTPATIENT
Start: 2024-07-31 | End: 2024-07-31

## 2024-07-31 RX ORDER — HYDROMORPHONE HYDROCHLORIDE 1 MG/ML
0.6 INJECTION, SOLUTION INTRAMUSCULAR; INTRAVENOUS; SUBCUTANEOUS EVERY 5 MIN PRN
Status: DISCONTINUED | OUTPATIENT
Start: 2024-07-31 | End: 2024-07-31

## 2024-07-31 RX ORDER — LIDOCAINE HYDROCHLORIDE 10 MG/ML
INJECTION, SOLUTION INFILTRATION; PERINEURAL
Status: COMPLETED | OUTPATIENT
Start: 2024-07-31 | End: 2024-07-31

## 2024-07-31 RX ORDER — KETOROLAC TROMETHAMINE 15 MG/ML
15 INJECTION, SOLUTION INTRAMUSCULAR; INTRAVENOUS EVERY 6 HOURS
Status: DISCONTINUED | OUTPATIENT
Start: 2024-07-31 | End: 2024-07-31

## 2024-07-31 RX ORDER — OXYCODONE HYDROCHLORIDE 5 MG/1
5 TABLET ORAL EVERY 4 HOURS PRN
Status: CANCELLED | OUTPATIENT
Start: 2024-07-31

## 2024-07-31 RX ORDER — SENNA AND DOCUSATE SODIUM 50; 8.6 MG/1; MG/1
2 TABLET, FILM COATED ORAL NIGHTLY
Qty: 28 TABLET | Refills: 0 | Status: SHIPPED | OUTPATIENT
Start: 2024-07-31

## 2024-07-31 RX ADMIN — LIDOCAINE HYDROCHLORIDE 5 ML: 10 INJECTION, SOLUTION INFILTRATION; PERINEURAL at 16:35:00

## 2024-07-31 RX ADMIN — DEXAMETHASONE SODIUM PHOSPHATE 4 MG: 4 MG/ML VIAL (ML) INJECTION at 17:20:00

## 2024-07-31 RX ADMIN — DEXAMETHASONE SODIUM PHOSPHATE 10 MG: 10 INJECTION, SOLUTION INTRAMUSCULAR; INTRAVENOUS at 16:35:00

## 2024-07-31 RX ADMIN — ONDANSETRON 4 MG: 2 INJECTION INTRAMUSCULAR; INTRAVENOUS at 17:20:00

## 2024-07-31 RX ADMIN — ROPIVACAINE HYDROCHLORIDE 20 ML: 5 INJECTION, SOLUTION EPIDURAL; INFILTRATION; PERINEURAL at 16:35:00

## 2024-07-31 NOTE — ANESTHESIA PROCEDURE NOTES
Airway  Date/Time: 7/31/2024 4:50 PM  Urgency: Elective      General Information and Staff    Patient location during procedure: OR  Anesthesiologist: Jay Jay Suresh MD  Performed: anesthesiologist   Performed by: Jay Jay Suresh MD  Authorized by: Jay Jay Suresh MD      Indications and Patient Condition  Indications for airway management: anesthesia  Sedation level: deep  Preoxygenated: yes  Patient position: sniffing  Mask difficulty assessment: 1 - vent by mask    Final Airway Details  Final airway type: supraglottic airway      Successful airway: classic  Size 4       Number of attempts at approach: 1

## 2024-07-31 NOTE — ANESTHESIA POSTPROCEDURE EVALUATION
Patient: Benjamin Ralph    Procedure Summary       Date: 07/31/24 Room / Location: [facility] MAIN OR [address] [place] [address] MAIN OR    Anesthesia Start: 1645 Anesthesia Stop: 1740    Procedure: Right knee arthroscopy with medial lateral meniscus debridement (Right) Diagnosis:       Other tear of meniscus of right knee as current injury, unspecified meniscus, subsequent encounter      Derangement of posterior horn of medial meniscus of right knee      (Other tear of meniscus of right knee as current injury, unspecified meniscus, subsequent encounter [S83.203D]Derangement of posterior horn of medial meniscus of right knee [M23.321])    Surgeons: Keny Lockhart MD Anesthesiologist: Jay Jay Suresh MD    Anesthesia Type: general, regional ASA Status: 2            Anesthesia Type: No value filed.    Vitals Value Taken Time   /73 07/31/24 1823   Temp 98.3 °F (36.8 °C) 07/31/24 1820   Pulse 86 07/31/24 1823   Resp 16 07/31/24 1823   SpO2 94 % 07/31/24 1823       [facility] AN Post Evaluation:   Patient Evaluated in PACU  Patient Participation: complete - patient participated  Level of Consciousness: awake  Pain Score: 2  Pain Management: adequate  Airway Patency:patent  Dental exam unchanged from preop  Yes    Cardiovascular Status: hemodynamically stable  Respiratory Status: spontaneous ventilation  Postoperative Hydration euvolemic      Jay Jay Suresh MD  7/31/2024 6:46 PM

## 2024-07-31 NOTE — BRIEF OP NOTE
Pre-Operative Diagnosis: Other tear of meniscus of right knee as current injury, unspecified meniscus, subsequent encounter [S83.203D]  Derangement of posterior horn of medial meniscus of right knee [M23.321]     Post-Operative Diagnosis: Right knee medial and lateral meniscus tear, femoral chondral lesion     Procedure Performed:   Right knee arthroscopy with medial and lateral meniscus debridement  Right knee anterior synovectomy  Right knee chondroplasty of the medial femoral condyle    Surgeons and Role:     * Keny Lockhart MD - Primary    Assistant(s):  Surgical Assistant.: Vahe Purcell     Surgical Findings: Complex tears of the body and posterior horn of the medial meniscus.  Peripheral tears of the lateral meniscus in the posterior horn.  Medial femoral condyle chondral lesion, 8 x 8 mm     Specimen: None     Estimated Blood Loss: 5 cc    Dictation Number: N/A    Keny Lockhart MD  7/31/2024  5:32 PM

## 2024-07-31 NOTE — H&P
Orthopaedic Outpatient  Surgery History & Physical  _______________________________________________________________________________________________________________  _______________________________________________________________________________________________________________    Benjamin Ralph Patient Status:  Hospital Outpatient Surgery    6/10/1950 MRN [id number]   [facility] POST ANESTHESIA CARE UNIT Attending Keny Lockhart MD     DATE: 2024     CHIEF COMPLAINT: Right knee pain       History provided by:patient  HISTORY OF PRESENT ILLNESS: Benjamin Ralph is a 74 year old male who presents as a patient to outpatient surgery for his right knee.  He has a prolonged history of right knee pain, intermittent mechanical symptoms, and medial and laterally located knee pain with intermittent giving way and buckling.  She denies subhash instability of the knee.  He denies neurologic symptoms.  He denies any traumatic injury.  Pain has progressively worsened despite therapy, cryotherapy, activity modification, and anti-inflammatory medications.  He presents today for operative intervention.  Since his last visit, he denies any changes to his medical history or symptoms    SOCIAL HISTORY  Social History     Socioeconomic History    Marital status: Single     Spouse name: Not on file    Number of children: 0    Years of education: Not on file    Highest education level: Not on file   Occupational History    Occupation:    Tobacco Use    Smoking status: Former     Current packs/day: 0.00     Types: Cigarettes     Quit date: 3/25/1978     Years since quittin.3     Passive exposure: Never    Smokeless tobacco: Never   Vaping Use    Vaping status: Never Used   Substance and Sexual Activity    Alcohol use: Yes     Comment: rarely maybe a glass of wine a week    Drug use: Yes     Types: Cannabis     Comment: gummies    Sexual activity: Not Currently   Other Topics Concern      Service Not Asked    Blood Transfusions Not Asked    Caffeine Concern Yes     Comment: coffee, 3cups/day    Occupational Exposure Not Asked    Hobby Hazards Not Asked    Sleep Concern Not Asked    Stress Concern Not Asked    Weight Concern Not Asked    Special Diet Not Asked    Back Care Not Asked    Exercise Not Asked    Bike Helmet Not Asked    Seat Belt Not Asked    Self-Exams Not Asked    Grew up on a farm No    History of tanning No    Outdoor occupation No    Pt has a pacemaker No    Pt has a defibrillator No    Reaction to local anesthetic No   Social History Narrative    Not on file     Social Determinants of Health     Financial Resource Strain: Not on file   Food Insecurity: Not on file   Transportation Needs: Not on file   Physical Activity: Not on file   Stress: Not on file   Social Connections: Not on file   Housing Stability: Not on file        PAST MEDICAL HISTORY  Past Medical History:    Basal cell carcinoma    left nose    Basal cell carcinoma of nose    BCC (basal cell carcinoma)     left posterior lateral neck    BCC (basal cell carcinoma)    right ala base    Calculus of kidney    left    Chronic headaches    Chronic pansinusitis    Chronic sinusitis    Congestion of nasal sinus    COVID-19    Esophageal reflux    Ganglion of flexor tendon sheath of right thumb    High cholesterol    Melanoma (HCC)    left arm, stage I; .75 mm depth    Osteoarthritis    Plantar wart    right heel    Pneumonia due to organism    2016    Visual impairment    Glasses        PAST SURGICAL HISTORY  Past Surgical History:   Procedure Laterality Date    Adj tiss xfer lid,nos,ear <10sqcm Right 2-17-17    Exc lesion of nose, V_Y flap    Colonoscopy      Colonoscopy      Colonoscopy N/A 12/26/2018    Procedure: COLONOSCOPY;  Surgeon: Isidoro Mosley MD;  Location: [facility] ENDOSCOPY    Exc skin malig 2.1-3cm face,facial  05/18/2022    Excis tendon sheath lesn,hand/fingr Right 2-17-17    Exc ganglion R thumb     Foot surgery Left 2006    Nerve procedure    Full graft proc head,fac,hand <20sqc  05/18/2022    Nasal scopy,remv part ethmoid      Nasal scopy,rmv tiss maxill sinus      Repair of nasal septum  1985    Repair of nasal septum      Repr cmpl wnd head,fac,hand 2.6-7.5  05/18/2022    Skin surgery Left 10/07/16    left upper arm melanoma        MEDICATIONS  [unfilled]     ALLERGIES  Allergies   Allergen Reactions    Penicillin G HIVES     states he is not sure if it was from actual drug  Denies skin peeling, blisters or organ damage    Penicillins HIVES     Denies blisters, skin peeling or organ damage        FAMILY HISTORY  Family History   Problem Relation Age of Onset    Dementia Mother     Seizure Disorder Mother     Hypertension Mother     Cancer Mother         skin    Cancer Father         Leukemia        REVIEW OF SYSTEMS  A 14 point review of systems was performed. Pertinent positives and negatives noted in the HPI.    PHYSICAL EXAM  /88   Pulse 76   Temp 97.9 °F (36.6 °C) (Oral)   Resp 13   Ht 5' 10\" (1.778 m)   Wt 191 lb (86.6 kg)   SpO2 96%   BMI 27.41 kg/m²      Constitutional: The patient is well-developed, well-nourished, in no acute distress.  Neurological: Alert and oriented to person, place, and time.  Psychiatric: Mood and affect normal.  Head: Normocephalic and atraumatic.  Cardiovascular: regular rate by palpation  Pulmonary/Chest: Effort normal. No respiratory distress. Breathing non-labored  Abdominal: Abdomen exhibits no distension.   Right  KNEE  INSPECTION/PALPATION  No readily apparent visual abnormalities of the knee.   No ecchymosis or erythema  No effusion.   No breaks in skin. Skin is euthermic.  Neutral alignment on standing  Slightly antalgic gait  TTP  to medial joint line,  very mildly tender  to lateral joint line  ROM  0-130 degrees  KNEE STRENGTH  Flexion: 5/5  Extension: 5/5  STABILITY  1A lachman, stable anterior drawer  Stable posterior drawer  Stable to varus and  valgus stress at 0 and 30 degrees  1 quadrant of lateral patellar translation  Negative J sign, negative apprehension  PERTINENT FINDINGS  Positive Davi    Positive Thessaly  Negative Patellar grind  SILT Saima/Saph/SPN/DPN/T  2+ DP/PT pulse, foot wwp      IMAGING  I independently viewed and interpreted the imaging. Radiologist interpretation is available in the imaging report.  X-ray: Plain films of the right knee knee including AP, lateral, and sunrise nonweightbearing views were reviewed. These demonstrate no acute osseous abnormalities.  The patella is centrally positioned within the femoral trochlea.  There are minimal to no degenerative changes in the patellofemoral joint.  There are mild degenerative changes in the medial knee compartment and minimal to no degenerative changes in the lateral knee compartment of the tibiofemoral joint.  There are no  loose bodies evident.  There is no evidence of Segond fracture or other fractures.   MRI: MRI of the right knee demonstrates complex tear of the posterior horn and body of the medial meniscus.  There is subarticular fissuring within the medial femoral condyle.  The cruciate and collateral ligaments are intact.  The lateral meniscus and lateral articular surfaces are intact.  There is mild chondromalacia present in the medial and lateral patella facets        ASSESSMENT/PLAN: Benjamin Ralph is a 74 year old male who presents for right knee meniscal pathology that has been refractory to conservative measures. Benjamin Ralph  meets appropriate indications to undergo surgical intervention for the above diagnosis and this option was discussed in detail again today. After discussion of risks, benefits, and alternatives, they elected to proceed with surgery as planned and a witnessed consent form was signed. The operative site was marked with an indelible marker. They were offered the opportunity to ask all additional questions, which were answered to their  satisfaction.    NPO for OR  Surgery planned: Right knee arthroscopic assisted medial meniscus debridement, possible lateral meniscus debridement, possible chondroplasty  Planned for: Same Day Discharge  Post-operative medications ordered  PRE-OP WEIGHT BEARING STATUS: Weightbearing as tolerated  POST-OP WEIGHT BEARING PLAN: Weightbearing as tolerated    I have personally seen Benjamin Ralph and discussed in detail their plan of care.   Please note that this note was written in combination with voice recognition/dictation software and there is a possibility of transcription errors which were not identified at the time of note submission. If clarification is necessary, please contact the author or clinic staff.    Keny Lockhart MD  Orthopaedic Surgery  7/31/2024

## 2024-07-31 NOTE — ANESTHESIA PREPROCEDURE EVALUATION
Anesthesia PreOp Note    HPI:     Benjamin Ralph is a 74 year old male who presents for preoperative consultation requested by: Keny Lockhart MD    Date of Surgery: 7/31/2024    Procedure(s):  Right knee arthroscopy with medial meniscus debridement  Indication: Other tear of meniscus of right knee as current injury, unspecified meniscus, subsequent encounter [S83.203D]  Derangement of posterior horn of medial meniscus of right knee [M23.321]    Relevant Problems   No relevant active problems       NPO:  Last Liquid Consumption Date: 07/31/24  Last Liquid Consumption Time: 0800 (black coffee, water)  Last Solid Consumption Date: 07/30/24  Last Solid Consumption Time: 1500  Last Liquid Consumption Date: 07/31/24          History Review:  Patient Active Problem List    Diagnosis Date Noted    Derangement of posterior horn of medial meniscus of right knee 06/07/2024    Tear of meniscus of right knee as current injury 05/31/2024    Hamstring tendinitis of right thigh 05/17/2024    Osteoarthritis of right knee 05/16/2024    Trigger point of neck 04/27/2023    Cervical radiculopathy 04/27/2023    Basal cell carcinoma of left side of neck 10/14/2021    History of COVID-19 10/06/2021    Right kidney stone 10/06/2020    Sun-damaged skin 03/05/2020    Seasonal allergies 06/19/2019    History of actinic keratoses 02/22/2018    Hypercholesterolemia 09/05/2017    History of basal cell carcinoma 04/20/2017    Basal cell carcinoma of nose 02/02/2017    Personal history of malignant melanoma of skin 01/19/2017    Combined arterial insufficiency and corporo-venous occlusive erectile dysfunction 12/08/2016    Enlarged prostate 09/29/2016    Melanoma of left upper arm (HCC) 09/08/2016    Actinic keratosis 07/18/2016    Inflamed seborrheic keratosis 07/18/2016       Past Medical History:    Basal cell carcinoma    left nose    Basal cell carcinoma of nose    BCC (basal cell carcinoma)     left posterior lateral neck    BCC (basal  cell carcinoma)    right ala base    Calculus of kidney    left    Chronic headaches    Chronic pansinusitis    Chronic sinusitis    Congestion of nasal sinus    COVID-19    Esophageal reflux    Ganglion of flexor tendon sheath of right thumb    High cholesterol    Melanoma (HCC)    left arm, stage I; .75 mm depth    Osteoarthritis    Plantar wart    right heel    Pneumonia due to organism    2016    Visual impairment    Glasses       Past Surgical History:   Procedure Laterality Date    Adj tiss xfer lid,nos,ear <10sqcm Right 2-17-17    Exc lesion of nose, V_Y flap    Colonoscopy      Colonoscopy      Colonoscopy N/A 12/26/2018    Procedure: COLONOSCOPY;  Surgeon: Isidoro Mosley MD;  Location: [facility] ENDOSCOPY    Exc skin malig 2.1-3cm face,facial  05/18/2022    Excis tendon sheath lesn,hand/fingr Right 2-17-17    Exc ganglion R thumb    Foot surgery Left 2006    Nerve procedure    Full graft proc head,fac,hand <20sqc  05/18/2022    Nasal scopy,remv part ethmoid      Nasal scopy,rmv tiss maxill sinus      Repair of nasal septum  1985    Repair of nasal septum      Repr cmpl wnd head,fac,hand 2.6-7.5  05/18/2022    Skin surgery Left 10/07/16    left upper arm melanoma       Facility-Administered Medications Prior to Admission   Medication Dose Route Frequency Provider Last Rate Last Admin    [COMPLETED] sodium hyaluronate (DUROLANE)   Intra-articular Once    Given at 06/07/24 0815    [COMPLETED] triamcinolone acetonide (Kenalog-40) 40 MG/ML injection 20 mg  20 mg Intramuscular Once    20 mg at 05/17/24 1111     Medications Prior to Admission   Medication Sig Dispense Refill Last Dose    rosuvastatin 10 MG Oral Tab Take 1 tablet (10 mg total) by mouth nightly. 90 tablet 3 7/30/2024 at 1800    Turmeric 500 MG Oral Cap Take 500 mg by mouth daily.   7/24/2024    ibuprofen 400 MG Oral Tab Take 1 tablet (400 mg total) by mouth every 6 (six) hours as needed for Pain.   7/24/2024    dutasteride 0.5 MG Oral Cap Take 1  capsule (0.5 mg total) by mouth daily. 90 capsule 3 2024    sildenafil 20 MG Oral Tab Take one tablet PO daily as needed 10 tablet 5 7/3/2024    acetaminophen 325 MG Oral Tab Take 2 tablets (650 mg total) by mouth every 6 (six) hours as needed for Pain.   2024 at 0400     Current Facility-Administered Medications Ordered in Epic   Medication Dose Route Frequency Provider Last Rate Last Admin    lactated ringers infusion   Intravenous Continuous Keny Lockhart MD 20 mL/hr at 24 1438 New Bag at 24 1438     No current [facility]-ordered outpatient medications on file.       Allergies   Allergen Reactions    Penicillin G HIVES     states he is not sure if it was from actual drug  Denies skin peeling, blisters or organ damage    Penicillins HIVES     Denies blisters, skin peeling or organ damage       Family History   Problem Relation Age of Onset    Dementia Mother     Seizure Disorder Mother     Hypertension Mother     Cancer Mother         skin    Cancer Father         Leukemia     Social History     Socioeconomic History    Marital status: Single    Number of children: 0   Occupational History    Occupation:    Tobacco Use    Smoking status: Former     Current packs/day: 0.00     Types: Cigarettes     Quit date: 3/25/1978     Years since quittin.3     Passive exposure: Never    Smokeless tobacco: Never   Vaping Use    Vaping status: Never Used   Substance and Sexual Activity    Alcohol use: Yes     Comment: rarely maybe a glass of wine a week    Drug use: Yes     Types: Cannabis     Comment: gummies    Sexual activity: Not Currently   Other Topics Concern    Caffeine Concern Yes     Comment: coffee, 3cups/day    Grew up on a farm No    History of tanning No    Outdoor occupation No    Pt has a pacemaker No    Pt has a defibrillator No    Reaction to local anesthetic No       Available pre-op labs reviewed.  Lab Results   Component Value Date    WBC 8.0 2024    RBC 5.38  07/05/2024    HGB 16.6 07/05/2024    HCT 49.7 07/05/2024    MCV 92.4 07/05/2024    MCH 30.9 07/05/2024    MCHC 33.4 07/05/2024    RDW 12.8 07/05/2024    .0 07/05/2024     Lab Results   Component Value Date     07/05/2024    K 4.4 07/05/2024     07/05/2024    CO2 28.0 07/05/2024    BUN 15 07/05/2024    CREATSERUM 0.90 07/05/2024     (H) 07/05/2024    CA 9.6 07/05/2024          Vital Signs:  Body mass index is 27.41 kg/m².   height is 1.778 m (5' 10\") and weight is 86.6 kg (191 lb). His oral temperature is 97.9 °F (36.6 °C). His blood pressure is 136/88 and his pulse is 76. His respiration is 13 and oxygen saturation is 96%.   Vitals:    07/23/24 1024 07/31/24 1415 07/31/24 1620   BP:  139/87 136/88   Pulse:  84 76   Resp:  16 13   Temp:  97.9 °F (36.6 °C)    TempSrc:  Oral    SpO2:  96% 96%   Weight: 86.2 kg (190 lb) 86.6 kg (191 lb)    Height: 1.778 m (5' 10\") 1.778 m (5' 10\")         Anesthesia Evaluation      Airway   Mallampati: II  TM distance: >3 FB  Neck ROM: full  Dental      Pulmonary - normal exam   Cardiovascular - normal exam    Neuro/Psych    (+)  neuromuscular disease,        GI/Hepatic/Renal    (+) GERD    Endo/Other    Abdominal  - normal exam                 Anesthesia Plan:   ASA:  2  Plan:   General and regional  Plan Comments: Anesthesia plan was discussed with the patient, going through the rationale, expectations, benefits and risks namely injury to lips, teeth, gyms or corneas, risks of an allergic reaction, risk of awareness or recall of intra-operative events, risk of prolonged intubation and ICU admission, risks of kidney failure, risk of coronary events or dysrhythmias, risk of cerebrovascular events or stroke and on rare occasions death.     Plan for nerve block was discussed with the patient, going through the rationale, expectations, benefits and risks namely bleeding, infection, block failure, need to repeat, nerve injury, local anesthetic related nerve  toxicity, local anesthesia systemic toxicity, damage to nearby structures or blood vessels which in the case of upper extremity blocks also carries the risk of lung injury and pneumothorax.         I have informed Benjamin Ralph and/or legal guardian or family member of the nature of the anesthetic plan, benefits, risks including possible dental damage if relevant, major complications, and any alternative forms of anesthetic management.   All of the patient's questions were answered to the best of my ability. The patient desires the anesthetic management as planned.  Jay Jay Suresh MD  7/31/2024 4:44 PM  Present on Admission:  **None**

## 2024-07-31 NOTE — ANESTHESIA PROCEDURE NOTES
Peripheral Block    Date/Time: 7/31/2024 4:35 PM    Performed by: Jay Jay Suresh MD  Authorized by: Jay Jay Suresh MD      General Information and Staff    Start Time:  7/31/2024 4:35 PM  End Time:  7/31/2024 4:40 PM  Anesthesiologist:  Jay Jay Suresh MD  Performed by:  Anesthesiologist  Patient Location:  PACU      Site Identification: real time ultrasound guided and image stored and retrievable      Reason for Block: at surgeon's request and post-op pain management    Preanesthetic Checklist: 2 patient identifers, IV checked, risks and benefits discussed, monitors and equipment checked, pre-op evaluation, timeout performed, anesthesia consent and sterile technique used      Procedure Details    Patient Position:  Supine  Prep: ChloraPrep    Monitoring:  Cardiac monitor  Block Type:  Saphenous  Injection Technique:  Single-shot    Needle      Assessment    Injection Assessment:  Good spread noted, incremental injection, low pressure, local visualized surrounding nerve on ultrasound, negative aspiration for heme and no pain on injection  Paresthesia Pain:  None  Heart Rate Change: No        Medications  7/31/2024 4:35 PM  lidocaine injection 1% - Intradermal   5 mL - 7/31/2024 4:35:00 PM  ropivacaine (NAROPIN) injection 0.5% - Infiltration   20 mL - 7/31/2024 4:35:00 PM  dexamethasone (DECADRON) PF injection 10 mg/ml - Injection   10 mg - 7/31/2024 4:35:00 PM    Additional Comments

## 2024-08-01 NOTE — OPERATIVE REPORT
PRE-OP DX: Right  knee medial  meniscal tear  POST-OP DX: Right  knee medial and lateral  meniscal tear, femoral chondral lesion     SURGEON: Keny Lockhart MD  ASSISTANT(S): Vahe Purcell     PROCEDURE(S):   Right knee arthroscopic lateral meniscus debridement   Right  knee arthroscopic medial meniscus debridement   Right knee arthroscopic limited synovectomy  Right knee arthroscopic femoral chondroplasty    ANESTHESIA:  General    EBL: 5cc  MATERIALS:  None  BLOOD:  None  SPECIMEN(S):  None  DRAINS:  None    DISPOSITION/CONDITIONS: Stable to PACU    OPERATIVE FINDINGS: Complex tears of the body and posterior horn of the medial meniscus. Peripheral tears of the lateral meniscus in the posterior horn. Medial femoral condyle chondral lesion, 8 x 8 mm     IMPLANTS:* No implants in log *   COMPLICATIONS:  None    INDICATIONS FOR PROCEDURE:  Benjamin Ralph is a 74 year old male who presented with the above diagnosis.  Patient reports pain and mechanical symptoms. We discussed the risks and benefits of operative versus nonoperative management. The risks of nonoperative management specifically the risk of persistent stiffness and mechanical symptoms, progression to arthritis, and pain were discussed and the patient elected to undergo operative treatment.  We discussed benefits of the surgery including return of mobility, stability, and pain management. We discussed alternative options. We additionally discussed the risks of surgery, which include, but are not limited to bleeding, pain, infection, damage to nerves/vessels, incomplete relief of pain, incomplete return of function, failure of healing, need for additional surgery, blood clots, and death. The patient understands the above risks and elected to proceed.     DESCRIPTION OF PROCEDURE:   On the day of the procedure, the patient was identified in the preoperative holding area using three identifiers and the correct operative site was verified with the patient  and marked by myself.  The patient was then transferred to the operating room.  Once in the operating room, they underwent anesthesia without complications. We then placed the patient in the supine position on the operating table with all bony prominences well padded. Preoperative antibiotics were administered within one hour of skin incision.  The lower extremity was prepped and draped in the usual sterile fashion.  A surgical time-out was performed using two patient identifiers with all operating room members in the operating room.    An Esmarch was then used to exsanguinate the lower extremity and tourniquet was inflated to 250 mmHg.  A standard lateral parapatellar viewing portal was created with an 11 blade and arthroscope was introduced into the knee joint.  Diagnostic arthroscopy of the knee was then performed.      Diagnostic Findings    Medial patellar facet  intact with minimal fissuring  Lateral patellar facet  intact with minimal fissuring  Trochlear groove  intact without fissuring  Medial femoral condyle  I8x8mm chondral lesion  Lateral femoral condyle  intact without fissuring  Medial tibial plateau  intact without fissuring  Lateral tibial plateau  intact without fissuring    Medial recess/gutter   no loose bodies or extruded meniscus  Lateral recess/gutter  no loose bodies or extruded meniscus    Medial meniscus  Complex tear of body and posterior horn  Lateral meniscus  Peripheral tears    ACL    intact  PCL    intact  Fat pad    Substantial hypertrophy    Limited Synovectomy  Substantial debridement of the anterior soft tissue scarring and ligamentum was performed. The scar tissue, fat pad hypertrophy, and synovium were debrided to a stable, healthy appearing margin.  Medial Meniscus Debridement  Upon entering the medial compartment of the knee, there was tearing of the posterior horn and body of the medial meniscus in multiple zones of the meniscus.  Given the location and configuration, a  debridement was carried out using a combination of meniscal biters and motorized shaver.  The root, horns, and body of the meniscus were probed and determined to be appropriately stable after debridement.  Lateral Meniscus Debridement  Upon entering the medial compartment of the knee, there was tearing of the body of the lateral meniscus in the white-white zone of the meniscus.  Given the location and configuration, a debridement was carried out using a combination of meniscal biters and motorized shaver.  The root, horns, and body of the meniscus were probed and determined to be appropriately stable after debridement.  Chondroplasty  Chondral fraying and edges were debrided to a stable margin in the medial femoral condyle, achieving a healthy appearing edge with smooth borders  All arthroscopic fluid was then drained from the knee.  Portals were closed with 3-0 monocryl. Tourniquet was let down and hemostasis was confirmed. Soft dressings were applied and the patient was awoken from anesthesia without complications.  The patient was transferred to the PACU in stable condition.      POSTOPERATIVE PLAN: The patient will be weightbearing as tolerated.  They will be taking aspirin for DVT prophylaxis and will followup in the orthopedic clinic in approximately two weeks.     First Assist Necessity and Involvement     For this procedure, a surgical assistant was present for, and assisted with, the entirety of the case. The surgical assistant was involved with patient positioning, prepping and draping, directly assisting with key portions of the case including retracting and/or managing instrumentation, and closing. The surgical assistant was necessary to ensure greater likelihood of a safe and efficient operation, and no Orthopaedic Surgery resident was available to operate as an assistant.

## 2024-08-02 ENCOUNTER — TELEPHONE (OUTPATIENT)
Dept: ORTHOPEDICS CLINIC | Facility: CLINIC | Age: 74
End: 2024-08-02

## 2024-08-02 NOTE — TELEPHONE ENCOUNTER
Post-op call for Dr. Lockhart    Date: 8/2/2024      Time: 12:59 PM   Patient: Benjamin Ralph      number: [id number]   Surgery and surgery date:7/31/2024        Procedure Laterality Anesthesia   Right knee arthroscopy with medial lateral meniscus debridement          Patient unavailable.  Left message on voicemail to call clinic with questions or concerns.  Number was provided./SPOKE TO PATIENT  Patient's general feeling since discharge:/ thigh is sore from nerve block  Pain control (0-10):The knee is a 50%-60%with pain  Pain Medication:  dose/strength       Acetaminophen 500 MG Oral Cap 180 capsule 0 7/31/2024 --   Sig:   Take 2 capsules (1,000 mg total) by mouth every 8 (eight) hours as needed for Pain. Never exceed 3000 mg in a 24-hour       Medication Quantity Refills Start End   celecoxib 100 MG Oral Cap 60 capsule 0 7/31/2024 8/30/2024   Sig:   Take 1 capsule (100        Medication Quantity Refills Start End   gabapentin 300 MG Oral Cap         Medication Quantity Refills Start End   oxyCODONE 5 MG Oral Tab         Medication Quantity Refills Start End   aspirin 325 MG Oral Tab EC           Fever: no  Chills: no  SOB: no  Incision site appearance:  Redness  no  Drainage no  Clean/dry/Intact yes  Calf pain, redness or warmth: no   Bowel Regimen: yes no (did not specify)  If not, what are you taking stool softener/laxative?  Confirmed appointment date for post op:8/16/2024  Other concerns patients may ask: We went over ice protocol and dressing care. He asked if he can take an oxycodone. I told him to try one and lay down for a nap.Especially if his pain is over a 6  Bathing and bandages   Patient needs to keep incision clean and dry for the first week./DONE  Enforce rest, ice, compression elevation and use their pain medication accordingly./DONE  If the patient needs more pain medication, please put in a communication.

## 2024-08-06 ENCOUNTER — TELEPHONE (OUTPATIENT)
Dept: ORTHOPEDICS CLINIC | Facility: CLINIC | Age: 74
End: 2024-08-06

## 2024-08-06 NOTE — TELEPHONE ENCOUNTER
Spoke with patient. Endorses continued pain that he felt would be better by now. I advised that it is only a week after his surgery and levels of pain can vary from person to person. States knee is warm and swollen and there is a lot of bruising on his thigh. Pain is localized to knee and is manageable at rest but he is unable to walk any distance without pain getting to a 7-8/10. Denies any fever. We did go through some of the points on his post-op papers with regards to showering and the steri-strips.. He is icing and elevating, doing home exercises, taking acetaminophen 2 times a day, then the oxy at night, he is also taking the celecoxib and gabapentin per prescription. He starts Physical Therapy tomorrow. Advised he can up the acetaminophen to 3x per day and if pain is bad during to day he can take an oxy for breakthrough pain. Advised he can add compression to the knee for swelling and to let pain be his guide as far as the exercises and Physical Therapy and advised to speak up if uncomfortable. Advised to reach out if anything changes or gets worse. Verbalized understanding.

## 2024-08-06 NOTE — TELEPHONE ENCOUNTER
Patient states he is not feeling well and is having issues with pain, soreness, and walking. Patient will go into further detail with nurse. Please call.

## 2024-08-07 ENCOUNTER — OFFICE VISIT (OUTPATIENT)
Dept: PHYSICAL THERAPY | Age: 74
End: 2024-08-07
Attending: STUDENT IN AN ORGANIZED HEALTH CARE EDUCATION/TRAINING PROGRAM
Payer: MEDICARE

## 2024-08-07 DIAGNOSIS — M23.321 DERANGEMENT OF POSTERIOR HORN OF MEDIAL MENISCUS OF RIGHT KNEE: Primary | ICD-10-CM

## 2024-08-07 DIAGNOSIS — S83.203D COMPLEX TEAR OF MENISCUS OF RIGHT KNEE AS CURRENT INJURY, UNSPECIFIED MENISCUS, SUBSEQUENT ENCOUNTER: ICD-10-CM

## 2024-08-07 PROCEDURE — 97110 THERAPEUTIC EXERCISES: CPT

## 2024-08-07 PROCEDURE — 97161 PT EVAL LOW COMPLEX 20 MIN: CPT

## 2024-08-07 NOTE — PROGRESS NOTES
Diagnosis:   Primary osteoarthritis of right knee (M17.11)  Hamstring tendinitis of right thigh (M76.891)       Referring Provider: Keny Lockhart  Date of Evaluation:    5/23/2024    Precautions:  None Next MD visit: -  Date of Surgery: 7/31/2024 Right knee arthroscopy with medial lateral meniscus debridement    Insurance Primary/Secondary: MEDICARE / [employer] INDEMNITY     # Auth Visits: n/a          Pt had been seen for 14 visits preoperatively.  Subjective: Pt reports surgery went smoothly, but has a large bruise inner R thigh due to trauma of injection for local block anesthesia.  Knee continues to have some pain similar to pre-op location, and pt feels swelling present since surgery.  He reports icing and doing HEP as able.  Pain: -    Objective: See below treatment grid.  - Knee AROM - 0-135  - Strength - able to perform R SLR without any extensor lag  - steri-strips in place over portals    Assessment:  Pt is almost one week post op R knee menisectomy and moving well, though still experiencing discomfort as well as medial thigh bruising.    Goals:   Pt demonstrate full AROM R knee  Pt ambulate community distances in normal pattern without pain, including stairs, ramps, level, uneven surfaces  Pt demo independence with HEP  MMT R knee 5/5    Plan: Progress strengthening, ROM and upright activity training as able.  Date: 8/7/2024  Tx#: 1/12 Date:   Tx#:  Date:   Tx#:  Date:  Tx#: Date:   Tx#:    TherEx (30')  - Bike x 5' for stretching  - Supine SLR 0# 3x10 R  - Supine heel slides  - LAQ with 0# 2x10, 5\" holds  - Seated HS stretch       Ice x 10' after session                     HEP: [employer] Access Code: [email or web address]     Charges: Ther Ex x2, Eval Low   Total Timed Treatment: 45 min  Total Treatment Time: 45 min

## 2024-08-09 ENCOUNTER — TELEPHONE (OUTPATIENT)
Dept: ORTHOPEDICS CLINIC | Facility: CLINIC | Age: 74
End: 2024-08-09

## 2024-08-09 NOTE — TELEPHONE ENCOUNTER
Spoke with patient. Endorses that aside from the pain and stiffness he feels great. Advised that bruising into thigh is normal part of the healing process and should subside with time. He asked about heat and I advised to stick with ice for the time being as heat brings blood to the area which is good in some instances, but not others. We spoke about pain medication and he is being very sparing with the oxycodone but is running out. I did mention that he can take it more than once a day if pain is increased at times. Advised to be diligent with ROM exercises to address stiffness. He is following his discharge instructions diligently.

## 2024-08-09 NOTE — TELEPHONE ENCOUNTER
Per patient had surgery 7/31 and his thigh is swollen and bruised from an injection. Please see picture sent via [facility] and advise

## 2024-08-10 DIAGNOSIS — Z47.89 ORTHOPEDIC AFTERCARE: Primary | ICD-10-CM

## 2024-08-10 RX ORDER — OXYCODONE HYDROCHLORIDE 5 MG/1
5 TABLET ORAL EVERY 4 HOURS PRN
Qty: 12 TABLET | Refills: 0 | Status: SHIPPED | OUTPATIENT
Start: 2024-08-10

## 2024-08-12 ENCOUNTER — OFFICE VISIT (OUTPATIENT)
Dept: PHYSICAL THERAPY | Age: 74
End: 2024-08-12
Attending: STUDENT IN AN ORGANIZED HEALTH CARE EDUCATION/TRAINING PROGRAM
Payer: MEDICARE

## 2024-08-12 DIAGNOSIS — M17.11 PRIMARY OSTEOARTHRITIS OF RIGHT KNEE: Primary | ICD-10-CM

## 2024-08-12 DIAGNOSIS — M76.891 HAMSTRING TENDINITIS OF RIGHT THIGH: ICD-10-CM

## 2024-08-12 PROCEDURE — 97110 THERAPEUTIC EXERCISES: CPT

## 2024-08-12 NOTE — PROGRESS NOTES
Diagnosis:   Primary osteoarthritis of right knee (M17.11)  Hamstring tendinitis of right thigh (M76.891)       Referring Provider: Keny Lockhart  Date of Evaluation:    5/23/2024    Precautions:  None Next MD visit: -  Date of Surgery: 7/31/2024 Right knee arthroscopy with medial lateral meniscus debridement    Insurance Primary/Secondary: MEDICARE / [place]DEMN[place]     # Auth Visits: n/a            Subjective: Pt reports knee feels swollen and is hurting today, he has been taking tylenol, oxycodone 2x/day, icing frequently. Feels like he can't sit too long, not more than hour until he has to walk for a while. Able to walk about 10 min before knee is hurting. Doing HEP. Follow up with surgeon on Friday.   Pain: 9/10, L knee    Objective: See below treatment grid.  - Knee AROM - NT this session  - Strength - able to perform R SLR without any extensor lag when verbally cued for knee ext prior to initiating   - steri-strips in place over portals    Assessment:  Pt still with significant L medial thigh bruising, trialed kinesiotaping technique for bruising/inflammation this session, pt ed on tape wear duration and appropriate removal technique. Pt with significantly improved pain down to 5-6/10 L knee at end of session. Reinforced importance of elevating knee in supine above the level of the heart to manage inflammation, 2-3x/day 15-20 min each time, in addition to all other pain management techniques pt is currently performing at home. Pt will continue to benefit from skilled PT intervention.       Goals:   Pt demonstrate full AROM R knee  Pt ambulate community distances in normal pattern without pain, including stairs, ramps, level, uneven surfaces  Pt demo independence with HEP  MMT R knee 5/5    Plan: Progress strengthening, ROM and upright activity training as able.  Date: 8/7/2024  Tx#: 1/12 Date: 8/12/24  Tx#: 2/12 Date:   Tx#:  Date:  Tx#: Date:   Tx#:    TherEx (30')  - Bike x 5' for stretching  - Supine SLR  0# 3x10 R  - Supine heel slides  - LAQ with 0# 2x10, 5\" holds  - Seated HS stretch Therex: 45 min  Kinesiotaping applied to R medial thigh region with brusing/inflammation management technique  - Nustep L4 5' for L knee ROM  - LAQ with 0# R 2x10  - Supine R heel slides 2x10  - Supine SLR 0# 2x10 R      Ice x 10' after session Ice x 10' after session                    HEP: [employer] Access Code: [email or web address]     Charges: 3 TE   Total Timed Treatment: 45 min  Total Treatment Time: 55 min

## 2024-08-15 PROBLEM — Z47.89 ORTHOPEDIC AFTERCARE: Status: ACTIVE | Noted: 2024-08-15

## 2024-08-16 ENCOUNTER — OFFICE VISIT (OUTPATIENT)
Dept: ORTHOPEDICS CLINIC | Facility: CLINIC | Age: 74
End: 2024-08-16

## 2024-08-16 ENCOUNTER — TELEPHONE (OUTPATIENT)
Dept: ORTHOPEDICS CLINIC | Facility: CLINIC | Age: 74
End: 2024-08-16

## 2024-08-16 DIAGNOSIS — Z47.89 ORTHOPEDIC AFTERCARE: Primary | ICD-10-CM

## 2024-08-16 PROCEDURE — 99024 POSTOP FOLLOW-UP VISIT: CPT | Performed by: STUDENT IN AN ORGANIZED HEALTH CARE EDUCATION/TRAINING PROGRAM

## 2024-08-16 NOTE — PROGRESS NOTES
Orthopaedic Surgery Postop Patient Visit  _______________________________________________________________________________________________________________  _______________________________________________________________________________________________________________    DATE OF VISIT: 8/16/2024     DATE OF SURGERY: 7/31/2024     CHIEF COMPLAINT: Follow-up Right  knee Medial meniscus debridement, Lateral meniscus debridement, Synovectomy, and Chondroplasty  Chief Complaint   Patient presents with    Post-Op     1st POV Right knee arthroscopy, sx 07/31/24. Constant Pain 8/10. Pain is relieve with ice. Steri strips in place.        HISTORY OF PRESENT ILLNESS: Benjamin Ralph is a 74 year old male who presents to the clinic for follow-up after Right  knee Medial meniscus debridement, Lateral meniscus debridement, Synovectomy, and Chondroplasty. Pain is moderate on current regimen.  His pain includes the anterior aspect of the knee as well as the medial aspect of the thigh where he has some bruising from his tourniquet and/or the leg justice device    The patient denies fevers, chills, and wound issues.     MEDICATIONS  Reviewed   oxyCODONE 5 MG Oral Tab Take 1 tablet (5 mg total) by mouth every 4 (four) hours as needed for Pain. 12 tablet 0    Acetaminophen 500 MG Oral Cap Take 2 capsules (1,000 mg total) by mouth every 8 (eight) hours as needed for Pain. Never exceed 3000 mg in a 24-hour period.  Many over-the-counter medications also have acetaminophen in them. 180 capsule 0    celecoxib 100 MG Oral Cap Take 1 capsule (100 mg total) by mouth 2 (two) times daily. 60 capsule 0    oxyCODONE 5 MG Oral Tab Take 1 tablet (5 mg total) by mouth every 4 (four) hours as needed for Pain. 12 tablet 0    Vitamin C 500 MG Oral Tab Take 1 tablet (500 mg total) by mouth in the morning and 1 tablet (500 mg total) before bedtime. 84 tablet 0    aspirin 325 MG Oral Tab EC Take 1 tablet (325 mg total) by mouth daily. Stop if stomach  upset. 30 tablet 0    ondansetron 4 MG Oral Tablet Dispersible Take 1 tablet (4 mg total) by mouth every 8 (eight) hours as needed for Nausea. 10 tablet 0    rosuvastatin 10 MG Oral Tab Take 1 tablet (10 mg total) by mouth nightly. 90 tablet 3    Turmeric 500 MG Oral Cap Take 500 mg by mouth daily.      dutasteride 0.5 MG Oral Cap Take 1 capsule (0.5 mg total) by mouth daily. 90 capsule 3    sildenafil 20 MG Oral Tab Take one tablet PO daily as needed 10 tablet 5        ALLERGIES  Allergies   Allergen Reactions    Penicillin G HIVES     states he is not sure if it was from actual drug  Denies skin peeling, blisters or organ damage    Penicillins HIVES     Denies blisters, skin peeling or organ damage        REVIEW OF SYSTEMS  A 14 point review of systems was performed. Pertinent positives and negatives noted in the HPI.    PHYSICAL EXAM  There were no vitals taken for this visit.     Constitutional: The patient is well-developed, well-nourished, in no acute distress.  Neurological: Alert and oriented to person, place, and time.  Psychiatric: Mood and affect normal.  Head: Normocephalic and atraumatic.  Cardiovascular: regular rate by palpation  Pulmonary/Chest: Effort normal. No respiratory distress. Breathing non-labored  Abdominal: Abdomen exhibits no distension.   Right  Knee  INSPECTION/PALPATION  Wound well appearing  Moderate effusion.   No breaks in skin. Skin is euthermic.  Diffusely TTP  ROM  0-110    MOTOR  Able to perform SLR  Intact EHL/FHL/TA/GSC  DVT  No palpable cords  Negative Homans  SILT Saima/Saph/SPN/DPN/T  2+ DP/PT pulse, brisk capillary refill      ASSESSMENT/PLAN: Benjamin Ralph is a 74 year old male who presents to the Orthopaedic surgery clinic today for follow-up 2 weeks after arthroscopic knee surgery.  He is overall doing fairly well but is having a substantial amount of effusion in the knee which is limiting his overall progression.  This will likely resolve in the next few weeks, but  for now, he should continue with cryotherapy, elevation, and other compression on the knee to encourage resolution faster    We discussed the relevant radiographs and arthroscopic imaging at today's visit  NEW PRESCRIPTIONS:  Oxycodone, meloxicam  IMAGING ORDERED: none  CONSULTS PLACED: none    FOLLOW-UP: 4 weeks    RADIOGRAPHS AT NEXT VISIT: none    I have personally seen Benjamin Reaericruthy and discussed in detail their plan of care. Prior to departure, they indicated agreement with and understanding of their plan of care and their follow-up as documented herein this note. Please note that this note was written in combination with voice recognition/dictation software and there is a possibility of transcription errors which were not identified at the time of note submission. If clarification is necessary, please contact the author or clinic staff.    Keny Lockhart MD  Orthopaedic Surgery  8/16/2024

## 2024-08-16 NOTE — TELEPHONE ENCOUNTER
Patient states he was prescribed meloxicam and oxycodone at appointment today but pharmacy does not have scripts yet. Patient asking if they have been [place] [facility] [place]. Please advise.

## 2024-08-16 NOTE — TELEPHONE ENCOUNTER
Patient seen today for post-op visit for right knee arthroscopy on 7/31/24- Please see message below advise on refill for oxycodone for patient. Unsure if Meloxicam is indicated as patient has Rx for celebrex from 7/31/24

## 2024-08-17 RX ORDER — MELOXICAM 15 MG/1
15 TABLET ORAL DAILY
Qty: 30 TABLET | Refills: 0 | Status: SHIPPED | OUTPATIENT
Start: 2024-08-17

## 2024-08-17 RX ORDER — OXYCODONE HYDROCHLORIDE 5 MG/1
5 TABLET ORAL EVERY 4 HOURS PRN
Qty: 16 TABLET | Refills: 0 | Status: SHIPPED | OUTPATIENT
Start: 2024-08-17

## 2024-08-20 ENCOUNTER — OFFICE VISIT (OUTPATIENT)
Dept: PHYSICAL THERAPY | Age: 74
End: 2024-08-20
Attending: STUDENT IN AN ORGANIZED HEALTH CARE EDUCATION/TRAINING PROGRAM
Payer: MEDICARE

## 2024-08-20 PROCEDURE — 97140 MANUAL THERAPY 1/> REGIONS: CPT

## 2024-08-20 PROCEDURE — 97110 THERAPEUTIC EXERCISES: CPT

## 2024-08-20 NOTE — PROGRESS NOTES
Diagnosis:   Primary osteoarthritis of right knee (M17.11)  Hamstring tendinitis of right thigh (M76.891)       Referring Provider: Keny Lockhart  Date of Evaluation:    5/23/2024    Precautions:  None Next MD visit: -  Date of Surgery: 7/31/2024 Right knee arthroscopy with medial lateral meniscus debridement    Insurance Primary/Secondary: [place]IC[place] / [place]     # Auth Visits: n/a            Subjective: Pt notes swelling and bruising improving slightly, but continued pain and only brief relief with icing.  Pain: -    Objective: See below treatment grid.    Assessment:  Mild progress with bruising/swelling, but fairly good ROM despite discomfort.    Goals:   Pt demonstrate full AROM R knee  Pt ambulate community distances in normal pattern without pain, including stairs, ramps, level, uneven surfaces  Pt demo independence with HEP  MMT R knee 5/5    Plan: Progress strengthening, ROM and upright activity training as able.  Date: 8/7/2024  Tx#: 1/12 Date: 8/12/24  Tx#: 2/12 Date: 8/20/2024  Tx#: 3/12 Date:  Tx#: Date:   Tx#:    TherEx (30')  - Bike x 5' for stretching  - Supine SLR 0# 3x10 R  - Supine heel slides  - LAQ with 0# 2x10, 5\" holds  - Seated HS stretch Therex: 45 min  Kinesiotaping applied to R medial thigh region with brusing/inflammation management technique  - Nustep L4 5' for L knee ROM  - LAQ with 0# R 2x10  - Supine R heel slides 2x10  - Supine SLR 0# 2x10 R Therex: 35 min  Kinesiotaping applied to R medial thigh region with brusing/inflammation management technique  - Nustep L5 6' for L knee ROM  - Shuttle B leg press 6b x30  - Shuttle R/L leg press 5b x30  - Shuttle B calf raises 5b x30  - LAQ with 1# R 2x10  - Supine R heel slides 2x10 1#  - Supine SLR 1# 2x10 R       Ice x 10' after session Ice x 10' after session Manual (10')  - PROM R knee  - Pat/fem jt mobility  - STM pat tendon                   HEP: [employer] Access Code: [email or web address]     Charges: Ther Ex x2, Manual  Total Timed  Treatment: 45 min  Total Treatment Time: 55 min

## 2024-08-23 ENCOUNTER — OFFICE VISIT (OUTPATIENT)
Dept: PHYSICAL THERAPY | Age: 74
End: 2024-08-23
Attending: STUDENT IN AN ORGANIZED HEALTH CARE EDUCATION/TRAINING PROGRAM
Payer: MEDICARE

## 2024-08-23 PROCEDURE — 97110 THERAPEUTIC EXERCISES: CPT

## 2024-08-23 PROCEDURE — 97140 MANUAL THERAPY 1/> REGIONS: CPT

## 2024-08-23 NOTE — PROGRESS NOTES
Diagnosis:   Primary osteoarthritis of right knee (M17.11)  Hamstring tendinitis of right thigh (M76.891)       Referring Provider: Keny Lockhart  Date of Evaluation:    5/23/2024    Precautions:  None Next MD visit: -  Date of Surgery: 7/31/2024 Right knee arthroscopy with medial lateral meniscus debridement    Insurance Primary/Secondary: [place] / [place]     # Auth Visits: n/a            Subjective: Continues to have significant pain and swelling, though the swelling is mildly improved.  He continues to used pain pills and ice regularly (ice nearly constantly) throughout the day.  Pain: -    Objective: See below treatment grid.    Assessment:  Good A/PROM except prone knee flexion (to approx 100).  Continued warmth and swelling surgical knee.    Goals:   Pt demonstrate full AROM R knee  Pt ambulate community distances in normal pattern without pain, including stairs, ramps, level, uneven surfaces  Pt demo independence with HEP  MMT R knee 5/5    Plan: Progress strengthening, ROM and upright activity training as able.  Date: 8/7/2024  Tx#: 1/12 Date: 8/12/24  Tx#: 2/12 Date: 8/20/2024  Tx#: 3/12 Date: 8/23/2024  Tx#: 4/12 Date:   Tx#:    TherEx (30')  - Bike x 5' for stretching  - Supine SLR 0# 3x10 R  - Supine heel slides  - LAQ with 0# 2x10, 5\" holds  - Seated HS stretch Therex: 45 min  Kinesiotaping applied to R medial thigh region with brusing/inflammation management technique  - Nustep L4 5' for L knee ROM  - LAQ with 0# R 2x10  - Supine R heel slides 2x10  - Supine SLR 0# 2x10 R Therex: 35 min  Kinesiotaping applied to R medial thigh region with brusing/inflammation management technique  - Nustep L5 6' for L knee ROM  - Shuttle B leg press 6b x30  - Shuttle R/L leg press 5b x30  - Shuttle B calf raises 5b x30  - LAQ with 1# R 2x10  - Supine R heel slides 2x10 1#  - Supine SLR 1# 2x10 R Therex: 35 min  - Nustep L5 6' for L knee ROM  - Shuttle B leg press 6b x30  - Shuttle R/L leg press 5b x30  -  Shuttle B calf raises 5b x30  - LAQ with 1# R 2x10  - Supine R heel slides 2x10 1#  - Supine SLR 1# 2x10 R    Ice x 10' after session Ice x 10' after session Manual (10')  - PROM R knee  - Pat/fem jt mobility  - STM pat tendon Manual (10')  - PROM R knee  - Pat/fem jt mobility  - STM pat tendon       Previous KT still in place           HEP: [facility] Access Code: [email or web address]     Charges: Ther Ex x2, Manual  Total Timed Treatment: 45 min  Total Treatment Time: 55 min

## 2024-08-27 ENCOUNTER — OFFICE VISIT (OUTPATIENT)
Dept: PHYSICAL THERAPY | Age: 74
End: 2024-08-27
Attending: STUDENT IN AN ORGANIZED HEALTH CARE EDUCATION/TRAINING PROGRAM
Payer: MEDICARE

## 2024-08-27 PROCEDURE — 97140 MANUAL THERAPY 1/> REGIONS: CPT

## 2024-08-27 PROCEDURE — 97110 THERAPEUTIC EXERCISES: CPT

## 2024-08-27 NOTE — PROGRESS NOTES
Diagnosis:   Primary osteoarthritis of right knee (M17.11)  Hamstring tendinitis of right thigh (M76.891)       Referring Provider: Keny Lockhart  Date of Evaluation:    5/23/2024    Precautions:  None Next MD visit: -  Date of Surgery: 7/31/2024 Right knee arthroscopy with medial lateral meniscus debridement    Insurance Primary/Secondary: MEDICARE / [place]DEMN[place]     # Auth Visits: n/a            Subjective: Has returned to work, which is dilcia slow right now.  Has been walking and doing stairs there and doing alright.  Biggest physical challenge right now is getting up after sitting >10 minutes due to pain. Pt admits slowly improving pain levels, but primary pain location at ant/med joint line.  Pain: -    Objective: See below treatment grid.    Assessment:  Improving movement patterns despite continued c/o pain.    Goals:   Pt demonstrate full AROM R knee  Pt ambulate community distances in normal pattern without pain, including stairs, ramps, level, uneven surfaces  Pt demo independence with HEP  MMT R knee 5/5    Plan: Progress strengthening, ROM and upright activity training as able.  Date: 8/7/2024  Tx#: 1/12 Date: 8/12/24  Tx#: 2/12 Date: 8/20/2024  Tx#: 3/12 Date: 8/23/2024  Tx#: 4/12 Date: 8/27/2024  Tx#: 5/12   TherEx (30')  - Bike x 5' for stretching  - Supine SLR 0# 3x10 R  - Supine heel slides  - LAQ with 0# 2x10, 5\" holds  - Seated HS stretch Therex: 45 min  Kinesiotaping applied to R medial thigh region with brusing/inflammation management technique  - Nustep L4 5' for L knee ROM  - LAQ with 0# R 2x10  - Supine R heel slides 2x10  - Supine SLR 0# 2x10 R Therex: 35 min  Kinesiotaping applied to R medial thigh region with brusing/inflammation management technique  - Nustep L5 6' for L knee ROM  - Shuttle B leg press 6b x30  - Shuttle R/L leg press 5b x30  - Shuttle B calf raises 5b x30  - LAQ with 1# R 2x10  - Supine R heel slides 2x10 1#  - Supine SLR 1# 2x10 R Therex: 35 min  - Nustep L5 6' for  L knee ROM  - Shuttle B leg press 6b x30  - Shuttle R/L leg press 5b x30  - Shuttle B calf raises 5b x30  - LAQ with 1# R 2x10  - Supine R heel slides 2x10 1#  - Supine SLR 1# 2x10 R Therex: 35 min  - Nustep L5 6' for L knee ROM  - Shuttle B leg press 7b x30  - Shuttle R/L leg press 6b x30  - Shuttle B calf raises 6b x30  - LAQ with 2# R 3x10  - Supine R heel slides 3x10 2#  - Supine SLR 2# 3x10 R  - Prone knee flexion stretch w/ strap  - Prone HS curl 2# ea x30  - Prone SLR 2# ea   Ice x 10' after session Ice x 10' after session Manual (10')  - PROM R knee  - Pat/fem jt mobility  - STM pat tendon Manual (10')  - PROM R knee  - Pat/fem jt mobility  - STM pat tendon Manual (10')  - PROM R knee  - Pat/fem jt mobility  - STM pat tendon      Previous KT still in place           HEP: [employer] Access Code: [email or web address]     Charges: Ther Ex x2, Manual  Total Timed Treatment: 45 min  Total Treatment Time: 55 min

## 2024-08-28 ENCOUNTER — TELEPHONE (OUTPATIENT)
Dept: PODIATRY CLINIC | Facility: CLINIC | Age: 74
End: 2024-08-28

## 2024-08-28 NOTE — TELEPHONE ENCOUNTER
Spoke with patient. He just wanted to have a check up/follow up and establish care. Former patient of Dr. Pedersen. Had 2ng toe amp in April. Scheduled appointment on 9/5/24 at 1:40 pm. Location given. He is aware of MD leave and is ok with that.

## 2024-08-29 ENCOUNTER — OFFICE VISIT (OUTPATIENT)
Dept: PHYSICAL THERAPY | Age: 74
End: 2024-08-29
Attending: STUDENT IN AN ORGANIZED HEALTH CARE EDUCATION/TRAINING PROGRAM
Payer: MEDICARE

## 2024-08-29 PROCEDURE — 97140 MANUAL THERAPY 1/> REGIONS: CPT

## 2024-08-29 PROCEDURE — 97110 THERAPEUTIC EXERCISES: CPT

## 2024-08-29 NOTE — PROGRESS NOTES
Diagnosis:   Primary osteoarthritis of right knee (M17.11)  Hamstring tendinitis of right thigh (M76.891)       Referring Provider: Keny Lockhart  Date of Evaluation:    5/23/2024    Precautions:  None Next MD visit: -  Date of Surgery: 7/31/2024 Right knee arthroscopy with medial lateral meniscus debridement    Insurance Primary/Secondary: MEDIC[place] / [place]DEMN[place]     # Auth Visits: n/a            Subjective: Brusing is improving, but still swollen and sore.  Pain: -    Objective: See below treatment grid.    Assessment:  Continued small incremental improvements.    Goals:   Pt demonstrate full AROM R knee  Pt ambulate community distances in normal pattern without pain, including stairs, ramps, level, uneven surfaces  Pt demo independence with HEP  MMT R knee 5/5    Plan: Progress strengthening, ROM and upright activity training as able.  Date: 8/7/2024  Tx#: 1/12 Date: 8/12/24  Tx#: 2/12 Date: 8/20/2024  Tx#: 3/12 Date: 8/23/2024  Tx#: 4/12 Date: 8/27/2024  Tx#: 5/12 Date: 8/29/2024  Tx#: 6/12   TherEx (30')  - Bike x 5' for stretching  - Supine SLR 0# 3x10 R  - Supine heel slides  - LAQ with 0# 2x10, 5\" holds  - Seated HS stretch Therex: 45 min  Kinesiotaping applied to R medial thigh region with brusing/inflammation management technique  - Nustep L4 5' for L knee ROM  - LAQ with 0# R 2x10  - Supine R heel slides 2x10  - Supine SLR 0# 2x10 R Therex: 35 min  Kinesiotaping applied to R medial thigh region with brusing/inflammation management technique  - Nustep L5 6' for L knee ROM  - Shuttle B leg press 6b x30  - Shuttle R/L leg press 5b x30  - Shuttle B calf raises 5b x30  - LAQ with 1# R 2x10  - Supine R heel slides 2x10 1#  - Supine SLR 1# 2x10 R Therex: 35 min  - Nustep L5 6' for L knee ROM  - Shuttle B leg press 6b x30  - Shuttle R/L leg press 5b x30  - Shuttle B calf raises 5b x30  - LAQ with 1# R 2x10  - Supine R heel slides 2x10 1#  - Supine SLR 1# 2x10 R Therex: 35 min  - Nustep L5 6' for L knee  ROM  - Shuttle B leg press 7b x30  - Shuttle R/L leg press 6b x30  - Shuttle B calf raises 6b x30  - LAQ with 2# R 3x10  - Supine R heel slides 3x10 2#  - Supine SLR 2# 3x10 R  - Prone knee flexion stretch w/ strap  - Prone HS curl 2# ea x30  - Prone SLR 2# ea Therex: 35 min  - Nustep L5 6' for L knee ROM  - Shuttle B leg press 8b x30  - Shuttle R/L leg press 6b x30  - Shuttle B calf raises 6b x30  - LAQ with 2# R 3x10  - Supine R heel slides 3x10 2#  - Supine SLR 2# 3x10 R  - Prone knee flexion stretch w/ strap  - Prone HS curl 2# ea x30  - Prone SLR 2# ea   Ice x 10' after session Ice x 10' after session Manual (10')  - PROM R knee  - Pat/fem jt mobility  - STM pat tendon Manual (10')  - PROM R knee  - Pat/fem jt mobility  - STM pat tendon Manual (10')  - PROM R knee  - Pat/fem jt mobility  - STM pat tendon Manual (10')  - PROM R knee  - Pat/fem jt mobility  - STM pat tendon  - STM R distal quad      Previous KT still in place             HEP: [employer] Access Code: [email or web address]     Charges: Ther Ex x2, Manual  Total Timed Treatment: 45 min  Total Treatment Time: 55 min

## 2024-09-05 ENCOUNTER — OFFICE VISIT (OUTPATIENT)
Dept: PODIATRY CLINIC | Facility: CLINIC | Age: 74
End: 2024-09-05

## 2024-09-05 DIAGNOSIS — Z89.421 HISTORY OF AMPUTATION OF LESSER TOE, RIGHT (HCC): Primary | ICD-10-CM

## 2024-09-05 PROCEDURE — 99213 OFFICE O/P EST LOW 20 MIN: CPT | Performed by: STUDENT IN AN ORGANIZED HEALTH CARE EDUCATION/TRAINING PROGRAM

## 2024-09-05 NOTE — PROGRESS NOTES
[facility]  Progress Note      Benjamin Ralph is a 74 year old male.   Chief Complaint   Patient presents with    Foot Pain     S/p R 2nd toe amputation from 4/18/24 by Dr LENNON - states he is ok - has no c/o regarding his toe and foot -              HPI:     Pt is a 74 year old male with history of right 2nd digit amputation who PTC for rossy foot evaluation. Denies any signs of infection. Denies any pain to rossy feet. Admits that he is able to trim his own toenails.     Allergies: Penicillin g and Penicillins    Current Outpatient Medications   Medication Sig Dispense Refill    Meloxicam 15 MG Oral Tab Take 1 tablet (15 mg total) by mouth daily. 30 tablet 0    oxyCODONE 5 MG Oral Tab Take 1 tablet (5 mg total) by mouth every 4 (four) hours as needed for Pain. 16 tablet 0    oxyCODONE 5 MG Oral Tab Take 1 tablet (5 mg total) by mouth every 4 (four) hours as needed for Pain. 12 tablet 0    Acetaminophen 500 MG Oral Cap Take 2 capsules (1,000 mg total) by mouth every 8 (eight) hours as needed for Pain. Never exceed 3000 mg in a 24-hour period.  Many over-the-counter medications also have acetaminophen in them. 180 capsule 0    oxyCODONE 5 MG Oral Tab Take 1 tablet (5 mg total) by mouth every 4 (four) hours as needed for Pain. 12 tablet 0    Vitamin C 500 MG Oral Tab Take 1 tablet (500 mg total) by mouth in the morning and 1 tablet (500 mg total) before bedtime. 84 tablet 0    rosuvastatin 10 MG Oral Tab Take 1 tablet (10 mg total) by mouth nightly. 90 tablet 3    Turmeric 500 MG Oral Cap Take 500 mg by mouth daily.      dutasteride 0.5 MG Oral Cap Take 1 capsule (0.5 mg total) by mouth daily. 90 capsule 3    sildenafil 20 MG Oral Tab Take one tablet PO daily as needed 10 tablet 5      Past Medical History:    Basal cell carcinoma    left nose    Basal cell carcinoma of nose    BCC (basal cell carcinoma)     left posterior lateral neck    BCC (basal cell carcinoma)    right ala base    Calculus of kidney     left    Chronic headaches    Chronic pansinusitis    Chronic sinusitis    Congestion of nasal sinus    COVID-19    Esophageal reflux    Ganglion of flexor tendon sheath of right thumb    High cholesterol    Melanoma (HCC)    left arm, stage I; .75 mm depth    Osteoarthritis    Plantar wart    right heel    Pneumonia due to organism    2016    Visual impairment    Glasses      Past Surgical History:   Procedure Laterality Date    Adj tiss xfer lid,nos,ear <10sqcm Right 17    Exc lesion of nose, V_Y flap    Colonoscopy      Colonoscopy      Colonoscopy N/A 2018    Procedure: COLONOSCOPY;  Surgeon: Isidoro Mosley MD;  Location[facility] ENDOSCOPY    Exc skin malig 2.1-3cm face,facial  2022    Excis tendon sheath lesn,hand/fingr Right 17    Exc ganglion R thumb    Foot surgery Left     Nerve procedure    Full graft proc head,fac,hand <20sqc  2022    Nasal scopy,remv part ethmoid      Nasal scopy,rmv tiss maxill sinus      Repair of nasal septum  1985    Repair of nasal septum      Repr cmpl wnd head,fac,hand 2.6-7.5  2022    Skin surgery Left 10/07/16    left upper arm melanoma      Family History   Problem Relation Age of Onset    Dementia Mother     Seizure Disorder Mother     Hypertension Mother     Cancer Mother         skin    Cancer Father         Leukemia      Social History     Socioeconomic History    Marital status: Single    Number of children: 0   Occupational History    Occupation:    Tobacco Use    Smoking status: Former     Current packs/day: 0.00     Types: Cigarettes     Quit date: 3/25/1978     Years since quittin.4     Passive exposure: Never    Smokeless tobacco: Never   Vaping Use    Vaping status: Never Used   Substance and Sexual Activity    Alcohol use: Yes     Comment: rarely maybe a glass of wine a week    Drug use: Yes     Types: Cannabis     Comment: gummies    Sexual activity: Not Currently   Other Topics Concern    Caffeine  Concern Yes     Comment: coffee, 3cups/day    Grew up on a farm No    History of tanning No    Outdoor occupation No    Pt has a pacemaker No    Pt has a defibrillator No    Reaction to local anesthetic No           REVIEW OF SYSTEMS:     Denies nause, fever, chills  No calf pain  Denies chest pain or SOB      EXAM:   There were no vitals taken for this visit.  GENERAL: well developed, well nourished, in no apparent distress  EXTREMITIES:   1. Integument: Normal skin temperature and turgor  2. Vascular: Dorsalis pedis two out of four bilateral and posterior tibial pulses two out of   four bilateral, capillary refill normal.   3. Musculoskeletal: All muscle groups are graded 5 out of 5 in the foot and ankle. S/p right 2nd digit amputation   4. Neurological: Normal sharp dull sensation; reflexes normal.             ASSESSMENT AND PLAN:   Diagnoses and all orders for this visit:    History of amputation of lesser toe, right (HCC)        Plan:       -Patient examined, chart history reviewed.  -Discussed importance of proper pedal hygiene, regular foot checks, and tight glucose control.  -Ambulate with supportive shoes and inserts and avoid walking barefoot.  -Educated patient on acute signs of infection advised patient to seek immediate medical attention if symptoms arise.      The patient indicates understanding of these issues and agrees to the plan.        Bettye Garcia DPM

## 2024-09-08 NOTE — TELEPHONE ENCOUNTER
Continued Stay Note  [facility]     Patient Name: Brigid Carballo  MRN: [id number]  Today's Date: 9/8/2024    Admit Date: 9/6/2024    Plan: Home with oxygen from [facility]   Discharge Plan       Row Name 09/08/24 1419       Plan    Plan Home with oxygen from [facility]    Plan Comments CCP notified that pt was discharging and needed home oxygen. CCP contacted pt whose ok with using [facility]. Home oxygen set up and oxygen tank delivered to RN. Pt knows to call [facility] when she gets home for inhome oxygen set up.                   Discharge Codes    No documentation.                 Expected Discharge Date and Time       Expected Discharge Date Expected Discharge Time    Sep 8, 2024               Mounika Araiza RN     LMTCB    PSR- SCHEDULE A F/U

## 2024-09-12 ENCOUNTER — OFFICE VISIT (OUTPATIENT)
Dept: PHYSICAL THERAPY | Age: 74
End: 2024-09-12
Attending: STUDENT IN AN ORGANIZED HEALTH CARE EDUCATION/TRAINING PROGRAM
Payer: MEDICARE

## 2024-09-12 PROCEDURE — 97140 MANUAL THERAPY 1/> REGIONS: CPT

## 2024-09-12 PROCEDURE — 97110 THERAPEUTIC EXERCISES: CPT

## 2024-09-12 NOTE — PROGRESS NOTES
Diagnosis:   Primary osteoarthritis of right knee (M17.11)  Hamstring tendinitis of right thigh (M76.891)       Referring Provider: Keny Lockhart  Date of Evaluation:    5/23/2024    Precautions:  None Next MD visit: -  Date of Surgery: 7/31/2024 Right knee arthroscopy with medial lateral meniscus debridement    Insurance Primary/Secondary: MEDICARE / [place]MN[place]     # Auth Visits: n/a            Subjective: Bruising resolved, but medial knee pain continues without any real improvement since pre-operatively.  Pain: -    Objective: See below treatment grid.    Assessment:  Pt's movement still obviously antalgic.    Goals:   Pt demonstrate full AROM R knee  Pt ambulate community distances in normal pattern without pain, including stairs, ramps, level, uneven surfaces  Pt demo independence with HEP  MMT R knee 5/5    Plan: Pt to f/u with surgeon approx 1.5 week to decide next steps.  Date: 8/23/2024  Tx#: 4/12 Date: 8/27/2024  Tx#: 5/12 Date: 8/29/2024  Tx#: 6/12 Date: 9/12/2024  Tx#: 7/12   Therex: 35 min  - Nustep L5 6' for L knee ROM  - Shuttle B leg press 6b x30  - Shuttle R/L leg press 5b x30  - Shuttle B calf raises 5b x30  - LAQ with 1# R 2x10  - Supine R heel slides 2x10 1#  - Supine SLR 1# 2x10 R Therex: 35 min  - Nustep L5 6' for L knee ROM  - Shuttle B leg press 7b x30  - Shuttle R/L leg press 6b x30  - Shuttle B calf raises 6b x30  - LAQ with 2# R 3x10  - Supine R heel slides 3x10 2#  - Supine SLR 2# 3x10 R  - Prone knee flexion stretch w/ strap  - Prone HS curl 2# ea x30  - Prone SLR 2# ea Therex: 35 min  - Nustep L5 6' for L knee ROM  - Shuttle B leg press 8b x30  - Shuttle R/L leg press 6b x30  - Shuttle B calf raises 6b x30  - LAQ with 2# R 3x10  - Supine R heel slides 3x10 2#  - Supine SLR 2# 3x10 R  - Prone knee flexion stretch w/ strap  - Prone HS curl 2# ea x30  - Prone SLR 2# ea Therex: 35 min  - Nustep L5 6' for L knee ROM  - Shuttle B leg press 8b x30  - Shuttle R/L leg press 6b x30  -  Shuttle B calf raises 6b x30  - LAQ with 2# R 3x10  - Supine R heel slides 3x10 2#  - Supine SLR 2# 3x10 R  - Prone knee flexion stretch w/ strap  - Prone HS curl 2# ea x30  - Prone SLR 2# ea   Manual (10')  - PROM R knee  - Pat/fem jt mobility  - STM pat tendon Manual (10')  - PROM R knee  - Pat/fem jt mobility  - STM pat tendon Manual (10')  - PROM R knee  - Pat/fem jt mobility  - STM pat tendon  - STM R distal quad Manual (10')  - PROM R knee  - Pat/fem jt mobility  - STM pat tendon  - STM R distal quad   Previous KT still in place            HEP: [employer] Access Code: [email or web address]     Charges: Ther Ex x2, Manual  Total Timed Treatment: 45 min  Total Treatment Time: 55 min

## 2024-09-16 ENCOUNTER — TELEPHONE (OUTPATIENT)
Dept: ORTHOPEDICS CLINIC | Facility: CLINIC | Age: 74
End: 2024-09-16

## 2024-09-16 ENCOUNTER — APPOINTMENT (OUTPATIENT)
Dept: PHYSICAL THERAPY | Age: 74
End: 2024-09-16
Attending: STUDENT IN AN ORGANIZED HEALTH CARE EDUCATION/TRAINING PROGRAM
Payer: MEDICARE

## 2024-09-16 NOTE — TELEPHONE ENCOUNTER
Dr Lockhart surgical patient    S/w patient- Patient is s/p right knee arthroscopy with medial mensicus debridement on 7/31/24- Patient states that he has had consistent pain with the surgical region. He states that that he has been doing PT 2 times a week. He additionally has done home exercises and some stationary bike work. He states that operative knee is still swollen and has not noticed improvement. Patient has follow up booked with Dr Lockhart on 9/23/24. He was wondering what he could do in the meantime. He is currently taking Meloxicam as prescribed daily and taking 2000mg of tylenol per day. He also states that he is still biking daily with the stationary bike. I advised that he stay on Meloxicam, but increase his tylenol intake to 3000mg (1000mg q8 hours). I also stated that he should work on resting the knee this week. I advise icing to help with swelling. He was agreeable to plan, but was wondering if there is anything else he should be doing this week before his appointment with Dr Lockhart next week.    Please advise

## 2024-09-16 NOTE — TELEPHONE ENCOUNTER
Patient states the pain and his walking is getting worse from his meniscus injury. Patient is asking for advice on what to do for pain before 9/23 appointment. Please call

## 2024-09-16 NOTE — TELEPHONE ENCOUNTER
Phone went directly to     Left message to call back    If patient returns call- please attempt connect with RN station at [phone]

## 2024-09-19 ENCOUNTER — APPOINTMENT (OUTPATIENT)
Dept: PHYSICAL THERAPY | Age: 74
End: 2024-09-19
Attending: STUDENT IN AN ORGANIZED HEALTH CARE EDUCATION/TRAINING PROGRAM
Payer: MEDICARE

## 2024-09-23 ENCOUNTER — HOSPITAL ENCOUNTER (OUTPATIENT)
Dept: GENERAL RADIOLOGY | Age: 74
Discharge: HOME | End: 2024-09-23
Attending: STUDENT IN AN ORGANIZED HEALTH CARE EDUCATION/TRAINING PROGRAM
Payer: MEDICARE

## 2024-09-23 ENCOUNTER — OFFICE VISIT (OUTPATIENT)
Dept: ORTHOPEDICS CLINIC | Facility: CLINIC | Age: 74
End: 2024-09-23

## 2024-09-23 DIAGNOSIS — M25.561 CHRONIC PAIN OF RIGHT KNEE: Primary | ICD-10-CM

## 2024-09-23 DIAGNOSIS — G89.29 CHRONIC PAIN OF RIGHT KNEE: ICD-10-CM

## 2024-09-23 DIAGNOSIS — G89.29 CHRONIC PAIN OF RIGHT KNEE: Primary | ICD-10-CM

## 2024-09-23 DIAGNOSIS — M25.561 CHRONIC PAIN OF RIGHT KNEE: ICD-10-CM

## 2024-09-23 DIAGNOSIS — Z47.89 ORTHOPEDIC AFTERCARE: ICD-10-CM

## 2024-09-23 PROCEDURE — 73564 X-RAY EXAM KNEE 4 OR MORE: CPT | Performed by: STUDENT IN AN ORGANIZED HEALTH CARE EDUCATION/TRAINING PROGRAM

## 2024-09-23 PROCEDURE — 99024 POSTOP FOLLOW-UP VISIT: CPT | Performed by: STUDENT IN AN ORGANIZED HEALTH CARE EDUCATION/TRAINING PROGRAM

## 2024-09-23 NOTE — PROGRESS NOTES
Orthopaedic Surgery Postop Patient Visit  _______________________________________________________________________________________________________________  _______________________________________________________________________________________________________________    DATE OF VISIT: 9/23/2024     DATE OF SURGERY: 7/31/2024     CHIEF COMPLAINT: Follow-up Right  knee Medial meniscus debridement, Lateral meniscus debridement, Synovectomy, and Chondroplasty  Chief Complaint   Patient presents with    Post-Op     2nd POV Right knee arthroscopy, sx 07/31/24. Medial aspect constant pain 9/10, shin is sore. Meloxicam, and oxyCODONE do not relieved pain.        HISTORY OF PRESENT ILLNESS: Benjamin Ralph is a 74 year old male who presents to the clinic for follow-up after Right  knee Medial meniscus debridement, Lateral meniscus debridement, Synovectomy, and Chondroplasty. The patient has started working with therapy. Pain is poorly controlled on current regimen.  He feels that is persisting despite the surgery and severe on the medial side, rated as approximately 9 out of 10, not relieved with medications.  He is exasperated and is now interested in more definitive operative intervention    The patient denies fevers, chills, and wound issues.     MEDICATIONS  Reviewed   Acetaminophen 500 MG Oral Cap Take 2 capsules (1,000 mg total) by mouth every 8 (eight) hours as needed for Pain. Never exceed 3000 mg in a 24-hour period.  Many over-the-counter medications also have acetaminophen in them. 180 capsule 0    Vitamin C 500 MG Oral Tab Take 1 tablet (500 mg total) by mouth in the morning and 1 tablet (500 mg total) before bedtime. 84 tablet 0    rosuvastatin 10 MG Oral Tab Take 1 tablet (10 mg total) by mouth nightly. 90 tablet 3    Turmeric 500 MG Oral Cap Take 500 mg by mouth daily.      dutasteride 0.5 MG Oral Cap Take 1 capsule (0.5 mg total) by mouth daily. 90 capsule 3    sildenafil 20 MG Oral Tab Take one tablet PO  daily as needed 10 tablet 5        ALLERGIES  Allergies   Allergen Reactions    Penicillin G HIVES     states he is not sure if it was from actual drug  Denies skin peeling, blisters or organ damage    Penicillins HIVES     Denies blisters, skin peeling or organ damage        REVIEW OF SYSTEMS  A 14 point review of systems was performed. Pertinent positives and negatives noted in the HPI.    PHYSICAL EXAM  There were no vitals taken for this visit.     Constitutional: The patient is well-developed, well-nourished, in no acute distress.  Neurological: Alert and oriented to person, place, and time.  Psychiatric: Mood and affect normal.  Head: Normocephalic and atraumatic.  Cardiovascular: regular rate by palpation  Pulmonary/Chest: Effort normal. No respiratory distress. Breathing non-labored  Abdominal: Abdomen exhibits no distension.   Right  Knee  INSPECTION/PALPATION  Wound well healed  Mild effusion.   No breaks in skin. Skin is euthermic.  Diffusely TTP  ROM  0-130    MOTOR  Able to perform SLR  Intact EHL/FHL/TA/GSC  Hip Flexion: 5/5  Hip Extension: 5/5  Knee Flexion: 5/5  Knee Extension: 5/5   DVT  No palpable cords  Negative Homans  SILT Saima/Saph/SPN/DPN/T  2+ DP/PT pulse, brisk capillary refill      ASSESSMENT/PLAN: Benjamin Ralph is a 74 year old male who presents to the Orthopaedic surgery clinic today for follow-up 7 weeks after arthroscopic knee surgery.  He overall is now progressing well with substantial pain persist despite his arthroscopic surgery.  During his surgery he was confirmed to have some degree of degenerative changes diffusely throughout the knee, worse than apparent radiographically.  It is therefore reasonable to consider a referral for discussion of knee arthroplasty.  I will refer him to see my partner Dr. Sanchez for discussion of his knees, injections, and potential for arthroplasty in the near future.  He is amenable to this plan.  For now, he will remain weightbearing and  range of motion as tolerated.  He will continue with cryotherapy, elevation, compression, and anti-inflammatories as needed.    NEW PRESCRIPTIONS: none  IMAGING ORDERED: none  CONSULTS PLACED:  Patient referred to see my partner Dr. Sanchez    I have personally seen Benjamin Ralph and discussed in detail their plan of care. Prior to departure, they indicated agreement with and understanding of their plan of care and their follow-up as documented herein this note. Please note that this note was written in combination with voice recognition/dictation software and there is a possibility of transcription errors which were not identified at the time of note submission. If clarification is necessary, please contact the author or clinic staff.    Keny Lockhart MD  Orthopaedic Surgery  9/23/2024

## 2024-09-24 ENCOUNTER — HOSPITAL ENCOUNTER (OUTPATIENT)
Dept: MRI IMAGING | Facility: HOSPITAL | Age: 74
Discharge: HOME | End: 2024-09-24
Attending: ORTHOPAEDIC SURGERY
Payer: MEDICARE

## 2024-09-24 ENCOUNTER — OFFICE VISIT (OUTPATIENT)
Dept: ORTHOPEDICS CLINIC | Facility: CLINIC | Age: 74
End: 2024-09-24

## 2024-09-24 ENCOUNTER — TELEPHONE (OUTPATIENT)
Dept: ORTHOPEDICS CLINIC | Facility: CLINIC | Age: 74
End: 2024-09-24

## 2024-09-24 DIAGNOSIS — M17.11 PRIMARY OSTEOARTHRITIS OF RIGHT KNEE: ICD-10-CM

## 2024-09-24 DIAGNOSIS — M17.11 PRIMARY OSTEOARTHRITIS OF RIGHT KNEE: Primary | ICD-10-CM

## 2024-09-24 PROCEDURE — 73721 MRI JNT OF LWR EXTRE W/O DYE: CPT | Performed by: ORTHOPAEDIC SURGERY

## 2024-09-24 PROCEDURE — 99214 OFFICE O/P EST MOD 30 MIN: CPT | Performed by: ORTHOPAEDIC SURGERY

## 2024-09-24 NOTE — TELEPHONE ENCOUNTER
Patient was given pre surgical papers today in clinic. Including Dental clearance form, [facility] Surgery Check List (Medical clearance), Pre Operative Education for Total Joint replacements and [employer] booklet.

## 2024-09-24 NOTE — H&P
NURSING INTAKE COMMENTS:   Chief Complaint   Patient presents with    Knee Pain     Pt had Right knee arthroscopy, sx 07/31/24 with Dr. Lockhart -  Pt states Dr. Lockhart would like to get another opinion.  Constant pain. States some swelling in knee.         HPI: This 74 year old male presents today with persistent right knee pain despite injections, therapy, and arthroscopy.  X-rays show near bone-on-bone of the medial compartment.  MRI shows bone marrow edema in the medial compartment as well.  Although the lateral compartment looks relatively normal on MRI, he has significant lateral joint line pain in addition to the medial pain.  He does not have much patellofemoral pain.  He is here to talk about knee replacement.    He lives by himself in a house with stairs.  He said his sister in [place] can come in and spend a week or 2 with him postoperatively.  He works as a  which she said is mostly desk work.  He stays in good shape physically and bikes and goes to the gym regularly and continues to do so.  He drives and does not use a cane or walker.  He is a non-smoker.  He is no longer on oxycodone as listed on his medical chart.    He had the right second toe removed in April 2024.  There is no sign of infection and the wound is healed.  He denies numbness and tingling.  BMI is 27.    Past Medical History:    Basal cell carcinoma    left nose    Basal cell carcinoma of nose    BCC (basal cell carcinoma)     left posterior lateral neck    BCC (basal cell carcinoma)    right ala base    Calculus of kidney    left    Chronic headaches    Chronic pansinusitis    Chronic sinusitis    Congestion of nasal sinus    COVID-19    Esophageal reflux    Ganglion of flexor tendon sheath of right thumb    High cholesterol    Melanoma (HCC)    left arm, stage I; .75 mm depth    Osteoarthritis    Plantar wart    right heel    Pneumonia due to organism    2016    Visual impairment    Glasses     Past Surgical History:    Procedure Laterality Date    Adj tiss xfer lid,nos,ear <10sqcm Right 2-17-17    Exc lesion of nose, V_Y flap    Colonoscopy      Colonoscopy      Colonoscopy N/A 12/26/2018    Procedure: COLONOSCOPY;  Surgeon: Isidoro Mosley MD;  Location: [facility] ENDOSCOPY    Exc skin malig 2.1-3cm face,facial  05/18/2022    Excis tendon sheath lesn,hand/fingr Right 2-17-17    Exc ganglion R thumb    Foot surgery Left 2006    Nerve procedure    Full graft proc head,fac,hand <20sqc  05/18/2022    Nasal scopy,remv part ethmoid      Nasal scopy,rmv tiss maxill sinus      Repair of nasal septum  1985    Repair of nasal septum      Repr cmpl wnd head,fac,hand 2.6-7.5  05/18/2022    Skin surgery Left 10/07/16    left upper arm melanoma     Current Outpatient Medications   Medication Sig Dispense Refill    oxyCODONE 5 MG Oral Tab Take 1 tablet (5 mg total) by mouth every 4 (four) hours as needed for Pain. 16 tablet 0    oxyCODONE 5 MG Oral Tab Take 1 tablet (5 mg total) by mouth every 4 (four) hours as needed for Pain. 12 tablet 0    Acetaminophen 500 MG Oral Cap Take 2 capsules (1,000 mg total) by mouth every 8 (eight) hours as needed for Pain. Never exceed 3000 mg in a 24-hour period.  Many over-the-counter medications also have acetaminophen in them. 180 capsule 0    oxyCODONE 5 MG Oral Tab Take 1 tablet (5 mg total) by mouth every 4 (four) hours as needed for Pain. 12 tablet 0    Vitamin C 500 MG Oral Tab Take 1 tablet (500 mg total) by mouth in the morning and 1 tablet (500 mg total) before bedtime. 84 tablet 0    rosuvastatin 10 MG Oral Tab Take 1 tablet (10 mg total) by mouth nightly. 90 tablet 3    Turmeric 500 MG Oral Cap Take 500 mg by mouth daily.      dutasteride 0.5 MG Oral Cap Take 1 capsule (0.5 mg total) by mouth daily. 90 capsule 3    sildenafil 20 MG Oral Tab Take one tablet PO daily as needed 10 tablet 5    Meloxicam 15 MG Oral Tab Take 1 tablet (15 mg total) by mouth daily. (Patient not taking: Reported on  2024) 30 tablet 0     Allergies   Allergen Reactions    Penicillin G HIVES     states he is not sure if it was from actual drug  Denies skin peeling, blisters or organ damage    Penicillins HIVES     Denies blisters, skin peeling or organ damage     Family History   Problem Relation Age of Onset    Dementia Mother     Seizure Disorder Mother     Hypertension Mother     Cancer Mother         skin    Cancer Father         Leukemia     No family Hx of DVT/PE    Social History     Occupational History    Occupation:    Tobacco Use    Smoking status: Former     Current packs/day: 0.00     Types: Cigarettes     Quit date: 3/25/1978     Years since quittin.5     Passive exposure: Never    Smokeless tobacco: Never   Vaping Use    Vaping status: Never Used   Substance and Sexual Activity    Alcohol use: Yes     Comment: rarely maybe a glass of wine a week    Drug use: Yes     Types: Cannabis     Comment: gummies    Sexual activity: Not Currently        Review of Systems:  GENERAL: feels generally well, no significant weight loss or weight gain  SKIN: no ulcerated or worrisome skin lesions  EYES:denies blurred vision or double vision  HEENT: denies new nasal congestion, sinus pain or ST  LUNGS: denies shortness of breath  CARDIOVASCULAR: denies chest pain  GI: no hematemesis, no worsening heartburn, no diarrhea  : no dysuria, no blood in urine, no difficulty urinating, no incontinence  MUSCULOSKELETAL: no other musculoskeletal complaints other than in HPI  NEURO: no numbness or tingling, no weakness or balance disorder  PSYCHE: no depression or anxiety  HEMATOLOGIC: no hx of blood dyscrasia, no Hx DVT/PE  ENDOCRINE: no thyroid or diabetes issues  ALL/ASTHMA: no new hx of severe allergy or asthma    Physical Examination:    There were no vitals taken for this visit.  Constitutional: appears well hydrated, alert and responsive, no acute distress noted  Extremities: Right knee with minimal swelling  and no asymmetric warmth.  Healthy skin on the legs.  He has a small kathrin on the anterior patella from where he stuck himself with a pen when he was 9 years old.  It tattooed him slightly.  Musculoskeletal: Good motion 0 to 130 degrees.  Focally tender on the medial joint line greater than lateral joint line.  Patella grind negative for crepitus and pain.  No instability.  Neurological: Normal motor and sensory bilateral lower extremities including the toes.    Imaging: X-rays and MRI show medial osteoarthritis with bone-on-bone.  The lateral and patellofemoral joints look relatively normal although he has significant lateral tenderness.      XR KNEE, COMPLETE (4 OR MORE VIEWS), RIGHT (CPT=73564)    Result Date: 9/23/2024  PROCEDURE: XR KNEE, COMPLETE (4 OR MORE VIEWS), RIGHT (CPT=73564)  COMPARISON: [facility] 2nd Floor, XR KNEE, COMPLETE (4 OR MORE VIEWS), RIGHT (CPT=73564), 4/26/2024, 1:14 PM.  [facility], X KNEE RT, 9/25/2012, 2:59 PM.  INDICATIONS: Generalized right knee pain x 2 months. No known injuries.  TECHNIQUE: 4. views were obtained.   FINDINGS:  BONES: No acute fracture or osseous malalignment.  There is medial tibiofemoral compartment joint narrowing.  There is patellofemoral osteophyte formation.  SOFT TISSUES: Negative. No visible soft tissue swelling. EFFUSION: Small suprapatellar knee effusion OTHER: Negative.         CONCLUSION:   Mild medial tibiofemoral and patellofemoral compartment osteoarthritis, similar to prior.  Small suprapatellar knee effusion.    Dictated by (CST): Viki Licona MD on 9/23/2024 at 5:06 PM     Finalized by (CST): Viki Licona MD on 9/23/2024 at 5:08 PM             Lab Results   Component Value Date    WBC 8.0 07/05/2024    HGB 16.6 07/05/2024    .0 07/05/2024      Lab Results   Component Value Date     (H) 07/05/2024    BUN 15 07/05/2024    CREATSERUM 0.90 07/05/2024    GFRNAA 89 06/20/2022    GFRAA  102 06/20/2022        Assessment and Plan:  Diagnoses and all orders for this visit:    Primary osteoarthritis of right knee  -     MRI KNEE BARB, RIGHT (CPT=73721); Future        Assessment: Above diagnosis.    Plan: I told him his options at this point are to live with it with occasional cortisone injection versus knee replacement.  From the imaging he look like he could have been a partial replacement but he has lateral tenderness and therefore I recommended full replacement.  We discussed potential risks of surgery such as death, heart attack, stroke, bleeding, infection, blood clot, nerve injury, artery injury, failure fixation, fracture, stiffness, loosening, revision surgery, numbness laterally incision, and persistent pain despite surgery.  He had no objections to blood transfusion should be necessary.  He has questions which were answered to his satisfaction.  Again his sister will live with them for a week or 2 postoperatively.  Told him if that was not the case, he may have to go to rehab.  He did not want to do that.    He will need medical and dental clearance.  He will need MRI templating.  The plan will be a Barb persona LPS knee replacement right knee.    Follow Up: No follow-ups on file.    Arnold Sanchez MD

## 2024-09-25 ENCOUNTER — APPOINTMENT (OUTPATIENT)
Dept: PHYSICAL THERAPY | Age: 74
End: 2024-09-25
Attending: STUDENT IN AN ORGANIZED HEALTH CARE EDUCATION/TRAINING PROGRAM
Payer: MEDICARE

## 2024-09-27 ENCOUNTER — OFFICE VISIT (OUTPATIENT)
Dept: PHYSICAL THERAPY | Age: 74
End: 2024-09-27
Attending: STUDENT IN AN ORGANIZED HEALTH CARE EDUCATION/TRAINING PROGRAM
Payer: MEDICARE

## 2024-09-27 PROCEDURE — 97110 THERAPEUTIC EXERCISES: CPT

## 2024-09-27 NOTE — PROGRESS NOTES
Diagnosis:   Primary osteoarthritis of right knee (M17.11)  Hamstring tendinitis of right thigh (M76.891)       Referring Provider: Keny Lockhart  Date of Evaluation:    5/23/2024    Precautions:  None Next MD visit: -  Date of Surgery: 7/31/2024 Right knee arthroscopy with medial lateral meniscus debridement    Insurance Primary/Secondary: MEDICARE / [employer] [place] INDEMNITY     # Auth Visits: n/a            Subjective: Pt has bben seen by Dr. Sanchez and is going to have a R TKA at some point soon.  He continues to c/o R knee pain and the new MRI demonstrates to OA and subchondral fracture.  He wants to discuss through TKA post op rehab, what he should do pre-op, etc.  Pain: -    Objective: Reviewed current HEP which includes open and closed chain strengthening and stretching exercises.  He brought in a internet based list of post op TKA exercise including QS, heel slides, ankle pumps, SLR (flex and abd), bridges, clams, HS curls.  Discussed initial post op goals of AROM 0-90, and solid quad contraction, with eventual goal of 125 deg flex, normal walking pattern, initial stairs and eventual normal pattern, likely plan for him (lives alone) of going to rehab x 2 weeks, delay in return to driving (due to R knee).    Assessment:  Pt has continued pain and limitation, but good HEP for now pre-op R TKA.    Goals: Goals not met.    Plan: DC to HEP.  R TKA to occur eventually.  HEP: [facility] Access Code: [email or web address]     Charges: Ther Ex x2  Total Timed Treatment: 30 min  Total Treatment Time: 30 min

## 2024-09-30 ENCOUNTER — TELEPHONE (OUTPATIENT)
Dept: ORTHOPEDICS CLINIC | Facility: CLINIC | Age: 74
End: 2024-09-30

## 2024-09-30 DIAGNOSIS — M17.11 PRIMARY OSTEOARTHRITIS OF RIGHT KNEE: Primary | ICD-10-CM

## 2024-09-30 NOTE — TELEPHONE ENCOUNTER
Type of surgery:  Right total knee arthroplasty  Date: 12/6/24  Location: [facility]  Medical Clearance:      *Medical: Yes      *Dental: Yes      *Other:  Prior Authorization Status: Pending   Workers Comp:  Medact[employer]/Domo: [id number]  [place]: Yes  POV: 12/23/24

## 2024-09-30 NOTE — TELEPHONE ENCOUNTER
Per patient had knee replacement MRI done last week, asking what next steps are for scheduling surgery. Also would like to discuss switching from home health to doing outpatient. Please call thank you.

## 2024-09-30 NOTE — TELEPHONE ENCOUNTER
Spoke with patient to offer surgery dates. He chose 12/6. Patient was reminded that Medical and/or Dental clearance is needed within 30 days of surgical date. Failure to complete all testing in a timely manner will cause surgery to be delayed and/or rescheduled. Patient understood and had no further questions at this time.     Patient informed me that he will not have anyone to help him at home after surgery he would like to go to a rehab facility. Dr. Sanchez is it ok to change him from outpatient in a bed to inpatient?

## 2024-10-01 NOTE — CM/SW NOTE
10/01/24 1800   CM/SW Screening   Referral Source Social Work (self-referral)   Information Source [facility] staff;Chart review   Patient's Current Mental Status at Time of Assessment Alert;Oriented   Patient's Home Environment House   Number of Levels in Home 2   Patient lives with Alone   Patient Status Prior to Admission   Independent with ADLs and Mobility Yes   Discharge Needs   Anticipated D/C needs To be determined   CM/SW/Palliative Follow Up   CM or SW follow-up needed? SW;CM     FRANSISCO spoke with pt prior to surgery to discuss DC planning. Pt concerned about being home alone. SW explained CHERYL requirements and discussed outpatient in a bed vs Inpatient and medicare coverages    Pt voices understanding. SW discussed having pt pay respite rate in a facility if he's concerned about being home alone. Pt is agreeable to pay a respite rate as he really does not feel as though he can go home alone.     FRANSISCO sent SNF list to review from Medicare.gov and encouraged pt to review list of facilities and inquire about respite rates in order to better understand how much room and board may be    Pt agreeable and thankful for the information    Maritza BRYANTW, MSW ext. [phone]

## 2024-10-03 NOTE — TELEPHONE ENCOUNTER
Left message for patient to inform him that the 12/12 surgery date was a mistake. His surgery date has been changed to 12/6. He is on the wait list, if a sooner surgery date becomes available we will give him a call.

## 2024-10-03 NOTE — TELEPHONE ENCOUNTER
Patient calling to ask why surgery was changed from 12/6 to 12/12 and states he did not get a call about the change. Patient also states he would like surgery sooner. Please call.

## 2024-10-07 ENCOUNTER — TELEPHONE (OUTPATIENT)
Dept: ORTHOPEDICS CLINIC | Facility: CLINIC | Age: 74
End: 2024-10-07

## 2024-10-07 NOTE — TELEPHONE ENCOUNTER
S/w patient- patient states that he has FMLA forms that need to be completed. He was wondering the process that he needs to go through to get these forms filled out. I informed him that he should reach out to the forms department for assistance. I provided email, fax and phone for their department. He had no other questions at this time

## 2024-10-07 NOTE — TELEPHONE ENCOUNTER
Patient has fmla form for after his surgery on 12/6. Patient needs clarification who should drop the form off to, please call at [phone], thanks.

## 2024-10-08 ENCOUNTER — TELEPHONE (OUTPATIENT)
Dept: ORTHOPEDICS CLINIC | Facility: CLINIC | Age: 74
End: 2024-10-08

## 2024-10-08 NOTE — TELEPHONE ENCOUNTER
Patient called inquiring how to get forms to us. Informed patient of options, patient chose to drop them off. Informed patient to fill out Release of Information if he wants paperwork faxed, patient understood.  Type of Leave: continuous  Reason for Leave: surgery 12/6  Start date of leave: 12/6  End date of leave: 1-12 weeks recovery

## 2024-10-16 ENCOUNTER — LAB ENCOUNTER (OUTPATIENT)
Dept: LAB | Age: 74
End: 2024-10-16
Attending: INTERNAL MEDICINE
Payer: MEDICARE

## 2024-10-16 ENCOUNTER — OFFICE VISIT (OUTPATIENT)
Dept: INTERNAL MEDICINE CLINIC | Facility: CLINIC | Age: 74
End: 2024-10-16

## 2024-10-16 VITALS
TEMPERATURE: 98 F | BODY MASS INDEX: 27.49 KG/M2 | DIASTOLIC BLOOD PRESSURE: 84 MMHG | WEIGHT: 192 LBS | HEIGHT: 70 IN | OXYGEN SATURATION: 97 % | HEART RATE: 77 BPM | RESPIRATION RATE: 16 BRPM | SYSTOLIC BLOOD PRESSURE: 142 MMHG

## 2024-10-16 DIAGNOSIS — Z00.00 ENCOUNTER FOR ANNUAL HEALTH EXAMINATION: ICD-10-CM

## 2024-10-16 DIAGNOSIS — Z12.11 COLON CANCER SCREENING: ICD-10-CM

## 2024-10-16 DIAGNOSIS — N40.0 ENLARGED PROSTATE: ICD-10-CM

## 2024-10-16 DIAGNOSIS — M17.11 PRIMARY OSTEOARTHRITIS OF RIGHT KNEE: ICD-10-CM

## 2024-10-16 DIAGNOSIS — E78.00 HYPERCHOLESTEROLEMIA: ICD-10-CM

## 2024-10-16 DIAGNOSIS — N52.03 COMBINED ARTERIAL INSUFFICIENCY AND CORPORO-VENOUS OCCLUSIVE ERECTILE DYSFUNCTION: ICD-10-CM

## 2024-10-16 DIAGNOSIS — Z00.00 ENCOUNTER FOR ANNUAL HEALTH EXAMINATION: Primary | ICD-10-CM

## 2024-10-16 DIAGNOSIS — Z85.828 HISTORY OF BASAL CELL CARCINOMA: ICD-10-CM

## 2024-10-16 DIAGNOSIS — J30.2 SEASONAL ALLERGIES: ICD-10-CM

## 2024-10-16 DIAGNOSIS — L57.0 ACTINIC KERATOSIS: ICD-10-CM

## 2024-10-16 PROBLEM — Z47.89 ORTHOPEDIC AFTERCARE: Status: RESOLVED | Noted: 2024-08-15 | Resolved: 2024-10-16

## 2024-10-16 LAB
ALBUMIN SERPL-MCNC: 4.9 G/DL (ref 3.2–4.8)
ALBUMIN/GLOB SERPL: 2 {RATIO} (ref 1–2)
ALP LIVER SERPL-CCNC: 82 U/L
ALT SERPL-CCNC: 17 U/L
ANION GAP SERPL CALC-SCNC: 7 MMOL/L (ref 0–18)
AST SERPL-CCNC: 16 U/L (ref ?–34)
BASOPHILS # BLD AUTO: 0.07 X10(3) UL (ref 0–0.2)
BASOPHILS NFR BLD AUTO: 0.8 %
BILIRUB SERPL-MCNC: 0.7 MG/DL (ref 0.2–1.1)
BUN BLD-MCNC: 18 MG/DL (ref 9–23)
BUN/CREAT SERPL: 20 (ref 10–20)
CALCIUM BLD-MCNC: 10.3 MG/DL (ref 8.7–10.4)
CHLORIDE SERPL-SCNC: 106 MMOL/L (ref 98–112)
CHOLEST SERPL-MCNC: 147 MG/DL (ref ?–200)
CO2 SERPL-SCNC: 28 MMOL/L (ref 21–32)
CREAT BLD-MCNC: 0.9 MG/DL
DEPRECATED RDW RBC AUTO: 40.3 FL (ref 35.1–46.3)
EGFRCR SERPLBLD CKD-EPI 2021: 89 ML/MIN/1.73M2 (ref 60–?)
EOSINOPHIL # BLD AUTO: 0.1 X10(3) UL (ref 0–0.7)
EOSINOPHIL NFR BLD AUTO: 1.1 %
ERYTHROCYTE [DISTWIDTH] IN BLOOD BY AUTOMATED COUNT: 12.1 % (ref 11–15)
FASTING PATIENT LIPID ANSWER: YES
FASTING STATUS PATIENT QL REPORTED: YES
GLOBULIN PLAS-MCNC: 2.4 G/DL (ref 2–3.5)
GLUCOSE BLD-MCNC: 84 MG/DL (ref 70–99)
HCT VFR BLD AUTO: 50.9 %
HDLC SERPL-MCNC: 42 MG/DL (ref 40–59)
HGB BLD-MCNC: 17.1 G/DL
IMM GRANULOCYTES # BLD AUTO: 0.03 X10(3) UL (ref 0–1)
IMM GRANULOCYTES NFR BLD: 0.3 %
LDLC SERPL CALC-MCNC: 92 MG/DL (ref ?–100)
LYMPHOCYTES # BLD AUTO: 2.32 X10(3) UL (ref 1–4)
LYMPHOCYTES NFR BLD AUTO: 25.4 %
MCH RBC QN AUTO: 30.6 PG (ref 26–34)
MCHC RBC AUTO-ENTMCNC: 33.6 G/DL (ref 31–37)
MCV RBC AUTO: 91.1 FL
MONOCYTES # BLD AUTO: 0.69 X10(3) UL (ref 0.1–1)
MONOCYTES NFR BLD AUTO: 7.6 %
NEUTROPHILS # BLD AUTO: 5.91 X10 (3) UL (ref 1.5–7.7)
NEUTROPHILS # BLD AUTO: 5.91 X10(3) UL (ref 1.5–7.7)
NEUTROPHILS NFR BLD AUTO: 64.8 %
NONHDLC SERPL-MCNC: 105 MG/DL (ref ?–130)
OSMOLALITY SERPL CALC.SUM OF ELEC: 293 MOSM/KG (ref 275–295)
PLATELET # BLD AUTO: 266 10(3)UL (ref 150–450)
POTASSIUM SERPL-SCNC: 4.2 MMOL/L (ref 3.5–5.1)
PROT SERPL-MCNC: 7.3 G/DL (ref 5.7–8.2)
PSA SERPL-MCNC: 0.7 NG/ML (ref ?–4)
RBC # BLD AUTO: 5.59 X10(6)UL
SODIUM SERPL-SCNC: 141 MMOL/L (ref 136–145)
TRIGL SERPL-MCNC: 65 MG/DL (ref 30–149)
TSI SER-ACNC: 1.18 MIU/ML (ref 0.55–4.78)
VLDLC SERPL CALC-MCNC: 11 MG/DL (ref 0–30)
WBC # BLD AUTO: 9.1 X10(3) UL (ref 4–11)

## 2024-10-16 PROCEDURE — 84153 ASSAY OF PSA TOTAL: CPT

## 2024-10-16 PROCEDURE — 99213 OFFICE O/P EST LOW 20 MIN: CPT | Performed by: INTERNAL MEDICINE

## 2024-10-16 PROCEDURE — 36415 COLL VENOUS BLD VENIPUNCTURE: CPT

## 2024-10-16 PROCEDURE — 85025 COMPLETE CBC W/AUTO DIFF WBC: CPT

## 2024-10-16 PROCEDURE — 84443 ASSAY THYROID STIM HORMONE: CPT

## 2024-10-16 PROCEDURE — 80061 LIPID PANEL: CPT

## 2024-10-16 PROCEDURE — 80053 COMPREHEN METABOLIC PANEL: CPT

## 2024-10-16 PROCEDURE — G0439 PPPS, SUBSEQ VISIT: HCPCS | Performed by: INTERNAL MEDICINE

## 2024-10-16 RX ORDER — ROSUVASTATIN CALCIUM 10 MG/1
10 TABLET, COATED ORAL NIGHTLY
Qty: 90 TABLET | Refills: 3 | Status: SHIPPED | OUTPATIENT
Start: 2024-10-16

## 2024-10-16 NOTE — PATIENT INSTRUCTIONS
Prevention Guidelines, Men Ages 65 and Older  Screening tests and vaccines are an important part of managing your health.A screening test is done to find possible disorders or diseases in people who don't have any symptoms. The goal is to find a disease early so lifestyle changes can be made and you can be watched more closely to reduce the risk of disease, or to detect it early enough to treat it most effectively. Screening tests are not considered diagnostic, but are used to determine if more testing is needed.  Health counseling is essential, too. Below are guidelines for these, for men ages 65 and older. Talk with your healthcare provider to make sure you’re up-to-date on what you need.  Screening Who needs it How often   Abdominal aortic aneurysm Men ages 65 to 75 who have ever smoked 1 ultrasound   Alcohol misuse All men in this age group At routine exams   Blood pressure All men in this age group Yearly checkup if your blood pressure is normal  Normal blood pressure is less than 120/80 mm Hg  If your blood pressure reading is higher than normal, follow the advice of your healthcare provider   Colorectal cancer All men in this age group Flexible sigmoidoscopy every 5 years, or colonoscopy every 10 years, or double-contrast barium enema every 5 years; yearly fecal occult blood test or fecal immunochemical test; or a stool DNA test as often as your healthcare provider advises; talk with your healthcare provider about which tests are best for you and when you no longer need colonoscopies (generally after age 75)   Depression All men in this age group At routine exams   Type 2 diabetes or prediabetes All men beginning at age 45 and men without symptoms at any age who are overweight or obese and have 1 or more other risk factors for diabetes At least every 3 years (yearly if your blood sugar has already begun to rise)   Type 2 diabetes All men with prediabetes Every year   Hepatitis C Men at increased risk for  infection - talk with your healthcare provider At routine exams   High cholesterol or triglycerides All men in this age group At least every 5 years   HIV Men at increased risk for infection - talk with your healthcare provider At routine exams   Lung cancer Adults ages 55 to 80 who have smoked Yearly screening in smokers with 30 pack-year history of smoking or who quit within 15 years   Obesity All men in this age group At routine exams   Prostate cancer All men in this age group, talk to healthcare provider about risks and benefits of digital rectal exam (RAVINDER) and prostate-specific antigen (PSA) screening1 At routine exams   Syphilis Men at increased risk for infection - talk with your healthcare provider At routine exams   Tuberculosis Men at increased risk for infection - talk with your healthcare provider Ask your healthcare provider   Vision All men in this age group Every 1 to 2 years; if you have a chronic health condition, ask your healthcare provider if you needs exams more often   Vaccine Who needs it How often   Chickenpox (varicella) All men in this age group who have no record of this infection or vaccine 2 doses; second dose should be given at least 4 weeks after the first dose   Hepatitis A Men at increased risk for infection - talk with your healthcare provider 2 doses given at least 6 months apart   Hepatitis B Men at increased risk for infection - talk with your healthcare provider 3 doses over 6 months; second dose should be given 1 month after the first dose; the third dose should be given at least 2 months after the second dose and at least 4 months after the first dose   Haemophilus influenzae Type B (HIB) Men at increased risk for infection - talk with your healthcare provider 1 to 3 doses   Influenza (flu) All men in this age group  Once a year   Meningococcal Men at increased risk for infection - talk with your healthcare provider 1 or more doses   Pneumococcal conjugate vaccine (PCV13) and  pneumococcal polysaccharide vaccine (PPSV23) All men in this age group 1 dose of each vaccine   Tetanus/diphtheria/  pertussis (Td/Tdap) booster All men in this age group Td every 10 years, or Tdap if you will have contact with a child younger than 12 months old   Zoster All men in this age group 1 dose   Counseling Who needs it How often   Diet and exercise Men who are overweight or obese When diagnosed, and then at routine exams   Fall prevention (exercise, vitamin D supplements) All men in this age group At routine exams   Sexually transmitted infection Men at increased risk for infection - talk with your healthcare provider At routine exams   Use of daily aspirin Men ages 45 to 79 at risk for cardiovascular health problems At routine exams   Use of tobacco and the health effects it can cause All men in this age group Every visit   [address] [place] [address] [facility]   Date Last Reviewed: 2/1/2017  © 5115-9772 [employer], [employer]. [address] [place] [id number]. All rights reserved. This information is not intended as a substitute for professional medical care. Always follow your healthcare professional's instructions.

## 2024-10-16 NOTE — PROGRESS NOTES
Subjective:   Benjamin Ralph is a 74 year old male who presents for a Medicare Subsequent Annual Wellness visit (Pt already had Initial Annual Wellness) and scheduled follow up of multiple significant but stable problems.     Patient presents for above.  Here for his Medicare wellness exam.     History of high cholesterol on Crestor.  Tolerating medication well.  No side effects from medications.  Lipid panel 1 year ago was normal.     History of primary osteoarthritis of his right knee.  Did have arthroscopic procedure on this earlier this year but symptoms worsen.  He will be going for arthroplasty in 6 weeks.    History of enlarged prostate.  Currently on monoherapy.  Now urinates only 1 time nightly as opposed to 3-4 times.  Follows with urology.     History of erectile dysfunction on Viagra.  This works very well for him.    History of basal cell carcinoma of his nose and actinic keratosis.  Sees his dermatologist on a 6-month basis.  All was pronounced well.    History of seasonal allergies on Singulair.  Symptoms are well-controlled.     Had colonoscopy in 2022 with repeat to be done in 2025.     He is up-to-date on his influenza and pneumococcal vaccinations.    History/Other:   Fall Risk Assessment:   He has been screened for Falls and is low risk.      Cognitive Assessment:   He had a completely normal cognitive assessment - see flowsheet entries     Functional Ability/Status:   Benjamin Ralph has a completely normal functional assessment. See flowsheet for details.        Depression Screening (PHQ):  PHQ-2 SCORE: 0  , done 10/16/2024   If you checked off any problems, how difficult have these problems made it for you to do your work, take care of things at home, or get along with other people?: Not difficult at all    Last [place] Suicide Screening on 10/16/2024 was No Risk.    Advanced Directives:   He does have a Living Will but we do NOT have it on file in Epic.    He does have a POA but we do  NOT have it on file in Epic.    Discussed Advance Care Planning with patient (and family/surrogate if present). Standard forms made available to patient in After Visit Summary.      Patient Active Problem List   Diagnosis    Actinic keratosis    Inflamed seborrheic keratosis    Melanoma of left upper arm (HCC)    Enlarged prostate    Combined arterial insufficiency and corporo-venous occlusive erectile dysfunction    Personal history of malignant melanoma of skin    Basal cell carcinoma of nose    History of basal cell carcinoma    Hypercholesterolemia    History of actinic keratoses    Seasonal allergies    Sun-damaged skin    Right kidney stone    History of COVID-19    Basal cell carcinoma of left side of neck    Trigger point of neck    Cervical radiculopathy    Chronic pain of right knee    Osteoarthritis of right knee    Hamstring tendinitis of right thigh    Tear of meniscus of right knee as current injury    Derangement of posterior horn of medial meniscus of right knee     Allergies:  He is allergic to penicillin g and penicillins.    Current Medications:  Outpatient Medications Marked as Taking for the 10/16/24 encounter (Office Visit) with Felix Metzger MD   Medication Sig    rosuvastatin 10 MG Oral Tab Take 1 tablet (10 mg total) by mouth nightly.    Acetaminophen 500 MG Oral Cap Take 2 capsules (1,000 mg total) by mouth every 8 (eight) hours as needed for Pain. Never exceed 3000 mg in a 24-hour period.  Many over-the-counter medications also have acetaminophen in them.    dutasteride 0.5 MG Oral Cap Take 1 capsule (0.5 mg total) by mouth daily.    sildenafil 20 MG Oral Tab Take one tablet PO daily as needed       Medical History:  He  has a past medical history of Basal cell carcinoma (2017), Basal cell carcinoma of nose (02/17/2017), BCC (basal cell carcinoma) (2021), BCC (basal cell carcinoma) (2022), Calculus of kidney (2002), Chronic headaches, Chronic pansinusitis (06/28/2017), Chronic sinusitis,  Congestion of nasal sinus, COVID-19 (2020), Esophageal reflux, Ganglion of flexor tendon sheath of right thumb (2017), High cholesterol, Melanoma (HCC) (2016), Osteoarthritis, Plantar wart (2017), Pneumonia due to organism, and Visual impairment.  Surgical History:  He  has a past surgical history that includes repair of nasal septum (); foot surgery (Left, ); skin surgery (Left, 10/07/16); excis tendon sheath lesn,hand/fingr (Right, 17); adj tiss xfer lid,nos,ear <10sqcm (Right, 17); colonoscopy; repair of nasal septum; nasal scopy,rmv tiss maxill sinus; nasal scopy,remv part ethmoid; colonoscopy; colonoscopy (N/A, 2018); exc skin malig 2.1-3cm face,facial (2022); repr cmpl wnd head,fac,hand 2.6-7.5 (2022); and full graft proc head,fac,hand <20sqc (2022).   Family History:  His family history includes Cancer in his father and mother; Dementia in his mother; Hypertension in his mother; Seizure Disorder in his mother.  Social History:  He  reports that he quit smoking about 46 years ago. His smoking use included cigarettes. He has never been exposed to tobacco smoke. He has never used smokeless tobacco. He reports current alcohol use. He reports current drug use. Drug: Cannabis.    Tobacco:  He smoked tobacco in the past but quit greater than 12 months ago.  Social History     Tobacco Use   Smoking Status Former    Current packs/day: 0.00    Types: Cigarettes    Quit date: 3/25/1978    Years since quittin.5    Passive exposure: Never   Smokeless Tobacco Never          CAGE Alcohol Screen:   CAGE screening score of 0 on 10/16/2024, showing low risk of alcohol abuse.      Patient Care Team:  Felix Metzger MD as PCP - General (Internal Medicine)  David Moore MD (UROLOGY)  Litzy Price PT as Physical Therapist (Physical Therapy)  German Pedersen DPM (Surgery, Foot & Ankle)    Review of Systems   Constitutional: Negative.    HENT: Negative.      Eyes: Negative.    Respiratory: Negative.     Cardiovascular: Negative.    Gastrointestinal: Negative.    Endocrine: Negative.    Genitourinary: Negative.    Musculoskeletal:  Positive for arthralgias.   Skin: Negative.    Allergic/Immunologic: Negative.    Neurological: Negative.    Hematological: Negative.    Psychiatric/Behavioral: Negative.       Objective:   Physical Exam  Constitutional:       Appearance: Normal appearance. He is well-developed.   HENT:      Head: Normocephalic.      Right Ear: Tympanic membrane, ear canal and external ear normal. There is no impacted cerumen.      Left Ear: Tympanic membrane, ear canal and external ear normal. There is no impacted cerumen.   Eyes:      General: No scleral icterus.     Pupils: Pupils are equal, round, and reactive to light.   Neck:      Vascular: No JVD.   Cardiovascular:      Rate and Rhythm: Normal rate and regular rhythm.      Pulses: Normal pulses.      Heart sounds: Normal heart sounds. No murmur heard.  Pulmonary:      Effort: Pulmonary effort is normal. No respiratory distress.      Breath sounds: Normal breath sounds. No stridor. No wheezing, rhonchi or rales.   Chest:      Chest wall: No tenderness.   Abdominal:      General: Abdomen is flat. Bowel sounds are normal. There is no distension.      Palpations: Abdomen is soft.      Tenderness: There is no abdominal tenderness. There is no guarding or rebound.   Musculoskeletal:      Cervical back: Normal range of motion.      Right knee: Decreased range of motion.      Left knee: Decreased range of motion.   Lymphadenopathy:      Cervical: No cervical adenopathy.   Skin:     General: Skin is warm.   Neurological:      General: No focal deficit present.      Mental Status: He is alert.      Cranial Nerves: No cranial nerve deficit.   Psychiatric:         Mood and Affect: Mood normal.         Behavior: Behavior normal.       /84 (BP Location: Right arm, Patient Position: Sitting, Cuff Size: adult)    Pulse 77   Temp 97.8 °F (36.6 °C) (Temporal)   Resp 16   Ht 5' 10\" (1.778 m)   Wt 192 lb (87.1 kg)   SpO2 97%   BMI 27.55 kg/m²  Estimated body mass index is 27.55 kg/m² as calculated from the following:    Height as of this encounter: 5' 10\" (1.778 m).    Weight as of this encounter: 192 lb (87.1 kg).    Medicare Hearing Assessment:   Hearing Screening    Screening Method: Finger Rub  Finger Rub Result: Pass               Assessment & Plan:   Benjamin Ralph is a 74 year old male who presents for a Medicare Assessment.     1. Encounter for annual health examination (Primary)  -     CBC With Differential With Platelet; Future; Expected date: 10/16/2024  -     Comp Metabolic Panel (14); Future; Expected date: 10/16/2024  -     Lipid Panel; Future; Expected date: 10/16/2024  -     TSH W Reflex To Free T4; Future; Expected date: 10/16/2024  -     PSA Total, Diagnostic; Future; Expected date: 10/16/2024    2. Hypercholesterolemia  -     Rosuvastatin Calcium; Take 1 tablet (10 mg total) by mouth nightly.  Dispense: 90 tablet; Refill: 3  -     Expanded, Low Complexity (72872)  -     Comp Metabolic Panel (14); Future; Expected date: 10/16/2024  -     Lipid Panel; Future; Expected date: 10/16/2024    3. Primary osteoarthritis of right knee  -     Expanded, Low Complexity (55094)  - Return within 30 days of surgical date for preoperative evaluation.    4. Combined arterial insufficiency and corporo-venous occlusive erectile dysfunction  -     Expanded, Low Complexity (27585)  -     PSA Total, Diagnostic; Future; Expected date: 10/16/2024  - Continue sildenafil as needed.    5. Enlarged prostate  -     Expanded, Low Complexity (42663)  -     PSA Total, Diagnostic; Future; Expected date: 10/16/2024    6. History of basal cell carcinoma  -     Expanded, Low Complexity (71577)  - Follow-up with dermatology.    7. Actinic keratosis  -     Expanded, Low Complexity (76396)  - Follow-up with dermatology.    8. Seasonal  allergies  -     Expanded, Low Complexity (86915)  - Stable.    9. Colon cancer screening  -     GASTRO - INTERNAL    The patient indicates understanding of these issues and agrees to the plan.  Reinforced healthy diet, lifestyle, and exercise.      Return in about 6 months (around 4/16/2025) for Routine Follow-Up.     Felix Metzger MD, 10/16/2024     Supplementary Documentation:   General Health:  In the past six months, have you lost more than 10 pounds without trying?: 2 - No  Has your appetite been poor?: No  Type of Diet: Balanced  How does the patient maintain a good energy level?: Appropriate Exercise  How would you describe your daily physical activity?: Light  How would you describe your current health state?: Good  How do you maintain positive mental well-being?: Social Interaction  On a scale of 0 to 10, with 0 being no pain and 10 being severe pain, what is your pain level?: 8 - (Severe)  In the past six months, have you experienced urine leakage?: 0-No  At any time do you feel concerned for the safety/well-being of yourself and/or your children, in your home or elsewhere?: No  Have you had any immunizations at another office such as Influenza, Hepatitis B, Tetanus, or Pneumococcal?: No    Health Maintenance   Topic Date Due    Annual Physical  10/12/2024    Colorectal Cancer Screening  01/14/2025    HTN: BP Follow-Up  11/16/2024    PSA  03/08/2026    Influenza Vaccine  Completed    Annual Depression Screening  Completed    Fall Risk Screening (Annual)  Completed    Pneumococcal Vaccine: 65+ Years  Completed    Zoster Vaccines  Completed    COVID-19 Vaccine  Completed

## 2024-10-17 ENCOUNTER — TELEPHONE (OUTPATIENT)
Facility: CLINIC | Age: 74
End: 2024-10-17

## 2024-10-17 NOTE — TELEPHONE ENCOUNTER
Patient states he is due for procedure in May 2025. I do not see a letter yet. Patient is not having any symptoms please call

## 2024-10-17 NOTE — TELEPHONE ENCOUNTER
Patient contacted and advised he is not due for a recall colonoscopy till 2027 unless he is having GI symptoms per Dr. Mosley.  Patient states he is not having any symptoms at this time.  Patient voiced understanding.    Per operative report from 01/14/2022  Recommendations:  1.  High-fiber diet.  2.  In the absence of any new symptoms or signs, surveillance colonoscopy in 5 years.           Isidoro Mosley MD  1/14/2022

## 2024-10-17 NOTE — TELEPHONE ENCOUNTER
Left message to call back.  Patient not due for a recall colonoscopy till 01/14/2027 unless he has symptoms.

## 2024-10-18 NOTE — TELEPHONE ENCOUNTER
Rcvd Family Medical Leave Act and valid Release of Information in the forms department. Logged for processing.

## 2024-10-23 NOTE — TELEPHONE ENCOUNTER
Dr. Sanchez      Please sign off on form if you agree to: Family Medical Leave Act   (place your signature on the first page only)    -From your Inbasket, Highlight the patient and click Chart   -Double click the 10/08/24 Forms Completion telephone encounter  -Scroll down to the Media section   -Click the blue Hyperlink: Family Medical Leave Act Dr Sanchez 10/23/24  -Click Acknowledge located in the top right ribbon/menu   -Drag the mouse into the blank space of the document and a + sign will appear. Left click to   electronically sign the document.     Thank you,    Lane'

## 2024-10-24 NOTE — TELEPHONE ENCOUNTER
Form completed and faxed to Jeanette Meraz at [phone] with confirmation received.  MyChart sent to advise patient.

## 2024-11-05 ENCOUNTER — OFFICE VISIT (OUTPATIENT)
Dept: ORTHOPEDICS CLINIC | Facility: CLINIC | Age: 74
End: 2024-11-05

## 2024-11-05 DIAGNOSIS — M25.561 RIGHT KNEE PAIN, UNSPECIFIED CHRONICITY: ICD-10-CM

## 2024-11-05 DIAGNOSIS — M17.11 PRIMARY OSTEOARTHRITIS OF RIGHT KNEE: Primary | ICD-10-CM

## 2024-11-05 DIAGNOSIS — M25.561 CHRONIC PAIN OF RIGHT KNEE: ICD-10-CM

## 2024-11-05 DIAGNOSIS — G89.29 CHRONIC PAIN OF RIGHT KNEE: ICD-10-CM

## 2024-11-05 PROCEDURE — 99213 OFFICE O/P EST LOW 20 MIN: CPT

## 2024-11-14 ENCOUNTER — OFFICE VISIT (OUTPATIENT)
Dept: INTERNAL MEDICINE CLINIC | Facility: CLINIC | Age: 74
End: 2024-11-14

## 2024-11-14 ENCOUNTER — HOSPITAL ENCOUNTER (OUTPATIENT)
Dept: GENERAL RADIOLOGY | Facility: HOSPITAL | Age: 74
Discharge: HOME | End: 2024-11-14
Attending: INTERNAL MEDICINE
Payer: MEDICARE

## 2024-11-14 ENCOUNTER — LAB ENCOUNTER (OUTPATIENT)
Dept: LAB | Facility: HOSPITAL | Age: 74
End: 2024-11-14
Attending: INTERNAL MEDICINE
Payer: MEDICARE

## 2024-11-14 VITALS
HEART RATE: 82 BPM | BODY MASS INDEX: 28.06 KG/M2 | DIASTOLIC BLOOD PRESSURE: 78 MMHG | OXYGEN SATURATION: 95 % | HEIGHT: 70 IN | WEIGHT: 196 LBS | RESPIRATION RATE: 16 BRPM | TEMPERATURE: 97 F | SYSTOLIC BLOOD PRESSURE: 134 MMHG

## 2024-11-14 DIAGNOSIS — Z01.818 PREOP EXAM FOR INTERNAL MEDICINE: ICD-10-CM

## 2024-11-14 DIAGNOSIS — E78.00 HYPERCHOLESTEROLEMIA: ICD-10-CM

## 2024-11-14 DIAGNOSIS — Z01.818 PREOP EXAM FOR INTERNAL MEDICINE: Primary | ICD-10-CM

## 2024-11-14 DIAGNOSIS — M17.11 PRIMARY OSTEOARTHRITIS OF RIGHT KNEE: ICD-10-CM

## 2024-11-14 DIAGNOSIS — N40.0 ENLARGED PROSTATE: ICD-10-CM

## 2024-11-14 PROBLEM — G89.29 CHRONIC PAIN OF RIGHT KNEE: Status: RESOLVED | Noted: 2024-04-28 | Resolved: 2024-11-14

## 2024-11-14 PROBLEM — M23.321 DERANGEMENT OF POSTERIOR HORN OF MEDIAL MENISCUS OF RIGHT KNEE: Status: RESOLVED | Noted: 2024-06-07 | Resolved: 2024-11-14

## 2024-11-14 PROBLEM — Z86.16 HISTORY OF COVID-19: Status: RESOLVED | Noted: 2021-10-06 | Resolved: 2024-11-14

## 2024-11-14 PROBLEM — M25.561 CHRONIC PAIN OF RIGHT KNEE: Status: RESOLVED | Noted: 2024-04-28 | Resolved: 2024-11-14

## 2024-11-14 PROBLEM — M54.2 TRIGGER POINT OF NECK: Status: RESOLVED | Noted: 2023-04-27 | Resolved: 2024-11-14

## 2024-11-14 LAB
ALBUMIN SERPL-MCNC: 4.7 G/DL (ref 3.2–4.8)
ALBUMIN/GLOB SERPL: 2.1 {RATIO} (ref 1–2)
ALP LIVER SERPL-CCNC: 76 U/L
ALT SERPL-CCNC: 17 U/L
ANION GAP SERPL CALC-SCNC: 7 MMOL/L (ref 0–18)
AST SERPL-CCNC: 17 U/L (ref ?–34)
BASOPHILS # BLD AUTO: 0.05 X10(3) UL (ref 0–0.2)
BASOPHILS NFR BLD AUTO: 0.6 %
BILIRUB SERPL-MCNC: 0.9 MG/DL (ref 0.2–1.1)
BILIRUB UR QL: NEGATIVE
BUN BLD-MCNC: 13 MG/DL (ref 9–23)
BUN/CREAT SERPL: 14.3 (ref 10–20)
CALCIUM BLD-MCNC: 10.3 MG/DL (ref 8.7–10.4)
CHLORIDE SERPL-SCNC: 108 MMOL/L (ref 98–112)
CLARITY UR: CLEAR
CO2 SERPL-SCNC: 27 MMOL/L (ref 21–32)
CREAT BLD-MCNC: 0.91 MG/DL
DEPRECATED RDW RBC AUTO: 38 FL (ref 35.1–46.3)
EGFRCR SERPLBLD CKD-EPI 2021: 88 ML/MIN/1.73M2 (ref 60–?)
EOSINOPHIL # BLD AUTO: 0.09 X10(3) UL (ref 0–0.7)
EOSINOPHIL NFR BLD AUTO: 1.2 %
ERYTHROCYTE [DISTWIDTH] IN BLOOD BY AUTOMATED COUNT: 11.9 % (ref 11–15)
FASTING STATUS PATIENT QL REPORTED: YES
GLOBULIN PLAS-MCNC: 2.2 G/DL (ref 2–3.5)
GLUCOSE BLD-MCNC: 97 MG/DL (ref 70–99)
GLUCOSE UR-MCNC: NORMAL MG/DL
HCT VFR BLD AUTO: 47.1 %
HGB BLD-MCNC: 16.7 G/DL
HGB UR QL STRIP.AUTO: NEGATIVE
IMM GRANULOCYTES # BLD AUTO: 0.02 X10(3) UL (ref 0–1)
IMM GRANULOCYTES NFR BLD: 0.3 %
KETONES UR-MCNC: NEGATIVE MG/DL
LEUKOCYTE ESTERASE UR QL STRIP.AUTO: NEGATIVE
LYMPHOCYTES # BLD AUTO: 2.57 X10(3) UL (ref 1–4)
LYMPHOCYTES NFR BLD AUTO: 33.3 %
MCH RBC QN AUTO: 31.6 PG (ref 26–34)
MCHC RBC AUTO-ENTMCNC: 35.5 G/DL (ref 31–37)
MCV RBC AUTO: 89 FL
MONOCYTES # BLD AUTO: 0.63 X10(3) UL (ref 0.1–1)
MONOCYTES NFR BLD AUTO: 8.2 %
NEUTROPHILS # BLD AUTO: 4.36 X10 (3) UL (ref 1.5–7.7)
NEUTROPHILS # BLD AUTO: 4.36 X10(3) UL (ref 1.5–7.7)
NEUTROPHILS NFR BLD AUTO: 56.4 %
NITRITE UR QL STRIP.AUTO: NEGATIVE
OSMOLALITY SERPL CALC.SUM OF ELEC: 294 MOSM/KG (ref 275–295)
PH UR: 6 [PH] (ref 5–8)
PLATELET # BLD AUTO: 232 10(3)UL (ref 150–450)
POTASSIUM SERPL-SCNC: 4.1 MMOL/L (ref 3.5–5.1)
PROT SERPL-MCNC: 6.9 G/DL (ref 5.7–8.2)
PROT UR-MCNC: NEGATIVE MG/DL
RBC # BLD AUTO: 5.29 X10(6)UL
SODIUM SERPL-SCNC: 142 MMOL/L (ref 136–145)
SP GR UR STRIP: 1.01 (ref 1–1.03)
UROBILINOGEN UR STRIP-ACNC: NORMAL
WBC # BLD AUTO: 7.7 X10(3) UL (ref 4–11)

## 2024-11-14 PROCEDURE — 85025 COMPLETE CBC W/AUTO DIFF WBC: CPT

## 2024-11-14 PROCEDURE — 99214 OFFICE O/P EST MOD 30 MIN: CPT | Performed by: INTERNAL MEDICINE

## 2024-11-14 PROCEDURE — 81003 URINALYSIS AUTO W/O SCOPE: CPT

## 2024-11-14 PROCEDURE — 80053 COMPREHEN METABOLIC PANEL: CPT

## 2024-11-14 PROCEDURE — 36415 COLL VENOUS BLD VENIPUNCTURE: CPT

## 2024-11-14 PROCEDURE — 87081 CULTURE SCREEN ONLY: CPT

## 2024-11-14 PROCEDURE — G2211 COMPLEX E/M VISIT ADD ON: HCPCS | Performed by: INTERNAL MEDICINE

## 2024-11-14 PROCEDURE — 71046 X-RAY EXAM CHEST 2 VIEWS: CPT | Performed by: INTERNAL MEDICINE

## 2024-11-14 NOTE — PROGRESS NOTES
Patient ID: Benjamin Ralph is a 74 year old male.  Chief Complaint   Patient presents with    Pre-Op Exam     Patient presents for pre-op of right total knee arthroplasty. Will be conducted by Arnold Sanchez MD on 12/06/24. Will fax pre-op clearance to  [phone].        HISTORY OF PRESENT ILLNESS:   HPI  Pt presents for preoperative clearance.  Planned surgery - Right total knee arthroplasty  Type of anesthesia - Unknown  Surgeon - Dr. Arnold Sanchez  Date of surgery - 12/6/2024  Cardiac history - No  Myocardial infarction in past 6 months - No  Bleeding disorders - No  Taking blood thinners - Yes (NSAIDs)  Chest pain or shortness of breath with ADLs - No  Number of alcoholic beverages consumed weekly - 0-1  Tobacco consumption - No    Review of Systems  Ten point review of systems otherwise negative with the exception of HPI and assessment and plan.    MEDICAL HISTORY:     Past Medical History:    Basal cell carcinoma    left nose    Basal cell carcinoma of nose    BCC (basal cell carcinoma)     left posterior lateral neck    BCC (basal cell carcinoma)    right ala base    Calculus of kidney    left    Chronic headaches    Chronic pansinusitis    Chronic sinusitis    Congestion of nasal sinus    COVID-19    Esophageal reflux    Ganglion of flexor tendon sheath of right thumb    High cholesterol    Melanoma (HCC)    left arm, stage I; .75 mm depth    Osteoarthritis    Plantar wart    right heel    Pneumonia due to organism    2016    Visual impairment    Glasses       Past Surgical History:   Procedure Laterality Date    Adj tiss xfer lid,nos,ear <10sqcm Right 2-17-17    Exc lesion of nose, V_Y flap    Colonoscopy      Colonoscopy      Colonoscopy N/A 12/26/2018    Procedure: COLONOSCOPY;  Surgeon: Isidoro Mosley MD;  Location: [facility] ENDOSCOPY    Exc skin malig 2.1-3cm face,facial  05/18/2022    Excis tendon sheath lesn,hand/fingr Right 2-17-17    Exc ganglion R thumb    Foot surgery Left 2006    Nerve  procedure    Full graft proc head,fac,hand <20sqc  2022    Nasal scopy,remv part ethmoid      Nasal scopy,rmv tiss maxill sinus      Repair of nasal septum  1985    Repair of nasal septum      Repr cmpl wnd head,fac,hand 2.6-7.5  2022    Skin surgery Left 10/07/16    left upper arm melanoma         Current Outpatient Medications:     rosuvastatin 10 MG Oral Tab, Take 1 tablet (10 mg total) by mouth nightly., Disp: 90 tablet, Rfl: 3    Acetaminophen 500 MG Oral Cap, Take 2 capsules (1,000 mg total) by mouth every 8 (eight) hours as needed for Pain. Never exceed 3000 mg in a 24-hour period.  Many over-the-counter medications also have acetaminophen in them., Disp: 180 capsule, Rfl: 0    dutasteride 0.5 MG Oral Cap, Take 1 capsule (0.5 mg total) by mouth daily., Disp: 90 capsule, Rfl: 3    sildenafil 20 MG Oral Tab, Take one tablet PO daily as needed, Disp: 10 tablet, Rfl: 5    Allergies:Allergies[1]    Social History     Socioeconomic History    Marital status: Single     Spouse name: Not on file    Number of children: 0    Years of education: Not on file    Highest education level: Not on file   Occupational History    Occupation:    Tobacco Use    Smoking status: Former     Current packs/day: 0.00     Types: Cigarettes     Quit date: 3/25/1978     Years since quittin.6     Passive exposure: Never    Smokeless tobacco: Never   Vaping Use    Vaping status: Never Used   Substance and Sexual Activity    Alcohol use: Yes     Comment: rarely maybe a glass of wine a week    Drug use: Yes     Types: Cannabis     Comment: gummies    Sexual activity: Not Currently   Other Topics Concern     Service Not Asked    Blood Transfusions Not Asked    Caffeine Concern Yes     Comment: coffee, 3cups/day    Occupational Exposure Not Asked    Hobby Hazards Not Asked    Sleep Concern Not Asked    Stress Concern Not Asked    Weight Concern Not Asked    Special Diet Not Asked    Back Care Not Asked     Exercise Not Asked    Bike Helmet Not Asked    Seat Belt Not Asked    Self-Exams Not Asked    Grew up on a farm No    History of tanning No    Outdoor occupation No    Pt has a pacemaker No    Pt has a defibrillator No    Reaction to local anesthetic No   Social History Narrative    Not on file     Social Drivers of Health     Financial Resource Strain: Not on file   Food Insecurity: Not on file   Transportation Needs: Not on file   Physical Activity: Not on file   Stress: Not on file   Social Connections: Not on file   Housing Stability: Not on file           PHYSICAL EXAM:     Vitals:    11/14/24 1143   BP: 134/78   Pulse: 82   Resp: 16   Temp: 97.4 °F (36.3 °C)   TempSrc: Temporal   SpO2: 95%   Weight: 196 lb (88.9 kg)   Height: 5' 10\" (1.778 m)       Body mass index is 28.12 kg/m².    Physical Exam  Constitutional:       Appearance: Normal appearance.   Eyes:      General: No scleral icterus.  Cardiovascular:      Rate and Rhythm: Normal rate and regular rhythm.      Pulses: Normal pulses.      Heart sounds: Normal heart sounds. No murmur heard.  Pulmonary:      Effort: Pulmonary effort is normal. No respiratory distress.      Breath sounds: Normal breath sounds. No stridor. No wheezing or rhonchi.   Abdominal:      General: Abdomen is flat. Bowel sounds are normal.      Palpations: Abdomen is soft.   Musculoskeletal:      Right knee: Decreased range of motion.   Neurological:      Mental Status: He is alert.   Psychiatric:         Mood and Affect: Mood normal.         Behavior: Behavior normal.           ASSESSMENT/PLAN:   1. Preop exam for internal medicine  CBC With Differential With Platelet; Future  Comp Metabolic Panel (14); Future  MRSA Culture Only; Future  Urinalysis with Culture Reflex; Future  XR CHEST PA + LAT CHEST (CPT=71046); Future  EKG already completed.  Clearance dependent on above results.    2. Primary osteoarthritis of right knee  CBC With Differential With Platelet; Future  Comp  Metabolic Panel (14); Future  MRSA Culture Only; Future  Urinalysis with Culture Reflex; Future  XR CHEST PA + LAT CHEST (CPT=71046); Future  EKG already completed.  Clearance dependent on above results.    3. Hypercholesterolemia  CBC With Differential With Platelet; Future  Comp Metabolic Panel (14); Future  MRSA Culture Only; Future  Urinalysis with Culture Reflex; Future  XR CHEST PA + LAT CHEST (CPT=71046); Future  EKG already completed.  Clearance dependent on above results.    4. Enlarged prostate  CBC With Differential With Platelet; Future  Comp Metabolic Panel (14); Future  MRSA Culture Only; Future  Urinalysis with Culture Reflex; Future  XR CHEST PA + LAT CHEST (CPT=71046); Future  EKG already completed.  Clearance dependent on above results.    Return in about 6 weeks (around 12/26/2024) for Routine Follow-Up.    This note was prepared using Dragon Medical voice recognition dictation software. As a result errors may occur. When identified these errors have been corrected. While every attempt is made to correct errors during dictation discrepancies may still exist.    Felix Metzger MD  11/14/2024    Preoperative testing reviewed.  Patient is cleared for upcoming surgery without further testing.  Clearance is effective for 30 days from the original encounter date.    Felix Metzger MD  11/15/2024         [1]   Allergies  Allergen Reactions    Penicillin G HIVES     states he is not sure if it was from actual drug  Denies skin peeling, blisters or organ damage    Penicillins HIVES     Denies blisters, skin peeling or organ damage

## 2024-11-29 ENCOUNTER — TELEPHONE (OUTPATIENT)
Dept: ORTHOPEDICS CLINIC | Facility: CLINIC | Age: 74
End: 2024-11-29

## 2024-11-29 NOTE — TELEPHONE ENCOUNTER
Per patient is scheduled for surgery next week, patient is having cold symptoms, requesting to speak to RN. Please call thank you.

## 2024-11-29 NOTE — TELEPHONE ENCOUNTER
Called patient and he states he developed stuffy nose and very mild cough since 11/24. He denies any fever, chills , malaise or fatigue. He reports mild sore throat only in the morning and then it dissipates. Patient is scheduled for Right TKA on 12/6.patient expressed concerns related to upcoming surgery. I advised to Follow up next week if still feeling unwell as surgery is still one week away.   Advised if he needs care for his symptoms to follow up with pcp or UC.

## 2024-11-29 NOTE — TELEPHONE ENCOUNTER
Agree with plan.  He will probably be just fine for surgery 12/6/2024.  We doing the surgery with a spinal anesthesia so there are fewer respiratory complications to begin with.

## 2024-12-03 ENCOUNTER — LAB ENCOUNTER (OUTPATIENT)
Dept: LAB | Facility: HOSPITAL | Age: 74
End: 2024-12-03
Attending: ORTHOPAEDIC SURGERY
Payer: MEDICARE

## 2024-12-03 DIAGNOSIS — Z01.818 PREOP TESTING: ICD-10-CM

## 2024-12-03 LAB — SARS-COV-2 RNA RESP QL NAA+PROBE: NOT DETECTED

## 2024-12-03 PROCEDURE — 87635 SARS-COV-2 COVID-19 AMP PRB: CPT

## 2024-12-04 NOTE — H&P
Arnold Sanchez MD   Physician  Specialty: SURGERY, ORTHOPEDIC     H&P     Signed          Signed       Expand All Collapse All    NURSING INTAKE COMMENTS:        Chief Complaint   Patient presents with    Knee Pain       Pt had Right knee arthroscopy, sx 07/31/24 with Dr. Lockhart -  Pt states Dr. Lockhart would like to get another opinion.  Constant pain. States some swelling in knee.           HPI: This 74 year old male presents today with persistent right knee pain despite injections, therapy, and arthroscopy.  X-rays show near bone-on-bone of the medial compartment.  MRI shows bone marrow edema in the medial compartment as well.  Although the lateral compartment looks relatively normal on MRI, he has significant lateral joint line pain in addition to the medial pain.  He does not have much patellofemoral pain.  He is here to talk about knee replacement.     He lives by himself in a house with stairs.  He said his sister in [place] can come in and spend a week or 2 with him postoperatively.  He works as a  which she said is mostly desk work.  He stays in good shape physically and bikes and goes to the gym regularly and continues to do so.  He drives and does not use a cane or walker.  He is a non-smoker.  He is no longer on oxycodone as listed on his medical chart.     He had the right second toe removed in April 2024.  There is no sign of infection and the wound is healed.  He denies numbness and tingling.  BMI is 27.     Past Medical History       Past Medical History:    Basal cell carcinoma     left nose    Basal cell carcinoma of nose    BCC (basal cell carcinoma)      left posterior lateral neck    BCC (basal cell carcinoma)     right ala base    Calculus of kidney     left    Chronic headaches    Chronic pansinusitis    Chronic sinusitis    Congestion of nasal sinus    COVID-19    Esophageal reflux    Ganglion of flexor tendon sheath of right thumb    High cholesterol    Melanoma (HCC)      left arm, stage I; .75 mm depth    Osteoarthritis    Plantar wart     right heel    Pneumonia due to organism     2016    Visual impairment     Glasses         Past Surgical History         Past Surgical History:   Procedure Laterality Date    Adj tiss xfer lid,nos,ear <10sqcm Right 2-17-17     Exc lesion of nose, V_Y flap    Colonoscopy        Colonoscopy        Colonoscopy N/A 12/26/2018     Procedure: COLONOSCOPY;  Surgeon: Isidoro Mosley MD;  Location: [facility] ENDOSCOPY    Exc skin malig 2.1-3cm face,facial   05/18/2022    Excis tendon sheath lesn,hand/fingr Right 2-17-17     Exc ganglion R thumb    Foot surgery Left 2006     Nerve procedure    Full graft proc head,fac,hand <20sqc   05/18/2022    Nasal scopy,remv part ethmoid        Nasal scopy,rmv tiss maxill sinus        Repair of nasal septum   1985    Repair of nasal septum        Repr cmpl wnd head,fac,hand 2.6-7.5   05/18/2022    Skin surgery Left 10/07/16     left upper arm melanoma         Current Medications          Current Outpatient Medications   Medication Sig Dispense Refill    oxyCODONE 5 MG Oral Tab Take 1 tablet (5 mg total) by mouth every 4 (four) hours as needed for Pain. 16 tablet 0    oxyCODONE 5 MG Oral Tab Take 1 tablet (5 mg total) by mouth every 4 (four) hours as needed for Pain. 12 tablet 0    Acetaminophen 500 MG Oral Cap Take 2 capsules (1,000 mg total) by mouth every 8 (eight) hours as needed for Pain. Never exceed 3000 mg in a 24-hour period.  Many over-the-counter medications also have acetaminophen in them. 180 capsule 0    oxyCODONE 5 MG Oral Tab Take 1 tablet (5 mg total) by mouth every 4 (four) hours as needed for Pain. 12 tablet 0    Vitamin C 500 MG Oral Tab Take 1 tablet (500 mg total) by mouth in the morning and 1 tablet (500 mg total) before bedtime. 84 tablet 0    rosuvastatin 10 MG Oral Tab Take 1 tablet (10 mg total) by mouth nightly. 90 tablet 3    Turmeric 500 MG Oral Cap Take 500 mg by mouth daily.         dutasteride 0.5 MG Oral Cap Take 1 capsule (0.5 mg total) by mouth daily. 90 capsule 3    sildenafil 20 MG Oral Tab Take one tablet PO daily as needed 10 tablet 5    Meloxicam 15 MG Oral Tab Take 1 tablet (15 mg total) by mouth daily. (Patient not taking: Reported on 2024) 30 tablet 0         Allergies         Allergies   Allergen Reactions    Penicillin G HIVES       states he is not sure if it was from actual drug  Denies skin peeling, blisters or organ damage    Penicillins HIVES       Denies blisters, skin peeling or organ damage         Family History         Family History   Problem Relation Age of Onset    Dementia Mother      Seizure Disorder Mother      Hypertension Mother      Cancer Mother           skin    Cancer Father           Leukemia         No family Hx of DVT/PE     Social History            Occupational History    Occupation:    Tobacco Use    Smoking status: Former       Current packs/day: 0.00       Types: Cigarettes       Quit date: 3/25/1978       Years since quittin.5       Passive exposure: Never    Smokeless tobacco: Never   Vaping Use    Vaping status: Never Used   Substance and Sexual Activity    Alcohol use: Yes       Comment: rarely maybe a glass of wine a week    Drug use: Yes       Types: Cannabis       Comment: gummies    Sexual activity: Not Currently         Review of Systems:  GENERAL: feels generally well, no significant weight loss or weight gain  SKIN: no ulcerated or worrisome skin lesions  EYES:denies blurred vision or double vision  HEENT: denies new nasal congestion, sinus pain or ST  LUNGS: denies shortness of breath  CARDIOVASCULAR: denies chest pain  GI: no hematemesis, no worsening heartburn, no diarrhea  : no dysuria, no blood in urine, no difficulty urinating, no incontinence  MUSCULOSKELETAL: no other musculoskeletal complaints other than in HPI  NEURO: no numbness or tingling, no weakness or balance disorder  PSYCHE: no depression or  anxiety  HEMATOLOGIC: no hx of blood dyscrasia, no Hx DVT/PE  ENDOCRINE: no thyroid or diabetes issues  ALL/ASTHMA: no new hx of severe allergy or asthma     Physical Examination:    There were no vitals taken for this visit.  Constitutional: appears well hydrated, alert and responsive, no acute distress noted  Extremities: Right knee with minimal swelling and no asymmetric warmth.  Healthy skin on the legs.  He has a small kathrin on the anterior patella from where he stuck himself with a pen when he was 9 years old.  It tattooed him slightly.  Musculoskeletal: Good motion 0 to 130 degrees.  Focally tender on the medial joint line greater than lateral joint line.  Patella grind negative for crepitus and pain.  No instability.  Neurological: Normal motor and sensory bilateral lower extremities including the toes.     Imaging: X-rays and MRI show medial osteoarthritis with bone-on-bone.  The lateral and patellofemoral joints look relatively normal although he has significant lateral tenderness.        XR KNEE, COMPLETE (4 OR MORE VIEWS), RIGHT (CPT=73564)     Result Date: 9/23/2024  PROCEDURE:         XR KNEE, COMPLETE (4 OR MORE VIEWS), RIGHT (CPT=73564)  COMPARISON:    [facility] 2nd Floor, XR KNEE, COMPLETE (4 OR MORE VIEWS), RIGHT (CPT=73564), 4/26/2024, 1:14 PM.  [facility], X KNEE RT, 9/25/2012, 2:59 PM.  INDICATIONS:      Generalized right knee pain x 2 months. No known injuries.  TECHNIQUE:          4. views were obtained.   FINDINGS:          BONES:        No acute fracture or osseous malalignment.  There is medial tibiofemoral compartment joint narrowing.  There is patellofemoral osteophyte formation.  SOFT TISSUES:        Negative. No visible soft tissue swelling. EFFUSION:        Small suprapatellar knee effusion OTHER:        Negative.          CONCLUSION:          Mild medial tibiofemoral and patellofemoral compartment osteoarthritis, similar to prior.   Small suprapatellar knee effusion.    Dictated by (CST): Viki Licona MD on 9/23/2024 at 5:06 PM     Finalized by (CST): Viki Licona MD on 9/23/2024 at 5:08 PM                    Lab Results   Component Value Date     WBC 8.0 07/05/2024     HGB 16.6 07/05/2024     .0 07/05/2024            Lab Results   Component Value Date      (H) 07/05/2024     BUN 15 07/05/2024     CREATSERUM 0.90 07/05/2024     GFRNAA 89 06/20/2022     GFRAA 102 06/20/2022         Assessment and Plan:  Diagnoses and all orders for this visit:     Primary osteoarthritis of right knee  -     MRI KNEE BARB, RIGHT (CPT=73721); Future           Assessment: Above diagnosis.     Plan: I told him his options at this point are to live with it with occasional cortisone injection versus knee replacement.  From the imaging he look like he could have been a partial replacement but he has lateral tenderness and therefore I recommended full replacement.  We discussed potential risks of surgery such as death, heart attack, stroke, bleeding, infection, blood clot, nerve injury, artery injury, failure fixation, fracture, stiffness, loosening, revision surgery, numbness laterally incision, and persistent pain despite surgery.  He had no objections to blood transfusion should be necessary.  He has questions which were answered to his satisfaction.  Again his sister will live with them for a week or 2 postoperatively.  Told him if that was not the case, he may have to go to rehab.  He did not want to do that.     He will need medical and dental clearance.  He will need MRI templating.  The plan will be a Barb persona LPS knee replacement right knee.     Follow Up: No follow-ups on file.     Arnold Sanchez MD

## 2024-12-06 ENCOUNTER — ANESTHESIA EVENT (OUTPATIENT)
Dept: SURGERY | Facility: HOSPITAL | Age: 74
End: 2024-12-06
Payer: MEDICARE

## 2024-12-06 ENCOUNTER — ANESTHESIA (OUTPATIENT)
Dept: SURGERY | Facility: HOSPITAL | Age: 74
End: 2024-12-06
Payer: MEDICARE

## 2024-12-06 ENCOUNTER — HOSPITAL ENCOUNTER (INPATIENT)
Facility: HOSPITAL | Age: 74
LOS: 4 days | Discharge: NURSING, REHAB OR LONG-TERM CARE | End: 2024-12-10
Attending: ORTHOPAEDIC SURGERY | Admitting: ORTHOPAEDIC SURGERY
Payer: MEDICARE

## 2024-12-06 ENCOUNTER — APPOINTMENT (OUTPATIENT)
Dept: GENERAL RADIOLOGY | Facility: HOSPITAL | Age: 74
End: 2024-12-06
Attending: ORTHOPAEDIC SURGERY
Payer: MEDICARE

## 2024-12-06 DIAGNOSIS — M17.11 PRIMARY OSTEOARTHRITIS OF RIGHT KNEE: Primary | ICD-10-CM

## 2024-12-06 DIAGNOSIS — Z96.651 S/P TKR (TOTAL KNEE REPLACEMENT), RIGHT: ICD-10-CM

## 2024-12-06 DIAGNOSIS — Z01.818 PREOP TESTING: ICD-10-CM

## 2024-12-06 PROCEDURE — 73560 X-RAY EXAM OF KNEE 1 OR 2: CPT | Performed by: ORTHOPAEDIC SURGERY

## 2024-12-06 PROCEDURE — 0SRC0J9 REPLACEMENT OF RIGHT KNEE JOINT WITH SYNTHETIC SUBSTITUTE, CEMENTED, OPEN APPROACH: ICD-10-PCS | Performed by: ORTHOPAEDIC SURGERY

## 2024-12-06 PROCEDURE — 99222 1ST HOSP IP/OBS MODERATE 55: CPT | Performed by: INTERNAL MEDICINE

## 2024-12-06 DEVICE — IMPLANTABLE DEVICE
Type: IMPLANTABLE DEVICE | Site: KNEE | Status: FUNCTIONAL
Brand: PERSONA®

## 2024-12-06 DEVICE — IMPLANTABLE DEVICE
Type: IMPLANTABLE DEVICE | Site: KNEE | Status: FUNCTIONAL
Brand: PERSONA® VIVACIT-E®

## 2024-12-06 DEVICE — IMPLANTABLE DEVICE
Type: IMPLANTABLE DEVICE | Site: KNEE | Status: FUNCTIONAL
Brand: REFOBACIN® BONE CEMENT R

## 2024-12-06 DEVICE — IMPLANTABLE DEVICE
Type: IMPLANTABLE DEVICE | Site: KNEE | Status: FUNCTIONAL
Brand: PERSONA® NATURAL TIBIA®

## 2024-12-06 RX ORDER — MORPHINE SULFATE 4 MG/ML
4 INJECTION, SOLUTION INTRAMUSCULAR; INTRAVENOUS EVERY 10 MIN PRN
Status: DISCONTINUED | OUTPATIENT
Start: 2024-12-06 | End: 2024-12-06 | Stop reason: HOSPADM

## 2024-12-06 RX ORDER — ONDANSETRON 2 MG/ML
4 INJECTION INTRAMUSCULAR; INTRAVENOUS ONCE AS NEEDED
Status: DISPENSED | OUTPATIENT
Start: 2024-12-06 | End: 2024-12-06

## 2024-12-06 RX ORDER — ASPIRIN 325 MG
325 TABLET, DELAYED RELEASE (ENTERIC COATED) ORAL 2 TIMES DAILY
Status: DISCONTINUED | OUTPATIENT
Start: 2024-12-06 | End: 2024-12-10

## 2024-12-06 RX ORDER — ONDANSETRON 2 MG/ML
INJECTION INTRAMUSCULAR; INTRAVENOUS AS NEEDED
Status: DISCONTINUED | OUTPATIENT
Start: 2024-12-06 | End: 2024-12-06 | Stop reason: SURG

## 2024-12-06 RX ORDER — DOXEPIN HYDROCHLORIDE 50 MG/1
1 CAPSULE ORAL DAILY
Qty: 60 TABLET | Refills: 0 | Status: SHIPPED | OUTPATIENT
Start: 2024-12-07

## 2024-12-06 RX ORDER — PHENYLEPHRINE HCL 10 MG/ML
VIAL (ML) INJECTION AS NEEDED
Status: DISCONTINUED | OUTPATIENT
Start: 2024-12-06 | End: 2024-12-06 | Stop reason: SURG

## 2024-12-06 RX ORDER — MORPHINE SULFATE 1 MG/ML
INJECTION, SOLUTION EPIDURAL; INTRATHECAL; INTRAVENOUS AS NEEDED
Status: DISCONTINUED | OUTPATIENT
Start: 2024-12-06 | End: 2024-12-06 | Stop reason: SURG

## 2024-12-06 RX ORDER — ONDANSETRON 2 MG/ML
4 INJECTION INTRAMUSCULAR; INTRAVENOUS EVERY 6 HOURS PRN
Status: DISCONTINUED | OUTPATIENT
Start: 2024-12-06 | End: 2024-12-10

## 2024-12-06 RX ORDER — MORPHINE SULFATE 10 MG/ML
6 INJECTION, SOLUTION INTRAMUSCULAR; INTRAVENOUS EVERY 10 MIN PRN
Status: DISCONTINUED | OUTPATIENT
Start: 2024-12-06 | End: 2024-12-06 | Stop reason: HOSPADM

## 2024-12-06 RX ORDER — DOXEPIN HYDROCHLORIDE 50 MG/1
1 CAPSULE ORAL DAILY
Status: DISCONTINUED | OUTPATIENT
Start: 2024-12-06 | End: 2024-12-10

## 2024-12-06 RX ORDER — METOCLOPRAMIDE HYDROCHLORIDE 5 MG/ML
10 INJECTION INTRAMUSCULAR; INTRAVENOUS EVERY 8 HOURS PRN
Status: DISCONTINUED | OUTPATIENT
Start: 2024-12-07 | End: 2024-12-10

## 2024-12-06 RX ORDER — ACETAMINOPHEN 325 MG/1
650 TABLET ORAL EVERY 6 HOURS PRN
Status: ACTIVE | OUTPATIENT
Start: 2024-12-06 | End: 2024-12-07

## 2024-12-06 RX ORDER — SODIUM PHOSPHATE, DIBASIC AND SODIUM PHOSPHATE, MONOBASIC 7; 19 G/230ML; G/230ML
1 ENEMA RECTAL ONCE AS NEEDED
Status: DISCONTINUED | OUTPATIENT
Start: 2024-12-06 | End: 2024-12-10

## 2024-12-06 RX ORDER — DOCUSATE SODIUM 100 MG/1
100 CAPSULE, LIQUID FILLED ORAL 2 TIMES DAILY
Status: DISCONTINUED | OUTPATIENT
Start: 2024-12-06 | End: 2024-12-10

## 2024-12-06 RX ORDER — HYDROMORPHONE HYDROCHLORIDE 1 MG/ML
0.6 INJECTION, SOLUTION INTRAMUSCULAR; INTRAVENOUS; SUBCUTANEOUS EVERY 5 MIN PRN
Status: DISCONTINUED | OUTPATIENT
Start: 2024-12-06 | End: 2024-12-06 | Stop reason: HOSPADM

## 2024-12-06 RX ORDER — HYDROCODONE BITARTRATE AND ACETAMINOPHEN 10; 325 MG/1; MG/1
1 TABLET ORAL EVERY 4 HOURS PRN
Status: DISCONTINUED | OUTPATIENT
Start: 2024-12-06 | End: 2024-12-10

## 2024-12-06 RX ORDER — NAPROXEN 250 MG/1
250 TABLET ORAL 2 TIMES DAILY WITH MEALS
Qty: 28 TABLET | Refills: 0 | Status: SHIPPED | OUTPATIENT
Start: 2024-12-07

## 2024-12-06 RX ORDER — HALOPERIDOL 5 MG/ML
0.5 INJECTION INTRAMUSCULAR ONCE AS NEEDED
Status: ACTIVE | OUTPATIENT
Start: 2024-12-06 | End: 2024-12-06

## 2024-12-06 RX ORDER — ROSUVASTATIN CALCIUM 10 MG/1
10 TABLET, COATED ORAL NIGHTLY
Status: DISCONTINUED | OUTPATIENT
Start: 2024-12-06 | End: 2024-12-10

## 2024-12-06 RX ORDER — DIPHENHYDRAMINE HCL 25 MG
25 CAPSULE ORAL EVERY 4 HOURS PRN
Status: DISCONTINUED | OUTPATIENT
Start: 2024-12-06 | End: 2024-12-10

## 2024-12-06 RX ORDER — NALOXONE HYDROCHLORIDE 0.4 MG/ML
0.08 INJECTION, SOLUTION INTRAMUSCULAR; INTRAVENOUS; SUBCUTANEOUS
Status: ACTIVE | OUTPATIENT
Start: 2024-12-06 | End: 2024-12-07

## 2024-12-06 RX ORDER — DIPHENHYDRAMINE HCL 25 MG
25 CAPSULE ORAL EVERY 4 HOURS PRN
Status: ACTIVE | OUTPATIENT
Start: 2024-12-06 | End: 2024-12-07

## 2024-12-06 RX ORDER — TRANEXAMIC ACID 100 MG/ML
INJECTION, SOLUTION INTRAVENOUS AS NEEDED
Status: DISCONTINUED | OUTPATIENT
Start: 2024-12-06 | End: 2024-12-06 | Stop reason: SURG

## 2024-12-06 RX ORDER — HYDROCODONE BITARTRATE AND ACETAMINOPHEN 7.5; 325 MG/1; MG/1
1 TABLET ORAL EVERY 6 HOURS PRN
Status: DISPENSED | OUTPATIENT
Start: 2024-12-06 | End: 2024-12-07

## 2024-12-06 RX ORDER — DIPHENHYDRAMINE HYDROCHLORIDE 50 MG/ML
12.5 INJECTION INTRAMUSCULAR; INTRAVENOUS EVERY 4 HOURS PRN
Status: DISCONTINUED | OUTPATIENT
Start: 2024-12-06 | End: 2024-12-10

## 2024-12-06 RX ORDER — SODIUM CHLORIDE 9 MG/ML
INJECTION, SOLUTION INTRAVENOUS CONTINUOUS
Status: DISCONTINUED | OUTPATIENT
Start: 2024-12-06 | End: 2024-12-09

## 2024-12-06 RX ORDER — DIPHENHYDRAMINE HYDROCHLORIDE 50 MG/ML
25 INJECTION INTRAMUSCULAR; INTRAVENOUS ONCE AS NEEDED
Status: ACTIVE | OUTPATIENT
Start: 2024-12-06 | End: 2024-12-06

## 2024-12-06 RX ORDER — LIDOCAINE HYDROCHLORIDE 10 MG/ML
INJECTION, SOLUTION EPIDURAL; INFILTRATION; INTRACAUDAL; PERINEURAL AS NEEDED
Status: DISCONTINUED | OUTPATIENT
Start: 2024-12-06 | End: 2024-12-06 | Stop reason: SURG

## 2024-12-06 RX ORDER — DEXAMETHASONE SODIUM PHOSPHATE 4 MG/ML
VIAL (ML) INJECTION AS NEEDED
Status: DISCONTINUED | OUTPATIENT
Start: 2024-12-06 | End: 2024-12-06 | Stop reason: SURG

## 2024-12-06 RX ORDER — NALBUPHINE HYDROCHLORIDE 10 MG/ML
2.5 INJECTION INTRAMUSCULAR; INTRAVENOUS; SUBCUTANEOUS EVERY 4 HOURS PRN
Status: ACTIVE | OUTPATIENT
Start: 2024-12-06 | End: 2024-12-07

## 2024-12-06 RX ORDER — HYDROMORPHONE HYDROCHLORIDE 1 MG/ML
0.2 INJECTION, SOLUTION INTRAMUSCULAR; INTRAVENOUS; SUBCUTANEOUS EVERY 5 MIN PRN
Status: DISCONTINUED | OUTPATIENT
Start: 2024-12-06 | End: 2024-12-06 | Stop reason: HOSPADM

## 2024-12-06 RX ORDER — ENOXAPARIN SODIUM 100 MG/ML
40 INJECTION SUBCUTANEOUS ONCE
Status: COMPLETED | OUTPATIENT
Start: 2024-12-06 | End: 2024-12-06

## 2024-12-06 RX ORDER — BISACODYL 10 MG
10 SUPPOSITORY, RECTAL RECTAL
Status: DISCONTINUED | OUTPATIENT
Start: 2024-12-06 | End: 2024-12-10

## 2024-12-06 RX ORDER — HYDROCODONE BITARTRATE AND ACETAMINOPHEN 10; 325 MG/1; MG/1
1 TABLET ORAL EVERY 6 HOURS PRN
Qty: 25 TABLET | Refills: 0 | Status: SHIPPED | OUTPATIENT
Start: 2024-12-06

## 2024-12-06 RX ORDER — HYDROMORPHONE HYDROCHLORIDE 1 MG/ML
0.6 INJECTION, SOLUTION INTRAMUSCULAR; INTRAVENOUS; SUBCUTANEOUS
Status: ACTIVE | OUTPATIENT
Start: 2024-12-06 | End: 2024-12-07

## 2024-12-06 RX ORDER — NALOXONE HYDROCHLORIDE 0.4 MG/ML
0.08 INJECTION, SOLUTION INTRAMUSCULAR; INTRAVENOUS; SUBCUTANEOUS AS NEEDED
Status: DISCONTINUED | OUTPATIENT
Start: 2024-12-06 | End: 2024-12-06 | Stop reason: HOSPADM

## 2024-12-06 RX ORDER — ACETAMINOPHEN 325 MG/1
650 TABLET ORAL EVERY 8 HOURS PRN
Status: DISCONTINUED | OUTPATIENT
Start: 2024-12-06 | End: 2024-12-10

## 2024-12-06 RX ORDER — FINASTERIDE 5 MG/1
5 TABLET, FILM COATED ORAL DAILY
Status: DISCONTINUED | OUTPATIENT
Start: 2024-12-06 | End: 2024-12-10

## 2024-12-06 RX ORDER — BUPIVACAINE HYDROCHLORIDE 7.5 MG/ML
INJECTION, SOLUTION INTRASPINAL AS NEEDED
Status: DISCONTINUED | OUTPATIENT
Start: 2024-12-06 | End: 2024-12-06 | Stop reason: SURG

## 2024-12-06 RX ORDER — DIPHENHYDRAMINE HYDROCHLORIDE 50 MG/ML
12.5 INJECTION INTRAMUSCULAR; INTRAVENOUS EVERY 4 HOURS PRN
Status: ACTIVE | OUTPATIENT
Start: 2024-12-06 | End: 2024-12-07

## 2024-12-06 RX ORDER — ACETAMINOPHEN 500 MG
1000 TABLET ORAL ONCE
Status: COMPLETED | OUTPATIENT
Start: 2024-12-06 | End: 2024-12-06

## 2024-12-06 RX ORDER — PROCHLORPERAZINE EDISYLATE 5 MG/ML
5 INJECTION INTRAMUSCULAR; INTRAVENOUS ONCE AS NEEDED
Status: ACTIVE | OUTPATIENT
Start: 2024-12-06 | End: 2024-12-06

## 2024-12-06 RX ORDER — HYDROMORPHONE HYDROCHLORIDE 1 MG/ML
0.4 INJECTION, SOLUTION INTRAMUSCULAR; INTRAVENOUS; SUBCUTANEOUS
Status: ACTIVE | OUTPATIENT
Start: 2024-12-06 | End: 2024-12-07

## 2024-12-06 RX ORDER — POLYETHYLENE GLYCOL 3350 17 G/17G
17 POWDER, FOR SOLUTION ORAL DAILY PRN
Status: DISCONTINUED | OUTPATIENT
Start: 2024-12-06 | End: 2024-12-10

## 2024-12-06 RX ORDER — HYDROMORPHONE HYDROCHLORIDE 1 MG/ML
0.4 INJECTION, SOLUTION INTRAMUSCULAR; INTRAVENOUS; SUBCUTANEOUS EVERY 5 MIN PRN
Status: DISCONTINUED | OUTPATIENT
Start: 2024-12-06 | End: 2024-12-06 | Stop reason: HOSPADM

## 2024-12-06 RX ORDER — SODIUM CHLORIDE, SODIUM LACTATE, POTASSIUM CHLORIDE, CALCIUM CHLORIDE 600; 310; 30; 20 MG/100ML; MG/100ML; MG/100ML; MG/100ML
INJECTION, SOLUTION INTRAVENOUS CONTINUOUS
Status: DISCONTINUED | OUTPATIENT
Start: 2024-12-06 | End: 2024-12-06

## 2024-12-06 RX ORDER — SODIUM CHLORIDE, SODIUM LACTATE, POTASSIUM CHLORIDE, CALCIUM CHLORIDE 600; 310; 30; 20 MG/100ML; MG/100ML; MG/100ML; MG/100ML
INJECTION, SOLUTION INTRAVENOUS CONTINUOUS
Status: DISCONTINUED | OUTPATIENT
Start: 2024-12-06 | End: 2024-12-06 | Stop reason: HOSPADM

## 2024-12-06 RX ORDER — SENNOSIDES 8.6 MG
17.2 TABLET ORAL NIGHTLY
Status: DISCONTINUED | OUTPATIENT
Start: 2024-12-06 | End: 2024-12-10

## 2024-12-06 RX ORDER — HYDROCODONE BITARTRATE AND ACETAMINOPHEN 7.5; 325 MG/1; MG/1
2 TABLET ORAL EVERY 6 HOURS PRN
Status: DISPENSED | OUTPATIENT
Start: 2024-12-06 | End: 2024-12-07

## 2024-12-06 RX ORDER — ASPIRIN 325 MG
325 TABLET, DELAYED RELEASE (ENTERIC COATED) ORAL 2 TIMES DAILY
Qty: 60 TABLET | Refills: 0 | Status: SHIPPED | OUTPATIENT
Start: 2024-12-07

## 2024-12-06 RX ORDER — ACETAMINOPHEN 325 MG/1
650 TABLET ORAL EVERY 8 HOURS PRN
Qty: 60 TABLET | Refills: 0 | Status: SHIPPED | OUTPATIENT
Start: 2024-12-06

## 2024-12-06 RX ORDER — MORPHINE SULFATE 4 MG/ML
2 INJECTION, SOLUTION INTRAMUSCULAR; INTRAVENOUS EVERY 10 MIN PRN
Status: DISCONTINUED | OUTPATIENT
Start: 2024-12-06 | End: 2024-12-06 | Stop reason: HOSPADM

## 2024-12-06 RX ORDER — MIDAZOLAM HYDROCHLORIDE 1 MG/ML
INJECTION INTRAMUSCULAR; INTRAVENOUS AS NEEDED
Status: DISCONTINUED | OUTPATIENT
Start: 2024-12-06 | End: 2024-12-06 | Stop reason: SURG

## 2024-12-06 RX ORDER — PSEUDOEPHEDRINE HCL 30 MG
100 TABLET ORAL 2 TIMES DAILY
Qty: 20 CAPSULE | Refills: 0 | Status: SHIPPED | OUTPATIENT
Start: 2024-12-06

## 2024-12-06 RX ORDER — NAPROXEN 250 MG/1
250 TABLET ORAL 2 TIMES DAILY WITH MEALS
Status: DISCONTINUED | OUTPATIENT
Start: 2024-12-06 | End: 2024-12-10

## 2024-12-06 RX ADMIN — TRANEXAMIC ACID 1000 MG: 100 INJECTION, SOLUTION INTRAVENOUS at 08:56:00

## 2024-12-06 RX ADMIN — TRANEXAMIC ACID 1000 MG: 100 INJECTION, SOLUTION INTRAVENOUS at 10:30:00

## 2024-12-06 RX ADMIN — DEXAMETHASONE SODIUM PHOSPHATE 4 MG: 4 MG/ML VIAL (ML) INJECTION at 09:05:00

## 2024-12-06 RX ADMIN — SODIUM CHLORIDE, SODIUM LACTATE, POTASSIUM CHLORIDE, CALCIUM CHLORIDE: 600; 310; 30; 20 INJECTION, SOLUTION INTRAVENOUS at 10:53:00

## 2024-12-06 RX ADMIN — MORPHINE SULFATE 0.25 MG: 1 INJECTION, SOLUTION EPIDURAL; INTRATHECAL; INTRAVENOUS at 08:42:00

## 2024-12-06 RX ADMIN — MIDAZOLAM HYDROCHLORIDE 2 MG: 1 INJECTION INTRAMUSCULAR; INTRAVENOUS at 08:38:00

## 2024-12-06 RX ADMIN — PHENYLEPHRINE HCL 100 MCG: 10 MG/ML VIAL (ML) INJECTION at 09:23:00

## 2024-12-06 RX ADMIN — LIDOCAINE HYDROCHLORIDE 30 MG: 10 INJECTION, SOLUTION EPIDURAL; INFILTRATION; INTRACAUDAL; PERINEURAL at 08:45:00

## 2024-12-06 RX ADMIN — BUPIVACAINE HYDROCHLORIDE 1.6 ML: 7.5 INJECTION, SOLUTION INTRASPINAL at 08:42:00

## 2024-12-06 RX ADMIN — ONDANSETRON 4 MG: 2 INJECTION INTRAMUSCULAR; INTRAVENOUS at 10:03:00

## 2024-12-06 NOTE — ANESTHESIA PROCEDURE NOTES
Spinal Block    Date/Time: 12/6/2024 8:41 AM    Performed by: Palmer Brown MD  Authorized by: Palmer Brown MD      General Information and Staff    Start Time:  12/6/2024 8:41 AM  End Time:  12/6/2024 8:42 AM  Anesthesiologist:  Palmer Brown MD  Performed by:  Anesthesiologist  Patient Location:  OR  Site identification: surface landmarks    Reason for Block: at surgeon's request and post-op pain management    Preanesthetic Checklist: patient identified, IV checked, risks and benefits discussed, monitors and equipment checked, pre-op evaluation, timeout performed, anesthesia consent and sterile technique used      Procedure Details    Patient Position:  Sitting  Prep: ChloraPrep    Monitoring:  Heart rate and continuous pulse ox  Approach:  Midline  Location:  L3-4  Injection Technique:  Single-shot    Needle    Needle Type:  Pencil-tip  Needle Gauge:  24 G  Needle Length:  4 in    Assessment      Additional Comments     Spinal easy x1, patient tolerated well

## 2024-12-06 NOTE — PLAN OF CARE
Patient is A&Ox4. Room air. No tele. Pain managed w/ PRN pain medications. Patient is a standby assist in room when connected to IV pump. Bed in lowest position and locked. Call light in hand. Personal belongings w/in reach.     Problem: Patient Centered Care  Goal: Patient preferences are identified and integrated in the patient's plan of care  Description: Interventions:  - What would you like us to know as we care for you? What is the plan?  - Provide timely, complete, and accurate information to patient/family  - Incorporate patient and family knowledge, values, beliefs, and cultural backgrounds into the planning and delivery of care  - Encourage patient/family to participate in care and decision-making at the level they choose  - Honor patient and family perspectives and choices  Outcome: Progressing     Problem: Patient/Family Goals  Goal: Patient/Family Long Term Goal  Description: Patient's Long Term Goal: To be discharged    Interventions:  - Assist patient to all tests and procedures  - See additional Care Plan goals for specific interventions  Outcome: Progressing  Goal: Patient/Family Short Term Goal  Description: Patient's Short Term Goal: To feel better    Interventions:   - Continue medication administration as ordered   - See additional Care Plan goals for specific interventions  Outcome: Progressing

## 2024-12-06 NOTE — CONSULTS
[facility]  part of [facility]  Consult Note       Benjamin Ralph Patient Status:  Inpatient    6/10/1950  74 year old CSN [id number]   Location 407/407-A Attending Arnold Sanchez MD     PCP Felix Metzger MD         DATE OF ADMISSION: 2024     CHIEF COMPLAINT: Right knee pain    HISTORY OF PRESENT ILLNESS  This is a 74 year oldmale with history of Right knee osteoarthritis who underwent right total knee arthroplasty. Patient was admitted post operatively for closer monitoring and care. At time of interview, patient reported pain was fairly well controlled.  He did report some current nausea, but denied emesis. Patient otherwise denied CP, SOB, abd pain, F/C    PAST MEDICAL HISTORY  Past Medical History:    Basal cell carcinoma    left nose    Basal cell carcinoma of nose    BCC (basal cell carcinoma)     left posterior lateral neck    BCC (basal cell carcinoma)    right ala base    Calculus of kidney    left    Chronic headaches    Chronic pansinusitis    Chronic sinusitis    Congestion of nasal sinus    COVID-19    Esophageal reflux    Ganglion of flexor tendon sheath of right thumb    High cholesterol    Melanoma (HCC)    left arm, stage I; .75 mm depth    Osteoarthritis    Plantar wart    right heel    Pneumonia due to organism    2016    Visual impairment    Glasses        PAST SURGICAL HISTORY  Past Surgical History:   Procedure Laterality Date    Adj tiss xfer lid,nos,ear <10sqcm Right 2-17-17    Exc lesion of nose, V_Y flap    Colonoscopy      Colonoscopy      Colonoscopy N/A 2018    Procedure: COLONOSCOPY;  Surgeon: Isidoro Mosley MD;  Location: [facility] ENDOSCOPY    Exc skin malig 2.1-3cm face,facial  2022    Excis tendon sheath lesn,hand/fingr Right 2-17-17    Exc ganglion R thumb    Foot surgery Left 2006    Nerve procedure    Full graft proc head,fac,hand <20sqc  2022    Nasal scopy,remv part ethmoid      Nasal scopy,rmv tiss maxill sinus      Repair  of nasal septum  1985    Repair of nasal septum      Repr cmpl wnd head,fac,hand 2.6-7.5  2022    Skin surgery Left 10/07/16    left upper arm melanoma       ALLERGIES   Penicillin g and Penicillins    MEDICATIONS  Current Discharge Medication List        CONTINUE these medications which have NOT CHANGED    Details   rosuvastatin 10 MG Oral Tab Take 1 tablet (10 mg total) by mouth nightly.  Qty: 90 tablet, Refills: 3    Associated Diagnoses: Hypercholesterolemia      Acetaminophen 500 MG Oral Cap Take 2 capsules (1,000 mg total) by mouth every 8 (eight) hours as needed for Pain. Never exceed 3000 mg in a 24-hour period.  Many over-the-counter medications also have acetaminophen in them.  Qty: 180 capsule, Refills: 0    Associated Diagnoses: Derangement of posterior horn of medial meniscus of right knee; Complex tear of meniscus of right knee as current injury, unspecified meniscus, subsequent encounter      dutasteride 0.5 MG Oral Cap Take 1 capsule (0.5 mg total) by mouth daily.  Qty: 90 capsule, Refills: 3      sildenafil 20 MG Oral Tab Take one tablet PO daily as needed  Qty: 10 tablet, Refills: 5    Comments: To replace tadalafil 10 mg Rx; wants to pay out-of-pocket  Associated Diagnoses: Combined arterial insufficiency and corporo-venous occlusive erectile dysfunction             SOCIAL HISTORY  Social History     Socioeconomic History    Marital status: Single    Number of children: 0   Occupational History    Occupation:    Tobacco Use    Smoking status: Former     Current packs/day: 0.00     Types: Cigarettes     Quit date: 3/25/1978     Years since quittin.7     Passive exposure: Never    Smokeless tobacco: Never   Vaping Use    Vaping status: Never Used   Substance and Sexual Activity    Alcohol use: Yes     Comment: rarely maybe a glass of wine a week    Drug use: Yes     Types: Cannabis     Comment: gummies    Sexual activity: Not Currently   Other Topics Concern    Caffeine  Concern Yes     Comment: coffee, 3cups/day    Grew up on a farm No    History of tanning No    Outdoor occupation No    Pt has a pacemaker No    Pt has a defibrillator No    Reaction to local anesthetic No       FAMILY HISTORY  Family History   Problem Relation Age of Onset    Dementia Mother     Seizure Disorder Mother     Hypertension Mother     Cancer Mother         skin    Cancer Father         Leukemia       PHYSICAL EXAM  Vital signs:  /75 (BP Location: Right arm)   Pulse 63   Temp 97.8 °F (36.6 °C) (Oral)   Resp 18   Ht 5' 11\" (1.803 m)   Wt 192 lb (87.1 kg)   SpO2 95%   BMI 26.78 kg/m²      GENERAL:  Awake and alert, in no acute distress.  HEART:  Regular rhythm.  Regular rate   LUNGS:  Air entry was minimally decreased.  No crackles or wheezes   ABDOMEN: Soft and non-tender.  Bowel sounds were present.  MUSCULOSKELETAL: Right lower extremity with dressing in place  PSYCHIATRIC: Normal mood      IMAGING  XR KNEE (1 OR 2 VIEWS), RIGHT (CPT=73560)    Result Date: 12/6/2024  CONCLUSION:   Expected postoperative changes from right total knee arthroplasty, which are described above.     Dictated by (CST): Thien Bardales MD on 12/06/2024 at 12:13 PM     Finalized by (CST): Thien Bardales MD on 12/06/2024 at 12:14 PM           Data:  No results for input(s): \"RBC\", \"HGB\", \"HCT\", \"MCV\", \"MCH\", \"MCHC\", \"RDW\", \"NEPRELIM\", \"WBC\", \"PLT\" in the last 168 hours.  No results for input(s): \"GLU\", \"BUN\", \"CREATSERUM\", \"GFRAA\", \"GFRNAA\", \"CA\", \"ALB\", \"NA\", \"K\", \"CL\", \"CO2\", \"ALKPHO\", \"AST\", \"ALT\", \"BILT\", \"TP\" in the last 168 hours.    ASSESSMENT/PLAN    Primary osteoarthritis of right knee  - s/p R knee arthroplasty on 12/6  -Completing perioperative abx  - cont pain control, close monitoring with IV narcotics  - Encourage Incentive spirometry  - PT/OT per Ortho  - F/u further Ortho recs    Hyperlipidemia  -Statin    BPH  -Restart finasteride    Plan of care discussed with patient at bedside.  Discussed with   RN.      Lucian Garcia MD    This note was prepared using Dragon Medical voice recognition dictation software. As a result errors may occur. When identified these errors have been corrected. While every attempt is made to correct errors during dictation discrepancies may still exist

## 2024-12-06 NOTE — ANESTHESIA POSTPROCEDURE EVALUATION
atient: Benjamin Turnerk98.1    Procedure Summary       Date: 12/06/24 Room / Location: [facility] MAIN OR  / [facility] MAIN OR    Anesthesia Start: 0836 Anesthesia Stop: 1105    Procedure: Right total knee arthroplasty with MRI templated patient specific instruments (Right: Knee) Diagnosis:       Primary osteoarthritis of right knee      (Primary osteoarthritis of right knee [M17.11])    Surgeons: Arnold Sanchez MD Anesthesiologist: Palmer Brown MD    Anesthesia Type: spinal ASA Status: 2            Anesthesia Type: spinal    Vitals Value Taken Time   /77 12/06/24 1104   Temp 98.1 12/06/24 1108   Pulse 86 12/06/24 1108   Resp 19 12/06/24 1108   SpO2 96 % 12/06/24 1108   Vitals shown include unfiled device data.    [facility] AN Post Evaluation:   Patient Evaluated in PACU  Patient Participation: complete - patient participated  Level of Consciousness: awake  Pain Management: adequate  Airway Patency:patent  Yes    Nausea/Vomiting: none  Cardiovascular Status: acceptable  Respiratory Status: acceptable  Postoperative Hydration acceptable      Palmer Brown MD  12/6/2024 11:08 AM

## 2024-12-06 NOTE — ANESTHESIA PREPROCEDURE EVALUATION
Anesthesia PreOp Note    HPI:     Benjamin Ralph is a 74 year old male who presents for preoperative consultation requested by: Arnold Sanchez MD    Date of Surgery: 12/6/2024    Procedure(s):  Right total knee arthroplasty  Indication: Primary osteoarthritis of right knee [M17.11]    Relevant Problems   No relevant active problems       NPO:  Last Liquid Consumption Date: 12/05/24  Last Liquid Consumption Time: 1800  Last Solid Consumption Date: 12/05/24  Last Solid Consumption Time: 1600  Last Liquid Consumption Date: 12/05/24          History Review:  Patient Active Problem List    Diagnosis Date Noted    Tear of meniscus of right knee as current injury 05/31/2024    Hamstring tendinitis of right thigh 05/17/2024    Osteoarthritis of right knee 05/16/2024    Cervical radiculopathy 04/27/2023    Basal cell carcinoma of left side of neck 10/14/2021    Right kidney stone 10/06/2020    Sun-damaged skin 03/05/2020    Seasonal allergies 06/19/2019    History of actinic keratoses 02/22/2018    Hypercholesterolemia 09/05/2017    History of basal cell carcinoma 04/20/2017    Basal cell carcinoma of nose 02/02/2017    Personal history of malignant melanoma of skin 01/19/2017    Combined arterial insufficiency and corporo-venous occlusive erectile dysfunction 12/08/2016    Enlarged prostate 09/29/2016    Melanoma of left upper arm (HCC) 09/08/2016    Actinic keratosis 07/18/2016    Inflamed seborrheic keratosis 07/18/2016       Past Medical History:    Basal cell carcinoma    left nose    Basal cell carcinoma of nose    BCC (basal cell carcinoma)     left posterior lateral neck    BCC (basal cell carcinoma)    right ala base    Calculus of kidney    left    Chronic headaches    Chronic pansinusitis    Chronic sinusitis    Congestion of nasal sinus    COVID-19    Esophageal reflux    Ganglion of flexor tendon sheath of right thumb    High cholesterol    Melanoma (HCC)    left arm, stage I; .75 mm depth     Osteoarthritis    Plantar wart    right heel    Pneumonia due to organism    2016    Visual impairment    Glasses       Past Surgical History:   Procedure Laterality Date    Adj tiss xfer lid,nos,ear <10sqcm Right 17    Exc lesion of nose, V_Y flap    Colonoscopy      Colonoscopy      Colonoscopy N/A 2018    Procedure: COLONOSCOPY;  Surgeon: Isdioro Mosley MD;  Location: [facility] ENDOSCOPY    Exc skin malig 2.1-3cm face,facial  2022    Excis tendon sheath lesn,hand/fingr Right 17    Exc ganglion R thumb    Foot surgery Left     Nerve procedure    Full graft proc head,fac,hand <20sqc  2022    Nasal scopy,remv part ethmoid      Nasal scopy,rmv tiss maxill sinus      Repair of nasal septum      Repair of nasal septum      Repr cmpl wnd head,fac,hand 2.6-7.5  2022    Skin surgery Left 10/07/16    left upper arm melanoma       Prescriptions Prior to Admission[1]  Current Medications and Prescriptions Ordered in Epic[2]    Allergies[3]    Family History   Problem Relation Age of Onset    Dementia Mother     Seizure Disorder Mother     Hypertension Mother     Cancer Mother         skin    Cancer Father         Leukemia     Social History     Socioeconomic History    Marital status: Single    Number of children: 0   Occupational History    Occupation:    Tobacco Use    Smoking status: Former     Current packs/day: 0.00     Types: Cigarettes     Quit date: 3/25/1978     Years since quittin.7     Passive exposure: Never    Smokeless tobacco: Never   Vaping Use    Vaping status: Never Used   Substance and Sexual Activity    Alcohol use: Yes     Comment: rarely maybe a glass of wine a week    Drug use: Yes     Types: Cannabis     Comment: gummies    Sexual activity: Not Currently   Other Topics Concern    Caffeine Concern Yes     Comment: coffee, 3cups/day    Grew up on a farm No    History of tanning No    Outdoor occupation No    Pt has a pacemaker No    Pt  has a defibrillator No    Reaction to local anesthetic No       Available pre-op labs reviewed.  Lab Results   Component Value Date    WBC 7.7 11/14/2024    RBC 5.29 11/14/2024    HGB 16.7 11/14/2024    HCT 47.1 11/14/2024    MCV 89.0 11/14/2024    MCH 31.6 11/14/2024    MCHC 35.5 11/14/2024    RDW 11.9 11/14/2024    .0 11/14/2024     Lab Results   Component Value Date     11/14/2024    K 4.1 11/14/2024     11/14/2024    CO2 27.0 11/14/2024    BUN 13 11/14/2024    CREATSERUM 0.91 11/14/2024    GLU 97 11/14/2024    CA 10.3 11/14/2024          Vital Signs:  Body mass index is 26.78 kg/m².   height is 1.803 m (5' 11\") and weight is 87.1 kg (192 lb). His oral temperature is 97.7 °F (36.5 °C). His blood pressure is 132/83 and his pulse is 84. His respiration is 18 and oxygen saturation is 94%.   Vitals:    11/29/24 1034 12/06/24 0649   BP:  132/83   Pulse:  84   Resp:  18   Temp:  97.7 °F (36.5 °C)   TempSrc:  Oral   SpO2:  94%   Weight: 86.2 kg (190 lb) 87.1 kg (192 lb)   Height: 1.803 m (5' 11\") 1.803 m (5' 11\")        Anesthesia Evaluation     Patient summary reviewed    Airway   Mallampati: II  TM distance: >3 FB  Neck ROM: full  Dental - Dentition appears grossly intact     Pulmonary - negative ROS and normal exam   Cardiovascular - negative ROS and normal exam    Neuro/Psych - negative ROS   (+)  neuromuscular disease,        GI/Hepatic/Renal - negative ROS   (+) GERD    Endo/Other - negative ROS   Abdominal                  Anesthesia Plan:   ASA:  2  Plan:   Spinal  Plan Comments: Spinal with Duramorph plus IV sedation  Informed Consent Plan and Risks Discussed With:  Patient      I have informed Benjamin Ralph and/or legal guardian or family member of the nature of the anesthetic plan, benefits, risks including possible dental damage if relevant, major complications, and any alternative forms of anesthetic management.   All of the patient's questions were answered to the best of my  ability. The patient desires the anesthetic management as planned.  Palmer Brown MD  12/6/2024 7:03 AM  Present on Admission:  **None**           [1]   Medications Prior to Admission   Medication Sig Dispense Refill Last Dose/Taking    rosuvastatin 10 MG Oral Tab Take 1 tablet (10 mg total) by mouth nightly. 90 tablet 3 12/5/2024 at  9:00 PM    Acetaminophen 500 MG Oral Cap Take 2 capsules (1,000 mg total) by mouth every 8 (eight) hours as needed for Pain. Never exceed 3000 mg in a 24-hour period.  Many over-the-counter medications also have acetaminophen in them. 180 capsule 0 12/5/2024 at  8:00 AM    dutasteride 0.5 MG Oral Cap Take 1 capsule (0.5 mg total) by mouth daily. (Patient taking differently: Take 1 capsule (0.5 mg total) by mouth every evening.) 90 capsule 3 12/5/2024 at  9:00 PM    sildenafil 20 MG Oral Tab Take one tablet PO daily as needed 10 tablet 5 Past Week   [2]   Current Facility-Administered Medications Ordered in Epic   Medication Dose Route Frequency Provider Last Rate Last Admin    lactated ringers infusion   Intravenous Continuous Arnold Sanchez MD 20 mL/hr at 12/06/24 0655 New Bag at 12/06/24 0655    ceFAZolin (Ancef) 2g in 10mL IV syringe premix  2 g Intravenous Once Arnold Sanchez MD        clonidine-EPINEPHrine-ropivacaine-ketorolac (CERTS) (Duraclon-Adrenalin-Naropin-Toradol) pain cocktail irrigation   Intra-articular Once (Intra-Op) Arnold Sanchez MD         No current [facility]-ordered outpatient medications on file.   [3]   Allergies  Allergen Reactions    Penicillin G HIVES     states he is not sure if it was from actual drug  Denies skin peeling, blisters or organ damage    Penicillins HIVES     Denies blisters, skin peeling or organ damage

## 2024-12-06 NOTE — INTERVAL H&P NOTE
Pre-op Diagnosis: Primary osteoarthritis of right knee [M17.11]    The above referenced H&P was reviewed by Arnold Sanchez MD on 12/6/2024, the patient was examined and no significant changes have occurred in the patient's condition since the H&P was performed.  I discussed with the patient and/or legal representative the potential benefits, risks and side effects of this procedure; the likelihood of the patient achieving goals; and potential problems that might occur during recuperation.  I discussed reasonable alternatives to the procedure, including risks, benefits and side effects related to the alternatives and risks related to not receiving this procedure.  We will proceed with procedure as planned.

## 2024-12-06 NOTE — CM/SW NOTE
Department  notified of request for [facility] referrals started. Assigned CM/SW to follow up with pt/family on further discharge planning.     Salome Elliott, DSC

## 2024-12-06 NOTE — BRIEF OP NOTE
Pre-Operative Diagnosis: Primary osteoarthritis of right knee [M17.11]     Post-Operative Diagnosis: Same     Procedure Performed:   Right total knee arthroplasty with MRI templated patient-specific instruments    Surgeons and Role:     * Arnold Sanchez MD - Primary    Assistant(s): Margo Washington PA-C (first assistant); Saul Graham CSA (second assistant)    Anesthesia: Dr. Brown with spinal using Duramorph/MAC     Surgical Findings: Domo Persona femur LPS size 10 right standard, tibia G right with short stem extension, polyethylene 11 mm LPS Vivacit-E, patella 41 x 10.0 mm.  Tourniquet: 72 minutes at 300 mmHg.  Drain: None  Specimen: Bone cuts to pathology  Complications: None  Estimated Blood Loss: 50 ml  Stable to PACU.  Tranexamic acid 1 g IV second dose given in OR at end of case.    Arnold Sanchez MD  12/6/2024  8:54 AM

## 2024-12-06 NOTE — OPERATIVE REPORT
[facility]    PATIENT'S NAME: ANTHONY BARDALES   ATTENDING PHYSICIAN: Arnold Sanchez MD   OPERATING PHYSICIAN: Arnold Sanchez MD   PATIENT ACCOUNT#:   [id number]    LOCATION:  [address] [place] [address]  MEDICAL RECORD #:   [id number]       YOB: 1950  ADMISSION DATE:       12/06/2024      OPERATION DATE:  12/06/2024    OPERATIVE REPORT      PREOPERATIVE DIAGNOSIS:  Primary osteoarthritis of the right knee.  POSTOPERATIVE DIAGNOSIS:  Primary osteoarthritis of the right knee.  PROCEDURE:  Right total knee arthroplasty with MRI-templated patient-specific instruments.      FIRST ASSISTANT:  Margo Washington PA-C     SECOND ASSISTANT:  IMAN Del Castillo     ANESTHESIA:  Dr. Brown with spinal using Duramorph/MAC.    INDICATIONS:  The patient is a 74-year-old man with the above diagnosis documented by physical exam, x-ray, and MRI.  He has failed nonoperative treatment, and the risks and complications of surgery were discussed.  No guarantees were given.  The consent was signed.    OPERATIVE TECHNIQUE:  Patient was brought to the operating room and placed in supine position.  Appropriate IV access and monitors were placed.  Ancef 2 g IV and tranexamic acid 1 g IV were both used as a prophylaxis.  Despite a stated penicillin allergy, he had no adverse reaction to the Ancef 2 g.  Spinal with Duramorph followed by monitored sedation was performed by Dr. Brown. He was placed in supine position on the operative table and placed under deeper sedation.  SCD boot was on the left leg.  Right thigh had a tourniquet placed.  The right lower extremity was then shaved, prepped and draped in a sterile fashion with Betadine, followed by ChloraPrep.  The leg was exsanguinated and the tourniquet inflated to 300 mmHg.  Tourniquet time for the case was 72 minutes.    Incision was made, and a midvastus snip was used to gain access to the right knee.  Patellofemoral and medial osteoarthritis, bone on bone, was seen.  The lateral  compartment was relatively normal.  Cruciate ligaments were intact.  A large effusion was noted on entry to the knee.  Fluid was clear yellow.  The fat pad was excised, and the patella was resected from 21 mm to 14 mm.  A 41 mm patella fit optimally and the 3 drill holes were placed.  A protector was placed and the patella tucked in the lateral gutter.      The knee was flexed, and the cruciate ligaments and anterior menisci were excised.  The patient-specific guide fit very well in the distal femur.  Distal femoral cuts were made as templated.  The knee was flexed further, and the size 10 cutting block was applied and seen to be properly externally rotated.  Anterior cut was made without notch.  Posterior cuts were made as templated.  Chamfer cuts were finished.  There were no significant osteophytes.    The rest of the menisci were excised, and the patient-specific guide was applied to the proximal tibia.  Drop dina was in line with the anterior tibial spine with proper posterior slope.  Medial and lateral cuts were made as templated.  Some medial osteophyte was removed, and we downsized from the templated H tibia to a G tibia.    Spacer blocks with 10 or 12 mm spacers showed good balance in the collateral ligaments.  LPS notch was cut for the femur, and trial reduction was performed.  With an 11 mm spacer, the knee had full extension and full deep flexion with proper rollback.  Patella was tracking well in all positions.  There was good balance between the collateral ligaments at 0 degrees, 40 degrees, and 100 degrees.  No snapping or popping was noted.  The tibia was nailed in its proper rotation.  The pegs were drilled for the femur and the stem prepared for the tibia.    Two batches of Domo methylmethacrylate cement with gentamicin premixed were prepared in a vacuum container.  Bone surfaces were cleansed thoroughly with saline and dried thoroughly.  Cement was pressurized into the bone surfaces as well as  placed on the back of the components.  Tibia, followed by femur, followed by patella were cemented in standard fashion.  A spacer was placed and an assistant held axial pressure while the cement was hardening and excess cement was removed.  During the last 4 minutes of cement drying, a dilute Betadine solution was placed in the knee.    Once the cement had hardened, any excess cement removed, trial reduction confirmed the 11 mm fit better than the 10 or 12 mm spacer.  Again, the above parameters were noted.  The wound was irrigated 1 last time, and the LPS Vivacit-E  polyethylene size 11 mm was snapped into position with no soft tissue interposed.  Again, the same parameters were noted as above.    The tourniquet was let down, and there was no significant bleeding.  The wound was closed in flexion in layers with staples for the skin and silver Mepilex dressing.  The rest of the dressings were placed.  Tranexamic acid 1 g IV second dose was given in the OR toward the end of the case after the tourniquet was let down.  The knee had been injected with 80 mL of joint cocktail for postop pain relief after the capsule was closed.    The patient was taken out of sedation and brought to recovery room in stable condition.    Blood loss was 50 mL.  The specimens were the bone cuts to Pathology.  There were no drains, no complications, and the patient tolerated procedure well.     Dictated By Arnold Sanchez MD  d: 12/06/2024 10:56:05  t: 12/06/2024 16:29:39  Job [id number]  [facility]/    cc: Felix Metzger MD

## 2024-12-06 NOTE — DISCHARGE INSTRUCTIONS
Starting today, work on straightening and bending the knee.  Work several hours a day on bending and straightening your knee.  Pump feet often to help prevent blood clots. Weight bearing as tolerated with walker.  When sitting in a chair elevate the leg.  Ice for up to 20 minutes at a time. Place towel between ice and skin.     For wound care, each Mepilex dressing can be used for up to 3 days if it is not soiled.  Peel back dressing and paint wound with betadine and Q-tips daily. Do not double dip Q-tips. Sponge bath only. Take medications as prescribed in the discharge paperwork.        Call Dr. Sanchez's office to make a follow-up appointment for 2 weeks.       Dear Patient,     [facility] cares about your progress with recovery following your joint replacement surgery.     300 days from your scheduled surgery, [facility] will send you a follow-up survey to help us understand how your surgery impacted your mobility, pain, and overall quality of life. Please make every effort to complete this survey. The information collected from this survey will be used by your physician to track your recovery.     Sincerely,     [facility] [place]

## 2024-12-07 PROBLEM — Z96.651 S/P TOTAL KNEE REPLACEMENT USING CEMENT, RIGHT: Status: ACTIVE | Noted: 2024-12-07

## 2024-12-07 LAB
ANION GAP SERPL CALC-SCNC: 5 MMOL/L (ref 0–18)
BUN BLD-MCNC: 17 MG/DL (ref 9–23)
BUN/CREAT SERPL: 18.5 (ref 10–20)
CALCIUM BLD-MCNC: 9.3 MG/DL (ref 8.7–10.4)
CHLORIDE SERPL-SCNC: 106 MMOL/L (ref 98–112)
CO2 SERPL-SCNC: 28 MMOL/L (ref 21–32)
CREAT BLD-MCNC: 0.92 MG/DL
EGFRCR SERPLBLD CKD-EPI 2021: 87 ML/MIN/1.73M2 (ref 60–?)
GLUCOSE BLD-MCNC: 125 MG/DL (ref 70–99)
GLUCOSE BLDC GLUCOMTR-MCNC: 118 MG/DL (ref 70–99)
HCT VFR BLD AUTO: 41.9 %
HGB BLD-MCNC: 14.1 G/DL
OSMOLALITY SERPL CALC.SUM OF ELEC: 291 MOSM/KG (ref 275–295)
POTASSIUM SERPL-SCNC: 4.7 MMOL/L (ref 3.5–5.1)
SODIUM SERPL-SCNC: 139 MMOL/L (ref 136–145)

## 2024-12-07 PROCEDURE — 99233 SBSQ HOSP IP/OBS HIGH 50: CPT | Performed by: HOSPITALIST

## 2024-12-07 RX ORDER — TAMSULOSIN HYDROCHLORIDE 0.4 MG/1
0.4 CAPSULE ORAL
Status: DISCONTINUED | OUTPATIENT
Start: 2024-12-07 | End: 2024-12-10

## 2024-12-07 NOTE — PHYSICAL THERAPY NOTE
Chart reviewed, called [facility]. Pt cold and clammy with BP dropping when up in chair--occurred x2 today. Pt just returned to bed. Will plan to hold on PT eval today and plan to reattempt tomorrow.

## 2024-12-07 NOTE — CM/SW NOTE
10/01/24 1800   Services Requested   Submitted to [facility] Yes   PASRR Level 1 Submitted Yes   PMR Consult Requested Not clinically appropriate at this time   Choice of Post-Acute Provider   Informed patient of right to choose their preferred provider Yes   List of appropriate post-acute services provided to patient/family with quality data Yes   Patient/family choice [facility] followed up on discharge planning.    FR[facility] met with pt bedside.  Pt prefers [facility] as he lives alone and only has his older brother in the area who is older than him.    FRANSISCO explained average LOS for Medicare ortho pts.      SW gave pt list of Medicare quality data.  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    [facility] submitted.    PASRR completed and uploaded to referral.    FRANSISCO confirmed with liaison Saumya from [facility] that they can accept.    PLAN: DC to [facility] pending [facility] clear    / to remain available for support and/or discharge planning.     Lorraine Bartlett MSW, LSW [phone]

## 2024-12-07 NOTE — PROGRESS NOTES
[facility]  part of [facility]    Progress Note    Benjamin Ralph Patient Status:  Inpatient    6/10/1950 MRN [id number]   Location [facility] 4W/SW/SE Attending Keagan Ernandez MD   Hosp Day # 1 PCP Felix Metzger MD       Subjective:   Benjamin Ralph is a(n) 74 year old male was seen and examined  Lying in bed, NAD, pain controlled  No other issues  No acute events overnight     Objective:   Blood pressure 115/74, pulse 73, temperature 98.5 °F (36.9 °C), temperature source Oral, resp. rate 16, height 5' 11\" (1.803 m), weight 192 lb (87.1 kg), SpO2 97%.    GENERAL:  Awake and alert, in no acute distress.  HEART:  Regular rhythm.  Regular rate   LUNGS:  Air entry was minimally decreased.  No crackles or wheezes   ABDOMEN: Soft and non-tender.  Bowel sounds were present.  MUSCULOSKELETAL: Right lower extremity with dressing in place  PSYCHIATRIC: Normal mood  NEURO: Non-focal     Current Inpatient Medications:     Current Facility-Administered Medications:     tamsulosin (Flomax) cap 0.4 mg, 0.4 mg, Oral, Daily @ 0700    finasteride (Proscar) tab 5 mg, 5 mg, Oral, Daily    rosuvastatin (Crestor) tab 10 mg, 10 mg, Oral, Nightly    sodium chloride 0.9 % IV bolus 500 mL, 500 mL, Intravenous, Once PRN    sennosides (Senokot) tab 17.2 mg, 17.2 mg, Oral, Nightly    docusate sodium (Colace) cap 100 mg, 100 mg, Oral, BID    polyethylene glycol (PEG 3350) (Miralax) 17 g oral packet 17 g, 17 g, Oral, Daily PRN    magnesium hydroxide (Milk of Magnesia) 400 MG/5ML oral suspension 30 mL, 30 mL, Oral, Daily PRN    bisacodyl (Dulcolax) 10 MG rectal suppository 10 mg, 10 mg, Rectal, Daily PRN    fleet enema (Fleet) rectal enema 133 mL, 1 enema, Rectal, Once PRN    ondansetron (Zofran) 4 MG/2ML injection 4 mg, 4 mg, Intravenous, Q6H PRN    metoclopramide (Reglan) 5 mg/mL injection 10 mg, 10 mg, Intravenous, Q8H PRN    diphenhydrAMINE (Benadryl) cap/tab 25 mg, 25 mg, Oral, Q4H PRN **OR**  diphenhydrAMINE (Benadryl) 50 mg/mL  injection 12.5 mg, 12.5 mg, Intravenous, Q4H PRN    sodium chloride 0.9% infusion, , Intravenous, Continuous    aspirin DR tab 325 mg, 325 mg, Oral, BID    acetaminophen (Tylenol) tab 650 mg, 650 mg, Oral, Q8H PRN    HYDROcodone-acetaminophen (Norco)  MG per tab 1 tablet, 1 tablet, Oral, Q4H PRN    calcium carbonate-vitamin D (Oyster Shell-D) 250-3.125 MG-MCG per tab 1 tablet, 1 tablet, Oral, BID with meals    multivitamin (Tab-A-Krista/Beta Carotene) tab 1 tablet, 1 tablet, Oral, Daily    naproxen (Naprosyn) tab 250 mg, 250 mg, Oral, BID with meals    naloxone (Narcan) 0.4 MG/ML injection 0.08 mg, 0.08 mg, Intravenous, Q5 Min PRN    acetaminophen (Tylenol) tab 650 mg, 650 mg, Oral, Q6H PRN    HYDROcodone-acetaminophen (Norco) 7.5-325 MG per tab 1 tablet, 1 tablet, Oral, Q6H PRN    HYDROcodone-acetaminophen (Norco) 7.5-325 MG per tab 2 tablet, 2 tablet, Oral, Q6H PRN    HYDROmorphone (Dilaudid) 1 MG/ML injection 0.4 mg, 0.4 mg, Intravenous, Q1H PRN    HYDROmorphone (Dilaudid) 1 MG/ML injection 0.6 mg, 0.6 mg, Intravenous, Q1H PRN    diphenhydrAMINE (Benadryl) 50 mg/mL  injection 12.5 mg, 12.5 mg, Intravenous, Q4H PRN **OR** diphenhydrAMINE (Benadryl) cap/tab 25 mg, 25 mg, Oral, Q4H PRN    nalbuphine (Nubain) 10 mg/mL injection 2.5 mg, 2.5 mg, Intravenous, Q4H PRN    Assessment and Plan:   Primary osteoarthritis of right knee  - s/p R knee arthroplasty on 12/6  -Completing perioperative abx  - cont pain control, close monitoring with IV narcotics  - Encourage Incentive spirometry  - PT/OT per Ortho  - F/u further Ortho recs     Hyperlipidemia  -Statin     BPH  -Restart finasteride  -added tamsulosin    DVT Ppx: ASA  Full code    Results:     Recent Labs   Lab 12/07/24 0448   HGB 14.1   HCT 41.9         Recent Labs   Lab 12/07/24 0448   *   BUN 17   CREATSERUM 0.92   EGFRCR 87   CA 9.3      K 4.7      CO2 28.0         Imaging:   XR KNEE (1 OR 2 VIEWS),  RIGHT (CPT=73560)    Result Date: 12/6/2024  CONCLUSION:   Expected postoperative changes from right total knee arthroplasty, which are described above.     Dictated by (CST): Thien Bardales MD on 12/06/2024 at 12:13 PM     Finalized by (CST): Thien Bardales MD on 12/06/2024 at 12:14 PM                 Keagan Ernandez MD  12/7/2024

## 2024-12-07 NOTE — PLAN OF CARE
Benjamin is post-op day one. A&Ox4. 1 L NC at this time. SCD's, Rudy hose, and ASA for DVT prophylaxis. Gel ice wrap to R knee. Denies nausea at this time, tolerating diet but reports low appetite. Patient unable to void, bladder scan showed 772 ml. Straight cath'd 850 ml @ 2100. Tolerated well. Prn norco given for pain control. Continuous IVF, iv abx given as ordered. X1 with a walker. Ambulates. Bed alarm on. Call light within reach. Plan for PT/OT to eval today.    Problem: Patient Centered Care  Goal: Patient preferences are identified and integrated in the patient's plan of care  Description: Interventions:  - What would you like us to know as we care for you? \"I think I might need rehab\".  - Provide timely, complete, and accurate information to patient/family  - Incorporate patient and family knowledge, values, beliefs, and cultural backgrounds into the planning and delivery of care  - Encourage patient/family to participate in care and decision-making at the level they choose  - Honor patient and family perspectives and choices  Outcome: Progressing     Problem: Patient/Family Goals  Goal: Patient/Family Long Term Goal  Description: Patient's Long Term Goal: Go home    Interventions:  - PT/OT, monitor labs, administer medications  - See additional Care Plan goals for specific interventions  Outcome: Progressing  Goal: Patient/Family Short Term Goal  Description: Patient's Short Term Goal: Pain control    Interventions:   - Prn pain medications, gel ice, walker  - See additional Care Plan goals for specific interventions  Outcome: Progressing

## 2024-12-07 NOTE — OCCUPATIONAL THERAPY NOTE
Chart Reviewed. Per RN, Pt is returning to bed via overhead lift due to being cold, clammy, low BP when up in chair. This is the 2nd occurrence today. RN requesting hold until AM. Will follow

## 2024-12-07 NOTE — PROGRESS NOTES
[facility]  part of [facility]    Progress Note    Benjamin Ralph Patient Status:  Inpatient    6/10/1950 MRN [id number]   Location [facility] 4W/SW/SE Attending Keagan Ernandez MD   Hosp Day # 1 PCP Felix Metzger MD         Subjective:     Constitutional:         Patient awake and alert in hospital bed.  No family visiting.  Pain control pretty good.  Eating and voiding now.  Had some trouble with voiding earlier.       Objective:   Blood pressure 115/74, pulse 73, temperature 98.5 °F (36.9 °C), temperature source Oral, resp. rate 16, height 5' 11\" (1.803 m), weight 192 lb (87.1 kg), SpO2 97%.  Physical Exam  Musculoskeletal:      Comments: Dressing clean.  No significant swelling right knee or leg.  No calf tenderness.  Normal motor and sensory bilateral lower extremities.  Active motion 10 to 90 degrees.  Passively 0 to 110 degrees.  I taught him how to do the self rehab.  Easily pumps both feet.         Results:   Lab Results   Component Value Date    WBC 7.7 2024    HGB 14.1 2024    HCT 41.9 2024    .0 2024    CREATSERUM 0.92 2024    BUN 17 2024     2024    K 4.7 2024     2024    CO2 28.0 2024     (H) 2024    CA 9.3 2024    ALB 4.7 2024    ALKPHO 76 2024    BILT 0.9 2024    TP 6.9 2024    AST 17 2024    ALT 17 2024    T4F 0.91 10/02/2015    TSH 1.183 10/16/2024    PSA 0.70 10/16/2024    ESRML 3 2018    MG 2.1 2020       XR KNEE (1 OR 2 VIEWS), RIGHT (CPT=73560)    Result Date: 2024  CONCLUSION:   Expected postoperative changes from right total knee arthroplasty, which are described above.     Dictated by (CST): Thien Bardales MD on 2024 at 12:13 PM     Finalized by (CST): Thien Bardales MD on 2024 at 12:14 PM               Assessment & Plan:     Preop testing  Postop now       Right knee arthritis status post  right total knee arthroplasty postop day 1 patient doing very well.  His motion is very good both passively and actively of her postop day #1.  I went over  Self rehab, medications, and wound care.  He will be transferred to [facility] when accepted.  Continue vigorous postoperative rehab.      Arnold Sanchez MD  12/7/2024

## 2024-12-07 NOTE — PROGRESS NOTES
Attempted to ambulate with pt in room, Pt became dizzy and light headed. Assisted to chair. BP checked went down to 58/42. BLE elevated,encouraged pt to drink fluids. IVF infusing. Pt recovered. Pt requesting to stay in chair at this time. MD Ernandez made aware of situation    Addendum 1315-Pt felt similar symptoms as listed above while sitting up in chair. BP WDL. Pt pale, cool, and clammy. Blood glucose was 118. Pt assisted back to bed with sunita lift. MD Ernandez made aware. Ordered to give 500cc bolus and keep patient on bedrest for remainder of day.       Sheeba MULLIGAN RN

## 2024-12-08 LAB
ALBUMIN SERPL-MCNC: 3.6 G/DL (ref 3.2–4.8)
ANION GAP SERPL CALC-SCNC: 4 MMOL/L (ref 0–18)
BASOPHILS # BLD AUTO: 0.04 X10(3) UL (ref 0–0.2)
BASOPHILS NFR BLD AUTO: 0.4 %
BUN BLD-MCNC: 12 MG/DL (ref 9–23)
BUN/CREAT SERPL: 15.4 (ref 10–20)
CALCIUM BLD-MCNC: 9 MG/DL (ref 8.7–10.4)
CHLORIDE SERPL-SCNC: 110 MMOL/L (ref 98–112)
CO2 SERPL-SCNC: 29 MMOL/L (ref 21–32)
CREAT BLD-MCNC: 0.78 MG/DL
DEPRECATED RDW RBC AUTO: 40.5 FL (ref 35.1–46.3)
EGFRCR SERPLBLD CKD-EPI 2021: 93 ML/MIN/1.73M2 (ref 60–?)
EOSINOPHIL # BLD AUTO: 0.1 X10(3) UL (ref 0–0.7)
EOSINOPHIL NFR BLD AUTO: 1 %
ERYTHROCYTE [DISTWIDTH] IN BLOOD BY AUTOMATED COUNT: 12.4 % (ref 11–15)
GLUCOSE BLD-MCNC: 93 MG/DL (ref 70–99)
HCT VFR BLD AUTO: 38.1 %
HGB BLD-MCNC: 12.9 G/DL
IMM GRANULOCYTES # BLD AUTO: 0.03 X10(3) UL (ref 0–1)
IMM GRANULOCYTES NFR BLD: 0.3 %
LYMPHOCYTES # BLD AUTO: 2.15 X10(3) UL (ref 1–4)
LYMPHOCYTES NFR BLD AUTO: 21.1 %
MAGNESIUM SERPL-MCNC: 1.9 MG/DL (ref 1.6–2.6)
MCH RBC QN AUTO: 30.5 PG (ref 26–34)
MCHC RBC AUTO-ENTMCNC: 33.9 G/DL (ref 31–37)
MCV RBC AUTO: 90.1 FL
MONOCYTES # BLD AUTO: 0.89 X10(3) UL (ref 0.1–1)
MONOCYTES NFR BLD AUTO: 8.7 %
NEUTROPHILS # BLD AUTO: 6.98 X10 (3) UL (ref 1.5–7.7)
NEUTROPHILS # BLD AUTO: 6.98 X10(3) UL (ref 1.5–7.7)
NEUTROPHILS NFR BLD AUTO: 68.5 %
OSMOLALITY SERPL CALC.SUM OF ELEC: 295 MOSM/KG (ref 275–295)
PHOSPHATE SERPL-MCNC: 2.8 MG/DL (ref 2.4–5.1)
PLATELET # BLD AUTO: 186 10(3)UL (ref 150–450)
POTASSIUM SERPL-SCNC: 4 MMOL/L (ref 3.5–5.1)
RBC # BLD AUTO: 4.23 X10(6)UL
SODIUM SERPL-SCNC: 143 MMOL/L (ref 136–145)
WBC # BLD AUTO: 10.2 X10(3) UL (ref 4–11)

## 2024-12-08 PROCEDURE — 99233 SBSQ HOSP IP/OBS HIGH 50: CPT | Performed by: HOSPITALIST

## 2024-12-08 NOTE — PROGRESS NOTES
[facility]  part of [facility]    Progress Note    Benjamin Ralph Patient Status:  Inpatient    6/10/1950 MRN [id number]   Location [facility] 4W/SW/SE Attending Keaagn Ernandez MD   Hosp Day # 2 PCP Felix Metzger MD         Subjective:   Subjective:  Patient awake, lying in bed.  Physical therapy at bedside.  Patient reports that he is continuing to have pain.  He is planning to go to an acute rehab.  Denies any numbness or tingling.  Objective:   Blood pressure 154/81, pulse 93, temperature 98.4 °F (36.9 °C), temperature source Oral, resp. rate 16, height 5' 11\" (1.803 m), weight 192 lb (87.1 kg), SpO2 93%.  Physical Exam  Lower extremity skin healthy, no pitting edema.  Calf soft nontender.  Dressing on, clean and dry.  Active range of motion 10 to 90 degrees, able to push him 0 to 110 degrees.  Able to straight leg raise left leg, difficulty with right leg straight leg raise.  Denies numbness and tingling  Results:   Lab Results   Component Value Date    WBC 10.2 2024    HGB 12.9 (L) 2024    HCT 38.1 (L) 2024    .0 2024    CREATSERUM 0.78 2024    BUN 12 2024     2024    K 4.0 2024     2024    CO2 29.0 2024    GLU 93 2024    CA 9.0 2024    ALB 3.6 2024    ALKPHO 76 2024    BILT 0.9 2024    TP 6.9 2024    AST 17 2024    ALT 17 2024    T4F 0.91 10/02/2015    TSH 1.183 10/16/2024    PSA 0.70 10/16/2024    ESRML 3 2018    MG 1.9 2024    PHOS 2.8 2024       XR KNEE (1 OR 2 VIEWS), RIGHT (CPT=73560)    Result Date: 2024  CONCLUSION:   Expected postoperative changes from right total knee arthroplasty, which are described above.     Dictated by (CST): Thien Bardales MD on 2024 at 12:13 PM     Finalized by (CST): Thien Bardales MD on 2024 at 12:14 PM               Assessment & Plan:     Osteoarthritis of right  knee  Patient doing well postop day #2 from right total knee replacement.  Motion is good.  He will continue working with physical therapy for the remainder of his hospital stay.  He is planning discharge to acute rehab.      Preop testing  As above      S/P total knee replacement using cement, right  As above              SOURAV Arthur  12/8/2024

## 2024-12-08 NOTE — OCCUPATIONAL THERAPY NOTE
Hold at this time. During PT session, Pt w significant drop in BP and symptomatic. RN requesting pt remain bed level and is contacting MD. Will follow up as able and appropriate.

## 2024-12-08 NOTE — PROGRESS NOTES
[facility]  part of [facility]    Progress Note    Benjamin Ralph Patient Status:  Inpatient    6/10/1950 MRN [id number]   Location [facility] 4W/SW/SE Attending Keagan Ernandez MD   Hosp Day # 2 PCP Felix Metzger MD       Subjective:   Benjamin Ralph is a(n) 74 year old male was seen and examined  Lying in bed, NAD, pain controlled  Yesterday and today had hypotensive episode upon standing  Currently resolved  No dizziness or blurry vision   No acute events overnight     Objective:   Blood pressure 154/81, pulse 93, temperature 98.4 °F (36.9 °C), temperature source Oral, resp. rate 16, height 5' 11\" (1.803 m), weight 192 lb (87.1 kg), SpO2 93%.    GENERAL:  Awake and alert, in no acute distress.  HEART:  Regular rhythm.  Regular rate   LUNGS:  Air entry was minimally decreased.  No crackles or wheezes   ABDOMEN: Soft and non-tender.  Bowel sounds were present.  MUSCULOSKELETAL: Right lower extremity with dressing in place  PSYCHIATRIC: Normal mood  NEURO: Non-focal     Current Inpatient Medications:     Current Facility-Administered Medications:     tamsulosin (Flomax) cap 0.4 mg, 0.4 mg, Oral, Daily @ 0700    finasteride (Proscar) tab 5 mg, 5 mg, Oral, Daily    rosuvastatin (Crestor) tab 10 mg, 10 mg, Oral, Nightly    sennosides (Senokot) tab 17.2 mg, 17.2 mg, Oral, Nightly    docusate sodium (Colace) cap 100 mg, 100 mg, Oral, BID    polyethylene glycol (PEG 3350) (Miralax) 17 g oral packet 17 g, 17 g, Oral, Daily PRN    magnesium hydroxide (Milk of Magnesia) 400 MG/5ML oral suspension 30 mL, 30 mL, Oral, Daily PRN    bisacodyl (Dulcolax) 10 MG rectal suppository 10 mg, 10 mg, Rectal, Daily PRN    fleet enema (Fleet) rectal enema 133 mL, 1 enema, Rectal, Once PRN    ondansetron (Zofran) 4 MG/2ML injection 4 mg, 4 mg, Intravenous, Q6H PRN    metoclopramide (Reglan) 5 mg/mL injection 10 mg, 10 mg, Intravenous, Q8H PRN    diphenhydrAMINE (Benadryl) cap/tab 25 mg, 25 mg, Oral,  Q4H PRN **OR** diphenhydrAMINE (Benadryl) 50 mg/mL  injection 12.5 mg, 12.5 mg, Intravenous, Q4H PRN    sodium chloride 0.9% infusion, , Intravenous, Continuous    aspirin DR tab 325 mg, 325 mg, Oral, BID    acetaminophen (Tylenol) tab 650 mg, 650 mg, Oral, Q8H PRN    HYDROcodone-acetaminophen (Norco)  MG per tab 1 tablet, 1 tablet, Oral, Q4H PRN    calcium carbonate-vitamin D (Oyster Shell-D) 250-3.125 MG-MCG per tab 1 tablet, 1 tablet, Oral, BID with meals    multivitamin (Tab-A-Krista/Beta Carotene) tab 1 tablet, 1 tablet, Oral, Daily    naproxen (Naprosyn) tab 250 mg, 250 mg, Oral, BID with meals    Assessment and Plan:   Primary osteoarthritis of right knee  - s/p R knee arthroplasty on 12/6  -Completing perioperative abx  - cont pain control, close monitoring with IV narcotics  - Encourage Incentive spirometry  - PT/OT per Ortho  - F/u further Ortho recs     Orthostatic hypotension  - likely multifactorial from hypovolemia, narcotics  - cont maintenance IVF  - check echo  - monitor on tele     Hyperlipidemia  -Statin     BPH  -Restart finasteride  -added tamsulosin    DVT Ppx: ASA  Full code    Results:     Recent Labs   Lab 12/07/24  0448 12/08/24  0459   RBC  --  4.23   HGB 14.1 12.9*   HCT 41.9 38.1*   MCV  --  90.1   MCH  --  30.5   MCHC  --  33.9   RDW  --  12.4   NEPRELIM  --  6.98   WBC  --  10.2   PLT  --  186.0         Recent Labs   Lab 12/07/24  0448 12/08/24  0459   * 93   BUN 17 12   CREATSERUM 0.92 0.78   EGFRCR 87 93   CA 9.3 9.0    143   K 4.7 4.0    110   CO2 28.0 29.0         Imaging:   XR KNEE (1 OR 2 VIEWS), RIGHT (CPT=73560)    Result Date: 12/6/2024  CONCLUSION:   Expected postoperative changes from right total knee arthroplasty, which are described above.     Dictated by (CST): Thien Bardales MD on 12/06/2024 at 12:13 PM     Finalized by (CST): Thien Bardales MD on 12/06/2024 at 12:14 PM                 Keagan Ernandez MD  12/8/2024

## 2024-12-08 NOTE — PLAN OF CARE
Pt is A&O x4. Breathing on room air. ASA and SCDs for DVT prophylaxis. Voiding via Urinal. Given Norco prn for pain. D.C. plan is [facility] pending for med clear and pain controlled. Call light within reach. Safety precaution in place.     Problem: Patient Centered Care  Goal: Patient preferences are identified and integrated in the patient's plan of care  Description: Interventions:  - What would you like us to know as we care for you?   - Provide timely, complete, and accurate information to patient/family  - Incorporate patient and family knowledge, values, beliefs, and cultural backgrounds into the planning and delivery of care  - Encourage patient/family to participate in care and decision-making at the level they choose  - Honor patient and family perspectives and choices  Outcome: Progressing     Problem: Patient/Family Goals  Goal: Patient/Family Long Term Goal  Description: Patient's Long Term Goal: able to perform daily living activities with mild pain    Interventions:  - PT/OT  - Pain managment  - See additional Care Plan goals for specific interventions  Outcome: Progressing  Goal: Patient/Family Short Term Goal  Description: Patient's Short Term Goal: Pain level <3/10    Interventions:   - pain med prn  - Non-pharmacological interventions  - See additional Care Plan goals for specific interventions  Outcome: Progressing     Problem: MUSCULOSKELETAL - ADULT  Goal: Return mobility to safest level of function  Description: INTERVENTIONS:  - Assess patient stability and activity tolerance for standing, transferring and ambulating w/ or w/o assistive devices  - Assist with transfers and ambulation using safe patient handling equipment as needed  - Ensure adequate protection for wounds/incisions during mobilization  - Obtain PT/OT consults as needed  - Advance activity as appropriate  - Communicate ordered activity level and limitations with patient/family  Outcome: Progressing  Goal: Maintain proper  alignment of affected body part  Description: INTERVENTIONS:  - Support and protect limb and body alignment per provider's orders  - Instruct and reinforce with patient and family use of appropriate assistive device and precautions (e.g. spinal or hip dislocation precautions)  Outcome: Progressing     Problem: Impaired Functional Mobility  Goal: Achieve highest/safest level of mobility/gait  Description: Interventions:  - Assess patient's functional ability and stability  - Promote increasing activity/tolerance for mobility and gait  - Educate and engage patient/family in tolerated activity level and precautions  - Recommend use of  RW for transfers and ambulation  Outcome: Progressing     Problem: PAIN - ADULT  Goal: Verbalizes/displays adequate comfort level or patient's stated pain goal  Description: INTERVENTIONS:  - Encourage pt to monitor pain and request assistance  - Assess pain using appropriate pain scale  - Administer analgesics based on type and severity of pain and evaluate response  - Implement non-pharmacological measures as appropriate and evaluate response  - Consider cultural and social influences on pain and pain management  - Manage/alleviate anxiety  - Utilize distraction and/or relaxation techniques  - Monitor for opioid side effects  - Notify MD/LIP if interventions unsuccessful or patient reports new pain  - Anticipate increased pain with activity and pre-medicate as appropriate  Outcome: Progressing     Problem: RISK FOR INFECTION - ADULT  Goal: Absence of fever/infection during anticipated neutropenic period  Description: INTERVENTIONS  - Monitor WBC  - Administer growth factors as ordered  - Implement neutropenic guidelines  Outcome: Progressing     Problem: SAFETY ADULT - FALL  Goal: Free from fall injury  Description: INTERVENTIONS:  - Assess pt frequently for physical needs  - Identify cognitive and physical deficits and behaviors that affect risk of falls.  - [place] fall precautions  as indicated by assessment.  - Educate pt/family on patient safety including physical limitations  - Instruct pt to call for assistance with activity based on assessment  - Modify environment to reduce risk of injury  - Provide assistive devices as appropriate  - Consider OT/PT consult to assist with strengthening/mobility  - Encourage toileting schedule  Outcome: Progressing     Problem: DISCHARGE PLANNING  Goal: Discharge to home or other facility with appropriate resources  Description: INTERVENTIONS:  - Identify barriers to discharge w/pt and caregiver  - Include patient/family/discharge partner in discharge planning  - Arrange for needed discharge resources and transportation as appropriate  - Identify discharge learning needs (meds, wound care, etc)  - Arrange for interpreters to assist at discharge as needed  - Consider post-discharge preferences of patient/family/discharge partner  - Complete POLST form as appropriate  - Assess patient's ability to be responsible for managing their own health  - Refer to Case Management Department for coordinating discharge planning if the patient needs post-hospital services based on physician/LIP order or complex needs related to functional status, cognitive ability or social support system  Outcome: Progressing

## 2024-12-08 NOTE — PHYSICAL THERAPY NOTE
PHYSICAL THERAPY KNEE EVALUATION - INPATIENT      Room Number: 407/407-A  Evaluation Date: 12/8/2024  Type of Evaluation: Initial  Physician Order: PT Eval and Treat    Presenting Problem: Pt is s/p right TKA . Pt is WBAT for right LE post sx .  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  Prior to admission, patient's baseline is indep with ADL , working , community ambulation with no AD , denies falls , and able to drive .     Pt with pain rated at 8/10 right knee post sx contributing to muscle guarding   and symptomatic low BP during activity limiting progression of fxn mobility and exercises . Pt with difficulty following directions for therapy exercise instruction requiring constant repetition , mod cues with lack of retention of education provided noted.  Handout and extra time was provided for therapy exercise instruction See vitals in flow sheet during therapy session.        Patient is currently functioning below baseline with bed mobility, transfers, gait, stair negotiation, maintaining seated position, standing prolonged periods, and performing household tasks.  Patient is requiring  min to mod assist for safety  as a result of the following impairments: decreased functional strength, decreased endurance/aerobic capacity, pain, impaired standing balance, decreased muscular endurance, medical status, limited right knee  ROM, and low BP  .  Physical Therapy will continue to follow for duration of hospitalization.    Patient will benefit from continued skilled PT Services to promote return to prior level of function and safety with continuous  assistance and gradual rehabilitative therapy .    PLAN DURING HOSPITALIZATION  Nursing Mobility Recommendation :  (unable to progress OOB due to low BP)  PT Device Recommendation: Rolling walker;Gait belt (pt has PUW  / cane    step over tub    has shower chair)  PT Treatment Plan: Bed mobility;Body mechanics;Endurance;Energy conservation;Patient education;Family education;Gait training;Range of motion;Strengthening;Stair training;Transfer training;Balance training  Rehab Potential : Good  Frequency (Obs): Daily     PHYSICAL THERAPY MEDICAL/SOCIAL HISTORY   History related to current admission: Postop now        Right knee arthritis status post right total knee arthroplasty postop day 1 patient doing very well.  His motion is very good both passively and actively of her postop day #1.  I went over  Self rehab, medications, and wound care.  He will be transferred to [facility] when accepted.  Continue vigorous postoperative rehab.        Arnold Sanchez MD  2024        Problem List  Principal Problem:    Osteoarthritis of right knee  Active Problems:    Preop testing    S/P total knee replacement using cement, right      HOME SITUATION  Type of Home: House  Home Layout: One level  Stairs to Enter : 4   Railing: Yes              Lives With: Alone    Drives: Yes   Patient Regularly Uses: None (Indep ADL and community mobility with no AD , denies falls working par time able to to drive)          SUBJECTIVE  \"I did all these exercises before surgery but I was not in this much pain , It hurts , will this pain go away \"   Ortho MD team present during session aware of pt pain--pt was medicated for session     PHYSICAL THERAPY EXAMINATION   OBJECTIVE  Precautions: Limb alert - right  Fall Risk: High fall risk    WEIGHT BEARING RESTRICTION  R Lower Extremity: Weight Bearing as Tolerated      PAIN ASSESSMENT  Ratin  Location: resting  right knee pain  \"it is so stiff \"  Management Techniques: Activity  promotion;Body mechanics;Breathing techniques;Relaxation;Repositioning    COGNITION  Overall Cognitive Status:  WFL - within functional limits  Pt appears distracted by significant pain , difficulty with comprehension and retention of therapy education noted     RANGE OF MOTION AND STRENGTH ASSESSMENT  Upper extremity ROM and strength are within functional limits   Left Lower extremity ROM is within functional limits   Left Lower extremity strength is within functional limits       Right knee ROM not formally assessed--ortho MD team in room providing ROM assessment during therapy visit.   Therapy initiated instruction of post op TKA therapy exercises using handout with poor tolerance .   B AP / B QS and right HS initiated.   Pt with symptomatic drop in BP therefore unable to progress to sitting knee exercises this session .       BALANCE  Static Sitting: Fair +  Dynamic Sitting: Not tested  Static Standing: Poor +  Dynamic Standing: Poor +       Pt denies any numbness and tingling of LE     ACTIVITY TOLERANCE  Pulse: 93        BP: 154/81 (resting    standing with onset of symptoms 89/68       worsening symptoms with return to sit therefore return supine   BP returned to baseline of 145/68)             O2 WALK  Oxygen Therapy  SPO2% on Room Air at Rest: 97    AM-PAC '6-Clicks' INPATIENT SHORT FORM - BASIC MOBILITY  How much difficulty does the patient currently have...  Patient Difficulty: Turning over in bed (including adjusting bedclothes, sheets and blankets)?: A Little   Patient Difficulty: Sitting down on and standing up from a chair with arms (e.g., wheelchair, bedside commode, etc.): A Little   Patient Difficulty: Moving from lying on back to sitting on the side of the bed?: A Little   How much help from another person does the patient currently need...   Help from Another: Moving to and from a bed to a chair (including a wheelchair)?: A Little   Help from Another: Need to walk in hospital room?: A Little    Help from Another: Climbing 3-5 steps with a railing?: A Lot     AM-PAC Score:  Raw Score: 17   Approx Degree of Impairment: 50.57%   Standardized Score (AM-PAC Scale): 42.13   CMS Modifier (G-Code): CK     FUNCTIONAL ABILITY STATUS  Functional Mobility/Gait Assessment  Gait Assistance: Not tested  Distance (ft): unable to progress to ambulation today due to symptomatic drop in BP--stood EOB with RW    Supine to Sit: minimal assist  Pt able to sit at EOB with SBA . Pt denies symptoms therefore progressed to stand     Sit to Stand: moderate assist--once pt standing > pt reporting onset of symptoms . BP assessed in standing with drop to 89/68 from 154/81 .  Pt encouraged to sit at EOB .  Symptoms continue to worsen with need to lay down .  Supine BP  145/68 with slow resolution of symptoms.  RN contacted and arrived in room.  Discussed pt status and response to activity .  RN recommend continue to keep pt supine in bed and RN to contact MD .        Skilled Therapy Provided: PT eval completed,  post op TKA therapy education and exercise initiated, fxn mobility attempt as above with inability to progress OOB due to low BP.  DC Planning. Patient received supine in bed, agreeable to physical therapy. Vital signs monitored as noted above, patient experiencing light headed dizzy , sweating feeling unwell  --post op pain  .     Education Provided To: Patient   Patient Education: Role of Physical Therapy;Plan of Care;Discharge Recommendations;DME Recommendations;Functional Transfer Techniques;Fall Prevention;Weight Bear Status;Surgical Precautions;Posture/Positioning;Proper Body Mechanics;LE HEP for ROM;LE HEP for Strengthening;Gait Training   Patient's Response to Education: Verbalized Understanding;Returned Demonstration;Requires Further Education;Demonstrates Poor Carry Over to Information      The patient's Approx Degree of Impairment: 50.57% has been calculated based on documentation in the [facility] '6 clicks' Inpatient  Basic Mobility Short Form.  Research supports that patients with this level of impairment may benefit from CHERYL .  Final disposition will be made by interdisciplinary medical team.    Patient End of Session: In bed;Needs met;Call light within reach;RN aware of session/findings;All patient questions and concerns addressed;Hospital anti-slip socks;SCDs in place;Ice applied;Alarm set    CURRENT GOALS  Goals to be met by: 12/30/24  Patient Goal Patient's self-stated goal is:CHERYL --pt lives alone with no family support at DC per pt      Goal #1 Patient is able to demonstrate supine - sit EOB @ level: modified independent     Goal #1   Current Status    Goal #2 Patient is able to demonstrate transfers Sit to/from Stand at assistance level: modified independent     Goal #2  Current Status    Goal #3 Patient is able to ambulate 300 feet with assistive device at assistance level: modified independent    Goal #3   Current Status    Goal #4 Patient will negotiate 4 stairs/one curb w/ assistive device and supervision   Goal #4   Current Status    Goal #5  AROM 0 degrees extension to 95 degrees flexion     Goal #5   Current Status    Goal #6 Patient independently performs home exercise program for ROM/strengthening per the instructions provided in preparation for discharge.   Goal #6  Current Status      Patient Evaluation Complexity Level:  History Moderate - 1 or 2 personal factors and/or co-morbidities   Examination of body systems Low -  addressing 1-2 elements   Clinical Presentation  Moderate - Evolving   Clinical Decision Making  Moderate Complexity   PT eval and therapy activity 2 units

## 2024-12-08 NOTE — PLAN OF CARE
Pt is A&O x4. Breathing on room air. ASA and SCDs for DVT prophylaxis. Voiding via Urinal. Given Narco prn for pain. D.C. plan is [facility] pending for med clear and pain controlled.  Pt had hypotensive episode while working with Physical Therapy. MD aware. PT recommended Bed exercises. Helped pt performed. Echo ordered, will be done tomorrow and placed on TELE Call light within reach. Safety precaution in place.   Problem: Patient Centered Care  Goal: Patient preferences are identified and integrated in the patient's plan of care  Description: Interventions:  - What would you like us to know as we care for you?   - Provide timely, complete, and accurate information to patient/family  - Incorporate patient and family knowledge, values, beliefs, and cultural backgrounds into the planning and delivery of care  - Encourage patient/family to participate in care and decision-making at the level they choose  - Honor patient and family perspectives and choices  Outcome: Progressing

## 2024-12-08 NOTE — PLAN OF CARE
Problem: Patient Centered Care  Goal: Patient preferences are identified and integrated in the patient's plan of care  Description: Interventions:  - What would you like us to know as we care for you? \"I live at home alone\"  - Provide timely, complete, and accurate information to patient/family  - Incorporate patient and family knowledge, values, beliefs, and cultural backgrounds into the planning and delivery of care  - Encourage patient/family to participate in care and decision-making at the level they choose  - Honor patient and family perspectives and choices  Outcome: Progressing       Problem: MUSCULOSKELETAL - ADULT  Goal: Return mobility to safest level of function  Description: INTERVENTIONS:  - Assess patient stability and activity tolerance for standing, transferring and ambulating w/ or w/o assistive devices  - Assist with transfers and ambulation using safe patient handling equipment as needed  - Ensure adequate protection for wounds/incisions during mobilization  - Obtain PT/OT consults as needed  - Advance activity as appropriate  - Communicate ordered activity level and limitations with patient/family  Outcome: Progressing  Goal: Maintain proper alignment of affected body part  Description: INTERVENTIONS:  - Support and protect limb and body alignment per provider's orders  - Instruct and reinforce with patient and family use of appropriate assistive device and precautions (e.g. spinal or hip dislocation precautions)  Outcome: Progressing     Problem: Impaired Functional Mobility  Goal: Achieve highest/safest level of mobility/gait  Description: Interventions:  - Assess patient's functional ability and stability  - Promote increasing activity/tolerance for mobility and gait  - Educate and engage patient/family in tolerated activity level and precautions  Outcome: Progressing       Problem: PAIN - ADULT  Goal: Verbalizes/displays adequate comfort level or patient's stated pain goal  Description:  INTERVENTIONS:  - Encourage pt to monitor pain and request assistance  - Assess pain using appropriate pain scale  - Administer analgesics based on type and severity of pain and evaluate response  - Implement non-pharmacological measures as appropriate and evaluate response  - Consider cultural and social influences on pain and pain management  - Manage/alleviate anxiety  - Utilize distraction and/or relaxation techniques  - Monitor for opioid side effects  - Notify MD/LIP if interventions unsuccessful or patient reports new pain  - Anticipate increased pain with activity and pre-medicate as appropriate  Outcome: Progressing     Problem: RISK FOR INFECTION - ADULT  Goal: Absence of fever/infection during anticipated neutropenic period  Description: INTERVENTIONS  - Monitor WBC  - Administer growth factors as ordered  - Implement neutropenic guidelines  Outcome: Progressing     Problem: SAFETY ADULT - FALL  Goal: Free from fall injury  Description: INTERVENTIONS:  - Assess pt frequently for physical needs  - Identify cognitive and physical deficits and behaviors that affect risk of falls.  - [place] fall precautions as indicated by assessment.  - Educate pt/family on patient safety including physical limitations  - Instruct pt to call for assistance with activity based on assessment  - Modify environment to reduce risk of injury  - Provide assistive devices as appropriate  - Consider OT/PT consult to assist with strengthening/mobility  - Encourage toileting schedule  Outcome: Progressing     Problem: DISCHARGE PLANNING  Goal: Discharge to home or other facility with appropriate resources  Description: INTERVENTIONS:  - Identify barriers to discharge w/pt and caregiver  - Include patient/family/discharge partner in discharge planning  - Arrange for needed discharge resources and transportation as appropriate  - Identify discharge learning needs (meds, wound care, etc)  - Arrange for interpreters to assist at  discharge as needed  - Consider post-discharge preferences of patient/family/discharge partner  - Complete POLST form as appropriate  - Assess patient's ability to be responsible for managing their own health  - Refer to Case Management Department for coordinating discharge planning if the patient needs post-hospital services based on physician/LIP order or complex needs related to functional status, cognitive ability or social support system  Outcome: Progressing

## 2024-12-09 ENCOUNTER — APPOINTMENT (OUTPATIENT)
Dept: CV DIAGNOSTICS | Facility: HOSPITAL | Age: 74
End: 2024-12-09
Attending: HOSPITALIST
Payer: MEDICARE

## 2024-12-09 PROCEDURE — 93306 TTE W/DOPPLER COMPLETE: CPT | Performed by: HOSPITALIST

## 2024-12-09 PROCEDURE — 99233 SBSQ HOSP IP/OBS HIGH 50: CPT | Performed by: HOSPITALIST

## 2024-12-09 NOTE — PROGRESS NOTES
[facility]  part of [facility]    Progress Note    Benjamin Ralph Patient Status:  Inpatient    6/10/1950 MRN [id number]   Location [facility] 4W/SW/SE Attending Keagan Ernandez MD   Hosp Day # 3 PCP Felix Metzger MD       Subjective:   Benjamin Ralph is a(n) 74 year old male was seen and examined  Lying in bed, NAD, pain controlled  No dizziness upon standing  No cp, sob, f,c,n,v abd pain or HA   No acute events overnight     Objective:   Blood pressure 142/89, pulse 90, temperature 97.5 °F (36.4 °C), temperature source Temporal, resp. rate 18, height 5' 11\" (1.803 m), weight 192 lb (87.1 kg), SpO2 96%.    GENERAL:  Awake and alert, in no acute distress.  HEART:  Regular rhythm.  Regular rate   LUNGS:  Air entry was minimally decreased.  No crackles or wheezes   ABDOMEN: Soft and non-tender.  Bowel sounds were present.  MUSCULOSKELETAL: Right lower extremity with dressing in place  PSYCHIATRIC: Normal mood  NEURO: Non-focal     Current Inpatient Medications:     Current Facility-Administered Medications:     [Held by provider] tamsulosin (Flomax) cap 0.4 mg, 0.4 mg, Oral, Daily @ 0700    finasteride (Proscar) tab 5 mg, 5 mg, Oral, Daily    rosuvastatin (Crestor) tab 10 mg, 10 mg, Oral, Nightly    sennosides (Senokot) tab 17.2 mg, 17.2 mg, Oral, Nightly    docusate sodium (Colace) cap 100 mg, 100 mg, Oral, BID    polyethylene glycol (PEG 3350) (Miralax) 17 g oral packet 17 g, 17 g, Oral, Daily PRN    magnesium hydroxide (Milk of Magnesia) 400 MG/5ML oral suspension 30 mL, 30 mL, Oral, Daily PRN    bisacodyl (Dulcolax) 10 MG rectal suppository 10 mg, 10 mg, Rectal, Daily PRN    fleet enema (Fleet) rectal enema 133 mL, 1 enema, Rectal, Once PRN    ondansetron (Zofran) 4 MG/2ML injection 4 mg, 4 mg, Intravenous, Q6H PRN    metoclopramide (Reglan) 5 mg/mL injection 10 mg, 10 mg, Intravenous, Q8H PRN    diphenhydrAMINE (Benadryl) cap/tab 25 mg, 25 mg, Oral, Q4H PRN **OR**  diphenhydrAMINE (Benadryl) 50 mg/mL  injection 12.5 mg, 12.5 mg, Intravenous, Q4H PRN    aspirin DR tab 325 mg, 325 mg, Oral, BID    acetaminophen (Tylenol) tab 650 mg, 650 mg, Oral, Q8H PRN    HYDROcodone-acetaminophen (Norco)  MG per tab 1 tablet, 1 tablet, Oral, Q4H PRN    calcium carbonate-vitamin D (Oyster Shell-D) 250-3.125 MG-MCG per tab 1 tablet, 1 tablet, Oral, BID with meals    multivitamin (Tab-A-Krista/Beta Carotene) tab 1 tablet, 1 tablet, Oral, Daily    naproxen (Naprosyn) tab 250 mg, 250 mg, Oral, BID with meals    Assessment and Plan:   Primary osteoarthritis of right knee  - s/p R knee arthroplasty on 12/6  -Completing perioperative abx  - cont pain control, close monitoring with IV narcotics  - Encourage Incentive spirometry  - PT/OT per Ortho  - F/u further Ortho recs     Orthostatic hypotension  - likely multifactorial from hypovolemia, narcotics  - now resolved, stopped flomax  - echo pending   - monitor on tele     Hyperlipidemia  -Statin     BPH  -cont finasteride     DVT Ppx: ASA  Full code    Results:     Recent Labs   Lab 12/07/24  0448 12/08/24  0459   RBC  --  4.23   HGB 14.1 12.9*   HCT 41.9 38.1*   MCV  --  90.1   MCH  --  30.5   MCHC  --  33.9   RDW  --  12.4   NEPRELIM  --  6.98   WBC  --  10.2   PLT  --  186.0         Recent Labs   Lab 12/07/24  0448 12/08/24  0459   * 93   BUN 17 12   CREATSERUM 0.92 0.78   EGFRCR 87 93   CA 9.3 9.0    143   K 4.7 4.0    110   CO2 28.0 29.0         Imaging:   No results found.          Keagan Ernandez MD  12/9/2024

## 2024-12-09 NOTE — OCCUPATIONAL THERAPY NOTE
OCCUPATIONAL THERAPY EVALUATION - INPATIENT     Room Number: 407/407-A  Evaluation Date: 2024  Type of Evaluation: Initial  Presenting Problem: R TKA; WBAT    Physician Order: IP Consult to Occupational Therapy  Reason for Therapy: ADL/IADL Dysfunction and Discharge Planning    OCCUPATIONAL THERAPY ASSESSMENT   Patient is a 74 year old male admitted 2024 for R TKA; WBAT.  Prior to admission, patient's baseline is home alone and independnet.  Patient is currently functioning below baseline with self care and basic mobility.  Patient is requiring up to Mod A  as a result of the following impairments: activity tolerance, pain, mobility, endurance. Occupational Therapy will continue to follow for duration of hospitalization.    Patient will benefit from continued skilled OT Services to promote return to prior level of function and safety with continuous assistance and gradual rehabilitative therapy.    PLAN DURING HOSPITALIZATION  OT Device Recommendations: TBD  OT Treatment Plan: Balance activities;Energy conservation/work simplification techniques;ADL training;Functional transfer training;Endurance training;Patient/Family education;Patient/Family training;Compensatory technique education     OCCUPATIONAL THERAPY MEDICAL/SOCIAL HISTORY   Problem List   Principal Problem:    Osteoarthritis of right knee  Active Problems:    Preop testing    S/P total knee replacement using cement, right    HOME SITUATION  Type of Home: House  Home Layout: One level  Lives With: Alone  Toilet and Equipment: Standard height toilet  Shower/Tub and Equipment: Tub-shower combo  Drives: Yes  Patient Regularly Uses: None  SUBJECTIVE  I can try     OCCUPATIONAL THERAPY EXAMINATION   OBJECTIVE  Precautions: Limb alert - right  Fall Risk: High fall risk    WEIGHT BEARING RESTRICTION  R Lower Extremity: Weight Bearing as Tolerated    PAIN ASSESSMENT  Ratin  Location: knee  Management Techniques: Activity promotion    ACTIVITY  TOLERANCE  Pulse: 90        BP: 142/89 (142/91 sitting EOB; 135/92 after walking)             O2 SATURATIONS       COGNITION  WNL    RANGE OF MOTION   Upper extremity ROM is within functional limits     STRENGTH ASSESSMENT  Upper extremity strength is within functional limits     ACTIVITIES OF DAILY LIVING ASSESSMENT  AM-PAC ‘6-Clicks’ Inpatient Daily Activity Short Form  How much help from another person does the patient currently need…  -   Putting on and taking off regular lower body clothing?: A Lot  -   Bathing (including washing, rinsing, drying)?: A Lot  -   Toileting, which includes using toilet, bedpan or urinal? : A Lot  -   Putting on and taking off regular upper body clothing?: A Little  -   Taking care of personal grooming such as brushing teeth?: A Little  -   Eating meals?: A Little    AM-PAC Score:  Score: 15  Approx Degree of Impairment: 56.46%  Standardized Score (AM-PAC Scale): 34.69  CMS Modifier (G-Code): CK    FUNCTIONAL TRANSFER ASSESSMENT  Sit to Stand: Edge of Bed  Edge of Bed: Minimal Assist    BED MOBILITY  Rolling: Minimal Assist  Supine to Sit : Minimal Assist  Sit to Supine (OT): Minimal Assist  Scooting: Min A    BALANCE ASSESSMENT  Static Sitting: Stand-by Assist  Static Standing: Minimal Assist    FUNCTIONAL ADL ASSESSMENT  Eating: Independent  Grooming Seated: Independent  Bathing Seated: Minimal Assist  UB Dressing Seated: Stand-by Assist  LB Dressing Seated: Moderate Assist  Toileting Seated: Moderate Assist    THERAPEUTIC EXERCISE     Skilled Therapy Provided: TF training, ADL retraining, up and ambulatory <household distances with chair follow and frequent standing rest breaks; up to mod A for mobility related self care 2/2 and requiring increased assist for all mobility; pt is not safe to d/c home alone at this level; will benefit from continued rehab in [facility] for next level of care; BP stable throughout evaluation (starting 142/89 ending 135/92)    EDUCATION PROVIDED  Patient  Education : Role of Occupational Therapy; Plan of Care; Discharge Recommendations; Functional Transfer Techniques; Fall Prevention; Posture/Positioning  Patient's Response to Education: Verbalized Understanding    The patient's Approx Degree of Impairment: 56.46% has been calculated based on documentation in the [facility] '6 clicks' Inpatient Daily Activity Short Form.  Research supports that patients with this level of impairment may benefit from GR.  Final disposition will be made by interdisciplinary medical team.    Patient End of Session: Up in chair;Needs met;RN aware of session/findings;Call light within reach;All patient questions and concerns addressed    OT Goals  Patient self-stated goal is: no goals stated      Patient will complete LE dressing with SBA   Comment:     Patient will complete toilet transfer with SBA   Comment:     Patient will complete self care task at sink level with  SBA  Comment:     Comment:         Goals  on:   Frequency:3-5x week     Patient Evaluation Complexity Level:   Occupational Profile/Medical History LOW - Brief history including review of medical or therapy records    Specific performance deficits impacting engagement in ADL/IADL LOW  1 - 3 performance deficits    Client Assessment/Performance Deficits LOW - No comorbidities nor modifications of tasks    Clinical Decision Making LOW - Analysis of occupational profile, problem-focused assessments, limited treatment options    Overall Complexity LOW     OT Session Time: 23 minutes  Self-Care Home Management: 8 minutes  Therapeutic Activity: 15 minutes    Tay Childers, Occupational Therapist, OTR/L ext [phone]

## 2024-12-09 NOTE — PLAN OF CARE
Patient has been ambulating 1 assist w/ walker. Pain is being controlled w/ Norco PRN. Has been tolerating diet, no n/v reported. Has been voiding using urinal/ambulating to the bathroom. Has aspirin and scds while in bed for dvt prophylaxis. Dc plain pending rehab. ECHO done today at bedside.     Problem: Patient Centered Care  Goal: Patient preferences are identified and integrated in the patient's plan of care  Description: Interventions:  - What would you like us to know as we care for you? I am going to rehab after the hospital   - Provide timely, complete, and accurate information to patient/family  - Incorporate patient and family knowledge, values, beliefs, and cultural backgrounds into the planning and delivery of care  - Encourage patient/family to participate in care and decision-making at the level they choose  - Honor patient and family perspectives and choices  Outcome: Progressing     Problem: Patient/Family Goals  Goal: Patient/Family Long Term Goal  Description: Patient's Long Term Goal: To keep pain level <5    Interventions:  - Pain medications  - ambulation  - walker   - See additional Care Plan goals for specific interventions  Outcome: Progressing  Goal: Patient/Family Short Term Goal  Description: Patient's Short Term Goal: to go home     Interventions:   - follow plan care   - See additional Care Plan goals for specific interventions  Outcome: Progressing     Problem: MUSCULOSKELETAL - ADULT  Goal: Return mobility to safest level of function  Description: INTERVENTIONS:  - Assess patient stability and activity tolerance for standing, transferring and ambulating w/ or w/o assistive devices  - Assist with transfers and ambulation using safe patient handling equipment as needed  - Ensure adequate protection for wounds/incisions during mobilization  - Obtain PT/OT consults as needed  - Advance activity as appropriate  - Communicate ordered activity level and limitations with  patient/family  Outcome: Progressing  Goal: Maintain proper alignment of affected body part  Description: INTERVENTIONS:  - Support and protect limb and body alignment per provider's orders  - Instruct and reinforce with patient and family use of appropriate assistive device and precautions (e.g. spinal or hip dislocation precautions)  Outcome: Progressing     Problem: Impaired Functional Mobility  Goal: Achieve highest/safest level of mobility/gait  Description: Interventions:  - Assess patient's functional ability and stability  - Promote increasing activity/tolerance for mobility and gait  - Educate and engage patient/family in tolerated activity level and precautions  - Recommend use of total lift for transfers  Outcome: Progressing     Problem: PAIN - ADULT  Goal: Verbalizes/displays adequate comfort level or patient's stated pain goal  Description: INTERVENTIONS:  - Encourage pt to monitor pain and request assistance  - Assess pain using appropriate pain scale  - Administer analgesics based on type and severity of pain and evaluate response  - Implement non-pharmacological measures as appropriate and evaluate response  - Consider cultural and social influences on pain and pain management  - Manage/alleviate anxiety  - Utilize distraction and/or relaxation techniques  - Monitor for opioid side effects  - Notify MD/LIP if interventions unsuccessful or patient reports new pain  - Anticipate increased pain with activity and pre-medicate as appropriate  Outcome: Progressing     Problem: RISK FOR INFECTION - ADULT  Goal: Absence of fever/infection during anticipated neutropenic period  Description: INTERVENTIONS  - Monitor WBC  - Administer growth factors as ordered  - Implement neutropenic guidelines  Outcome: Progressing     Problem: SAFETY ADULT - FALL  Goal: Free from fall injury  Description: INTERVENTIONS:  - Assess pt frequently for physical needs  - Identify cognitive and physical deficits and behaviors that  affect risk of falls.  - [place] fall precautions as indicated by assessment.  - Educate pt/family on patient safety including physical limitations  - Instruct pt to call for assistance with activity based on assessment  - Modify environment to reduce risk of injury  - Provide assistive devices as appropriate  - Consider OT/PT consult to assist with strengthening/mobility  - Encourage toileting schedule  Outcome: Progressing

## 2024-12-09 NOTE — PLAN OF CARE
SCD in place. Pain managed with as needed pain medications. Fall precautions in place. Call light in reach.     Problem: Patient Centered Care  Goal: Patient preferences are identified and integrated in the patient's plan of care  Description: Interventions:  - What would you like us to know as we care for you? None stated  - Provide timely, complete, and accurate information to patient/family  - Incorporate patient and family knowledge, values, beliefs, and cultural backgrounds into the planning and delivery of care  - Encourage patient/family to participate in care and decision-making at the level they choose  - Honor patient and family perspectives and choices  Outcome: Progressing     Problem: Patient/Family Goals  Goal: Patient/Family Long Term Goal  Description: Patient's Long Term Goal: Free of pain    Interventions:  - monitor pain score  - See additional Care Plan goals for specific interventions  Outcome: Progressing  Goal: Patient/Family Short Term Goal  Description: Patient's Short Term Goal: free of falls    Interventions:   - fall precautions in place.   - See additional Care Plan goals for specific interventions  Outcome: Progressing     Problem: MUSCULOSKELETAL - ADULT  Goal: Return mobility to safest level of function  Description: INTERVENTIONS:  - Assess patient stability and activity tolerance for standing, transferring and ambulating w/ or w/o assistive devices  - Assist with transfers and ambulation using safe patient handling equipment as needed  - Ensure adequate protection for wounds/incisions during mobilization  - Obtain PT/OT consults as needed  - Advance activity as appropriate  - Communicate ordered activity level and limitations with patient/family  Outcome: Progressing  Goal: Maintain proper alignment of affected body part  Description: INTERVENTIONS:  - Support and protect limb and body alignment per provider's orders  - Instruct and reinforce with patient and family use of  appropriate assistive device and precautions (e.g. spinal or hip dislocation precautions)  Outcome: Progressing     Problem: Impaired Functional Mobility  Goal: Achieve highest/safest level of mobility/gait  Description: Interventions:  - Assess patient's functional ability and stability  - Promote increasing activity/tolerance for mobility and gait  - Educate and engage patient/family in tolerated activity level and precautions  - Recommend use of  RW for transfers and ambulation  Outcome: Progressing     Problem: PAIN - ADULT  Goal: Verbalizes/displays adequate comfort level or patient's stated pain goal  Description: INTERVENTIONS:  - Encourage pt to monitor pain and request assistance  - Assess pain using appropriate pain scale  - Administer analgesics based on type and severity of pain and evaluate response  - Implement non-pharmacological measures as appropriate and evaluate response  - Consider cultural and social influences on pain and pain management  - Manage/alleviate anxiety  - Utilize distraction and/or relaxation techniques  - Monitor for opioid side effects  - Notify MD/LIP if interventions unsuccessful or patient reports new pain  - Anticipate increased pain with activity and pre-medicate as appropriate  Outcome: Progressing     Problem: RISK FOR INFECTION - ADULT  Goal: Absence of fever/infection during anticipated neutropenic period  Description: INTERVENTIONS  - Monitor WBC  - Administer growth factors as ordered  - Implement neutropenic guidelines  Outcome: Progressing     Problem: SAFETY ADULT - FALL  Goal: Free from fall injury  Description: INTERVENTIONS:  - Assess pt frequently for physical needs  - Identify cognitive and physical deficits and behaviors that affect risk of falls.  - [place] fall precautions as indicated by assessment.  - Educate pt/family on patient safety including physical limitations  - Instruct pt to call for assistance with activity based on assessment  - Modify  environment to reduce risk of injury  - Provide assistive devices as appropriate  - Consider OT/PT consult to assist with strengthening/mobility  - Encourage toileting schedule  Outcome: Progressing     Problem: DISCHARGE PLANNING  Goal: Discharge to home or other facility with appropriate resources  Description: INTERVENTIONS:  - Identify barriers to discharge w/pt and caregiver  - Include patient/family/discharge partner in discharge planning  - Arrange for needed discharge resources and transportation as appropriate  - Identify discharge learning needs (meds, wound care, etc)  - Arrange for interpreters to assist at discharge as needed  - Consider post-discharge preferences of patient/family/discharge partner  - Complete POLST form as appropriate  - Assess patient's ability to be responsible for managing their own health  - Refer to Case Management Department for coordinating discharge planning if the patient needs post-hospital services based on physician/LIP order or complex needs related to functional status, cognitive ability or social support system  Outcome: Progressing

## 2024-12-09 NOTE — PHYSICAL THERAPY NOTE
PHYSICAL THERAPY KNEE TREATMENT NOTE - INPATIENT     Room Number: 407/407-A             Presenting Problem: Pt is s/p right TKA . Pt is WBAT for right LE post sx .  Co-Morbidities : 2nd toe amputation R foot    Problem List  Principal Problem:    Osteoarthritis of right knee  Active Problems:    Preop testing    S/P total knee replacement using cement, right      PHYSICAL THERAPY ASSESSMENT   Patient demonstrates limited progress this session, goals  remain in progress.      Patient is requiring minimal assist as a result of the following impairments: decreased functional strength, decreased endurance/aerobic capacity, pain, and decreased muscular endurance.     Patient continues to function below baseline with bed mobility, transfers, gait, stair negotiation, and standing prolonged periods.  Next session anticipate patient to progress bed mobility, transfers, gait, stair negotiation, and standing prolonged periods.  Physical Therapy will continue to follow patient for duration of hospitalization.    Patient continues to benefit from continued skilled PT services: to promote return to prior level of function and safety with continuous assistance and gradual rehabilitative therapy     PLAN DURING HOSPITALIZATION  Nursing Mobility Recommendation : 1 Assist  PT Device Recommendation: Rolling walker;Gait belt (pt has PUW  / cane    step over tub    has shower chair)  PT Treatment Plan: Bed mobility;Body mechanics;Coordination;Endurance;Energy conservation;Patient education;Gait training;Range of motion;Strengthening;Stoop training;Stair training;Transfer training;Balance training  Frequency (Obs): Daily     SUBJECTIVE  Pt was agreeable to therapy session.         OBJECTIVE  Precautions: Limb alert - right    WEIGHT BEARING STATUS  R Lower Extremity: Weight Bearing as Tolerated      PAIN ASSESSMENT   Rating:  (did not rate)  Location: R  knee  Management Techniques: Activity promotion;Body  mechanics;Relaxation;Repositioning    BALANCE  Static Sitting: Fair +  Dynamic Sitting: Fair  Static Standing: Poor +  Dynamic Standing: Poor +    ACTIVITY TOLERANCE           BP: (!) 135/92  BP Location: Right arm  BP Method: Automatic  Patient Position: Sitting    O2 WALK       AM-PAC '6-Clicks' INPATIENT SHORT FORM - BASIC MOBILITY  How much difficulty does the patient currently have...  Patient Difficulty: Turning over in bed (including adjusting bedclothes, sheets and blankets)?: A Little   Patient Difficulty: Sitting down on and standing up from a chair with arms (e.g., wheelchair, bedside commode, etc.): A Little   Patient Difficulty: Moving from lying on back to sitting on the side of the bed?: A Little   How much help from another person does the patient currently need...   Help from Another: Moving to and from a bed to a chair (including a wheelchair)?: A Little   Help from Another: Need to walk in hospital room?: A Little   Help from Another: Climbing 3-5 steps with a railing?: A Lot     AM-PAC Score:  Raw Score: 17   Approx Degree of Impairment: 50.57%   Standardized Score (AM-PAC Scale): 42.13   CMS Modifier (G-Code): CK    FUNCTIONAL ABILITY STATUS  Functional Mobility/Gait Assessment  Gait Assistance: Minimum assistance  Distance (ft): 75'  Assistive Device: Rolling walker  Pattern: R Decreased stance time  Rolling: minimal assist  Supine to Sit: minimal assist  Sit to Supine:  NT  Sit to Stand: minimal assist    Skilled Therapy Provided: Pt is received in the bed and was cleared for therapy session. Co treat session with OT to maximize pt outcome. Pt's Orthostatic BP was monitored throughout the session. Pt in supine 142/89. Pt is min A with bed mobility and to transfer to the EOB. Pt required some assist with his R LE to the EOB. Pt sat EOB for a few minutes and BP was 142/91. Pt denied any dizziness and light headedness. Pt is min a with sit<>stand transfers with the RW. Pt was able to AMB about  75' with the RW min A for balance and safety and with chair follow for safety. Pt with slow gait and decreased step length with narrow ZACK. Pt required extra time to AMB due to fatigue. Pt was returned back to the room in the chair. Pt is then repositioned and left in the chair with all needs within reach. BP taken after AMB and was 135/92.  Reported to the RN on the status of the pt.     The patient's Approx Degree of Impairment: 50.57% has been calculated based on documentation in the [facility] '6 clicks' Inpatient Basic Mobility Short Form.  Research supports that patients with this level of impairment may benefit from Rehab.  Final disposition will be made by interdisciplinary medical team.        Knee ROM                 Patient End of Session: Up in chair;Needs met;Call light within reach;RN aware of session/findings;All patient questions and concerns addressed;Hospital anti-slip socks    CURRENT GOALS  Goals to be met by: 12/30/24  Patient Goal Patient's self-stated goal is:[facility] --pt lives alone with no family support at DC per pt      Goal #1 Patient is able to demonstrate supine - sit EOB @ level: modified independent     Goal #1   Current Status min  A   Goal #2 Patient is able to demonstrate transfers Sit to/from Stand at assistance level: modified independent     Goal #2  Current Status Min A with the RW from the height of the bed    Goal #3 Patient is able to ambulate 300 feet with assistive device at assistance level: modified independent    Goal #3   Current Status 75' with the RW min A and with chair follow for safety.    Goal #4 Patient will negotiate 4 stairs/one curb w/ assistive device and supervision   Goal #4   Current Status NT   Goal #5  AROM 0 degrees extension to 95 degrees flexion     Goal #5   Current Status IN PROGRESS   Goal #6 Patient independently performs home exercise program for ROM/strengthening per the instructions provided in preparation for discharge.   Goal #6  Current Status IN  PROGRESS       Therapeutic Activity: 25 minutes

## 2024-12-09 NOTE — PLAN OF CARE
Problem: Patient Centered Care  Goal: Patient preferences are identified and integrated in the patient's plan of care  Description: Interventions:  - What would you like us to know as we care for you?   - Provide timely, complete, and accurate information to patient/family  - Incorporate patient and family knowledge, values, beliefs, and cultural backgrounds into the planning and delivery of care  - Encourage patient/family to participate in care and decision-making at the level they choose  - Honor patient and family perspectives and choices  12/8/2024 1817 by Sandra Birmingham, RN  Outcome: Progressing  12/8/2024 0747 by Sandra Birmingham, RN  Outcome: Progressing

## 2024-12-10 VITALS
HEIGHT: 71 IN | OXYGEN SATURATION: 99 % | DIASTOLIC BLOOD PRESSURE: 73 MMHG | TEMPERATURE: 98 F | RESPIRATION RATE: 18 BRPM | BODY MASS INDEX: 26.88 KG/M2 | SYSTOLIC BLOOD PRESSURE: 158 MMHG | HEART RATE: 110 BPM | WEIGHT: 192 LBS

## 2024-12-10 PROCEDURE — 99239 HOSP IP/OBS DSCHRG MGMT >30: CPT | Performed by: HOSPITALIST

## 2024-12-10 NOTE — PLAN OF CARE
Patient is alert and oriented x4, on RA, and tele. No BM overnight, offered the patient prune juice in the morning. Voiding freely with the exception of urinal at the bedside overnight. Up 1 standby assist. Pain is being managed by Norco PRN. Saline locked. Call light is within reach. Plan for discharge tomorrow.   Problem: MUSCULOSKELETAL - ADULT  Goal: Return mobility to safest level of function  Description: INTERVENTIONS:  - Assess patient stability and activity tolerance for standing, transferring and ambulating w/ or w/o assistive devices  - Assist with transfers and ambulation using safe patient handling equipment as needed  - Ensure adequate protection for wounds/incisions during mobilization  - Obtain PT/OT consults as needed  - Advance activity as appropriate  - Communicate ordered activity level and limitations with patient/family  Outcome: Progressing     Problem: Impaired Functional Mobility  Goal: Achieve highest/safest level of mobility/gait  Description: Interventions:  - Assess patient's functional ability and stability  - Promote increasing activity/tolerance for mobility and gait  - Educate and engage patient/family in tolerated activity level and precautions  - Recommend patient transfer to bedside chair toward strongest side  - Recommend use of chair position in bed 3 times per day  Outcome: Progressing     Problem: PAIN - ADULT  Goal: Verbalizes/displays adequate comfort level or patient's stated pain goal  Description: INTERVENTIONS:  - Encourage pt to monitor pain and request assistance  - Assess pain using appropriate pain scale  - Administer analgesics based on type and severity of pain and evaluate response  - Implement non-pharmacological measures as appropriate and evaluate response  - Consider cultural and social influences on pain and pain management  - Manage/alleviate anxiety  - Utilize distraction and/or relaxation techniques  - Monitor for opioid side effects  - Notify MD/LIP if  interventions unsuccessful or patient reports new pain  - Anticipate increased pain with activity and pre-medicate as appropriate  Outcome: Progressing     Problem: RISK FOR INFECTION - ADULT  Goal: Absence of fever/infection during anticipated neutropenic period  Description: INTERVENTIONS  - Monitor WBC  - Administer growth factors as ordered  - Implement neutropenic guidelines  Outcome: Progressing     Problem: SAFETY ADULT - FALL  Goal: Free from fall injury  Description: INTERVENTIONS:  - Assess pt frequently for physical needs  - Identify cognitive and physical deficits and behaviors that affect risk of falls.  - [place] fall precautions as indicated by assessment.  - Educate pt/family on patient safety including physical limitations  - Instruct pt to call for assistance with activity based on assessment  - Modify environment to reduce risk of injury  - Provide assistive devices as appropriate  - Consider OT/PT consult to assist with strengthening/mobility  - Encourage toileting schedule  Outcome: Progressing     Problem: DISCHARGE PLANNING  Goal: Discharge to home or other facility with appropriate resources  Description: INTERVENTIONS:  - Identify barriers to discharge w/pt and caregiver  - Include patient/family/discharge partner in discharge planning  - Arrange for needed discharge resources and transportation as appropriate  - Identify discharge learning needs (meds, wound care, etc)  - Arrange for interpreters to assist at discharge as needed  - Consider post-discharge preferences of patient/family/discharge partner  - Complete POLST form as appropriate  - Assess patient's ability to be responsible for managing their own health  - Refer to Case Management Department for coordinating discharge planning if the patient needs post-hospital services based on physician/LIP order or complex needs related to functional status, cognitive ability or social support system  Outcome: Progressing

## 2024-12-10 NOTE — DISCHARGE SUMMARY
[facility]  part of [facility]    Discharge Summary    Benjamin Ralph Patient Status:  Inpatient    6/10/1950 MRN [id number]   Location [facility] 4W/SW/SE Attending Keagan Ernandez MD   Hosp Day # 4 PCP Felix Metzger MD     Date of Admission: 2024 Disposition: Home or Self Care     Date of Discharge: 12/10/24    Admitting Diagnosis: Primary osteoarthritis of right knee [M17.11]  Primary osteoarthritis of right knee    Hospital Discharge Diagnoses: Primary OA of R knee s/p arthroplasty    Lace+ Score: 54  59-90 High Risk  29-58 Medium Risk  0-28   Low Risk.    TCM Follow-Up Recommendation:  LACE 29-58: Moderate Risk of readmission after discharge from the hospital.    Problem List:   Patient Active Problem List   Diagnosis    Actinic keratosis    Inflamed seborrheic keratosis    Melanoma of left upper arm (HCC)    Enlarged prostate    Combined arterial insufficiency and corporo-venous occlusive erectile dysfunction    Personal history of malignant melanoma of skin    Basal cell carcinoma of nose    Preop testing    History of basal cell carcinoma    Hypercholesterolemia    History of actinic keratoses    Seasonal allergies    Sun-damaged skin    Right kidney stone    Basal cell carcinoma of left side of neck    Cervical radiculopathy    Osteoarthritis of right knee    Hamstring tendinitis of right thigh    Tear of meniscus of right knee as current injury    S/P total knee replacement using cement, right       Reason for Admission: elective R knee surgery    Physical Exam:   Vitals:    12/10/24 1153   BP: 158/73   Pulse: 110   Resp: 18   Temp:      GENERAL:  Awake and alert, in no acute distress.  HEART:  Regular rhythm.  Regular rate   LUNGS:  Air entry was minimally decreased.  No crackles or wheezes   ABDOMEN: Soft and non-tender.  Bowel sounds were present.  MUSCULOSKELETAL: Right lower extremity with dressing in place  PSYCHIATRIC: Normal mood  NEURO: Non-focal      History of Present Illness:   Per Dr. Sanchez  This 74 year old male presents today with persistent right knee pain despite injections, therapy, and arthroscopy.  X-rays show near bone-on-bone of the medial compartment.  MRI shows bone marrow edema in the medial compartment as well.  Although the lateral compartment looks relatively normal on MRI, he has significant lateral joint line pain in addition to the medial pain.  He does not have much patellofemoral pain.  He is here to talk about knee replacement.     He lives by himself in a house with stairs.  He said his sister in [place] can come in and spend a week or 2 with him postoperatively.  He works as a  which she said is mostly desk work.  He stays in good shape physically and bikes and goes to the gym regularly and continues to do so.  He drives and does not use a cane or walker.  He is a non-smoker.  He is no longer on oxycodone as listed on his medical chart.     He had the right second toe removed in April 2024.  There is no sign of infection and the wound is healed.  He denies numbness and tingling.  BMI is 27.     Hospital Course:   Primary osteoarthritis of right knee  - s/p R knee arthroplasty on 12/6  - pain controlled  - cont dvt ppx  - PT/OT evaluated, dc to [facility]       Orthostatic hypotension  - likely multifactorial from hypovolemia, narcotics  - now resolved, stopped flomax  - echo pending -> WNL     Hyperlipidemia  -Statin     BPH  -cont finasteride     Consultations: Ortho    Procedures: none    Complications: none    Discharge Condition: Stable    Discharge Medications:      Discharge Medications        START taking these medications        Instructions Prescription details   acetaminophen 325 MG Tabs  Commonly known as: Tylenol  Replaces: Acetaminophen 500 MG Caps      Take 2 tablets (650 mg total) by mouth every 8 (eight) hours as needed. Maximum 4000 mg Tylenol/acetaminophen daily from all sources.  Each Norco has 325  mg of Tylenol/acetaminophen in it.   Quantity: 60 tablet  Refills: 0     aspirin 325 MG Tbec      Take 1 tablet (325 mg total) by mouth in the morning and 1 tablet (325 mg total) before bedtime. Do not crush.   Quantity: 60 tablet  Refills: 0     calcium carbonate-vitamin D 250-3.125 MG-MCG Tabs  Commonly known as: Oyster Shell-D      Take 1 tablet by mouth 2 (two) times daily with meals.   Quantity: 60 tablet  Refills: 0     docusate sodium 100 MG Caps  Commonly known as: COLACE      Take 100 mg by mouth 2 (two) times daily. Do not crush. Stop if loose stool   Quantity: 20 capsule  Refills: 0     HYDROcodone-acetaminophen  MG Tabs  Commonly known as: Norco      Take 1 tablet by mouth every 6 (six) hours as needed. No alcohol or driving on this medication.  Stop if lethargic or hallucinating.  Maximum 4000 mg Tylenol/acetaminophen daily from all sources.  Recall each Norco has 325 mg of Tylenol/acetaminophen in it.   Quantity: 25 tablet  Refills: 0     multivitamin Tabs      Take 1 tablet by mouth daily.   Quantity: 60 tablet  Refills: 0     naproxen 250 MG Tabs  Commonly known as: Naprosyn      Take 1 tablet (250 mg total) by mouth 2 (two) times daily with meals. Take with food.  Stop if stomach upset.   Quantity: 28 tablet  Refills: 0            CHANGE how you take these medications        Instructions Prescription details   dutasteride 0.5 MG Caps  Commonly known as: Avodart  What changed: when to take this      Take 1 capsule (0.5 mg total) by mouth daily.   Quantity: 90 capsule  Refills: 3            CONTINUE taking these medications        Instructions Prescription details   rosuvastatin 10 MG Tabs  Commonly known as: Crestor      Take 1 tablet (10 mg total) by mouth nightly.   Quantity: 90 tablet  Refills: 3     sildenafil 20 MG Tabs  Commonly known as: Revatio      Take one tablet PO daily as needed   Quantity: 10 tablet  Refills: 5            STOP taking these medications      Acetaminophen 500 MG  Caps  Replaced by: acetaminophen 325 MG Tabs                  Where to Get Your Medications        These medications were sent to [id number] - [place], [place] - [address] [phone], [phone]  [address] [address]      Phone: [phone]   acetaminophen 325 MG Tabs  aspirin 325 MG Tbec  calcium carbonate-vitamin D 250-3.125 MG-MCG Tabs  docusate sodium 100 MG Caps  multivitamin Tabs  naproxen 250 MG Tabs       Please  your prescriptions at the location directed by your doctor or nurse    Bring a paper prescription for each of these medications  HYDROcodone-acetaminophen  MG Tabs         Greater than 35 minutes spent, >50% spent counseling re: treatment plan and workup     Keagan Ernandez MD  12/10/2024

## 2024-12-10 NOTE — PHYSICAL THERAPY NOTE
Chart reviewed for PT treatment, attempted to see patient. Per RN pt just received a suppository so not ready for PT right now. He is leaving at 1230 for rehab - will check back later as time allows and pending dc. RN aware

## 2024-12-10 NOTE — PLAN OF CARE
Patient has been ambulating 1 assist w/ walker. Pain is being controlled w/ Norco PRN. Has been tolerating diet, no n/v reported. Has been voiding using urinal/ambulating to the bathroom. Given miralax and suppository to help w/ BM.Has aspirin and scds while in bed for dvt prophylaxis. Plan is for pt to dc to rehab facility today. Rx and dc instructions provided to patient and medicar transportation.      Report given to HENNA Drew.    Problem: Patient Centered Care  Goal: Patient preferences are identified and integrated in the patient's plan of care  Description: Interventions:  - What would you like us to know as we care for you? I am ready to go to rehab  - Provide timely, complete, and accurate information to patient/family  - Incorporate patient and family knowledge, values, beliefs, and cultural backgrounds into the planning and delivery of care  - Encourage patient/family to participate in care and decision-making at the level they choose  - Honor patient and family perspectives and choices  Outcome: Adequate for Discharge     Problem: Patient/Family Goals  Goal: Patient/Family Long Term Goal  Description: Patient's Long Term Goal: To keep pain level <5    Interventions:  - Pain medications  - ambulation  - walker   - See additional Care Plan goals for specific interventions  Outcome: Adequate for Discharge  Goal: Patient/Family Short Term Goal  Description: Patient's Short Term Goal: to go home    Interventions:   - follow plan of care  - See additional Care Plan goals for specific interventions  Outcome: Adequate for Discharge     Problem: MUSCULOSKELETAL - ADULT  Goal: Return mobility to safest level of function  Description: INTERVENTIONS:  - Assess patient stability and activity tolerance for standing, transferring and ambulating w/ or w/o assistive devices  - Assist with transfers and ambulation using safe patient handling equipment as needed  - Ensure adequate protection for wounds/incisions during  mobilization  - Obtain PT/OT consults as needed  - Advance activity as appropriate  - Communicate ordered activity level and limitations with patient/family  Outcome: Adequate for Discharge  Goal: Maintain proper alignment of affected body part  Description: INTERVENTIONS:  - Support and protect limb and body alignment per provider's orders  - Instruct and reinforce with patient and family use of appropriate assistive device and precautions (e.g. spinal or hip dislocation precautions)  Outcome: Adequate for Discharge     Problem: Impaired Functional Mobility  Goal: Achieve highest/safest level of mobility/gait  Description: Interventions:  - Assess patient's functional ability and stability  - Promote increasing activity/tolerance for mobility and gait  - Educate and engage patient/family in tolerated activity level and precautions  - Recommend patient transfer to bedside chair toward strongest side  Outcome: Adequate for Discharge     Problem: PAIN - ADULT  Goal: Verbalizes/displays adequate comfort level or patient's stated pain goal  Description: INTERVENTIONS:  - Encourage pt to monitor pain and request assistance  - Assess pain using appropriate pain scale  - Administer analgesics based on type and severity of pain and evaluate response  - Implement non-pharmacological measures as appropriate and evaluate response  - Consider cultural and social influences on pain and pain management  - Manage/alleviate anxiety  - Utilize distraction and/or relaxation techniques  - Monitor for opioid side effects  - Notify MD/LIP if interventions unsuccessful or patient reports new pain  - Anticipate increased pain with activity and pre-medicate as appropriate  Outcome: Adequate for Discharge     Problem: RISK FOR INFECTION - ADULT  Goal: Absence of fever/infection during anticipated neutropenic period  Description: INTERVENTIONS  - Monitor WBC  - Administer growth factors as ordered  - Implement neutropenic  guidelines  Outcome: Adequate for Discharge     Problem: SAFETY ADULT - FALL  Goal: Free from fall injury  Description: INTERVENTIONS:  - Assess pt frequently for physical needs  - Identify cognitive and physical deficits and behaviors that affect risk of falls.  - [place] fall precautions as indicated by assessment.  - Educate pt/family on patient safety including physical limitations  - Instruct pt to call for assistance with activity based on assessment  - Modify environment to reduce risk of injury  - Provide assistive devices as appropriate  - Consider OT/PT consult to assist with strengthening/mobility  - Encourage toileting schedule  Outcome: Adequate for Discharge     Problem: DISCHARGE PLANNING  Goal: Discharge to home or other facility with appropriate resources  Description: INTERVENTIONS:  - Identify barriers to discharge w/pt and caregiver  - Include patient/family/discharge partner in discharge planning  - Arrange for needed discharge resources and transportation as appropriate  - Identify discharge learning needs (meds, wound care, etc)  - Arrange for interpreters to assist at discharge as needed  - Consider post-discharge preferences of patient/family/discharge partner  - Complete POLST form as appropriate  - Assess patient's ability to be responsible for managing their own health  - Refer to Case Management Department for coordinating discharge planning if the patient needs post-hospital services based on physician/LIP order or complex needs related to functional status, cognitive ability or social support system  Outcome: Adequate for Discharge

## 2024-12-10 NOTE — CONGREGATE LIVING REVIEW
Formerly [facility] Living Authorization    The [facility] Review Committee has reviewed this case and the patient IS APPROVED for discharge to a facility for Short Term Skilled once the following procedure is followed:     - The physician discharge instructions (contained within the [facility] note for SNF) must inlcude the below appropriate and approved COVID instructions to the facility    For questions regarding [facility] approval process, please contact the CM assigned to the case.  For questions regarding RN discharge workflow, please contact the unit Clinical Leader.

## 2024-12-24 ENCOUNTER — HOSPITAL ENCOUNTER (OUTPATIENT)
Dept: GENERAL RADIOLOGY | Facility: HOSPITAL | Age: 74
Discharge: HOME | End: 2024-12-24
Payer: MEDICARE

## 2024-12-24 ENCOUNTER — HOSPITAL ENCOUNTER (OUTPATIENT)
Dept: ULTRASOUND IMAGING | Facility: HOSPITAL | Age: 74
Discharge: HOME | End: 2024-12-24
Payer: MEDICARE

## 2024-12-24 ENCOUNTER — OFFICE VISIT (OUTPATIENT)
Dept: ORTHOPEDICS CLINIC | Facility: CLINIC | Age: 74
End: 2024-12-24

## 2024-12-24 ENCOUNTER — TELEPHONE (OUTPATIENT)
Dept: ORTHOPEDICS CLINIC | Facility: CLINIC | Age: 74
End: 2024-12-24

## 2024-12-24 DIAGNOSIS — Z96.651 S/P TKR (TOTAL KNEE REPLACEMENT), RIGHT: ICD-10-CM

## 2024-12-24 DIAGNOSIS — Z47.89 ORTHOPEDIC AFTERCARE: ICD-10-CM

## 2024-12-24 DIAGNOSIS — M79.604 PAIN AND SWELLING OF LOWER EXTREMITY, RIGHT: ICD-10-CM

## 2024-12-24 DIAGNOSIS — M79.89 PAIN AND SWELLING OF LOWER EXTREMITY, RIGHT: ICD-10-CM

## 2024-12-24 DIAGNOSIS — Z96.651 S/P TKR (TOTAL KNEE REPLACEMENT), RIGHT: Primary | ICD-10-CM

## 2024-12-24 DIAGNOSIS — M17.11 PRIMARY OSTEOARTHRITIS OF RIGHT KNEE: ICD-10-CM

## 2024-12-24 PROCEDURE — 99024 POSTOP FOLLOW-UP VISIT: CPT

## 2024-12-24 PROCEDURE — 93971 EXTREMITY STUDY: CPT

## 2024-12-24 PROCEDURE — 73562 X-RAY EXAM OF KNEE 3: CPT

## 2024-12-24 RX ORDER — HYDROCODONE BITARTRATE AND ACETAMINOPHEN 10; 325 MG/1; MG/1
1 TABLET ORAL EVERY 6 HOURS PRN
Qty: 25 TABLET | Refills: 0 | Status: SHIPPED | OUTPATIENT
Start: 2024-12-24

## 2024-12-24 NOTE — TELEPHONE ENCOUNTER
Spoke with patient and let him know results. Also advised about the compression stockings. Verbalized understanding. He is agreeable to plan. No further questions or concerns at this time.

## 2024-12-24 NOTE — PROGRESS NOTES
NURSING INTAKE COMMENTS:   Chief Complaint   Patient presents with    Post-Op     1st POV R knee - Pain is constant. Rates pain 6-7/10. No fever or chills.        HPI: This 74 year old male presents today approximately 2.5 weeks status post right total knee replacement.  Patient is still in an acute rehab facility.  Reports overall he is doing well.  He is working hard in physical therapy.  Does report pain, continue to take Norco as needed.  Denies any numbness or tingling.  Does report some calf pain in his right leg.  Denies any fever, chills, night sweats.  Ambulating with a walker today.    Past Medical History:    Basal cell carcinoma    left nose    Basal cell carcinoma of nose    BCC (basal cell carcinoma)     left posterior lateral neck    BCC (basal cell carcinoma)    right ala base    Calculus of kidney    left    Chronic headaches    Chronic pansinusitis    Chronic sinusitis    Congestion of nasal sinus    COVID-19    Esophageal reflux    Ganglion of flexor tendon sheath of right thumb    High cholesterol    Melanoma (HCC)    left arm, stage I; .75 mm depth    Osteoarthritis    Plantar wart    right heel    Pneumonia due to organism    2016    Visual impairment    Glasses     Past Surgical History:   Procedure Laterality Date    Adj tiss xfer lid,nos,ear <10sqcm Right 2-17-17    Exc lesion of nose, V_Y flap    Colonoscopy      Colonoscopy      Colonoscopy N/A 12/26/2018    Procedure: COLONOSCOPY;  Surgeon: Isidoro Mosley MD;  Location: [facility] ENDOSCOPY    Exc skin malig 2.1-3cm face,facial  05/18/2022    Excis tendon sheath lesn,hand/fingr Right 2-17-17    Exc ganglion R thumb    Foot surgery Left 2006    Nerve procedure    Full graft proc head,fac,hand <20sqc  05/18/2022    Nasal scopy,remv part ethmoid      Nasal scopy,rmv tiss maxill sinus      Repair of nasal septum  1985    Repair of nasal septum      Repr cmpl wnd head,fac,hand 2.6-7.5  05/18/2022    Skin surgery Left 10/07/16    left upper arm  melanoma     Current Outpatient Medications   Medication Sig Dispense Refill    HYDROcodone-acetaminophen  MG Oral Tab Take 1 tablet by mouth every 6 (six) hours as needed. No alcohol or driving on this medication.  Stop if lethargic or hallucinating.  Maximum 4000 mg Tylenol/acetaminophen daily from all sources.  Recall each Norco has 325 mg of Tylenol/acetaminophen in it. 25 tablet 0    acetaminophen 325 MG Oral Tab Take 2 tablets (650 mg total) by mouth every 8 (eight) hours as needed. Maximum 4000 mg Tylenol/acetaminophen daily from all sources.  Each Norco has 325 mg of Tylenol/acetaminophen in it. 60 tablet 0    aspirin 325 MG Oral Tab EC Take 1 tablet (325 mg total) by mouth in the morning and 1 tablet (325 mg total) before bedtime. Do not crush. 60 tablet 0    calcium carbonate-vitamin D 250-3.125 MG-MCG Oral Tab Take 1 tablet by mouth 2 (two) times daily with meals. 60 tablet 0    docusate sodium 100 MG Oral Cap Take 100 mg by mouth 2 (two) times daily. Do not crush. Stop if loose stool 20 capsule 0    multivitamin Oral Tab Take 1 tablet by mouth daily. 60 tablet 0    naproxen 250 MG Oral Tab Take 1 tablet (250 mg total) by mouth 2 (two) times daily with meals. Take with food.  Stop if stomach upset. 28 tablet 0    rosuvastatin 10 MG Oral Tab Take 1 tablet (10 mg total) by mouth nightly. 90 tablet 3    dutasteride 0.5 MG Oral Cap Take 1 capsule (0.5 mg total) by mouth daily. (Patient taking differently: Take 1 capsule (0.5 mg total) by mouth every evening.) 90 capsule 3    sildenafil 20 MG Oral Tab Take one tablet PO daily as needed 10 tablet 5     Allergies[1]  Family History   Problem Relation Age of Onset    Dementia Mother     Seizure Disorder Mother     Hypertension Mother     Cancer Mother         skin    Cancer Father         Leukemia     No family Hx of DVT/PE    Social History     Occupational History    Occupation:    Tobacco Use    Smoking status: Former     Current  packs/day: 0.00     Types: Cigarettes     Quit date: 3/25/1978     Years since quittin.7     Passive exposure: Never    Smokeless tobacco: Never   Vaping Use    Vaping status: Never Used   Substance and Sexual Activity    Alcohol use: Yes     Comment: rarely maybe a glass of wine a week    Drug use: Yes     Types: Cannabis     Comment: gummies    Sexual activity: Not Currently        Review of Systems:  GENERAL: feels generally well, no significant weight loss or weight gain  SKIN: no ulcerated or worrisome skin lesions  EYES:denies blurred vision or double vision  HEENT: denies new nasal congestion, sinus pain or ST  LUNGS: denies shortness of breath  CARDIOVASCULAR: denies chest pain  GI: no hematemesis, no worsening heartburn, no diarrhea  : no dysuria, no blood in urine, no difficulty urinating, no incontinence  MUSCULOSKELETAL: no other musculoskeletal complaints other than in HPI  NEURO: no numbness or tingling, no weakness or balance disorder  PSYCHE: no depression or anxiety  HEMATOLOGIC: no hx of blood dyscrasia, no Hx DVT/PE  ENDOCRINE: no thyroid or diabetes issues  ALL/ASTHMA: no new hx of severe allergy or asthma    Physical Examination:    There were no vitals taken for this visit.  Constitutional: appears well hydrated, alert and responsive, no acute distress noted  Extremities: Right lower extremity has trace pitting edema.  Calf has tenderness to palpation.  Incision healing well, staples removed today.  Musculoskeletal: Mild asymmetric warmth and swelling of right knee, consistent with postoperative changes.  Right knee range of motion 5 to 115 degrees, no instability.  Patella tracking well.  Able to straight leg raise against resistance.  Able to dorsiflex and plantarflex against resistance.  Neurological: Motor and sensory function intact today.    Imaging: X-rays taken in office show right knee implants are well-fixed and in proper alignment.      XR KNEE (3 VIEWS), RIGHT  (CPT=73562)    Result Date: 2024  PROCEDURE: XR KNEE ROUTINE (3 VIEWS), RIGHT (CPT=73562)  COMPARISON: [facility], XR KNEE (1 OR 2 VIEWS), RIGHT (CPT=73560), 2024, 11:45 AM.  INDICATIONS: Follow-up for right knee surgery.  TECHNIQUE: 3 views were obtained.   FINDINGS:   The patient is status post recent right knee arthroplasty with femoral and tibial components in expected configuration with no radiographic evidence of hardware failure.  There is persistent edema and staples in the overlying soft tissues compatible with recent postsurgical state.         CONCLUSION: Expected postsurgical changes status post recent right knee arthroplasty    Dictated by (CST): Rush Carter MD on 2024 at 8:50 AM     Finalized by (CST): Rush Carter MD on 2024 at 8:51 AM          CARD ECHO 2D DOPPLER (CPT=93306)    Result Date: 2024  Transthoracic Echocardiogram Name:Benjamin Ralph Date: 2024 :  06/10/1950 Ht:  (71in)  BP: 154 / 81 MRN:  [id number]    Age:  74years    Wt:  (192lb) HR: 87bpm Loc:  [facility]       Gndr: M          BSA: 2.07m^2 Sonographer: PAVAN Maurice Ordering:    Keagan Ernandez Consulting:  Lucian Garcia ---------------------------------------------------------------------------- History/Indications:  Orthostatic hypotension. Status post right knee arthroplasty. ---------------------------------------------------------------------------- Procedure information:  A transthoracic complete 2D study was performed. Additional evaluation included M-mode, complete spectral Doppler, and color Doppler.  Patient status:  Inpatient.  Location:  Bedside.    No prior study was available for comparison.    This was a STAT study. Transthoracic echocardiography for diagnosis and ventricular function evaluation. Image quality was adequate. ECG rhythm:   Normal sinus ---------------------------------------------------------------------------- Conclusions: 1. Left  ventricle: The cavity size was normal. Wall thickness was normal.    Systolic function was normal. The estimated ejection fraction was 60-65%,    by 3D assessment. No diagnostic evidence for regional wall motion    abnormalities. Left ventricular diastolic function parameters were    normal. 2. Left atrium: The left atrial volume was normal. Impressions:  No previous study was available for comparison. * ---------------------------------------------------------------------------- * Findings: Left ventricle:  The cavity size was normal. Wall thickness was normal. Systolic function was normal. The estimated ejection fraction was 60-65%, by 3D assessment. No diagnostic evidence for regional wall motion abnormalities. Left ventricular diastolic function parameters were normal. Left atrium:  The atrium was normal in size. The left atrial volume was normal. Right ventricle:  The cavity size was normal. Systolic function was normal. Right atrium:  The atrium was normal in size. Mitral valve:  The annulus was mildly calcified. Leaflet separation was normal.  Doppler:  Transvalvular velocity was within the normal range. There was no evidence for stenosis. There was trivial regurgitation. Aortic valve:  Not well visualized.  The valve was probably trileaflet. The leaflets were mildly calcified.  Doppler:  Transvalvular velocity was within the normal range. There was no evidence for stenosis. There was no regurgitation. Tricuspid valve:  The valve is structurally normal. Leaflet separation was normal.  Doppler:  Transvalvular velocity was within the normal range. There was no evidence for stenosis. There was trivial regurgitation. Pulmonic valve:   Poorly visualized.  Doppler:  Transvalvular velocity was within the normal range. There was no evidence for stenosis. There was no significant regurgitation. Pericardium:   There was no pericardial effusion. Aorta: Aortic root: The aortic root was normal. Pulmonary arteries:  Systolic pressure could not be accurately estimated. Systemic veins:  Poorly visualized. ---------------------------------------------------------------------------- Measurements  Left ventricle                    Value        Ref  LV end-diastolic volume, 3D       117   ml     ---------  LV end-systolic volume, 3D        42    ml     ---------  LV ejection fraction, 3D          64    %      52 - 72  LV end-diastolic volume/bsa,      56    ml/m^2 <=79  3D  LV end-systolic volume/bsa,       20    ml/m^2 <=32  3D  LV wall mass, 3D                  157   g      ---------  LV wall mass/bsa, 3D              76    g/m^2  ---------  IVS thickness, ED, PLAX           0.9   cm     0.6 - 1.0  LV ID, ED, PLAX                   4.3   cm     4.2 - 5.8  LV ID, ES, PLAX                   2.6   cm     2.5 - 4.0  LV PW thickness, ED, PLAX         0.9   cm     0.6 - 1.0  IVS/LV PW ratio, ED, PLAX         1.00         ---------  LV PW/LV ID ratio, ED, PLAX       0.21         ---------  LV ejection fraction              70    %      52 - 72  Stroke volume/bsa, 2D             41    ml/m^2 ---------  LV e', lateral                (L) 7.3   cm/sec >=10.0  LV E/e', lateral                  12           <=13  LV e', medial                     9.6   cm/sec >=7.0  LV E/e', medial                   9            ---------  LV e', average                    8.4   cm/sec ---------  LV E/e', average                  10           <=14  LVOT                              Value        Ref  LVOT ID                           2.4   cm     ---------  LVOT peak velocity, S             1.01  m/sec  ---------  LVOT VTI, S                       18.9  cm     ---------  LVOT peak gradient, S             4     mm Hg  ---------  LVOT mean gradient, S             2     mm Hg  ---------  Stroke volume (SV), LVOT DP       86    ml     ---------  Stroke index (SV/bsa), LVOT       41    ml/m^2 ---------  DP  Aortic root                       Value        Ref  Aortic  root ID                    3.7   cm     2.7 - 4.2  Left atrium                       Value        Ref  LA ID, A-P, ES                    3.2   cm     3.0 - 4.0  LA volume, S                  (H) 62    ml     18 - 58  LA volume/bsa, S                  30    ml/m^2 16 - 34  LA volume, ES, 1-p A4C        (H) 60    ml     18 - 58  LA volume, ES, 1-p A2C        (H) 62    ml     18 - 58  LA volume, ES, A/L                67    ml     ---------  LA volume/bsa, ES, A/L            32    ml/m^2 16 - 34  LA/aortic root ratio              0.87         ---------  LA volume, ES, 3D                 49    ml     18 - 58  LA volume/bsa, ES, 3D             24    ml/m^2 ---------  Mitral valve                      Value        Ref  Mitral E-wave peak velocity       0.86  m/sec  ---------  Mitral A-wave peak velocity       1.15  m/sec  ---------  Mitral peak gradient, D           3     mm Hg  ---------  Mitral E/A ratio, peak            0.7          ---------  Right ventricle                   Value        Ref  TAPSE, MM                         1.75  cm     >=1.70  RV s', lateral                    15.1  cm/sec >=9.5 Legend: (L)  and  (H)  kathrin values outside specified reference range. ---------------------------------------------------------------------------- Prepared and electronically signed by Dean Sanon MD 12/09/2024 15:51     XR KNEE (1 OR 2 VIEWS), RIGHT (CPT=73560)    Result Date: 12/6/2024  PROCEDURE: XR KNEE (1 OR 2 VIEWS), RIGHT (CPT=73560)  COMPARISON: [facility], XR KNEE, COMPLETE (4 OR MORE VIEWS), RIGHT (CPT=73564), 9/23/2024, 2:02 PM.  [facility] 2nd Floor, XR KNEE, COMPLETE (4 OR MORE VIEWS), RIGHT (CPT=73564), 4/26/2024, 1:14 PM.  [facility], X KNEE RT, 9/25/2012, 2:59 PM.  INDICATIONS: Status post right total knee arthroplasty  TECHNIQUE: Two views were obtained.   FINDINGS:  BONES: Postoperative changes from right total knee  arthroplasty with patellar resurfacing.  The hardware is in anatomic alignment.  No evidence of hardware related complication.  No acute fracture.  No dislocation. SOFT TISSUES: There is subcutaneous edema and subcutaneous emphysema about the knee.  There are anterior knee skin staples. EFFUSION: There is air and fluid within the joint space. OTHER: Negative.         CONCLUSION:   Expected postoperative changes from right total knee arthroplasty, which are described above.     Dictated by (CST): Thien Bardales MD on 12/06/2024 at 12:13 PM     Finalized by (CST): Thien Bardales MD on 12/06/2024 at 12:14 PM             Lab Results   Component Value Date    WBC 10.2 12/08/2024    HGB 12.9 (L) 12/08/2024    .0 12/08/2024      Lab Results   Component Value Date    GLU 93 12/08/2024    BUN 12 12/08/2024    CREATSERUM 0.78 12/08/2024    GFRNAA 89 06/20/2022    GFRAA 102 06/20/2022        Assessment and Plan:  Diagnoses and all orders for this visit:    S/P TKR (total knee replacement), right  -     Physical Therapy Referral - [facility]  -     US VENOUS DOPPLER LEG RIGHT - DIAG IMG (CPT=93971); Future  -     HYDROcodone-acetaminophen  MG Oral Tab; Take 1 tablet by mouth every 6 (six) hours as needed. No alcohol or driving on this medication.  Stop if lethargic or hallucinating.  Maximum 4000 mg Tylenol/acetaminophen daily from all sources.  Recall each Norco has 325 mg of Tylenol/acetaminophen in it.    Orthopedic aftercare  -     XR KNEE (3 VIEWS), RIGHT (CPT=73562); Future  -     Physical Therapy Referral - [facility]  -     US VENOUS DOPPLER LEG RIGHT - DIAG IMG (CPT=93971); Future  -     HYDROcodone-acetaminophen  MG Oral Tab; Take 1 tablet by mouth every 6 (six) hours as needed. No alcohol or driving on this medication.  Stop if lethargic or hallucinating.  Maximum 4000 mg Tylenol/acetaminophen daily from all sources.  Recall each Norco has 325 mg of Tylenol/acetaminophen in  it.    Primary osteoarthritis of right knee  -     Physical Therapy Referral - [facility]  -     US VENOUS DOPPLER LEG RIGHT - DIAG IMG (CPT=93971); Future  -     HYDROcodone-acetaminophen  MG Oral Tab; Take 1 tablet by mouth every 6 (six) hours as needed. No alcohol or driving on this medication.  Stop if lethargic or hallucinating.  Maximum 4000 mg Tylenol/acetaminophen daily from all sources.  Recall each Norco has 325 mg of Tylenol/acetaminophen in it.    Pain and swelling of lower extremity, right  -     US VENOUS DOPPLER LEG RIGHT - DIAG IMG (CPT=93971); Future        Assessment: As above    Plan: Discussed with the patient he should continue doing his home exercises and self rehab.  I gave him a new prescription to continue physical therapy.  I also gave him a new prescription for Norco.  I printed this prescription, as he is still in the acute rehab.  I also discussed that he should begin wearing his compression stockings during the day but take them off at night.    For his calf pain I ordered a stat venous Doppler ultrasound.    We will see him in 4 weeks for repeat x-rays of his right knee.    Follow Up: No follow-ups on file.    SOURAV Arthur       [1]   Allergies  Allergen Reactions    Penicillin G HIVES     Tolerated Ancef 2 g IV on 12/6/2024 without problem.  States he is not sure if it was from actual drug.  Denies skin peeling, blisters or organ damage    Penicillins HIVES     Tolerated Ancef 2 g IV on 12/6/2024 without problem.  Denies blisters, skin peeling or organ damage

## 2024-12-24 NOTE — TELEPHONE ENCOUNTER
Can you call the pt and let him know about negative DVT results. Should start wearing compression stockings during the day but not at night.

## 2024-12-30 ENCOUNTER — TELEPHONE (OUTPATIENT)
Dept: INTERNAL MEDICINE CLINIC | Facility: CLINIC | Age: 74
End: 2024-12-30

## 2024-12-30 NOTE — TELEPHONE ENCOUNTER
Maritza from [facility] home health is requesting a verbal confirmation if doctor will follow home health orders       Ext 125 for nurse to give verbal. Nurse name is larry

## 2025-01-02 ENCOUNTER — TELEPHONE (OUTPATIENT)
Dept: INTERNAL MEDICINE CLINIC | Facility: CLINIC | Age: 75
End: 2025-01-02

## 2025-01-02 NOTE — TELEPHONE ENCOUNTER
Name and  verified.     Received call from kevyn with home health. She is asking for home health orders for this patient.     She also states the patient was discharged from rehab with \"atorvastatin 20 mg\" but takes \"rosuvastatin 10 mg\" at home.     Dr. Metzger please advise, do you give ok for home health orders and which statin medication should the patient take. Thank you

## 2025-01-03 NOTE — TELEPHONE ENCOUNTER
Home health orders should come through orthopedic surgery.    He should be on rosuvastatin and not atorvastatin.

## 2025-01-03 NOTE — TELEPHONE ENCOUNTER
Spoke to Lalitha, informed of message from Dr. Metzger in regards to statin.   Provided orthopedic surgeon's name and number, Dr. Arnold Sanchez.   Lalitha verbalized understanding.    Transferred call to orthopedics.

## 2025-01-14 ENCOUNTER — OFFICE VISIT (OUTPATIENT)
Dept: INTERNAL MEDICINE CLINIC | Facility: CLINIC | Age: 75
End: 2025-01-14

## 2025-01-14 VITALS
SYSTOLIC BLOOD PRESSURE: 138 MMHG | DIASTOLIC BLOOD PRESSURE: 84 MMHG | WEIGHT: 186 LBS | HEART RATE: 90 BPM | BODY MASS INDEX: 26.04 KG/M2 | HEIGHT: 71 IN

## 2025-01-14 DIAGNOSIS — Z96.651 S/P TOTAL KNEE REPLACEMENT USING CEMENT, RIGHT: ICD-10-CM

## 2025-01-14 DIAGNOSIS — M17.11 PRIMARY OSTEOARTHRITIS OF RIGHT KNEE: Primary | ICD-10-CM

## 2025-01-14 PROCEDURE — 99213 OFFICE O/P EST LOW 20 MIN: CPT | Performed by: INTERNAL MEDICINE

## 2025-01-14 PROCEDURE — G2211 COMPLEX E/M VISIT ADD ON: HCPCS | Performed by: INTERNAL MEDICINE

## 2025-01-14 NOTE — PROGRESS NOTES
Patient ID: Benjamin Ralph is a 74 year old male.  Chief Complaint   Patient presents with    Knee Pain     Pt in for f/u on Right knee replacement, doing well, pain is mild, worse in the evening, 3/10, 12/06- Dr. somers        HISTORY OF PRESENT ILLNESS:   HPI  History of Present Illness  The patient, six weeks post-knee replacement surgery, reports persistent swelling and variable pain levels. The pain is described as mild (2-3/10) in the mornings, increasing to moderate (5-6/10) by late afternoon, particularly after exercise. Despite the discomfort, the patient continues to engage in physical therapy, with one home visit remaining before transitioning to offsite sessions.    The patient notes satisfactory knee flexion, attributing this to the physical therapy. However, he expresses concern about the need for further strengthening. The swelling and pain levels have remained relatively constant over the past week, with no significant improvement noted.    The patient denies experiencing any fevers, chills, chest pain, or shortness of breath. Bowel movements are reported as normal. The patient also expresses concern about his left knee, which has previously undergone a scope procedure, and plans to request an x-ray at his upcoming follow-up appointment with the orthopedic surgeon.    Review of Systems  Ten point review of systems otherwise negative with the exception of HPI and assessment and plan.    MEDICAL HISTORY:     Past Medical History:    Basal cell carcinoma    left nose    Basal cell carcinoma of nose    BCC (basal cell carcinoma)     left posterior lateral neck    BCC (basal cell carcinoma)    right ala base    Calculus of kidney    left    Chronic headaches    Chronic pansinusitis    Chronic sinusitis    Congestion of nasal sinus    COVID-19    Esophageal reflux    Ganglion of flexor tendon sheath of right thumb    High cholesterol    Melanoma (HCC)    left arm, stage I; .75 mm depth     Osteoarthritis    Plantar wart    right heel    Pneumonia due to organism    2016    Visual impairment    Glasses       Past Surgical History:   Procedure Laterality Date    Adj tiss xfer lid,nos,ear <10sqcm Right 2-17-17    Exc lesion of nose, V_Y flap    Colonoscopy      Colonoscopy      Colonoscopy N/A 12/26/2018    Procedure: COLONOSCOPY;  Surgeon: Isidoro Mosley MD;  Location: [facility] ENDOSCOPY    Exc skin malig 2.1-3cm face,facial  05/18/2022    Excis tendon sheath lesn,hand/fingr Right 2-17-17    Exc ganglion R thumb    Foot surgery Left 2006    Nerve procedure    Full graft proc head,fac,hand <20sqc  05/18/2022    Nasal scopy,remv part ethmoid      Nasal scopy,rmv tiss maxill sinus      Repair of nasal septum  1985    Repair of nasal septum      Repr cmpl wnd head,fac,hand 2.6-7.5  05/18/2022    Skin surgery Left 10/07/16    left upper arm melanoma         Current Outpatient Medications:     HYDROcodone-acetaminophen  MG Oral Tab, Take 1 tablet by mouth every 6 (six) hours as needed. No alcohol or driving on this medication.  Stop if lethargic or hallucinating.  Maximum 4000 mg Tylenol/acetaminophen daily from all sources.  Recall each Norco has 325 mg of Tylenol/acetaminophen in it., Disp: 25 tablet, Rfl: 0    acetaminophen 325 MG Oral Tab, Take 2 tablets (650 mg total) by mouth every 8 (eight) hours as needed. Maximum 4000 mg Tylenol/acetaminophen daily from all sources.  Each Norco has 325 mg of Tylenol/acetaminophen in it., Disp: 60 tablet, Rfl: 0    aspirin 325 MG Oral Tab EC, Take 1 tablet (325 mg total) by mouth in the morning and 1 tablet (325 mg total) before bedtime. Do not crush., Disp: 60 tablet, Rfl: 0    calcium carbonate-vitamin D 250-3.125 MG-MCG Oral Tab, Take 1 tablet by mouth 2 (two) times daily with meals., Disp: 60 tablet, Rfl: 0    multivitamin Oral Tab, Take 1 tablet by mouth daily., Disp: 60 tablet, Rfl: 0    rosuvastatin 10 MG Oral Tab, Take 1 tablet (10 mg total) by mouth  nightly., Disp: 90 tablet, Rfl: 3    dutasteride 0.5 MG Oral Cap, Take 1 capsule (0.5 mg total) by mouth daily. (Patient taking differently: Take 1 capsule (0.5 mg total) by mouth every evening.), Disp: 90 capsule, Rfl: 3    sildenafil 20 MG Oral Tab, Take one tablet PO daily as needed, Disp: 10 tablet, Rfl: 5    Allergies:Allergies[1]    Social History     Socioeconomic History    Marital status: Single     Spouse name: Not on file    Number of children: 0    Years of education: Not on file    Highest education level: Not on file   Occupational History    Occupation:    Tobacco Use    Smoking status: Former     Current packs/day: 0.00     Types: Cigarettes     Quit date: 3/25/1978     Years since quittin.8     Passive exposure: Never    Smokeless tobacco: Never   Vaping Use    Vaping status: Never Used   Substance and Sexual Activity    Alcohol use: Yes     Comment: rarely maybe a glass of wine a week    Drug use: Yes     Types: Cannabis     Comment: gummies    Sexual activity: Not Currently   Other Topics Concern     Service Not Asked    Blood Transfusions Not Asked    Caffeine Concern Yes     Comment: coffee, 3cups/day    Occupational Exposure Not Asked    Hobby Hazards Not Asked    Sleep Concern Not Asked    Stress Concern Not Asked    Weight Concern Not Asked    Special Diet Not Asked    Back Care Not Asked    Exercise Not Asked    Bike Helmet Not Asked    Seat Belt Not Asked    Self-Exams Not Asked    Grew up on a farm No    History of tanning No    Outdoor occupation No    Pt has a pacemaker No    Pt has a defibrillator No    Reaction to local anesthetic No   Social History Narrative    Not on file     Social Drivers of Health     Financial Resource Strain: Not on file   Food Insecurity: No Food Insecurity (2024)    Food Insecurity     Food Insecurity: Never true   Transportation Needs: No Transportation Needs (2024)    Transportation Needs     Lack of Transportation:  No     Car Seat: Not on file   Physical Activity: Not on file   Stress: Not on file   Social Connections: Not on file   Housing Stability: Low Risk  (12/6/2024)    Housing Stability     Housing Instability: No     Housing Instability Emergency: Not on file     Crib or Bassinette: Not on file           PHYSICAL EXAM:     Vitals:    01/14/25 1115   BP: 138/84   Pulse: 90   Weight: 186 lb (84.4 kg)   Height: 5' 11\" (1.803 m)       Body mass index is 25.94 kg/m².    Physical Exam  Constitutional:       Appearance: Normal appearance.   Eyes:      General: No scleral icterus.  Cardiovascular:      Rate and Rhythm: Normal rate and regular rhythm.      Pulses: Normal pulses.      Heart sounds: Normal heart sounds. No murmur heard.  Pulmonary:      Effort: Pulmonary effort is normal. No respiratory distress.      Breath sounds: Normal breath sounds. No stridor. No wheezing or rhonchi.   Abdominal:      General: Abdomen is flat. Bowel sounds are normal.      Palpations: Abdomen is soft.   Musculoskeletal:        Legs:    Neurological:      Mental Status: He is alert.   Psychiatric:         Mood and Affect: Mood normal.         Behavior: Behavior normal.           ASSESSMENT/PLAN:   1. Primary osteoarthritis of right knee  Doing well.  Continue physical therapy.  Pain control.  Follow-up with orthopedic surgery.    2. S/P total knee replacement using cement, right  As per #1.    Return in about 3 months (around 4/14/2025) for Routine Follow-Up.    This note was prepared using Dragon Medical voice recognition dictation software. As a result errors may occur. When identified these errors have been corrected. While every attempt is made to correct errors during dictation discrepancies may still exist.    Felix Metzger MD  1/14/2025       [1]   Allergies  Allergen Reactions    Penicillin G HIVES     Tolerated Ancef 2 g IV on 12/6/2024 without problem.  States he is not sure if it was from actual drug.  Denies skin peeling, blisters  or organ damage    Penicillins HIVES     Tolerated Ancef 2 g IV on 12/6/2024 without problem.  Denies blisters, skin peeling or organ damage

## 2025-01-14 NOTE — PROGRESS NOTES
The following individual(s) verbally consented to be recorded using ambient AI listening technology and understand that they can each withdraw their consent to this listening technology at any point by asking the clinician to turn off or pause the recording: yes

## 2025-01-20 ENCOUNTER — TELEPHONE (OUTPATIENT)
Dept: ORTHOPEDICS CLINIC | Facility: CLINIC | Age: 75
End: 2025-01-20

## 2025-01-20 ENCOUNTER — OFFICE VISIT (OUTPATIENT)
Dept: PHYSICAL THERAPY | Age: 75
End: 2025-01-20
Payer: MEDICARE

## 2025-01-20 DIAGNOSIS — Z96.651 S/P TKR (TOTAL KNEE REPLACEMENT), RIGHT: ICD-10-CM

## 2025-01-20 DIAGNOSIS — M17.11 PRIMARY OSTEOARTHRITIS OF RIGHT KNEE: ICD-10-CM

## 2025-01-20 DIAGNOSIS — Z47.89 ORTHOPEDIC AFTERCARE: Primary | ICD-10-CM

## 2025-01-20 PROCEDURE — 97110 THERAPEUTIC EXERCISES: CPT

## 2025-01-20 PROCEDURE — 97161 PT EVAL LOW COMPLEX 20 MIN: CPT

## 2025-01-20 NOTE — PROGRESS NOTES
LOWER EXTREMITY EVALUATION:     Diagnosis:   Orthopedic aftercare (Z47.89)  Primary osteoarthritis of right knee (M17.11)  S/P TKR (total knee replacement), right (Z96.651) Patient:  Benjamin Ralph (74 year old, male)        Referring Provider: Margo Washington  Today's Date   1/20/2025    Precautions:  Drug Allergy   Date of Evaluation: 01/20/25  Next MD visit: 1/21/25  Date of Surgery: 12/6/24     PATIENT SUMMARY   Summary of chief complaints: R knee pain, stiffness, knee swelling  History of current condition: Pt had R TKA on 12/6/24, went to rehab then was dishcarged to PT and had 6 visits. Now coming to start OPPT. Pt had a course of PT here in the late summer/early fall for R knee issues. Pt reports had some delay in early progress in the hospital after this surgery, due to meds he was taking for urination that made his BP drop, and then he had some difficulty getting up and moving around initially. Currently feels he is doing well, and is doing HEP 2x/day.   Pain level: current 2 /10, at best 2 /10, at worst 5 /10 (later in the day)  Description of symptoms: Pain at lateral side of knee just distal to knee joint.   Occupation: pt works FT as a , currently working limited hours, hybrid as needed, mostly in the office, seated at a computer   Leisure activities/Hobbies: walking, biking, concerts, courts plus in [place]   Prior level of function: indep  Current limitations: sleeping, walking a distance, limited to about 4 blocks right now, limited on stair frequency, can't sit more than 1 hour  Pt goals: less stiffness and pain, get regular motion back in the knee, be able to ride and bike, and walk a mile.  Past medical history was reviewed with Benjamin.  Significant findings include: R 2nd toe amputation 4/2024, partial mesicus sx in 7/2024  Benjamin  has a past medical history of Basal cell carcinoma (2017), Basal cell carcinoma of nose (02/17/2017), BCC (basal cell carcinoma) (2021), BCC  (basal cell carcinoma) (2022), Calculus of kidney (2002), Chronic headaches, Chronic pansinusitis (06/28/2017), Chronic sinusitis, Congestion of nasal sinus, COVID-19 (12/21/2020), Esophageal reflux, Ganglion of flexor tendon sheath of right thumb (02/17/2017), High cholesterol, Melanoma (HCC) (08/2016), Osteoarthritis, Plantar wart (06/26/2017), Pneumonia due to organism, and Visual impairment.  He  has a past surgical history that includes repair of nasal septum (1985); foot surgery (Left, 2006); skin surgery (Left, 10/07/16); excis tendon sheath lesn,hand/fingr (Right, 2-17-17); adj tiss xfer lid,nos,ear <10sqcm (Right, 2-17-17); colonoscopy; repair of nasal septum; nasal scopy,rmv tiss maxill sinus; nasal scopy,remv part ethmoid; colonoscopy; colonoscopy (N/A, 12/26/2018); exc skin malig 2.1-3cm face,facial (05/18/2022); repr cmpl wnd head,fac,hand 2.6-7.5 (05/18/2022); and full graft proc head,fac,hand <20sqc (05/18/2022).    ASSESSMENT  Benjamin presents to physical therapy evaluation with primary c/o R knee pain, stiffness, knee swelling. The results of the objective tests and measures show Pt with limited active R knee ext at end range, and minimal decreased stance time on RLE during gait cycle.. Functional deficits include but are not limited to sleeping, walking a distance, limited to about 4 blocks right now, limited on stair frequency, can't sit more than 1 hour. Signs and symptoms are consistent with diagnosis of Orthopedic aftercare (Z47.89)  Primary osteoarthritis of right knee (M17.11)  S/P TKR (total knee replacement), right (Z96.651). Pt and PT discussed evaluation findings, pathology, POC and HEP.  Pt voiced understanding and performs HEP correctly without reported pain. Skilled Physical Therapy is medically necessary to address the above impairments and reach functional goals.    OBJECTIVE:   Musculoskeletal  Observation: incision intact, small scab at lowest quadrant of scar  Palpation: soreness  with palpation at medial and lateral aspects of knee joint   Accessory Motion: patellar motion sup/inf, med/lat, intact but limited     Edema: R knee jt line = 44cm, L knee jt line = 40cm   Special Tests:none     MARINA ROM WNL and Strength (5/5) except below:  (* denotes performed with pain)  Knee   ROM MMT (-/5)    R L R L     Flex 127 137 4 5     Ext (L3) -12 0 5 5       Flexibility:  LE Flexibility R L     Hip Flexor mild goldberg mild goldberg     Hamstrings mild goldberg mild goldberg     ITB         Piriformis         Quads         Gastroc-soleus         Neurological:  Sensation: intact to light touch and pressure     Balance and Functional Mobility:  Gait: pt ambulates on level ground with decreased stance phase   Defer further functional testing to future session    Today's Treatment and Response:   Pt education was provided on exam findings, treatment diagnosis, treatment plan, expectations, and prognosis.  Today's Treatment       1/20/2025   Treatment   Therapeutic Exercise Reviewed HEP pt has been completing from HHPT  Quad set to SLR + exhale x10  Prone hang stretch x1 min  Prone knee flex x12  Standing TKE ball behind knee at wall x10   Therapeutic Exercise Min 15   Evaluation Min 30   Total of Timed Procedures 15   Total of Service Based 30   Total Treatment Time 45         Patient was instructed in and issued a HEP for: Access Code: [email or web address]  URL: [email or web address]  Date: 01/20/2025  Prepared by: Anuradha Vinson  Exercises  - Active Straight Leg Raise with Quad Set  - 2 x daily - 7 x weekly - 2 sets - 10 reps  - Prone Knee Flexion AROM  - 2 x daily - 7 x weekly - 2 sets - 10 reps  - Standing Terminal Knee Extension at Wall with Ball  - 2 x daily - 7 x weekly - 2 sets - 10 reps    Charges:  PT EVAL: Low Complexity, 1 TE  In agreement with evaluation findings and clinical rationale, this evaluation involved LOW COMPLEXITY decision making due to no personal factors/comorbidities, 1-2 body structures  involved/activity limitations, and stable symptoms as documented in the evaluation.                                                                                                                PLAN OF CARE:    Goals: (to be met in 12 visits)   Goals       Therapy Goals      Not Met Progress Toward Partially Met Met   Pt will improve knee extension ROM to 0 deg to allow proper heel strike during gait and terminal knee extension in stance. [] [] [] []   Pt will improve knee AROM flexion to >130 degrees to improve ability to perform stair climbing. [] [] [] []   Pt will improve quad strength to 5/5 to ascend 1 flight of stairs reciprocally without UE assist. [] [] [] []   Pt will increase hip and knee strength to grossly 4+/5 to be able to get up and down from the floor safely. [] [] [] []   Pt will demonstrate increased hip ER/ABD strength to 4+/5 to perform stepping and squatting activities without excessive femoral IR/ADD. [] [] [] []   Pt will improve SLS to 3s to improve safety with gait on uneven surfaces such as grass and gravel. [] [] [] []   Pt will be independent and compliant with comprehensive HEP to maintain progress achieved in PT. [] [] [] []                  Frequency / Duration: Patient will be seen 2x/week or a total of 12  visits over a 90 day period. Treatment will include: Gait training; Manual Therapy; Neuromuscular Re-education; Self-Care Home Management; Therapeutic Activities; Therapeutic Exercise; Home Exercise Program instruction; Electrical stimulation (unattended); Electrical stimulation (attended); Ultrasound    Education or treatment limitation: None   Rehab Potential: good     LEFS Score  LEFS Score: (Patient-Rptd) 66.25 % (1/20/2025 11:22 AM)      Patient/Family/Caregiver was advised of these findings, precautions, and treatment options and has agreed to actively participate in planning and for this course of care.    Thank you for your referral. Please co-sign or sign and return this  letter via fax as soon as possible to [phone]. If you have any questions, please contact me at Dept: [phone]    Sincerely,  Electronically signed by therapist: Anuradha Vinson PT  Physician's certification required: Yes  I certify the need for these services furnished under this plan of treatment and while under my care.    X___________________________________________________ Date____________________    Certification From: 1/20/2025  To:4/20/2025

## 2025-01-20 NOTE — TELEPHONE ENCOUNTER
Fern from medical records dept calling regarding chronic narcotics use out of station needs to be signed and answer yes or no and .      Fax number [phone]

## 2025-01-21 ENCOUNTER — HOSPITAL ENCOUNTER (OUTPATIENT)
Dept: GENERAL RADIOLOGY | Facility: HOSPITAL | Age: 75
Discharge: HOME | End: 2025-01-21
Payer: MEDICARE

## 2025-01-21 ENCOUNTER — OFFICE VISIT (OUTPATIENT)
Dept: ORTHOPEDICS CLINIC | Facility: CLINIC | Age: 75
End: 2025-01-21

## 2025-01-21 DIAGNOSIS — Z96.651 S/P TKR (TOTAL KNEE REPLACEMENT), RIGHT: ICD-10-CM

## 2025-01-21 DIAGNOSIS — Z47.89 ORTHOPEDIC AFTERCARE: Primary | ICD-10-CM

## 2025-01-21 DIAGNOSIS — Z47.89 ORTHOPEDIC AFTERCARE: ICD-10-CM

## 2025-01-21 PROCEDURE — 73562 X-RAY EXAM OF KNEE 3: CPT

## 2025-01-21 PROCEDURE — 99024 POSTOP FOLLOW-UP VISIT: CPT

## 2025-01-21 NOTE — PROGRESS NOTES
NURSING INTAKE COMMENTS:   Chief Complaint   Patient presents with    Post-Op     2nd POV- s/p R TKA sx on 12/06/2024- rates pain 2-6/10 all the time       HPI: This 74 year old male presents today approximately 6 weeks status post right total knee replacement.  Overall patient reports that he is doing well.  Denies any fever, chills, night sweats.  Denies any numbness or tingling.  Ambulating without cane, crutch, or walker.  He has not that he still having some pain and some swelling.  For pain he takes Tylenol and Aleve as needed.  He has been elevating his leg.  He is going to the health [employer] to also work on increased walking.    Patient also noted that he felt like he was compensating on his left knee throughout this time.  His left knee is starting to hurt slightly more.  Past Medical History:    Basal cell carcinoma    left nose    Basal cell carcinoma of nose    BCC (basal cell carcinoma)     left posterior lateral neck    BCC (basal cell carcinoma)    right ala base    Calculus of kidney    left    Chronic headaches    Chronic pansinusitis    Chronic sinusitis    Congestion of nasal sinus    COVID-19    Esophageal reflux    Ganglion of flexor tendon sheath of right thumb    High cholesterol    Melanoma (HCC)    left arm, stage I; .75 mm depth    Osteoarthritis    Plantar wart    right heel    Pneumonia due to organism    2016    Visual impairment    Glasses     Past Surgical History:   Procedure Laterality Date    Adj tiss xfer lid,nos,ear <10sqcm Right 2-17-17    Exc lesion of nose, V_Y flap    Colonoscopy      Colonoscopy      Colonoscopy N/A 12/26/2018    Procedure: COLONOSCOPY;  Surgeon: Isidoro Mosley MD;  Location: [facility] ENDOSCOPY    Exc skin malig 2.1-3cm face,facial  05/18/2022    Excis tendon sheath lesn,hand/fingr Right 2-17-17    Exc ganglion R thumb    Foot surgery Left 2006    Nerve procedure    Full graft proc head,fac,hand <20sqc  05/18/2022    Nasal scopy,remv part ethmoid      Nasal  edd oconnor tiss maxill sinus      Repair of nasal septum  1985    Repair of nasal septum      Repr cmpl wnd head,fac,hand 2.6-7.5  05/18/2022    Skin surgery Left 10/07/16    left upper arm melanoma     Current Outpatient Medications   Medication Sig Dispense Refill    HYDROcodone-acetaminophen  MG Oral Tab Take 1 tablet by mouth every 6 (six) hours as needed. No alcohol or driving on this medication.  Stop if lethargic or hallucinating.  Maximum 4000 mg Tylenol/acetaminophen daily from all sources.  Recall each Norco has 325 mg of Tylenol/acetaminophen in it. 25 tablet 0    acetaminophen 325 MG Oral Tab Take 2 tablets (650 mg total) by mouth every 8 (eight) hours as needed. Maximum 4000 mg Tylenol/acetaminophen daily from all sources.  Each Norco has 325 mg of Tylenol/acetaminophen in it. 60 tablet 0    aspirin 325 MG Oral Tab EC Take 1 tablet (325 mg total) by mouth in the morning and 1 tablet (325 mg total) before bedtime. Do not crush. 60 tablet 0    calcium carbonate-vitamin D 250-3.125 MG-MCG Oral Tab Take 1 tablet by mouth 2 (two) times daily with meals. 60 tablet 0    multivitamin Oral Tab Take 1 tablet by mouth daily. 60 tablet 0    rosuvastatin 10 MG Oral Tab Take 1 tablet (10 mg total) by mouth nightly. 90 tablet 3    dutasteride 0.5 MG Oral Cap Take 1 capsule (0.5 mg total) by mouth daily. (Patient taking differently: Take 1 capsule (0.5 mg total) by mouth every evening.) 90 capsule 3    sildenafil 20 MG Oral Tab Take one tablet PO daily as needed 10 tablet 5     Allergies[1]  Family History   Problem Relation Age of Onset    Dementia Mother     Seizure Disorder Mother     Hypertension Mother     Cancer Mother         skin    Cancer Father         Leukemia     No family Hx of DVT/PE    Social History     Occupational History    Occupation:    Tobacco Use    Smoking status: Former     Current packs/day: 0.00     Types: Cigarettes     Quit date: 3/25/1978     Years since quitting:  46.8     Passive exposure: Never    Smokeless tobacco: Never   Vaping Use    Vaping status: Never Used   Substance and Sexual Activity    Alcohol use: Yes     Comment: rarely maybe a glass of wine a week    Drug use: Yes     Types: Cannabis     Comment: gummies    Sexual activity: Not Currently        Review of Systems:  GENERAL: feels generally well, no significant weight loss or weight gain  SKIN: no ulcerated or worrisome skin lesions  EYES:denies blurred vision or double vision  HEENT: denies new nasal congestion, sinus pain or ST  LUNGS: denies shortness of breath  CARDIOVASCULAR: denies chest pain  GI: no hematemesis, no worsening heartburn, no diarrhea  : no dysuria, no blood in urine, no difficulty urinating, no incontinence  MUSCULOSKELETAL: no other musculoskeletal complaints other than in HPI  NEURO: no numbness or tingling, no weakness or balance disorder  PSYCHE: no depression or anxiety  HEMATOLOGIC: no hx of blood dyscrasia, no Hx DVT/PE  ENDOCRINE: no thyroid or diabetes issues  ALL/ASTHMA: no new hx of severe allergy or asthma    Physical Examination:    There were no vitals taken for this visit.  Constitutional: appears well hydrated, alert and responsive, no acute distress noted  Extremities: Lower extremity has slight pitting edema.  Calf soft nontender.  Incision healing well, 1 small area ofHealing incision.  No drainage or erythema.  Musculoskeletal: Mild asymmetric warmth and swelling of right knee, consistent with postoperative changes.  Right knee range of motion 3 to 125 degrees, no instability.  Able to straight leg raise against resistance.  Able dorsiflex and plantarflex against resistance.  Patella tracking well.  Neurological: Motor and sensory function intact.    Imaging: X-rays taken today in office show right knee implants are well-fixed in proper alignment.      US VENOUS DOPPLER LEG RIGHT - DIAG IMG (CPT=93971)    Result Date: 12/24/2024  PROCEDURE: US VENOUS DOPPLER LEG  RIGHT-DIAG IMG (CPT=93971)  COMPARISON: None.  INDICATIONS: Pain and swelling of lower extremity  TECHNIQUE: Color duplex Doppler venous ultrasound of the right lower extremity was performed in the usual manner.  FINDINGS: The femoral and popliteal veins appear normal.  Normal flow was demonstrated with color and pulsed Doppler.  Visualized portions of the great and small saphenous, posterior tibial, and peroneal veins appear normal.   THROMBI: None visible. COMPRESSIBILITY: Normal. OTHER: Negative.         CONCLUSION: No evidence of right lower extremity DVT.   Dictated by (CST): Gilbert Brown MD on 12/24/2024 at 10:11 AM     Finalized by (CST): Gilbert Brown MD on 12/24/2024 at 10:11 AM          XR KNEE (3 VIEWS), RIGHT (CPT=73562)    Result Date: 12/24/2024  PROCEDURE: XR KNEE ROUTINE (3 VIEWS), RIGHT (CPT=73562)  COMPARISON: [facility], XR KNEE (1 OR 2 VIEWS), RIGHT (CPT=73560), 12/06/2024, 11:45 AM.  INDICATIONS: Follow-up for right knee surgery.  TECHNIQUE: 3 views were obtained.   FINDINGS:   The patient is status post recent right knee arthroplasty with femoral and tibial components in expected configuration with no radiographic evidence of hardware failure.  There is persistent edema and staples in the overlying soft tissues compatible with recent postsurgical state.         CONCLUSION: Expected postsurgical changes status post recent right knee arthroplasty    Dictated by (CST): Rush Carter MD on 12/24/2024 at 8:50 AM     Finalized by (CST): Rush Carter MD on 12/24/2024 at 8:51 AM             Lab Results   Component Value Date    WBC 10.2 12/08/2024    HGB 12.9 (L) 12/08/2024    .0 12/08/2024      Lab Results   Component Value Date    GLU 93 12/08/2024    BUN 12 12/08/2024    CREATSERUM 0.78 12/08/2024    GFRNAA 89 06/20/2022    GFRAA 102 06/20/2022        Assessment and Plan:  Diagnoses and all orders for this visit:    Orthopedic aftercare  -     XR KNEE (3 VIEWS), RIGHT  (CPT=73562); Future  -     Physical Therapy Referral - [facility]    S/P TKR (total knee replacement), right  -     Physical Therapy Referral - [facility]        Assessment: As above    Plan: Discussed with the patient he is doing well.  He should continue working with physical therapy.  New prescription given him today.  Also discussed he should continue elevating and wearing his compression socks during the day.    Discussed until his incision is completely healed no baths or swimming.    We will see the patient in 6 weeks.  At that time we will get x-rays of both right and left knees.    Follow Up: No follow-ups on file.    SOURAV Arthur       [1]   Allergies  Allergen Reactions    Penicillin G HIVES     Tolerated Ancef 2 g IV on 12/6/2024 without problem.  States he is not sure if it was from actual drug.  Denies skin peeling, blisters or organ damage    Penicillins HIVES     Tolerated Ancef 2 g IV on 12/6/2024 without problem.  Denies blisters, skin peeling or organ damage

## 2025-01-22 ENCOUNTER — APPOINTMENT (OUTPATIENT)
Dept: PHYSICAL THERAPY | Age: 75
End: 2025-01-22
Payer: MEDICARE

## 2025-01-22 ENCOUNTER — OFFICE VISIT (OUTPATIENT)
Dept: PHYSICAL THERAPY | Age: 75
End: 2025-01-22
Payer: MEDICARE

## 2025-01-22 PROCEDURE — 97110 THERAPEUTIC EXERCISES: CPT | Performed by: PHYSICAL THERAPIST

## 2025-01-22 NOTE — PROGRESS NOTES
Patient: Benjamin Ralph  Referring Provider:  Insurance:   Diagnosis: Orthopedic aftercare (Z47.89)  Primary osteoarthritis of right knee (M17.11)  S/P TKR (total knee replacement), right (Z96.651) Margo Washington  MEDICARE   Date of Surgery: 12/6/24 Next MD visit:  [facility]   Precautions:  Drug Allergy 1/21/25 Referral Information:    Date of Evaluation: Req: 0, Auth: 0, Exp:     01/20/25 POC Auth Visits:  12       Today's Date   1/22/2025    Subjective  Patient reports still some pain at knee and some swelling       Pain: 2/10     Objective  see outpatient daily record            Assessment  Worked on knee extension as some loss compared to L, worked on LE strengthening and balance work.  Patient without complaints end of session.    Goals (to be met in 12 visits)   Goals       Therapy Goals      Not Met Progress Toward Partially Met Met   Pt will improve knee extension ROM to 0 deg to allow proper heel strike during gait and terminal knee extension in stance. [] [] [] []   Pt will improve knee AROM flexion to >130 degrees to improve ability to perform stair climbing. [] [] [] []   Pt will improve quad strength to 5/5 to ascend 1 flight of stairs reciprocally without UE assist. [] [] [] []   Pt will increase hip and knee strength to grossly 4+/5 to be able to get up and down from the floor safely. [] [] [] []   Pt will demonstrate increased hip ER/ABD strength to 4+/5 to perform stepping and squatting activities without excessive femoral IR/ADD. [] [] [] []   Pt will improve SLS to 3s to improve safety with gait on uneven surfaces such as grass and gravel. [] [] [] []   Pt will be independent and compliant with comprehensive HEP to maintain progress achieved in PT. [] [] [] []                  Plan   Continue to work on ROM, strengthening, advance as able.     Treatment Last 4 Visits       1/20/2025 1/22/2025   Treatment   Treatment Day  2   Therapeutic Exercise Reviewed HEP pt has been completing from  [facility]  Quad set to SLR + exhale x10  Prone hang stretch x1 min  Prone knee flex x12  Standing TKE ball behind knee at wall x10 Nu step 5 minutes level 5  Long sitting quad sets x 15 - towel under R heel  Heel slides slide board x 15  Supine SLR 2 x 10 with focus on quad set  SAQ bolster 2# 2 x 10  HS stretch with ankle DF/PF to promote extension at knee  Long sitting quad sets x 10  Shuttle leg press double x 15 reps 5B  Shuttle leg press single 3B x 10  Hip abd/ext with YTB x 10 each leg  Balance -   Air ex stand with cone touches -3 - 5 rounds  Marches air ex x 10  Tandem stand R behind x 20 sec  Slant board stretch x 40 seconds  Step up frwd/side 6\" x 8 each  Step down R leg 3\" x 8   Therapeutic Exercise Min 15 40   Evaluation Min 30    Total of Timed Procedures 15 40   Total of Service Based 30 0   Total Treatment Time 45 40         HEP  No changes    Charges   Ex 3

## 2025-01-27 ENCOUNTER — OFFICE VISIT (OUTPATIENT)
Dept: PHYSICAL THERAPY | Age: 75
End: 2025-01-27
Payer: MEDICARE

## 2025-01-27 PROCEDURE — 97110 THERAPEUTIC EXERCISES: CPT | Performed by: PHYSICAL THERAPIST

## 2025-01-31 ENCOUNTER — OFFICE VISIT (OUTPATIENT)
Dept: PHYSICAL THERAPY | Age: 75
End: 2025-01-31
Payer: MEDICARE

## 2025-01-31 PROCEDURE — 97110 THERAPEUTIC EXERCISES: CPT | Performed by: PHYSICAL THERAPIST

## 2025-01-31 NOTE — PROGRESS NOTES
Patient: Benjamin Ralph (74 year old, male) Referring Provider:  Insurance:   Diagnosis: Orthopedic aftercare (Z47.89)  Primary osteoarthritis of right knee (M17.11)  S/P TKR (total knee replacement), right (Z96.651) Margo Washington  MEDICARE   Date of Surgery: 12/6/24 Next MD visit:  [facility]   Precautions:  None 1/21/25 Referral Information:    Date of Evaluation: Req: 0, Auth: 0, Exp:     01/20/25 POC Auth Visits:  12       Today's Date   1/31/2025    Subjective  Patient reports still some stiffness at the knee and aching. Reports ache at front of knee.       Pain: 2/10     Objective  see outpatient daily record            Assessment  Continued to focus on end range extension R knee, revamped HEP to focus on extension.    Goals (to be met in 12 visits)   Goals       Therapy Goals      Not Met Progress Toward Partially Met Met   Pt will improve knee extension ROM to 0 deg to allow proper heel strike during gait and terminal knee extension in stance. [] [] [] []   Pt will improve knee AROM flexion to >130 degrees to improve ability to perform stair climbing. [] [] [] []   Pt will improve quad strength to 5/5 to ascend 1 flight of stairs reciprocally without UE assist. [] [] [] []   Pt will increase hip and knee strength to grossly 4+/5 to be able to get up and down from the floor safely. [] [] [] []   Pt will demonstrate increased hip ER/ABD strength to 4+/5 to perform stepping and squatting activities without excessive femoral IR/ADD. [] [] [] []   Pt will improve SLS to 3s to improve safety with gait on uneven surfaces such as grass and gravel. [] [] [] []   Pt will be independent and compliant with comprehensive HEP to maintain progress achieved in PT. [] [] [] []                  Plan  Focus on extension at knee, continue work on ROM and strengthening.    Treatment Last 4 Visits       1/20/2025 1/22/2025 1/27/2025 1/31/2025   Treatment   Treatment Day  2 3 4   Therapeutic Exercise Reviewed HEP pt has  been completing from [facility]  Quad set to SLR + exhale x10  Prone hang stretch x1 min  Prone knee flex x12  Standing TKE ball behind knee at wall x10 Nu step 5 minutes level 5  Long sitting quad sets x 15 - towel under R heel  Heel slides slide board x 15  Supine SLR 2 x 10 with focus on quad set  SAQ bolster 2# 2 x 10  HS stretch with ankle DF/PF to promote extension at knee  Long sitting quad sets x 10  Shuttle leg press double x 15 reps 5B  Shuttle leg press single 3B x 10  Hip abd/ext with YTB x 10 each leg  Balance -   Air ex stand with cone touches -3 - 5 rounds  Marches air ex x 10  Tandem stand R behind x 20 sec  Slant board stretch x 40 seconds  Step up frwd/side 6\" x 8 each  Step down R leg 3\" x 8 Nu step 5 minutes level 6  Slant board stretch x 30 seconds  Step up frwd/side/6\" x 12 each  Step downs 6\" x 10  SLR 3 x 10 3# just eblow knee    Long sitting quad sets x 15 - towel under R heel  Heel slides slide board x 10  SAQ bolster 3# 2 x 12  Prone hangs x 1 minute  Prone knee flexion x 15  HS stretch with ankle DF/PF to promote extension at knee  Seated HS curls 2 x 15 YTB  Shuttle leg press double 2 x 15 reps 5B  Shuttle leg press single 3B 2 x 10  Hip abd/ext with YTB x 10 each leg  Balance -   Air ex stand with cone touches -3 - 5 rounds       Nu step 7 minutes level 6  Long sitting quad sets x 15  Prone hangs 2 minutes with ankle pumps  Prone knee flexion 2 x 10 with end range stretch   SAQ over bolster 3# 2 x 12  SLR 2x 10 3# just below knee  HS stretch with ankle DF/PF to promote extension at knee  Seated HS curls 2 x 15 YTB  Slant board stretch x 30 seconds  Step up frwd x 10 with hand rails focusing on extension of knee  Bilat heel raises 2 x 10 focus on knee extension  Gastroc stretch at wall x 30 seconds   Therapeutic Exercise Min 15 40     Evaluation Min 30      Total of Timed Procedures 15 40 0 0   Total of Service Based 30 0 0 0   Total Treatment Time 45 40 0 0         HEP  Access Code:  [email or web address]  URL: [email or web address]  Date: 01/20/2025  Prepared by: Anuradha Vinson  Exercises  - Active Straight Leg Raise with Quad Set  - 2 x daily - 7 x weekly - 2 sets - 10 reps  - Prone Knee Flexion AROM  - 2 x daily - 7 x weekly - 2 sets - 10 reps  - Standing Terminal Knee Extension at Wall with Ball  - 2 x daily - 7 x weekly - 2 sets - 10 reps    Charges           Ex 3   40 minutes

## 2025-02-04 ENCOUNTER — OFFICE VISIT (OUTPATIENT)
Dept: PHYSICAL THERAPY | Age: 75
End: 2025-02-04
Payer: MEDICARE

## 2025-02-04 DIAGNOSIS — M17.11 PRIMARY OSTEOARTHRITIS OF RIGHT KNEE: ICD-10-CM

## 2025-02-04 DIAGNOSIS — Z96.651 S/P TKR (TOTAL KNEE REPLACEMENT), RIGHT: ICD-10-CM

## 2025-02-04 DIAGNOSIS — Z47.89 ORTHOPEDIC AFTERCARE: Primary | ICD-10-CM

## 2025-02-04 PROCEDURE — 97110 THERAPEUTIC EXERCISES: CPT | Performed by: PHYSICAL THERAPIST

## 2025-02-04 NOTE — PROGRESS NOTES
Patient: Benjamin Ralph (74 year old, male) Referring Provider:  Insurance:   Diagnosis: Orthopedic aftercare (Z47.89)  Primary osteoarthritis of right knee (M17.11)  S/P TKR (total knee replacement), right (Z96.651) Margo Washington  MEDICARE   Date of Surgery: 12/6/24 Next MD visit:  [facility]   Precautions:  None 1/21/25 Referral Information:    Date of Evaluation: Req: 0, Auth: 0, Exp:     01/20/25 POC Auth Visits:  12       Today's Date   2/4/2025    Subjective  Pt reports \"achiness and swelling that has improved since the surgery; however, sitting for long periods of time increases the stiffness but moving around helps.\"       Pain: 2/10     Objective  See outpatient daily record.       AROM: supine  R knee flexion: 121  R knee extension: -4  Assessment  Pt responded well to therapeutic interventions with no increase in pain. Reintroduced SL Shuttle to help improve LE strength. TKEs and wobbleboard added to help promote knee extension. Pt continues to lack around 4 degrees of extension. Continue to address balance/proprioception and ROM to help with gait normalization for return to ADLs.    Goals (to be met in 12 visits)   Goals       Therapy Goals      Not Met Progress Toward Partially Met Met   Pt will improve knee extension ROM to 0 deg to allow proper heel strike during gait and terminal knee extension in stance. [] [] [] []   Pt will improve knee AROM flexion to >130 degrees to improve ability to perform stair climbing. [] [] [] []   Pt will improve quad strength to 5/5 to ascend 1 flight of stairs reciprocally without UE assist. [] [] [] []   Pt will increase hip and knee strength to grossly 4+/5 to be able to get up and down from the floor safely. [] [] [] []   Pt will demonstrate increased hip ER/ABD strength to 4+/5 to perform stepping and squatting activities without excessive femoral IR/ADD. [] [] [] []   Pt will improve SLS to 3s to improve safety with gait on uneven surfaces such as grass  and gravel. [] [] [] []   Pt will be independent and compliant with comprehensive HEP to maintain progress achieved in PT. [] [] [] []                  Plan  Continue ROM, strengthening, balance work.  Advance as able.    Treatment Last 4 Visits       1/22/2025 1/27/2025 1/31/2025 2/4/2025   PT Treatment   Treatment Day 2 3 4 5   Therapeutic Exercise Nu step 5 minutes level 5  Long sitting quad sets x 15 - towel under R heel  Heel slides slide board x 15  Supine SLR 2 x 10 with focus on quad set  SAQ bolster 2# 2 x 10  HS stretch with ankle DF/PF to promote extension at knee  Long sitting quad sets x 10  Shuttle leg press double x 15 reps 5B  Shuttle leg press single 3B x 10  Hip abd/ext with YTB x 10 each leg  Balance -   Air ex stand with cone touches -3 - 5 rounds  Marches air ex x 10  Tandem stand R behind x 20 sec  Slant board stretch x 40 seconds  Step up frwd/side 6\" x 8 each  Step down R leg 3\" x 8 Nu step 5 minutes level 6  Slant board stretch x 30 seconds  Step up frwd/side/6\" x 12 each  Step downs 6\" x 10  SLR 3 x 10 3# just eblow knee    Long sitting quad sets x 15 - towel under R heel  Heel slides slide board x 10  SAQ bolster 3# 2 x 12  Prone hangs x 1 minute  Prone knee flexion x 15  HS stretch with ankle DF/PF to promote extension at knee  Seated HS curls 2 x 15 YTB  Shuttle leg press double 2 x 15 reps 5B  Shuttle leg press single 3B 2 x 10  Hip abd/ext with YTB x 10 each leg  Balance -   Air ex stand with cone touches -3 - 5 rounds       Nu step 7 minutes level 6  Long sitting quad sets x 15  Prone hangs 2 minutes with ankle pumps  Prone knee flexion 2 x 10 with end range stretch   SAQ over bolster 3# 2 x 12  SLR 2x 10 3# just below knee  HS stretch with ankle DF/PF to promote extension at knee  Seated HS curls 2 x 15 YTB  Slant board stretch x 30 seconds  Step up frwd x 10 with hand rails focusing on extension of knee  Bilat heel raises 2 x 10 focus on knee extension  Gastroc stretch at wall x 30  seconds Nu step 7 minutes level 6  Grade II R patellar mobs in superior direction to promote extension  TKE: 1x15 R with ball at wall with 2\" hold  Wobbleboard Taps: x20 A/P  Long sitting quad sets x 15  Prone hangs 2 minutes with ankle pumps  Prone knee flexion 2 x 10 with end range stretch   SAQ over bolster 3# 2 x 12  SLR 2x 10 3# just below knee  HS stretch with ankle DF/PF to promote extension at knee  Seated HS curls 2 x 15 RTB  Slant board stretch x 30 seconds  Step up frwd x 10 with hand rails focusing on extension of knee  Bilat heel raises 2 x 10 with heels off 6\" step - focus on knee extension  Gastroc stretch at wall x 30 seconds  SL Shuttle: 2x10 R 4 cords   Therapeutic Exercise Min 40  40 40   Total of Timed Procedures 40 0 40 40   Total of Service Based 0 0 0 0   Total Treatment Time 40 0 40 40         HEP  No changes.    Charges           Therex x3

## 2025-02-07 ENCOUNTER — OFFICE VISIT (OUTPATIENT)
Dept: PHYSICAL THERAPY | Age: 75
End: 2025-02-07
Payer: MEDICARE

## 2025-02-07 PROCEDURE — 97110 THERAPEUTIC EXERCISES: CPT | Performed by: PHYSICAL THERAPIST

## 2025-02-07 NOTE — PROGRESS NOTES
Patient: Benjamin Ralph  Referring Provider:  Insurance:   Diagnosis: Orthopedic aftercare (Z47.89)  Primary osteoarthritis of right knee (M17.11)  S/P TKR (total knee replacement), right (Z96.651) [facility]  [facility]   Date of Surgery: 12/6/24 Next MD visit:  [facility]   Precautions:  None 1/21/25 Referral Information:    Date of Evaluation: Req: 0, Auth: 0, Exp:     01/20/25 POC Auth Visits:  12       Today's Date   2/7/2025    Subjective  Patient reports pain still below the knee in the front       Pain: 2/10     Objective  See outpatient daily record.              Assessment  Patient reporting some relief in pain at end of the session.  Continuing to work on strengthening, flexibility, Added further step work for side and forward for R knee this session.    Goals (to be met in 12 visits)   Goals       Therapy Goals      Not Met Progress Toward Partially Met Met   Pt will improve knee extension ROM to 0 deg to allow proper heel strike during gait and terminal knee extension in stance. [] [] [] []   Pt will improve knee AROM flexion to >130 degrees to improve ability to perform stair climbing. [] [] [] []   Pt will improve quad strength to 5/5 to ascend 1 flight of stairs reciprocally without UE assist. [] [] [] []   Pt will increase hip and knee strength to grossly 4+/5 to be able to get up and down from the floor safely. [] [] [] []   Pt will demonstrate increased hip ER/ABD strength to 4+/5 to perform stepping and squatting activities without excessive femoral IR/ADD. [] [] [] []   Pt will improve SLS to 3s to improve safety with gait on uneven surfaces such as grass and gravel. [] [] [] []   Pt will be independent and compliant with comprehensive HEP to maintain progress achieved in PT. [] [] [] []                  Plan  Continue ROM, strengthening, balance work.  Advance as able.    Treatment Last 4 Visits       1/27/2025 1/31/2025 2/4/2025 2/7/2025   PT Treatment   Treatment Day 3 4 5 6    Therapeutic Exercise Nu step 5 minutes level 6  Slant board stretch x 30 seconds  Step up frwd/side/6\" x 12 each  Step downs 6\" x 10  SLR 3 x 10 3# just eblow knee    Long sitting quad sets x 15 - towel under R heel  Heel slides slide board x 10  SAQ bolster 3# 2 x 12  Prone hangs x 1 minute  Prone knee flexion x 15  HS stretch with ankle DF/PF to promote extension at knee  Seated HS curls 2 x 15 YTB  Shuttle leg press double 2 x 15 reps 5B  Shuttle leg press single 3B 2 x 10  Hip abd/ext with YTB x 10 each leg  Balance -   Air ex stand with cone touches -3 - 5 rounds       Nu step 7 minutes level 6  Long sitting quad sets x 15  Prone hangs 2 minutes with ankle pumps  Prone knee flexion 2 x 10 with end range stretch   SAQ over bolster 3# 2 x 12  SLR 2x 10 3# just below knee  HS stretch with ankle DF/PF to promote extension at knee  Seated HS curls 2 x 15 YTB  Slant board stretch x 30 seconds  Step up frwd x 10 with hand rails focusing on extension of knee  Bilat heel raises 2 x 10 focus on knee extension  Gastroc stretch at wall x 30 seconds Nu step 7 minutes level 6  Grade II R patellar mobs in superior direction to promote extension  TKE: 1x15 R with ball at wall with 2\" hold  Wobbleboard Taps: x20 A/P  Long sitting quad sets x 15  Prone hangs 2 minutes with ankle pumps  Prone knee flexion 2 x 10 with end range stretch   SAQ over bolster 3# 2 x 12  SLR 2x 10 3# just below knee  HS stretch with ankle DF/PF to promote extension at knee  Seated HS curls 2 x 15 RTB  Slant board stretch x 30 seconds  Step up frwd x 10 with hand rails focusing on extension of knee  Bilat heel raises 2 x 10 with heels off 6\" step - focus on knee extension  Gastroc stretch at wall x 30 seconds  SL Shuttle: 2x10 R 4 cords Nu step 6 minutes level 6  R patellar mobs inf/superior  Long sitting quad sets x 15  Prone hangs 2 minutes with ankle pumps  Prone knee flexion 2 x 10 with end range stretch   SAQ over bolster 2# x 20  SLR 2x 10 no  weight  Seated HS curls 1 x 15 RTB  Slant board stretch x 45 seconds  TKE: 1x15 R with ball at wall with 2\" hold  Step up frwd x 15 with hand rails focusing on extension of knee  Step up side x 15 6\" step  Bilat heel raises x 15  SL Shuttle: 2x12 R 4B  SLB on air ex with cone touches  Step downs 3\" x 10  Standing side hip abd z 15 each  Marches on air ex x 10   Therapeutic Exercise Min  40 40 40   Total of Timed Procedures 0 40 40 40   Total of Service Based 0 0 0 0   Total Treatment Time 0 40 40 40         HEP  No changes.    Charges           Ex 3

## 2025-02-10 ENCOUNTER — OFFICE VISIT (OUTPATIENT)
Dept: DERMATOLOGY CLINIC | Facility: CLINIC | Age: 75
End: 2025-02-10

## 2025-02-10 DIAGNOSIS — L82.1 SEBORRHEIC KERATOSES: ICD-10-CM

## 2025-02-10 DIAGNOSIS — L57.8 SUN-DAMAGED SKIN: ICD-10-CM

## 2025-02-10 DIAGNOSIS — D22.9 MULTIPLE BENIGN NEVI: ICD-10-CM

## 2025-02-10 DIAGNOSIS — D23.9 BENIGN NEOPLASM OF SKIN, UNSPECIFIED LOCATION: ICD-10-CM

## 2025-02-10 DIAGNOSIS — Z08 ENCOUNTER FOR FOLLOW-UP SURVEILLANCE OF SKIN CANCER: ICD-10-CM

## 2025-02-10 DIAGNOSIS — Z87.2 HISTORY OF ACTINIC KERATOSES: Primary | ICD-10-CM

## 2025-02-10 DIAGNOSIS — L81.4 SOLAR LENTIGO: ICD-10-CM

## 2025-02-10 DIAGNOSIS — Z85.820 ENCOUNTER FOR FOLLOW-UP SURVEILLANCE OF MELANOMA: ICD-10-CM

## 2025-02-10 DIAGNOSIS — Z13.89 ENCOUNTER FOR SURVEILLANCE OF ABNORMAL NEVI: ICD-10-CM

## 2025-02-10 DIAGNOSIS — Z85.828 ENCOUNTER FOR FOLLOW-UP SURVEILLANCE OF SKIN CANCER: ICD-10-CM

## 2025-02-10 DIAGNOSIS — Z08 ENCOUNTER FOR FOLLOW-UP SURVEILLANCE OF MELANOMA: ICD-10-CM

## 2025-02-10 NOTE — PROGRESS NOTES
The following individual(s) verbally consented to be recorded using ambient AI listening technology and understand that they can each withdraw their consent to this listening technology at any point by asking the clinician to turn off or pause the recording:    Patient name: Benjamin Ralph  Additional names:  None

## 2025-02-11 ENCOUNTER — OFFICE VISIT (OUTPATIENT)
Dept: PHYSICAL THERAPY | Age: 75
End: 2025-02-11
Payer: MEDICARE

## 2025-02-11 PROCEDURE — 97110 THERAPEUTIC EXERCISES: CPT | Performed by: PHYSICAL THERAPIST

## 2025-02-11 NOTE — PROGRESS NOTES
Patient: Benjamin Ralph (74 year old, male) Referring Provider:  Insurance:   Diagnosis: Orthopedic aftercare (Z47.89)  Primary osteoarthritis of right knee (M17.11)  S/P TKR (total knee replacement), right (Z96.651) [facility]  [facility]   Date of Surgery: 12/6/24 Next MD visit:  [facility]   Precautions:  None 1/21/25 Referral Information:    Date of Evaluation: Req: 0, Auth: 0, Exp:     01/20/25 POC Auth Visits:  12       Today's Date   2/11/2025    Subjective  Patient reports pain still below the knee in the front       Pain: 2/10     Objective  See outpatient daily record.            Assessment  Patient able to complete all exercises without complaints.  Increased step height to 8\" with step up, 4\" step down    Goals (to be met in 12 visits)   Goals       Therapy Goals      Not Met Progress Toward Partially Met Met   Pt will improve knee extension ROM to 0 deg to allow proper heel strike during gait and terminal knee extension in stance. [] [] [] []   Pt will improve knee AROM flexion to >130 degrees to improve ability to perform stair climbing. [] [] [] []   Pt will improve quad strength to 5/5 to ascend 1 flight of stairs reciprocally without UE assist. [] [] [] []   Pt will increase hip and knee strength to grossly 4+/5 to be able to get up and down from the floor safely. [] [] [] []   Pt will demonstrate increased hip ER/ABD strength to 4+/5 to perform stepping and squatting activities without excessive femoral IR/ADD. [] [] [] []   Pt will improve SLS to 3s to improve safety with gait on uneven surfaces such as grass and gravel. [] [] [] []   Pt will be independent and compliant with comprehensive HEP to maintain progress achieved in PT. [] [] [] []                  Plan  Continue ROM, strengthening, balance work.  Advance as able.    Treatment Last 4 Visits       1/31/2025 2/4/2025 2/7/2025 2/11/2025   PT Treatment   Treatment Day 4 5 6    Therapeutic Exercise Nu step 7 minutes level 6  Long  sitting quad sets x 15  Prone hangs 2 minutes with ankle pumps  Prone knee flexion 2 x 10 with end range stretch   SAQ over bolster 3# 2 x 12  SLR 2x 10 3# just below knee  HS stretch with ankle DF/PF to promote extension at knee  Seated HS curls 2 x 15 YTB  Slant board stretch x 30 seconds  Step up frwd x 10 with hand rails focusing on extension of knee  Bilat heel raises 2 x 10 focus on knee extension  Gastroc stretch at wall x 30 seconds Nu step 7 minutes level 6  Grade II R patellar mobs in superior direction to promote extension  TKE: 1x15 R with ball at wall with 2\" hold  Wobbleboard Taps: x20 A/P  Long sitting quad sets x 15  Prone hangs 2 minutes with ankle pumps  Prone knee flexion 2 x 10 with end range stretch   SAQ over bolster 3# 2 x 12  SLR 2x 10 3# just below knee  HS stretch with ankle DF/PF to promote extension at knee  Seated HS curls 2 x 15 RTB  Slant board stretch x 30 seconds  Step up frwd x 10 with hand rails focusing on extension of knee  Bilat heel raises 2 x 10 with heels off 6\" step - focus on knee extension  Gastroc stretch at wall x 30 seconds  SL Shuttle: 2x10 R 4 cords Nu step 6 minutes level 6  R patellar mobs inf/superior  Long sitting quad sets x 15  Prone hangs 2 minutes with ankle pumps  Prone knee flexion 2 x 10 with end range stretch   SAQ over bolster 2# x 20  SLR 2x 10 no weight  Seated HS curls 1 x 15 RTB  Slant board stretch x 45 seconds  TKE: 1x15 R with ball at wall with 2\" hold  Step up frwd x 15 with hand rails focusing on extension of knee  Step up side x 15 6\" step  Bilat heel raises x 15  SL Shuttle: 2x12 R 4B  SLB on air ex with cone touches  Step downs 3\" x 10  Standing side hip abd z 15 each  Marches on air ex x 10 Nu step 6 minutes level 6  R patellar mobs inf/superior  Long sitting quad sets x 15  Prone hangs 2 minutes with ankle pumps  Prone knee flexion 2 x 10 with end range stretch   SAQ over bolster 2# x 20  SLR 2x 10 no weight  Seated HS curls 1 x 15  RTB  Slant board stretch x 45 seconds  TKE: 1x15 R with ball at wall with 5\" hold  Step up frwd 8\" x 15 with hand rails focusing on extension of knee  Step up side x 15 6\" step  Standing DF x 15  SLB on air ex with cone touches  Step downs 4\" x 10  Standing side hip abd [id number] each, standing hip ext x 10 ea YTB  Marches on air ex x 30 seconds  Shuttle single leg 4B 2 x 12   Therapeutic Exercise Min 40 40 40 45   Total of Timed Procedures 40 40 40 45   Total of Service Based 0 0 0 0   Total Treatment Time 40 40 40 45         HEP  Added standing DF     Charges           Ex 3

## 2025-02-11 NOTE — PROGRESS NOTES
Patient: Benjamin Ralph  Referring Provider:  Insurance:   Diagnosis: Orthopedic aftercare (Z47.89)  Primary osteoarthritis of right knee (M17.11)  S/P TKR (total knee replacement), right (Z96.651) [facility]  [facility]   Date of Surgery: 12/6/24 Next MD visit:  [facility]   Precautions:  None 1/21/25 Referral Information:    Date of Evaluation: Req: 0, Auth: 0, Exp:     01/20/25 POC Auth Visits:  12       Today's Date   2/11/2025    Subjective  Patient reports pain still below the knee in the front       Pain: 2/10     Objective  See outpatient daily record.              Assessment  Patient reporting some relief in pain at end of the session.  Continuing to work on strengthening, flexibility, Added further step work for side and forward for R knee this session.    Goals (to be met in 12 visits)   Goals       Therapy Goals      Not Met Progress Toward Partially Met Met   Pt will improve knee extension ROM to 0 deg to allow proper heel strike during gait and terminal knee extension in stance. [] [] [] []   Pt will improve knee AROM flexion to >130 degrees to improve ability to perform stair climbing. [] [] [] []   Pt will improve quad strength to 5/5 to ascend 1 flight of stairs reciprocally without UE assist. [] [] [] []   Pt will increase hip and knee strength to grossly 4+/5 to be able to get up and down from the floor safely. [] [] [] []   Pt will demonstrate increased hip ER/ABD strength to 4+/5 to perform stepping and squatting activities without excessive femoral IR/ADD. [] [] [] []   Pt will improve SLS to 3s to improve safety with gait on uneven surfaces such as grass and gravel. [] [] [] []   Pt will be independent and compliant with comprehensive HEP to maintain progress achieved in PT. [] [] [] []                  Plan  Continue ROM, strengthening, balance work.  Advance as able.    Treatment Last 4 Visits       1/31/2025 2/4/2025 2/7/2025 2/11/2025   PT Treatment   Treatment Day 4 5 6 7    Therapeutic Exercise Nu step 7 minutes level 6  Long sitting quad sets x 15  Prone hangs 2 minutes with ankle pumps  Prone knee flexion 2 x 10 with end range stretch   SAQ over bolster 3# 2 x 12  SLR 2x 10 3# just below knee  HS stretch with ankle DF/PF to promote extension at knee  Seated HS curls 2 x 15 YTB  Slant board stretch x 30 seconds  Step up frwd x 10 with hand rails focusing on extension of knee  Bilat heel raises 2 x 10 focus on knee extension  Gastroc stretch at wall x 30 seconds Nu step 7 minutes level 6  Grade II R patellar mobs in superior direction to promote extension  TKE: 1x15 R with ball at wall with 2\" hold  Wobbleboard Taps: x20 A/P  Long sitting quad sets x 15  Prone hangs 2 minutes with ankle pumps  Prone knee flexion 2 x 10 with end range stretch   SAQ over bolster 3# 2 x 12  SLR 2x 10 3# just below knee  HS stretch with ankle DF/PF to promote extension at knee  Seated HS curls 2 x 15 RTB  Slant board stretch x 30 seconds  Step up frwd x 10 with hand rails focusing on extension of knee  Bilat heel raises 2 x 10 with heels off 6\" step - focus on knee extension  Gastroc stretch at wall x 30 seconds  SL Shuttle: 2x10 R 4 cords Nu step 6 minutes level 6  R patellar mobs inf/superior  Long sitting quad sets x 15  Prone hangs 2 minutes with ankle pumps  Prone knee flexion 2 x 10 with end range stretch   SAQ over bolster 2# x 20  SLR 2x 10 no weight  Seated HS curls 1 x 15 RTB  Slant board stretch x 45 seconds  TKE: 1x15 R with ball at wall with 2\" hold  Step up frwd x 15 with hand rails focusing on extension of knee  Step up side x 15 6\" step  Bilat heel raises x 15  SL Shuttle: 2x12 R 4B  SLB on air ex with cone touches  Step downs 3\" x 10  Standing side hip abd z 15 each  Marches on air ex x 10 Nu step 6 minutes level 6  R patellar mobs inf/superior  Long sitting quad sets x 15  Prone hangs 2 minutes with ankle pumps  Prone knee flexion 2 x 10 with end range stretch   SAQ over bolster 2# x  20  SLR 2x 10 no weight  Seated HS curls 1 x 15 RTB  Slant board stretch x 45 seconds  TKE: 1x15 R with ball at wall with 5\" hold  Step up frwd 8\" x 15 with hand rails focusing on extension of knee  Step up side x 15 6\" step  Standing DF x 15  SLB on air ex with cone touches  Step downs 4\" x 10  Standing side hip abd YTB15 each, standing hip ext x 10 ea YTB  Marches on air ex x 30 seconds  Shuttle single leg 4B 2 x 12   Therapeutic Exercise Min 40 40 40 45   Total of Timed Procedures 40 40 40 45   Total of Service Based 0 0 0 0   Total Treatment Time 40 40 40 45         HEP  No changes.    Charges     3 ther ex     Ex 3

## 2025-02-14 ENCOUNTER — OFFICE VISIT (OUTPATIENT)
Dept: PHYSICAL THERAPY | Age: 75
End: 2025-02-14
Payer: MEDICARE

## 2025-02-14 PROCEDURE — 97110 THERAPEUTIC EXERCISES: CPT | Performed by: PHYSICAL THERAPIST

## 2025-02-14 NOTE — PROGRESS NOTES
Patient: Benjamin Ralph Referring Provider:  Insurance:   Diagnosis: Orthopedic aftercare (Z47.89)  Primary osteoarthritis of right knee (M17.11)  S/P TKR (total knee replacement), right (Z96.651) [facility]   Date of Surgery: 12/6/24 Next MD visit:  [facility]   Precautions:  None 1/21/25 Referral Information:    Date of Evaluation: Req: 0, Auth: 0, Exp:     01/20/25 POC Auth Visits:  12       Today's Date   2/14/2025    Subjective  Patient reports pain is less and also feels the swelling is less at the knee       Pain: 1/10     Objective  See outpatient daily record.              Assessment   Patient continues to report a decrease in pain and swelling.  Increased to 8\" side step, 1/2 band with single leg squat.  Progressing well    Goals (to be met in 12 visits)   Goals       Therapy Goals      Not Met Progress Toward Partially Met Met   Pt will improve knee extension ROM to 0 deg to allow proper heel strike during gait and terminal knee extension in stance. [] [] [] []   Pt will improve knee AROM flexion to >130 degrees to improve ability to perform stair climbing. [] [] [] []   Pt will improve quad strength to 5/5 to ascend 1 flight of stairs reciprocally without UE assist. [] [] [] []   Pt will increase hip and knee strength to grossly 4+/5 to be able to get up and down from the floor safely. [] [] [] []   Pt will demonstrate increased hip ER/ABD strength to 4+/5 to perform stepping and squatting activities without excessive femoral IR/ADD. [] [] [] []   Pt will improve SLS to 3s to improve safety with gait on uneven surfaces such as grass and gravel. [] [] [] []   Pt will be independent and compliant with comprehensive HEP to maintain progress achieved in PT. [] [] [] []                  Plan  Continue ROM, strengthening, balance work.  Advance as able.    Treatment Last 4 Visits       2/4/2025 2/7/2025 2/11/2025 2/14/2025   PT Treatment   Treatment Day 5 6 7 8   Therapeutic Exercise Nu  step 7 minutes level 6  Grade II R patellar mobs in superior direction to promote extension  TKE: 1x15 R with ball at wall with 2\" hold  Wobbleboard Taps: x20 A/P  Long sitting quad sets x 15  Prone hangs 2 minutes with ankle pumps  Prone knee flexion 2 x 10 with end range stretch   SAQ over bolster 3# 2 x 12  SLR 2x 10 3# just below knee  HS stretch with ankle DF/PF to promote extension at knee  Seated HS curls 2 x 15 RTB  Slant board stretch x 30 seconds  Step up frwd x 10 with hand rails focusing on extension of knee  Bilat heel raises 2 x 10 with heels off 6\" step - focus on knee extension  Gastroc stretch at wall x 30 seconds  SL Shuttle: 2x10 R 4 cords Nu step 6 minutes level 6  R patellar mobs inf/superior  Long sitting quad sets x 15  Prone hangs 2 minutes with ankle pumps  Prone knee flexion 2 x 10 with end range stretch   SAQ over bolster 2# x 20  SLR 2x 10 no weight  Seated HS curls 1 x 15 RTB  Slant board stretch x 45 seconds  TKE: 1x15 R with ball at wall with 2\" hold  Step up frwd x 15 with hand rails focusing on extension of knee  Step up side x 15 6\" step  Bilat heel raises x 15  SL Shuttle: 2x12 R 4B  SLB on air ex with cone touches  Step downs 3\" x 10  Standing side hip abd z 15 each  Marches on air ex x 10 Nu step 6 minutes level 6  R patellar mobs inf/superior  Long sitting quad sets x 15  Prone hangs 2 minutes with ankle pumps  Prone knee flexion 2 x 10 with end range stretch   SAQ over bolster 2# x 20  SLR 2x 10 no weight  Seated HS curls 1 x 15 RTB  Slant board stretch x 45 seconds  TKE: 1x15 R with ball at wall with 5\" hold  Step up frwd 8\" x 15 with hand rails focusing on extension of knee  Step up side x 15 6\" step  Standing DF x 15  SLB on air ex with cone touches  Step downs 4\" x 10  Standing side hip abd YTB15 each, standing hip ext x 10 ea YTB  Marches on air ex x 30 seconds  Shuttle single leg 4B 2 x 12 Nu step 7 minutes level 6  R patellar mobs inf/superior  Long sitting quad sets x  20  Prone hangs 2 minutes with ankle pumps  Prone knee flexion 2 x 10 with  3# end range stretch   SAQ over bolster 3# x 20- 3 sec hold  SLR x 10  Seated HS curls 2 x 10 GTB  Slant board stretch x 45 seconds  TKE: x 20 R with ball at wall with 3 sec hold  Step up frwd 8\" x 15 with hand rails focusing on extension of knee  Step up side x 10 8\" step  Standing DF x 20  SLB on air ex with cone touches  Step downs 4\" x 10  Standing side hip abd YTB15 each, standing hip ext x 10 ea YTB  Shuttle single leg 4B 2 x 10   Therapeutic Exercise Min 40 40 45 45   Total of Timed Procedures 40 40 45 45   Total of Service Based 0 0 0 0   Total Treatment Time 40 40 45 45         HEP  No changes.    Charges           Ex 3  45 minutes

## 2025-02-16 NOTE — PROGRESS NOTES
Benjamin Ralph is a 74 year old male.    CC:    Chief Complaint   Patient presents with    Full Skin Exam     LOV 09/30/23- Pt presents for a Full Body Skin Exam. Pt denies any new lesions or areas of concern.   Personal Hx of AK's, BCC, melanoma 0.75 mm (2016-left lateral upper arm)          HISTORY:    Past Medical History:    Basal cell carcinoma    left nose    Basal cell carcinoma of nose    BCC (basal cell carcinoma)     left posterior lateral neck    BCC (basal cell carcinoma)    right ala base    Calculus of kidney    left    Chronic headaches    Chronic pansinusitis    Chronic sinusitis    Congestion of nasal sinus    COVID-19    Esophageal reflux    Ganglion of flexor tendon sheath of right thumb    High cholesterol    Melanoma (HCC)    left arm, stage I; .75 mm depth    Osteoarthritis    Plantar wart    right heel    Pneumonia due to organism    2016    Visual impairment    Glasses      Past Surgical History:   Procedure Laterality Date    Adj tiss xfer lid,nos,ear <10sqcm Right 2-17-17    Exc lesion of nose, V_Y flap    Colonoscopy      Colonoscopy      Colonoscopy N/A 12/26/2018    Procedure: COLONOSCOPY;  Surgeon: Isidoro Mosley MD;  Location: [facility] END[facility]COPY    Exc skin malig 2.1-3cm face,facial  05/18/2022    Excis tendon sheath lesn,hand/fingr Right 2-17-17    Exc ganglion R thumb    Foot surgery Left 2006    Nerve procedure    Full graft proc head,fac,hand <20sqc  05/18/2022    Nasal scopy,remv part ethmoid      Nasal scopy,rmv tiss maxill sinus      Repair of nasal septum  1985    Repair of nasal septum      Repr cmpl wnd head,fac,hand 2.6-7.5  05/18/2022    Skin surgery Left 10/07/16    left upper arm melanoma      Family History   Problem Relation Age of Onset    Dementia Mother     Seizure Disorder Mother     Hypertension Mother     Cancer Mother         skin    Cancer Father         Leukemia      Social History     Socioeconomic History    Marital status: Single    Number of children:  0   Occupational History    Occupation:    Tobacco Use    Smoking status: Former     Current packs/day: 0.00     Types: Cigarettes     Quit date: 3/25/1978     Years since quittin.9     Passive exposure: Never    Smokeless tobacco: Never   Vaping Use    Vaping status: Never Used   Substance and Sexual Activity    Alcohol use: Yes     Comment: rarely maybe a glass of wine a week    Drug use: Yes     Types: Cannabis     Comment: gummies    Sexual activity: Not Currently   Other Topics Concern    Caffeine Concern Yes     Comment: coffee, 3cups/day    Grew up on a farm No    History of tanning No    Outdoor occupation No    Pt has a pacemaker No    Pt has a defibrillator No    Reaction to local anesthetic No     Social Drivers of Health     Food Insecurity: No Food Insecurity (2024)    Food Insecurity     Food Insecurity: Never true   Transportation Needs: No Transportation Needs (2024)    Transportation Needs     Lack of Transportation: No   Housing Stability: Low Risk  (2024)    Housing Stability     Housing Instability: No        Current Outpatient Medications   Medication Sig Dispense Refill    acetaminophen 325 MG Oral Tab Take 2 tablets (650 mg total) by mouth every 8 (eight) hours as needed. Maximum 4000 mg Tylenol/acetaminophen daily from all sources.  Each Norco has 325 mg of Tylenol/acetaminophen in it. 60 tablet 0    aspirin 325 MG Oral Tab EC Take 1 tablet (325 mg total) by mouth in the morning and 1 tablet (325 mg total) before bedtime. Do not crush. 60 tablet 0    calcium carbonate-vitamin D 250-3.125 MG-MCG Oral Tab Take 1 tablet by mouth 2 (two) times daily with meals. 60 tablet 0    multivitamin Oral Tab Take 1 tablet by mouth daily. 60 tablet 0    rosuvastatin 10 MG Oral Tab Take 1 tablet (10 mg total) by mouth nightly. 90 tablet 3    dutasteride 0.5 MG Oral Cap Take 1 capsule (0.5 mg total) by mouth daily. (Patient taking differently: Take 1 capsule (0.5 mg total)  by mouth every evening.) 90 capsule 3    sildenafil 20 MG Oral Tab Take one tablet PO daily as needed 10 tablet 5    HYDROcodone-acetaminophen  MG Oral Tab Take 1 tablet by mouth every 6 (six) hours as needed. No alcohol or driving on this medication.  Stop if lethargic or hallucinating.  Maximum 4000 mg Tylenol/acetaminophen daily from all sources.  Recall each Norco has 325 mg of Tylenol/acetaminophen in it. (Patient not taking: Reported on 2/10/2025) 25 tablet 0     Allergies:   Allergies[1]    Past Medical History:    Basal cell carcinoma    left nose    Basal cell carcinoma of nose    BCC (basal cell carcinoma)     left posterior lateral neck    BCC (basal cell carcinoma)    right ala base    Calculus of kidney    left    Chronic headaches    Chronic pansinusitis    Chronic sinusitis    Congestion of nasal sinus    COVID-19    Esophageal reflux    Ganglion of flexor tendon sheath of right thumb    High cholesterol    Melanoma (HCC)    left arm, stage I; .75 mm depth    Osteoarthritis    Plantar wart    right heel    Pneumonia due to organism    2016    Visual impairment    Glasses     Past Surgical History:   Procedure Laterality Date    Adj tiss xfer lid,nos,ear <10sqcm Right 2-17-17    Exc lesion of nose, V_Y flap    Colonoscopy      Colonoscopy      Colonoscopy N/A 12/26/2018    Procedure: COLONOSCOPY;  Surgeon: Isidoro Mosley MD;  Location: [facility] ENDOSCOPY    Exc skin malig 2.1-3cm face,facial  05/18/2022    Excis tendon sheath lesn,hand/fingr Right 2-17-17    Exc ganglion R thumb    Foot surgery Left 2006    Nerve procedure    Full graft proc head,fac,hand <20sqc  05/18/2022    Nasal scopy,remv part ethmoid      Nasal scopy,rmv tiss maxill sinus      Repair of nasal septum  1985    Repair of nasal septum      Repr cmpl wnd head,fac,hand 2.6-7.5  05/18/2022    Skin surgery Left 10/07/16    left upper arm melanoma     Social History     Socioeconomic History    Marital status: Single     Spouse  name: Not on file    Number of children: 0    Years of education: Not on file    Highest education level: Not on file   Occupational History    Occupation:    Tobacco Use    Smoking status: Former     Current packs/day: 0.00     Types: Cigarettes     Quit date: 3/25/1978     Years since quittin.9     Passive exposure: Never    Smokeless tobacco: Never   Vaping Use    Vaping status: Never Used   Substance and Sexual Activity    Alcohol use: Yes     Comment: rarely maybe a glass of wine a week    Drug use: Yes     Types: Cannabis     Comment: gummies    Sexual activity: Not Currently   Other Topics Concern     Service Not Asked    Blood Transfusions Not Asked    Caffeine Concern Yes     Comment: coffee, 3cups/day    Occupational Exposure Not Asked    Hobby Hazards Not Asked    Sleep Concern Not Asked    Stress Concern Not Asked    Weight Concern Not Asked    Special Diet Not Asked    Back Care Not Asked    Exercise Not Asked    Bike Helmet Not Asked    Seat Belt Not Asked    Self-Exams Not Asked    Grew up on a farm No    History of tanning No    Outdoor occupation No    Pt has a pacemaker No    Pt has a defibrillator No    Reaction to local anesthetic No   Social History Narrative    Not on file     Social Drivers of Health     Food Insecurity: No Food Insecurity (2024)    Food Insecurity     Food Insecurity: Never true   Transportation Needs: No Transportation Needs (2024)    Transportation Needs     Lack of Transportation: No     Car Seat: Not on file   Stress: Not on file   Housing Stability: Low Risk  (2024)    Housing Stability     Housing Instability: No     Housing Instability Emergency: Not on file     Crib or Bassinette: Not on file     Family History   Problem Relation Age of Onset    Dementia Mother     Seizure Disorder Mother     Hypertension Mother     Cancer Mother         skin    Cancer Father         Leukemia       HPI:     HPI:  Chief Complaint   Patient  presents with    Full Skin Exam     LOV 09/30/23- Pt presents for a Full Body Skin Exam. Pt denies any new lesions or areas of concern.   Personal Hx of AK's, BCC, melanoma 0.75 mm (2016-left lateral upper arm)      ollow-up  Follow-up patient with history of melanoma here for routine follow-up no particular concerns.  Follow-up history of AK's, BCC, melanoma 0.75 mm 2016 left lateral upper arm most recent BCC at left posterior [id number]  History of Present Illness  Benjamin Ralph is a 74 year old male who presents for dermatological evaluation.    He has multiple benign keratoses on his back and has not noticed any suspicious lesions. He walks daily, avoids sun exposure, and uses sunscreen regularly. He also experiences dry skin on his legs and uses cream for management.    He had a toe amputated due to an infection that reached the bone, following an initial injury from a bruise that opened while wearing sandals. He did not receive the correct antibiotics in time to save the toe. He currently experiences occasional tenderness but can walk without significant issues.    He underwent a partial knee meniscectomy that was unsuccessful, leading to a knee replacement in December of the previous year. The knee remains swollen and occasionally painful. He uses a compression stocking, though he feels it is not very effective. Additionally, he notes soreness developing in the other knee.    Patient presents with concerns above.    Patient has been in their usual state of health.     Past notes/ records and appropriate/relevant lab results including pathology and past body maps reviewed. Including outside notes/ PCP notes as appropriate. Updated and new information noted in current visit.     ROS:  Denies other relevant systemic complaints. See HPI.     History, medications, allergies reviewed as noted.    Allergies:  Penicillin g and Penicillins      There were no vitals filed for this visit.    Physical Examination:      Well-developed well-nourished patient alert oriented in no acute distress.  Exam performed of appropriate involved areas    Physical Exam  SKIN: Incision on toe described as 'pretty good' with edema present. Benign keratosis observed on back. Scar from knee replacement described as 'pretty good' with edema present. Legs noted to be dry. Skin tags and a small cyst on nose observed.    Multiple light to medium brown, well marginated, uniformly pigmented, macules and papules 6 mm and less scattered on exam. pigmented lesions examined with dermoscopy benign-appearing patterns.     Waxy tannish keratotic papules scattered, cherry-red vascular papules scattered.    See map today's date for lesions noted .  See assessment and plan below for specific lesions.    Otherwise remarkable for lesions as noted on map.    See A/P  below for additional information:    Assessment / plan:    Results      No orders of the defined types were placed in this encounter.      Meds & Refills for this Visit:  Requested Prescriptions      No prescriptions requested or ordered in this encounter         Encounter Diagnoses   Name Primary?    History of actinic keratoses Yes    Encounter for follow-up surveillance of skin cancer     Encounter for follow-up surveillance of melanoma     Encounter for surveillance of abnormal nevi     Multiple benign nevi     Seborrheic keratoses     Solar lentigo     Benign neoplasm of skin, unspecified location     Sun-damaged skin      Patient seen for follow-up long-term monitoring, treatment of  History of nonmelanoma and melanoma skin cancers actinic keratoses sun damage high risk patient  Plan of care:  ongoing surveillance, monitoring including regular follow-up due to longer term risk of recurrence, new lesions.  See previous notes.  There is a longitudinal care relationship with me, the care plan reflects the ongoing nature of the continuous relationship of care, and the medical record indicates that there  is ongoing treatment of a serious/complex medical condition which I am currently managing.  is Applicable     Fall risk assessment:  Given the results of the fall risk questionnaire given today.   Suggest:   Make sure there is adequate lighting.  Eliminate throw rugs or possible sources of tripping & falling  Consider grab bars on the shower & toilet.  Hand rails on all stair cases  Ambulatory assistance devices such as cane or walker as indicate.  To ensure home safety measures.    Assessment & Plan  Benign Keratosis  Multiple benign keratoses observed on the back. No suspicious lesions noted.  - Advise patient to report any new or changing lesions    Toe Amputation  Post-toe amputation with non-infected but swollen incision site. Patient advised to monitor for signs of infection.  - Monitor for signs of infection  - Report any new symptoms or changes    Postoperative Knee Swelling  Persistent swelling and mild pain post-knee replacement surgery in December. Compression stockings minimally effective. Advised to elevate leg and massage scar with Vaseline.  - Continue using compression stockings  - Elevate leg during the day  - Massage scar with Vaseline    General Health Maintenance  Advised to use sunscreen and wear a hat outdoors. Dry legs noted; advised to apply cream and use sunscreen while driving.  - Apply cream to dry legs  - Wear sunscreen and a hat outdoors  - Use sunscreen while driving    Follow-up  - Schedule follow-up visit in one year.        Exam essentially unchanged no active AK's no new pigmented lesions suspicious there are some waxy tan papules more inflamed at posterior neck trial of cryo reassurance given continue sunscreen especially on posterior neck, hat.  Melanoma 2016 follow-up at least annually plan recheck in 6 months.    Actinic Keratoses.  Precancerous nature discussed. Sun protection, sunscreen/ blocks encouraged .  Monitoring for new lesions.  Sun damage additional recurrent and  new actinic keratoses, skin cancers may occur in areas of prior actinic keratoses, related to past sun exposure to minimize current sun exposure.  Sunscreen applied consistently regularly, reapplication and sun protection while driving recommended.    Forehead lesion waxy tan papule consistent with benign seborrheic keratosis continue careful monitoring.  Consider biopsy at follow-up if lesion does change.  Further plans pending path exam otherwise unchanged    History melanoma 0.75 mm left lateral upper arm post excision doing well no adenopathy no recurrence    History BCC no recurrence healing well left neck    History of AK's, sun damage overall is doing well.  Continue careful sun protection regular follow-up every 6 months most marked sun damage over the head neck arms hands    On the back numerous waxy tan keratotic papules lesions in areas of concern as noted reassurance given.  Benign nature discussed.  Possibility of cryo, alphahydroxy acids over-the-counter retinol's discussed.    Benign-appearing nevi few lesions on the legs  Benign-appearing nevi, no atypical features on dermoscopy reassurance given monitor.     Waxy tan keratotic papules lesions in areas of concern as noted reassurance given.  Benign nature discussed.  Possibility of cryo, alphahydroxy acids over-the-counter retinol's discussed.     No other susupicious lesions on todays  exam.    Please refer to map for specific lesions.  See additional diagnoses.  Pros cons of various therapies, risks benefits discussed.Pathophysiology, terapeutic options reviewed.  See  Medications in grid.  Instructions reviewed at length.    Benign nevi, seborrheic  keratoses, cherry angiomas:  Reassurance regarding other benign skin lesions.    Monitor for new or changing lesions. Signs and symptoms of skin cancer, ABCDE's of melanoma ( additional information available at AAD.org, skincancer.org) Encourage Sunscreen (broad-spectrum, ideally mineral-based-UVA/UVB  -SPF 30 or higher) use encouraged, sun protection/sun protective clothing, self exams reviewed Followup as noted RTC ---routine checkup 6 mos -one year or p.r.n.    Encounter Times   Including precharting, reviewing chart, prior notes obtaining history: 10 minutes, medical exam :10 minutes, notes on body map, plan, counseling 10minutes My total time spent caring for the patient on the day of the encounter: 30 minutes     The patient indicates understanding of these issues and agrees to the plan.  The patient is asked to return as noted in follow-up/ above.    This note was generated using Dragon voice recognition software.  Please contact me regarding any confusion resulting from errors in recognition..  Note to patient and family: The 21st Century Cures Act makes medical notes like these available to patients. However, be advised this is a medical document. It is intended as [id number] communication and monitoring of a patient's care needs. It is written in medical language and may contain abbreviations or verbiage that are unfamiliar. It may appear blunt or direct. Medical documents are intended to carry relevant information, facts as evident and the clinical opinion of the practitioner.       [1]   Allergies  Allergen Reactions    Penicillin G HIVES     Tolerated Ancef 2 g IV on 12/6/2024 without problem.  States he is not sure if it was from actual drug.  Denies skin peeling, blisters or organ damage    Penicillins HIVES     Tolerated Ancef 2 g IV on 12/6/2024 without problem.  Denies blisters, skin peeling or organ damage

## 2025-02-18 ENCOUNTER — OFFICE VISIT (OUTPATIENT)
Dept: PHYSICAL THERAPY | Age: 75
End: 2025-02-18
Payer: MEDICARE

## 2025-02-18 PROCEDURE — 97110 THERAPEUTIC EXERCISES: CPT | Performed by: PHYSICAL THERAPIST

## 2025-02-18 NOTE — PROGRESS NOTES
Patient: Benjamin Ralph  Referring Provider:  Insurance:   Diagnosis: Orthopedic aftercare (Z47.89)  Primary osteoarthritis of right knee (M17.11)  S/P TKR (total knee replacement), right (Z96.651) [facility] [place]  MEDICARE   Date of Surgery: 12/6/24 Next MD visit:  [facility]   Precautions:  None 1/21/25 Referral Information:    Date of Evaluation: Req: 0, Auth: 0, Exp:     01/20/25 POC Auth Visits:  [id number]       Today's Date   2/18/2025    Subjective   Patient reports the knee pain is low 1/10-2/10.  States still feels some stiffness at knee.  Is able to return to doing all activities.        Pain:       Objective     See outpatient daily record.            Assessment   Revamped HEP for ongoing exercises to continue.  Will reassess ROM, strength next session and goals as plan for discharge to independent [facility]    Goals (to be met in 12 visits)   Goals       Therapy Goals      Not Met Progress Toward Partially Met Met   Pt will improve knee extension ROM to 0 deg to allow proper heel strike during gait and terminal knee extension in stance. [] [] [] []   Pt will improve knee AROM flexion to >130 degrees to improve ability to perform stair climbing. [] [] [] []   Pt will improve quad strength to 5/5 to ascend 1 flight of stairs reciprocally without UE assist. [] [] [] []   Pt will increase hip and knee strength to grossly 4+/5 to be able to get up and down from the floor safely. [] [] [] []   Pt will demonstrate increased hip ER/ABD strength to 4+/5 to perform stepping and squatting activities without excessive femoral IR/ADD. [] [] [] []   Pt will improve SLS to 3s to improve safety with gait on uneven surfaces such as grass and gravel. [] [] [] []   Pt will be independent and compliant with comprehensive HEP to maintain progress achieved in PT. [] [] [] []                  Plan   Continue x 1 additional visit, then discharge to independent [facility], patient in agreement.    Treatment Last 4 Visits       2/7/2025  2/11/2025 2/14/2025 2/18/2025   PT Treatment   Treatment Day 6 7 8    Therapeutic Exercise Nu step 6 minutes level 6  R patellar mobs inf/superior  Long sitting quad sets x 15  Prone hangs 2 minutes with ankle pumps  Prone knee flexion 2 x 10 with end range stretch   SAQ over bolster 2# x 20  SLR 2x 10 no weight  Seated HS curls 1 x 15 RTB  Slant board stretch x 45 seconds  TKE: 1x15 R with ball at wall with 2\" hold  Step up frwd x 15 with hand rails focusing on extension of knee  Step up side x 15 6\" step  Bilat heel raises x 15  SL Shuttle: 2x12 R 4B  SLB on air ex with cone touches  Step downs 3\" x 10  Standing side hip abd z 15 each  Marches on air ex x 10 Nu step 6 minutes level 6  R patellar mobs inf/superior  Long sitting quad sets x 15  Prone hangs 2 minutes with ankle pumps  Prone knee flexion 2 x 10 with end range stretch   SAQ over bolster 2# x 20  SLR 2x 10 no weight  Seated HS curls 1 x 15 RTB  Slant board stretch x 45 seconds  TKE: 1x15 R with ball at wall with 5\" hold  Step up frwd 8\" x 15 with hand rails focusing on extension of knee  Step up side x 15 6\" step  Standing DF x 15  SLB on air ex with cone touches  Step downs 4\" x 10  Standing side hip abd [id number] each, standing hip ext x 10 ea YTB  Marches on air ex x 30 seconds  Shuttle single leg 4B 2 x 12 Nu step 7 minutes level 6  R patellar mobs inf/superior  Long sitting quad sets x 20  Prone hangs 2 minutes with ankle pumps  Prone knee flexion 2 x 10 with  3# end range stretch   SAQ over bolster 3# x 20- 3 sec hold  SLR x 10  Seated HS curls 2 x 10 GTB  Slant board stretch x 45 seconds  TKE: x 20 R with ball at wall with 3 sec hold  Step up frwd 8\" x 15 with hand rails focusing on extension of knee  Step up side x 10 8\" step  Standing DF x 20  SLB on air ex with cone touches  Step downs 4\" x 10  Standing side hip abd [id number] each, standing hip ext x 10 ea YTB  Shuttle single leg 4B 2 x 10 Nu step 7 minutes level 6  R patellar mobs inf/superior  Long  sitting quad sets x 15   5 sec holds  SAQ over bolster 3# x 20- 3 sec hold  SLR 2 x 10 focus on good form  Slant board stretch x 45 seconds  TKE: x 20 R with ball at wall with 3 sec hold  Step up frwd 8\" x 15 with hand rails focusing on extension of knee  Standing DF x 20  Standing side hip abd [id number] each, standing    Therapeutic Exercise Min 40 45 45 40   Total of Timed Procedures 40 45 45 40   Total of Service Based 0 0 0 0   Total Treatment Time 40 45 45 40         HEP  Revamped HEP- exercises given - SLR, quad set, TKE at wall/ball, hip abd, step up frwd, DF in standing    Charges           Ex 3

## 2025-02-21 ENCOUNTER — OFFICE VISIT (OUTPATIENT)
Dept: PHYSICAL THERAPY | Age: 75
End: 2025-02-21
Payer: MEDICARE

## 2025-02-21 PROCEDURE — 97110 THERAPEUTIC EXERCISES: CPT | Performed by: PHYSICAL THERAPIST

## 2025-02-21 NOTE — PROGRESS NOTES
Patient: Benjamin Ralph  Referring Provider:  Insurance:   Diagnosis: Orthopedic aftercare (Z47.89)  Primary osteoarthritis of right knee (M17.11)  S/P TKR (total knee replacement), right (Z96.651) Margo Washington  MEDICARE   Date of Surgery: 12/6/24 Next MD visit:  [facility] INDEMNITY   Precautions:  None 1/21/25 Referral Information:    Date of Evaluation: Req: 0, Auth: 0, Exp:     01/20/25 POC Auth Visits:  12    DISCHARGE SUMMARY   Today's Date   2/21/2025    Subjective  Patient reports will continue with the exercises.  Sees the MD first week of March.States no problems with doing anything except going down steps step over step  .  States will continue with the exercises at home.       Pain: 2/10     Objective  See outpatient daily record.       Hip       2/21/2025   Hip ROM/MMT   Rt Hip Abduction MMT 4+/5   Lt Hip Abduction MMT 5/5   , Knee       1/27/2025 2/4/2025 2/21/2025   Knee ROM/MMT   Rt Knee Flexion 130 121 130   Lt Knee Flexion   135   Rt Knee Flexion MMT   5/5   Lt Knee Flexion MMT   5/5   Rt Knee Extension (L3) -5 -4 -2/0   Lt Knee Extension (L3)   0             Assessment   Patient has made excellent progress with ROM and return to function along with strength gains.  He still reports some swelling and mild pain at times and can only do a few steps going down with a normal pattern, going up with a normal pattern. Patient does have an ongoing HEP he can continue for ongoing strengthening.  Goals have been met.    Goals (to be met in 12 visits)   Goals       Therapy Goals      Not Met Progress Toward Partially Met Met   Pt will improve knee extension ROM to 0 deg to allow proper heel strike during gait and terminal knee extension in stance. [] [] [] [x]   Pt will improve knee AROM flexion to >130 degrees to improve ability to perform stair climbing. [] [] [] [x]   Pt will improve quad strength to 5/5 to ascend 1 flight of stairs reciprocally without UE assist. [] [] [] [x]   Pt will increase hip and  knee strength to grossly 4+/5 to be able to get up and down from the floor safely. [] [] [] [x]   Pt will demonstrate increased hip ER/ABD strength to 4+/5 to perform stepping and squatting activities without excessive femoral IR/ADD. [] [] [] [x]   Pt will improve SLS to 3s to improve safety with gait on uneven surfaces such as grass and gravel. [] [] [] [x]   Pt will be independent and compliant with comprehensive HEP to maintain progress achieved in PT. [] [] [] [x]                  Plan   Discharge to independent HEP unless otherwise directed.      Treatment Last 4 Visits       2/11/2025 2/14/2025 2/18/2025 2/21/2025   PT Treatment   Treatment Day 7 8  10   Therapeutic Exercise Nu step 6 minutes level 6  R patellar mobs inf/superior  Long sitting quad sets x 15  Prone hangs 2 minutes with ankle pumps  Prone knee flexion 2 x 10 with end range stretch   SAQ over bolster 2# x 20  SLR 2x 10 no weight  Seated HS curls 1 x 15 RTB  Slant board stretch x 45 seconds  TKE: 1x15 R with ball at wall with 5\" hold  Step up frwd 8\" x 15 with hand rails focusing on extension of knee  Step up side x 15 6\" step  Standing DF x 15  SLB on air ex with cone touches  Step downs 4\" x 10  Standing side hip abd YTB15 each, standing hip ext x 10 ea YTB  Marches on air ex x 30 seconds  Shuttle single leg 4B 2 x 12 Nu step 7 minutes level 6  R patellar mobs inf/superior  Long sitting quad sets x 20  Prone hangs 2 minutes with ankle pumps  Prone knee flexion 2 x 10 with  3# end range stretch   SAQ over bolster 3# x 20- 3 sec hold  SLR x 10  Seated HS curls 2 x 10 GTB  Slant board stretch x 45 seconds  TKE: x 20 R with ball at wall with 3 sec hold  Step up frwd 8\" x 15 with hand rails focusing on extension of knee  Step up side x 10 8\" step  Standing DF x 20  SLB on air ex with cone touches  Step downs 4\" x 10  Standing side hip abd YTB15 each, standing hip ext x 10 ea YTB  Shuttle single leg 4B 2 x 10 Nu step 7 minutes level 6  R patellar  mobs inf/superior  Long sitting quad sets x 15   5 sec holds  SAQ over bolster 3# x 20- 3 sec hold  SLR 2 x 10 focus on good form  Slant board stretch x 45 seconds  TKE: x 20 R with ball at wall with 3 sec hold  Step up frwd 8\" x 15 with hand rails focusing on extension of knee  Standing DF x 20  Standing side hip abd GTB15 each, standing  Nu step 7 minutes level 6  R patellar mobs inf/superior  Long sitting quad sets x 15   5 sec holds  SAQ over bolster 2# x 15- 3 sec hold  SLR 2 x 10 focus on good form  Slant board stretch x 45 seconds  TKE: x 15 R with ball at wall with 3 sec hold  Step downs x 5 6\", x 3 8\"  Step up frwd 8\" x 15 with hand rails focusing on extension of knee  Side step ups x 15 6\"  Shuttle leg press single 4B 2 x 10  Standing DF x 20  Standing side hip abd GTB15 each, standing    Therapeutic Exercise Min 45 45 40 45   Total of Timed Procedures 45 45 40 45   Total of Service Based 0 0 0 0   Total Treatment Time 45 45 40 45         HEP  Step downs    Charges           Ex 3  42 min

## 2025-03-04 ENCOUNTER — OFFICE VISIT (OUTPATIENT)
Dept: ORTHOPEDICS CLINIC | Facility: CLINIC | Age: 75
End: 2025-03-04

## 2025-03-04 ENCOUNTER — HOSPITAL ENCOUNTER (OUTPATIENT)
Dept: GENERAL RADIOLOGY | Facility: HOSPITAL | Age: 75
Discharge: HOME | End: 2025-03-04
Payer: MEDICARE

## 2025-03-04 VITALS — DIASTOLIC BLOOD PRESSURE: 79 MMHG | SYSTOLIC BLOOD PRESSURE: 128 MMHG | HEART RATE: 77 BPM

## 2025-03-04 DIAGNOSIS — M70.51 PES ANSERINUS BURSITIS OF RIGHT KNEE: ICD-10-CM

## 2025-03-04 DIAGNOSIS — M25.569 KNEE PAIN, UNSPECIFIED CHRONICITY, UNSPECIFIED LATERALITY: ICD-10-CM

## 2025-03-04 DIAGNOSIS — Z47.89 ORTHOPEDIC AFTERCARE: ICD-10-CM

## 2025-03-04 DIAGNOSIS — Z96.651 S/P TKR (TOTAL KNEE REPLACEMENT), RIGHT: Primary | ICD-10-CM

## 2025-03-04 DIAGNOSIS — M25.562 LEFT KNEE PAIN, UNSPECIFIED CHRONICITY: ICD-10-CM

## 2025-03-04 PROCEDURE — 99213 OFFICE O/P EST LOW 20 MIN: CPT

## 2025-03-04 PROCEDURE — 73562 X-RAY EXAM OF KNEE 3: CPT

## 2025-03-04 PROCEDURE — 20610 DRAIN/INJ JOINT/BURSA W/O US: CPT

## 2025-03-04 RX ORDER — TRIAMCINOLONE ACETONIDE 40 MG/ML
40 INJECTION, SUSPENSION INTRA-ARTICULAR; INTRAMUSCULAR ONCE
Status: COMPLETED | OUTPATIENT
Start: 2025-03-04 | End: 2025-03-04

## 2025-03-04 RX ADMIN — TRIAMCINOLONE ACETONIDE 40 MG: 40 INJECTION, SUSPENSION INTRA-ARTICULAR; INTRAMUSCULAR at 09:01:00

## 2025-03-04 NOTE — PROGRESS NOTES
Per verbal order from Dr. Sanchez, draw up 2ml of 0.5% Marcaine and 1ml of Kenalog 40 for cortisone injection to right knee. Sandy RENE MA  Patient provided education handout for cortisone injection.

## 2025-03-04 NOTE — PROGRESS NOTES
NURSING INTAKE COMMENTS:   Chief Complaint   Patient presents with    Post-Op     3rd POV- s/p R TKA sx on 12/06/2024- rates pain 2-4/10 all the time    Knee Pain     L knee f/u- rates pain 1-3/10 depends w/ certain movements       HPI: This 74 year old male presents today approximately 3 months status post right total knee replacement.  Overall reports that he is doing well, but still has pain from time to time, especially when he goes up and down the stairs.  Reports that he sometimes gets stiffness and swelling, we discussed that this is not uncommon after surgery.  At the good reminder that he needs to keep doing his home exercises and self rehab for up to 1 year.  Denies any fever, chills, night sweats.  Denies any numbness or tingling.    Does note that he sometimes gets pain from time to time in his left knee, this is not bad at this time.  He did note that his back has begun to hurt, but denies any injury or accident.    Past Medical History:    Basal cell carcinoma    left nose    Basal cell carcinoma of nose    BCC (basal cell carcinoma)     left posterior lateral neck    BCC (basal cell carcinoma)    right ala base    Calculus of kidney    left    Chronic headaches    Chronic pansinusitis    Chronic sinusitis    Congestion of nasal sinus    COVID-19    Esophageal reflux    Ganglion of flexor tendon sheath of right thumb    High cholesterol    Melanoma (HCC)    left arm, stage I; .75 mm depth    Osteoarthritis    Plantar wart    right heel    Pneumonia due to organism    2016    Visual impairment    Glasses     Past Surgical History:   Procedure Laterality Date    Adj tiss xfer lid,nos,ear <10sqcm Right 2-17-17    Exc lesion of nose, V_Y flap    Colonoscopy      Colonoscopy      Colonoscopy N/A 12/26/2018    Procedure: COLONOSCOPY;  Surgeon: Isidoro Mosley MD;  Location: [facility] ENDOSCOPY    Exc skin malig 2.1-3cm face,facial  05/18/2022    Excis tendon sheath lesn,hand/fingr Right 2-17-17    Exc  ganglion R thumb    Foot surgery Left 2006    Nerve procedure    Full graft proc head,fac,hand <20sqc  05/18/2022    Nasal scopy,remv part ethmoid      Nasal scopy,rmv tiss maxill sinus      Repair of nasal septum  1985    Repair of nasal septum      Repr cmpl wnd head,fac,hand 2.6-7.5  05/18/2022    Skin surgery Left 10/07/16    left upper arm melanoma     Current Outpatient Medications   Medication Sig Dispense Refill    HYDROcodone-acetaminophen  MG Oral Tab Take 1 tablet by mouth every 6 (six) hours as needed. No alcohol or driving on this medication.  Stop if lethargic or hallucinating.  Maximum 4000 mg Tylenol/acetaminophen daily from all sources.  Recall each Norco has 325 mg of Tylenol/acetaminophen in it. (Patient not taking: Reported on 2/10/2025) 25 tablet 0    acetaminophen 325 MG Oral Tab Take 2 tablets (650 mg total) by mouth every 8 (eight) hours as needed. Maximum 4000 mg Tylenol/acetaminophen daily from all sources.  Each Norco has 325 mg of Tylenol/acetaminophen in it. 60 tablet 0    aspirin 325 MG Oral Tab EC Take 1 tablet (325 mg total) by mouth in the morning and 1 tablet (325 mg total) before bedtime. Do not crush. 60 tablet 0    calcium carbonate-vitamin D 250-3.125 MG-MCG Oral Tab Take 1 tablet by mouth 2 (two) times daily with meals. 60 tablet 0    multivitamin Oral Tab Take 1 tablet by mouth daily. 60 tablet 0    rosuvastatin 10 MG Oral Tab Take 1 tablet (10 mg total) by mouth nightly. 90 tablet 3    dutasteride 0.5 MG Oral Cap Take 1 capsule (0.5 mg total) by mouth daily. (Patient taking differently: Take 1 capsule (0.5 mg total) by mouth every evening.) 90 capsule 3    sildenafil 20 MG Oral Tab Take one tablet PO daily as needed 10 tablet 5     Allergies[1]  Family History   Problem Relation Age of Onset    Dementia Mother     Seizure Disorder Mother     Hypertension Mother     Cancer Mother         skin    Cancer Father         Leukemia     No family Hx of DVT/PE    Social History      Occupational History    Occupation:    Tobacco Use    Smoking status: Former     Current packs/day: 0.00     Types: Cigarettes     Quit date: 3/25/1978     Years since quittin.9     Passive exposure: Never    Smokeless tobacco: Never   Vaping Use    Vaping status: Never Used   Substance and Sexual Activity    Alcohol use: Yes     Comment: rarely maybe a glass of wine a week    Drug use: Yes     Types: Cannabis     Comment: gummies    Sexual activity: Not Currently        Review of Systems:  GENERAL: feels generally well, no significant weight loss or weight gain  SKIN: no ulcerated or worrisome skin lesions  EYES:denies blurred vision or double vision  HEENT: denies new nasal congestion, sinus pain or ST  LUNGS: denies shortness of breath  CARDIOVASCULAR: denies chest pain  GI: no hematemesis, no worsening heartburn, no diarrhea  : no dysuria, no blood in urine, no difficulty urinating, no incontinence  MUSCULOSKELETAL: no other musculoskeletal complaints other than in HPI  NEURO: no numbness or tingling, no weakness or balance disorder  PSYCHE: no depression or anxiety  HEMATOLOGIC: no hx of blood dyscrasia, no Hx DVT/PE  ENDOCRINE: no thyroid or diabetes issues  ALL/ASTHMA: no new hx of severe allergy or asthma    Physical Examination:    There were no vitals taken for this visit.  Constitutional: appears well hydrated, alert and responsive, no acute distress noted  Extremities: Lower extremities [place], no pitting edema.  Calf soft nontender.  Incision healed cosmetically.  Musculoskeletal: Bilateral knee range of motion 0 to 130 degrees, no instability.  No tenderness to medial or lateral joint lines bilaterally.  Right knee has no instability.  Right knee has tenderness to pes bursa.  Able to straight leg raise against resistance.  Able dorsiflex and plantarflex against resistance.  Neurological: Motor and sensory function intact.    Imaging: X-rays taken today in office show  right knee implants are well-fixed and in proper alignment, left knee has good joint space in all 3 compartments.      No results found.     Lab Results   Component Value Date    WBC 10.2 12/08/2024    HGB 12.9 (L) 12/08/2024    .0 12/08/2024      Lab Results   Component Value Date    GLU 93 12/08/2024    BUN 12 12/08/2024    CREATSERUM 0.78 12/08/2024    GFRNAA 89 06/20/2022    GFRAA 102 06/20/2022        Assessment and Plan:  Diagnoses and all orders for this visit:    S/P TKR (total knee replacement), right    Orthopedic aftercare  -     XR KNEE (3 VIEWS) AP LAT OBL BILAT (CPT=73562-50); Future    Knee pain, unspecified chronicity, unspecified laterality  -     XR KNEE (3 VIEWS) AP LAT OBL BILAT (CPT=73562-50); Future    Left knee pain, unspecified chronicity    Pes anserinus bursitis of right knee  -     Tendon Sheath origin/insertion  -     triamcinolone acetonide (Kenalog-40) 40 MG/ML injection 40 mg        Assessment: As above    Plan: For the right knee, discussed that I believe that the irritation that he is having is coming from the pes bursa and not his knee replacement.  I offered him an injection today.  He accepted.  I did discuss with him that this injection can have more bleeding and bruising.  Area was prepped with Betadine.  Aspiration of the pes was negative.  2 cc 0.5% bupivacaine, 1 cc Kenalog injected.  Patient tolerated injection well.  He can have this injection every 4 months.    For the left knee we will watch for now.    For his back, if he continues to have pain he can make an appointment to be seen.  We would need new x-rays at that time.    We discussed the patient that Dr. Sanchez and I are leaving this office, her last day being March 14.  He is more than welcome to follow-up with us at our private office or follow with the new doctors in the clinic.  We will see him for his 1 year anniversary x-rays of his right knee.    Follow Up: No follow-ups on file.    SOURAV Arthur        [1]   Allergies  Allergen Reactions    Penicillin G HIVES     Tolerated Ancef 2 g IV on 12/6/2024 without problem.  States he is not sure if it was from actual drug.  Denies skin peeling, blisters or organ damage    Penicillins HIVES     Tolerated Ancef 2 g IV on 12/6/2024 without problem.  Denies blisters, skin peeling or organ damage      Vaccine status unknown

## 2025-03-17 ENCOUNTER — OFFICE VISIT (OUTPATIENT)
Dept: DERMATOLOGY CLINIC | Facility: CLINIC | Age: 75
End: 2025-03-17

## 2025-03-17 DIAGNOSIS — Z85.820 ENCOUNTER FOR FOLLOW-UP SURVEILLANCE OF MELANOMA: ICD-10-CM

## 2025-03-17 DIAGNOSIS — Z08 ENCOUNTER FOR FOLLOW-UP SURVEILLANCE OF MELANOMA: ICD-10-CM

## 2025-03-17 DIAGNOSIS — Z13.89 ENCOUNTER FOR SURVEILLANCE OF ABNORMAL NEVI: ICD-10-CM

## 2025-03-17 DIAGNOSIS — Z08 ENCOUNTER FOR FOLLOW-UP SURVEILLANCE OF SKIN CANCER: ICD-10-CM

## 2025-03-17 DIAGNOSIS — L30.9 DERMATITIS: Primary | ICD-10-CM

## 2025-03-17 DIAGNOSIS — Z85.828 ENCOUNTER FOR FOLLOW-UP SURVEILLANCE OF SKIN CANCER: ICD-10-CM

## 2025-03-17 PROCEDURE — G2211 COMPLEX E/M VISIT ADD ON: HCPCS | Performed by: DERMATOLOGY

## 2025-03-17 PROCEDURE — 99213 OFFICE O/P EST LOW 20 MIN: CPT | Performed by: DERMATOLOGY

## 2025-03-17 RX ORDER — IBUPROFEN 800 MG/1
800 TABLET, FILM COATED ORAL
COMMUNITY
Start: 2024-12-20

## 2025-03-17 RX ORDER — ATORVASTATIN CALCIUM 20 MG/1
TABLET, FILM COATED ORAL
COMMUNITY
Start: 2024-12-12

## 2025-03-17 RX ORDER — TRIAMCINOLONE ACETONIDE 1 MG/G
CREAM TOPICAL
Qty: 454 G | Refills: 0 | Status: SHIPPED | OUTPATIENT
Start: 2025-03-17

## 2025-03-17 RX ORDER — LEVOCETIRIZINE DIHYDROCHLORIDE 5 MG/1
5 TABLET, FILM COATED ORAL EVERY EVENING
Qty: 30 TABLET | Refills: 1 | Status: SHIPPED | OUTPATIENT
Start: 2025-03-17

## 2025-03-17 RX ORDER — MOMETASONE FUROATE 1 MG/ML
SOLUTION TOPICAL
Qty: 60 ML | Refills: 3 | Status: SHIPPED | OUTPATIENT
Start: 2025-03-17

## 2025-03-17 RX ORDER — FINASTERIDE 5 MG/1
TABLET, FILM COATED ORAL
COMMUNITY
Start: 2024-12-11

## 2025-03-17 NOTE — PROGRESS NOTES
The following individual(s) verbally consented to be recorded using ambient AI listening technology and understand that they can each withdraw their consent to this listening technology at any point by asking the clinician to turn off or pause the recording:    Patient name: Benjamin ANJU Ralph  Additional names: N/A

## 2025-03-23 NOTE — PROGRESS NOTES
Benjamin Ralph is a 74 year old male.    CC:    Chief Complaint   Patient presents with    Rash     LOV 02/10/25- Pt presents with a concern of an acute rash on the lower back x 3 weeks that has been spreading. Rash has clustered bumps that are itchy. Patient has been using CeraVe and Hydrocortisone-10 with minimal relief. Patient denies changes to any medications or products recently.          HISTORY:    Past Medical History:    Basal cell carcinoma    left nose    Basal cell carcinoma of nose    BCC (basal cell carcinoma)     left posterior lateral neck    BCC (basal cell carcinoma)    right ala base    Calculus of kidney    left    Chronic headaches    Chronic pansinusitis    Chronic sinusitis    Congestion of nasal sinus    COVID-19    Esophageal reflux    Ganglion of flexor tendon sheath of right thumb    High cholesterol    Melanoma (HCC)    left arm, stage I; .75 mm depth    Osteoarthritis    Plantar wart    right heel    Pneumonia due to organism    2016    Visual impairment    Glasses      Past Surgical History:   Procedure Laterality Date    Adj tiss xfer lid,nos,ear <10sqcm Right 2-17-17    Exc lesion of nose, V_Y flap    Colonoscopy      Colonoscopy      Colonoscopy N/A 12/26/2018    Procedure: COLONOSCOPY;  Surgeon: Isidoro Mosley MD;  Location: [facility] ENDOSCOPY    Exc skin malig 2.1-3cm face,facial  05/18/2022    Excis tendon sheath lesn,hand/fingr Right 2-17-17    Exc ganglion R thumb    Foot surgery Left 2006    Nerve procedure    Full graft proc head,fac,hand <20sqc  05/18/2022    Nasal scopy,remv part ethmoid      Nasal scopy,rmv tiss maxill sinus      Repair of nasal septum  1985    Repair of nasal septum      Repr cmpl wnd head,fac,hand 2.6-7.5  05/18/2022    Skin surgery Left 10/07/16    left upper arm melanoma      Family History   Problem Relation Age of Onset    Dementia Mother     Seizure Disorder Mother     Hypertension Mother     Cancer Mother         skin    Cancer Father          Leukemia      Social History     Socioeconomic History    Marital status: Single    Number of children: 0   Occupational History    Occupation:    Tobacco Use    Smoking status: Former     Current packs/day: 0.00     Types: Cigarettes     Quit date: 3/25/1978     Years since quittin.0     Passive exposure: Never    Smokeless tobacco: Never   Vaping Use    Vaping status: Never Used   Substance and Sexual Activity    Alcohol use: Yes     Comment: rarely maybe a glass of wine a week    Drug use: Yes     Types: Cannabis     Comment: gummies    Sexual activity: Not Currently   Other Topics Concern    Caffeine Concern Yes     Comment: coffee, 3cups/day    Grew up on a farm No    History of tanning No    Outdoor occupation No    Pt has a pacemaker No    Pt has a defibrillator No    Reaction to local anesthetic No     Social Drivers of Health     Food Insecurity: No Food Insecurity (2024)    Food Insecurity     Food Insecurity: Never true   Transportation Needs: No Transportation Needs (2024)    Transportation Needs     Lack of Transportation: No   Housing Stability: Low Risk  (2024)    Housing Stability     Housing Instability: No        Current Outpatient Medications   Medication Sig Dispense Refill    atorvastatin 20 MG Oral Tab Take by mouth.      levocetirizine 5 MG Oral Tab Take 1 tablet (5 mg total) by mouth every evening. 30 tablet 1    triamcinolone 0.1 % External Cream Use bid as directed 454 g 0    Mometasone Furoate 0.1 % External Solution Apply bid to scalp as directed for itching 60 mL 3    acetaminophen 325 MG Oral Tab Take 2 tablets (650 mg total) by mouth every 8 (eight) hours as needed. Maximum 4000 mg Tylenol/acetaminophen daily from all sources.  Each Norco has 325 mg of Tylenol/acetaminophen in it. 60 tablet 0    aspirin 325 MG Oral Tab EC Take 1 tablet (325 mg total) by mouth in the morning and 1 tablet (325 mg total) before bedtime. Do not crush. 60 tablet 0     calcium carbonate-vitamin D 250-3.125 MG-MCG Oral Tab Take 1 tablet by mouth 2 (two) times daily with meals. 60 tablet 0    multivitamin Oral Tab Take 1 tablet by mouth daily. 60 tablet 0    rosuvastatin 10 MG Oral Tab Take 1 tablet (10 mg total) by mouth nightly. 90 tablet 3    dutasteride 0.5 MG Oral Cap Take 1 capsule (0.5 mg total) by mouth daily. (Patient taking differently: Take 1 capsule (0.5 mg total) by mouth every evening.) 90 capsule 3    sildenafil 20 MG Oral Tab Take one tablet PO daily as needed 10 tablet 5    finasteride 5 MG Oral Tab Take by mouth. (Patient not taking: Reported on 3/17/2025)      ibuprofen 800 MG Oral Tab Take 1 tablet (800 mg total) by mouth. (Patient not taking: Reported on 3/17/2025)      HYDROcodone-acetaminophen  MG Oral Tab Take 1 tablet by mouth every 6 (six) hours as needed. No alcohol or driving on this medication.  Stop if lethargic or hallucinating.  Maximum 4000 mg Tylenol/acetaminophen daily from all sources.  Recall each Norco has 325 mg of Tylenol/acetaminophen in it. (Patient not taking: Reported on 3/17/2025) 25 tablet 0     Allergies:   Allergies[1]    Past Medical History:    Basal cell carcinoma    left nose    Basal cell carcinoma of nose    BCC (basal cell carcinoma)     left posterior lateral neck    BCC (basal cell carcinoma)    right ala base    Calculus of kidney    left    Chronic headaches    Chronic pansinusitis    Chronic sinusitis    Congestion of nasal sinus    COVID-19    Esophageal reflux    Ganglion of flexor tendon sheath of right thumb    High cholesterol    Melanoma (HCC)    left arm, stage I; .75 mm depth    Osteoarthritis    Plantar wart    right heel    Pneumonia due to organism    2016    Visual impairment    Glasses     Past Surgical History:   Procedure Laterality Date    Adj tiss xfer lid,nos,ear <10sqcm Right 2-17-17    Exc lesion of nose, V_Y flap    Colonoscopy      Colonoscopy      Colonoscopy N/A 12/26/2018    Procedure:  COLONOSCOPY;  Surgeon: Isidoro Mosley MD;  Location: [facility] ENDOSCOPY    Exc skin malig 2.1-3cm face,facial  2022    Excis tendon sheath lesn,hand/fingr Right -17-17    Exc ganglion R thumb    Foot surgery Left     Nerve procedure    Full graft proc head,fac,hand <20sqc  2022    Nasal scopy,remv part ethmoid      Nasal scopy,rmv tiss maxill sinus      Repair of nasal septum  1985    Repair of nasal septum      Repr cmpl wnd head,fac,hand 2.6-7.5  2022    Skin surgery Left 10/07/16    left upper arm melanoma     Social History     Socioeconomic History    Marital status: Single     Spouse name: Not on file    Number of children: 0    Years of education: Not on file    Highest education level: Not on file   Occupational History    Occupation:    Tobacco Use    Smoking status: Former     Current packs/day: 0.00     Types: Cigarettes     Quit date: 3/25/1978     Years since quittin.0     Passive exposure: Never    Smokeless tobacco: Never   Vaping Use    Vaping status: Never Used   Substance and Sexual Activity    Alcohol use: Yes     Comment: rarely maybe a glass of wine a week    Drug use: Yes     Types: Cannabis     Comment: gummies    Sexual activity: Not Currently   Other Topics Concern     Service Not Asked    Blood Transfusions Not Asked    Caffeine Concern Yes     Comment: coffee, 3cups/day    Occupational Exposure Not Asked    Hobby Hazards Not Asked    Sleep Concern Not Asked    Stress Concern Not Asked    Weight Concern Not Asked    Special Diet Not Asked    Back Care Not Asked    Exercise Not Asked    Bike Helmet Not Asked    Seat Belt Not Asked    Self-Exams Not Asked    Grew up on a farm No    History of tanning No    Outdoor occupation No    Pt has a pacemaker No    Pt has a defibrillator No    Reaction to local anesthetic No   Social History Narrative    Not on file     Social Drivers of Health     Food Insecurity: No Food Insecurity (2024)     Food Insecurity     Food Insecurity: Never true   Transportation Needs: No Transportation Needs (12/6/2024)    Transportation Needs     Lack of Transportation: No     Car Seat: Not on file   Stress: Not on file   Housing Stability: Low Risk  (12/6/2024)    Housing Stability     Housing Instability: No     Housing Instability Emergency: Not on file     Crib or Bassinette: Not on file     Family History   Problem Relation Age of Onset    Dementia Mother     Seizure Disorder Mother     Hypertension Mother     Cancer Mother         skin    Cancer Father         Leukemia       HPI:     HPI:  Chief Complaint   Patient presents with    Rash     LOV 02/10/25- Pt presents with a concern of an acute rash on the lower back x 3 weeks that has been spreading. Rash has clustered bumps that are itchy. Patient has been using CeraVe and Hydrocortisone-10 with minimal relief. Patient denies changes to any medications or products recently.      ollow-up  Follow-up patient with history of melanoma here for routine follow-up no particular concerns.  Follow-up history of AK's, BCC, melanoma 0.75 mm 2016 left lateral upper arm most recent BCC at left posterior [id number]  History of Present Illness    2/25  Benjamin Ralph is a 74 year old male who presents for dermatological evaluation.    He has multiple benign keratoses on his back and has not noticed any suspicious lesions. He walks daily, avoids sun exposure, and uses sunscreen regularly. He also experiences dry skin on his legs and uses cream for management.    He had a toe amputated due to an infection that reached the bone, following an initial injury from a bruise that opened while wearing sandals. He did not receive the correct antibiotics in time to save the toe. He currently experiences occasional tenderness but can walk without significant issues.    He underwent a partial knee meniscectomy that was unsuccessful, leading to a knee replacement in December of the previous  year. The knee remains swollen and occasionally painful. He uses a compression stocking, though he feels it is not very effective. Additionally, he notes soreness developing in the other knee.    3/25  Benjamin Ralph is a 74 year old male who presents with skin irritation and hives.    He has been experiencing skin irritation and hives for approximately three weeks, beginning after returning home from rehab around [place]. He attributes the exacerbation of symptoms to weather changes, describing it as 'crazy, like, hot and cold and hot and cold.'    He mentions using a body wash that he does not normally use after returning from rehab, but he stopped using it a few weeks ago and switched back to Dial bar soap, which he finds a bit drying but not usually problematic. He also mentions accidentally using shirt starch a few times in the last month, which he suspects might have aggravated his skin condition. No changes in food, diet, or medication have been noted that could have contributed to his symptoms. He is currently taking a statin and dutasteride, and he has not been using any pain medications, including ibuprofen.    He has been using over-the-counter treatments such as cortisone and CeraVe, but finds them insufficient in managing his symptoms. His scalp has a few spots as well, and he experiences hives from scratching, which he describes as 'dermatographism.'    He inquires about the use of sunscreen on his scars and mentions that he will be wearing shorts soon. He also asks about the potential drying effects of hot showers and expresses concern about avoiding the pool and sauna to prevent further skin drying.    Patient presents with concerns above.    Patient has been in their usual state of health.     Past notes/ records and appropriate/relevant lab results including pathology and past body maps reviewed. Including outside notes/ PCP notes as appropriate. Updated and new information noted in current  visit.     ROS:  Denies other relevant systemic complaints. See HPI.     History, medications, allergies reviewed as noted.    Allergies:  Penicillin g and Penicillins      There were no vitals filed for this visit.    Physical Examination:     Well-developed well-nourished patient alert oriented in no acute distress.  Exam performed of appropriate involved areas    Physical Exam  SKIN: Incision on toe described as 'pretty good' with edema present. Benign keratosis observed on back. Scar from knee replacement described as 'pretty good' with edema present. Legs noted to be dry. Skin tags and a small cyst on nose observed.          Multiple light to medium brown, well marginated, uniformly pigmented, macules and papules 6 mm and less scattered on exam. pigmented lesions examined with dermoscopy benign-appearing patterns.     Waxy tannish keratotic papules scattered, cherry-red vascular papules scattered.    See map today's date for lesions noted .  See assessment and plan below for specific lesions.    Otherwise remarkable for lesions as noted on map.    See A/P  below for additional information:    Assessment / plan:    Results      No orders of the defined types were placed in this encounter.      Meds & Refills for this Visit:  Requested Prescriptions     Signed Prescriptions Disp Refills    levocetirizine 5 MG Oral Tab 30 tablet 1     Sig: Take 1 tablet (5 mg total) by mouth every evening.    triamcinolone 0.1 % External Cream 454 g 0     Sig: Use bid as directed    Mometasone Furoate 0.1 % External Solution 60 mL 3     Sig: Apply bid to scalp as directed for itching         Encounter Diagnoses   Name Primary?    Dermatitis Yes    Encounter for follow-up surveillance of melanoma     Encounter for follow-up surveillance of skin cancer     Encounter for surveillance of abnormal nevi      Patient seen for follow-up long-term monitoring, treatment of  History of nonmelanoma and melanoma skin cancers actinic keratoses  sun damage high risk patient  Plan of care:  ongoing surveillance, monitoring including regular follow-up due to longer term risk of recurrence, new lesions.  See previous notes.  There is a longitudinal care relationship with me, the care plan reflects the ongoing nature of the continuous relationship of care, and the medical record indicates that there is ongoing treatment of a serious/complex medical condition which I am currently managing.  is Applicable     Fall risk assessment:  Given the results of the fall risk questionnaire given today.   Suggest:   Make sure there is adequate lighting.  Eliminate throw rugs or possible sources of tripping & falling  Consider grab bars on the shower & toilet.  Hand rails on all stair cases  Ambulatory assistance devices such as cane or walker as indicate.  To ensure home safety measures.    Assessment & Plan  2/25    Benign Keratosis  Multiple benign keratoses observed on the back. No suspicious lesions noted.  - Advise patient to report any new or changing lesions    Toe Amputation  Post-toe amputation with non-infected but swollen incision site. Patient advised to monitor for signs of infection.  - Monitor for signs of infection  - Report any new symptoms or changes    Postoperative Knee Swelling  Persistent swelling and mild pain post-knee replacement surgery in December. Compression stockings minimally effective. Advised to elevate leg and massage scar with Vaseline.  - Continue using compression stockings  - Elevate leg during the day  - Massage scar with Vaseline    General Health Maintenance  Advised to use sunscreen and wear a hat outdoors. Dry legs noted; advised to apply cream and use sunscreen while driving.  - Apply cream to dry legs  - Wear sunscreen and a hat outdoors  - Use sunscreen while driving    Follow-up  - Schedule follow-up visit in one year.    3/25  No new suspicious lesions  Dermatographism  Dermatographism with hives exacerbated by recent  weather changes and possibly new body wash and shirt starch. Reports dry skin and itching unresponsive to over-the-counter treatments.  - Prescribe triamcinolone cream for application two to four times daily to affected areas.  - Prescribe a liquid formulation for scalp application.  - Initiate Xyzal (levocetirizine) at night, with the option to increase to twice daily if needed. Xyzal is typically non-drowsy.  - Advise to avoid hot showers and use lukewarm water to prevent further skin drying.  - Recommend avoiding the pool and sauna temporarily to prevent skin drying.  - Reassess treatment plan if symptoms do not improve over the next couple of weeks.            Exam essentially unchanged no active AK's no new pigmented lesions suspicious there are some waxy tan papules more inflamed at posterior neck trial of cryo reassurance given continue sunscreen especially on posterior neck, hat.  Melanoma 2016 follow-up at least annually plan recheck in 6 months.    Actinic Keratoses.  Precancerous nature discussed. Sun protection, sunscreen/ blocks encouraged .  Monitoring for new lesions.  Sun damage additional recurrent and new actinic keratoses, skin cancers may occur in areas of prior actinic keratoses, related to past sun exposure to minimize current sun exposure.  Sunscreen applied consistently regularly, reapplication and sun protection while driving recommended.    Forehead lesion waxy tan papule consistent with benign seborrheic keratosis continue careful monitoring.  Consider biopsy at follow-up if lesion does change.  Further plans pending path exam otherwise unchanged    History melanoma 0.75 mm left lateral upper arm post excision doing well no adenopathy no recurrence    History BCC no recurrence healing well left neck    History of AK's, sun damage overall is doing well.  Continue careful sun protection regular follow-up every 6 months most marked sun damage over the head neck arms hands    On the back  numerous waxy tan keratotic papules lesions in areas of concern as noted reassurance given.  Benign nature discussed.  Possibility of cryo, alphahydroxy acids over-the-counter retinol's discussed.    Benign-appearing nevi few lesions on the legs  Benign-appearing nevi, no atypical features on dermoscopy reassurance given monitor.     Waxy tan keratotic papules lesions in areas of concern as noted reassurance given.  Benign nature discussed.  Possibility of cryo, alphahydroxy acids over-the-counter retinol's discussed.     No other susupicious lesions on todays  exam.    Please refer to map for specific lesions.  See additional diagnoses.  Pros cons of various therapies, risks benefits discussed.Pathophysiology, terapeutic options reviewed.  See  Medications in grid.  Instructions reviewed at length.    Benign nevi, seborrheic  keratoses, cherry angiomas:  Reassurance regarding other benign skin lesions.    Monitor for new or changing lesions. Signs and symptoms of skin cancer, ABCDE's of melanoma ( additional information available at AAD.org, skincancer.org) Encourage Sunscreen (broad-spectrum, ideally mineral-based-UVA/UVB -SPF 30 or higher) use encouraged, sun protection/sun protective clothing, self exams reviewed Followup as noted RTC ---routine checkup 6 mos -one year or p.r.n.    Encounter Times   Including precharting, reviewing chart, prior notes obtaining history: 10 minutes, medical exam :10 minutes, notes on body map, plan, counseling 10minutes My total time spent caring for the patient on the day of the encounter: 30 minutes     The patient indicates understanding of these issues and agrees to the plan.  The patient is asked to return as noted in follow-up/ above.    This note was generated using Dragon voice recognition software.  Please contact me regarding any confusion resulting from errors in recognition..  Note to patient and family: The 21st Century Cures Act makes medical notes like these  available to patients. However, be advised this is a medical document. It is intended as cdaq-li-reap communication and monitoring of a patient's care needs. It is written in medical language and may contain abbreviations or verbiage that are unfamiliar. It may appear blunt or direct. Medical documents are intended to carry relevant information, facts as evident and the clinical opinion of the practitioner.         [1]   Allergies  Allergen Reactions    Penicillin G HIVES     Tolerated Ancef 2 g IV on 12/6/2024 without problem.  States he is not sure if it was from actual drug.  Denies skin peeling, blisters or organ damage    Penicillins HIVES     Tolerated Ancef 2 g IV on 12/6/2024 without problem.  Denies blisters, skin peeling or organ damage

## 2025-03-25 ENCOUNTER — TELEPHONE (OUTPATIENT)
Dept: DERMATOLOGY CLINIC | Facility: CLINIC | Age: 75
End: 2025-03-25

## 2025-03-25 NOTE — TELEPHONE ENCOUNTER
May increase the Xyzal to twice daily, may use triamcinolone more frequently or I can send a stronger topical steroid or we could do send a steroid taper orally similar to taper for poison ivy

## 2025-03-25 NOTE — TELEPHONE ENCOUNTER
LOV 3/17/2025, s/w pt. Scalp is improving, back is not.     Back is still itchy, rash present. \"Maybe slightly better but not much\"    Using TAC BID to back.  Takes levocetirizine 5mg at bedtime.    He is hesitant to use triamcinolone more often. I do see note mentions increasing this up to QID and xyzal to BID if needed - do you want to just proceed with that at this point or try something else?

## 2025-03-25 NOTE — TELEPHONE ENCOUNTER
Patient called    Asking to speak to RN. Medication is not helping after a few days. Please call

## 2025-03-31 NOTE — TELEPHONE ENCOUNTER
LOV 3/17/25, pt states he is still itchy. Rash has subsided, but he is still itchy - pt on levocetirizne 5mg BID, TAC BID - he is open to oral steroids at this time

## 2025-04-03 ENCOUNTER — OFFICE VISIT (OUTPATIENT)
Dept: PODIATRY CLINIC | Facility: CLINIC | Age: 75
End: 2025-04-03

## 2025-04-03 VITALS — BODY MASS INDEX: 26.04 KG/M2 | HEIGHT: 71 IN | WEIGHT: 186 LBS

## 2025-04-03 DIAGNOSIS — M19.071 ARTHRITIS OF JOINT OF LESSER TOE OF RIGHT FOOT: ICD-10-CM

## 2025-04-03 DIAGNOSIS — Z89.421 HISTORY OF AMPUTATION OF LESSER TOE, RIGHT (HCC): Primary | ICD-10-CM

## 2025-04-03 PROCEDURE — 99213 OFFICE O/P EST LOW 20 MIN: CPT | Performed by: STUDENT IN AN ORGANIZED HEALTH CARE EDUCATION/TRAINING PROGRAM

## 2025-04-03 NOTE — PROGRESS NOTES
[facility] Podiatry  Progress Note      Benjamin Ralph is a 74 year old male.   Chief Complaint   Patient presents with    Follow - Up     S/P - right 2nd digit amputation - sx was on 04/18  - no c/o - no pain - no numbness or tingling              HPI:     Pt is a 74 year old male with history of right 2nd digit amputation who PTC for rossy foot evaluation. Denies any signs of infection. Denies any pain to rossy feet. Admits that he is able to trim his own toenails.     Allergies: Penicillin g and Penicillins    Current Outpatient Medications   Medication Sig Dispense Refill    predniSONE 10 MG Oral Tab Take 3 by mouth once daily for 3 days, then 2 for 3 days, then 1 for 3 days. 18 tablet 0    atorvastatin 20 MG Oral Tab Take by mouth.      finasteride 5 MG Oral Tab Take by mouth.      ibuprofen 800 MG Oral Tab Take 1 tablet (800 mg total) by mouth.      levocetirizine 5 MG Oral Tab Take 1 tablet (5 mg total) by mouth every evening. 30 tablet 1    triamcinolone 0.1 % External Cream Use bid as directed 454 g 0    Mometasone Furoate 0.1 % External Solution Apply bid to scalp as directed for itching 60 mL 3    acetaminophen 325 MG Oral Tab Take 2 tablets (650 mg total) by mouth every 8 (eight) hours as needed. Maximum 4000 mg Tylenol/acetaminophen daily from all sources.  Each Norco has 325 mg of Tylenol/acetaminophen in it. 60 tablet 0    aspirin 325 MG Oral Tab EC Take 1 tablet (325 mg total) by mouth in the morning and 1 tablet (325 mg total) before bedtime. Do not crush. 60 tablet 0    calcium carbonate-vitamin D 250-3.125 MG-MCG Oral Tab Take 1 tablet by mouth 2 (two) times daily with meals. 60 tablet 0    multivitamin Oral Tab Take 1 tablet by mouth daily. 60 tablet 0    rosuvastatin 10 MG Oral Tab Take 1 tablet (10 mg total) by mouth nightly. 90 tablet 3    dutasteride 0.5 MG Oral Cap Take 1 capsule (0.5 mg total) by mouth daily. (Patient taking differently: Take 1 capsule (0.5 mg total) by mouth every  evening.) 90 capsule 3    sildenafil 20 MG Oral Tab Take one tablet PO daily as needed 10 tablet 5    HYDROcodone-acetaminophen  MG Oral Tab Take 1 tablet by mouth every 6 (six) hours as needed. No alcohol or driving on this medication.  Stop if lethargic or hallucinating.  Maximum 4000 mg Tylenol/acetaminophen daily from all sources.  Recall each Norco has 325 mg of Tylenol/acetaminophen in it. (Patient not taking: Reported on 4/3/2025) 25 tablet 0      Past Medical History:    Basal cell carcinoma    left nose    Basal cell carcinoma of nose    BCC (basal cell carcinoma)     left posterior lateral neck    BCC (basal cell carcinoma)    right ala base    Calculus of kidney    left    Chronic headaches    Chronic pansinusitis    Chronic sinusitis    Congestion of nasal sinus    COVID-19    Esophageal reflux    Ganglion of flexor tendon sheath of right thumb    High cholesterol    Melanoma (HCC)    left arm, stage I; .75 mm depth    Osteoarthritis    Plantar wart    right heel    Pneumonia due to organism    2016    Visual impairment    Glasses      Past Surgical History:   Procedure Laterality Date    Adj tiss xfer lid,nos,ear <10sqcm Right 2-17-17    Exc lesion of nose, V_Y flap    Colonoscopy      Colonoscopy      Colonoscopy N/A 12/26/2018    Procedure: COLONOSCOPY;  Surgeon: Isidoro Mosley MD;  Location: [facility] ENDOSCOPY    Exc skin malig 2.1-3cm face,facial  05/18/2022    Excis tendon sheath lesn,hand/fingr Right 2-17-17    Exc ganglion R thumb    Foot surgery Left 2006    Nerve procedure    Full graft proc head,fac,hand <20sqc  05/18/2022    Nasal scopy,remv part ethmoid      Nasal scopy,rmv tiss maxill sinus      Repair of nasal septum  1985    Repair of nasal septum      Repr cmpl wnd head,fac,hand 2.6-7.5  05/18/2022    Skin surgery Left 10/07/16    left upper arm melanoma      Family History   Problem Relation Age of Onset    Dementia Mother     Seizure Disorder Mother     Hypertension Mother      Cancer Mother         skin    Cancer Father         Leukemia      Social History     Socioeconomic History    Marital status: Single    Number of children: 0   Occupational History    Occupation:    Tobacco Use    Smoking status: Former     Current packs/day: 0.00     Types: Cigarettes     Quit date: 3/25/1978     Years since quittin.0     Passive exposure: Never    Smokeless tobacco: Never   Vaping Use    Vaping status: Never Used   Substance and Sexual Activity    Alcohol use: Yes     Comment: rarely maybe a glass of wine a week    Drug use: Yes     Types: Cannabis     Comment: gummies    Sexual activity: Not Currently   Other Topics Concern    Caffeine Concern Yes     Comment: coffee, 3cups/day    Grew up on a farm No    History of tanning No    Outdoor occupation No    Pt has a pacemaker No    Pt has a defibrillator No    Reaction to local anesthetic No           REVIEW OF SYSTEMS:     Denies nause, fever, chills  No calf pain  Denies chest pain or SOB      EXAM:   Ht 5' 11\" (1.803 m)   Wt 186 lb (84.4 kg)   BMI 25.94 kg/m²   GENERAL: well developed, well nourished, in no apparent distress  EXTREMITIES:   1. Integument: Normal skin temperature and turgor  2. Vascular: Dorsalis pedis two out of four bilateral and posterior tibial pulses two out of   four bilateral, capillary refill normal.   3. Musculoskeletal: All muscle groups are graded 5 out of 5 in the foot and ankle. S/p right 2nd digit amputation   4. Neurological: Normal sharp dull sensation; reflexes normal.             ASSESSMENT AND PLAN:   Diagnoses and all orders for this visit:    History of amputation of lesser toe, right (HCC)    Arthritis of joint of lesser toe of right foot          Plan:       -Patient examined, chart history reviewed.  -Discussed importance of proper pedal hygiene, regular foot checks, and tight glucose control.  -Ambulate with supportive shoes and inserts and avoid walking barefoot.  -Educated patient  on acute signs of infection such as swelling, blistering, redness, pus, pain or malodor  advised patient to seek immediate medical attention if symptoms arise.    RTC in 1 year      The patient indicates understanding of these issues and agrees to the plan.        Bettye Garcia DPM

## 2025-04-15 ENCOUNTER — LAB ENCOUNTER (OUTPATIENT)
Dept: LAB | Age: 75
End: 2025-04-15
Attending: DERMATOLOGY
Payer: MEDICARE

## 2025-04-15 DIAGNOSIS — Z51.81 MEDICATION MONITORING ENCOUNTER: ICD-10-CM

## 2025-04-15 DIAGNOSIS — Z85.820 ENCOUNTER FOR FOLLOW-UP SURVEILLANCE OF MELANOMA: ICD-10-CM

## 2025-04-15 DIAGNOSIS — L30.9 DERMATITIS: ICD-10-CM

## 2025-04-15 DIAGNOSIS — Z08 ENCOUNTER FOR FOLLOW-UP SURVEILLANCE OF MELANOMA: ICD-10-CM

## 2025-04-15 LAB
BASOPHILS # BLD AUTO: 0.07 X10(3) UL (ref 0–0.2)
BASOPHILS NFR BLD AUTO: 0.6 %
CRP SERPL-MCNC: 0.4 MG/DL (ref ?–1)
DEPRECATED RDW RBC AUTO: 39.5 FL (ref 35.1–46.3)
EOSINOPHIL # BLD AUTO: 0.09 X10(3) UL (ref 0–0.7)
EOSINOPHIL NFR BLD AUTO: 0.7 %
ERYTHROCYTE [DISTWIDTH] IN BLOOD BY AUTOMATED COUNT: 12.3 % (ref 11–15)
ERYTHROCYTE [SEDIMENTATION RATE] IN BLOOD: 33 MM/HR (ref 0–20)
HCT VFR BLD AUTO: 49.4 % (ref 39–53)
HGB BLD-MCNC: 16.8 G/DL (ref 13–17.5)
IMM GRANULOCYTES # BLD AUTO: 0.04 X10(3) UL (ref 0–1)
IMM GRANULOCYTES NFR BLD: 0.3 %
LDH SERPL L TO P-CCNC: 241 U/L (ref 120–246)
LYMPHOCYTES # BLD AUTO: 2.15 X10(3) UL (ref 1–4)
LYMPHOCYTES NFR BLD AUTO: 17.4 %
MCH RBC QN AUTO: 29.8 PG (ref 26–34)
MCHC RBC AUTO-ENTMCNC: 34 G/DL (ref 31–37)
MCV RBC AUTO: 87.7 FL (ref 80–100)
MONOCYTES # BLD AUTO: 0.79 X10(3) UL (ref 0.1–1)
MONOCYTES NFR BLD AUTO: 6.4 %
NEUTROPHILS # BLD AUTO: 9.21 X10 (3) UL (ref 1.5–7.7)
NEUTROPHILS # BLD AUTO: 9.21 X10(3) UL (ref 1.5–7.7)
NEUTROPHILS NFR BLD AUTO: 74.6 %
PLATELET # BLD AUTO: 257 10(3)UL (ref 150–450)
RBC # BLD AUTO: 5.63 X10(6)UL (ref 3.8–5.8)
WBC # BLD AUTO: 12.4 X10(3) UL (ref 4–11)

## 2025-04-15 PROCEDURE — 85652 RBC SED RATE AUTOMATED: CPT

## 2025-04-15 PROCEDURE — 36415 COLL VENOUS BLD VENIPUNCTURE: CPT

## 2025-04-15 PROCEDURE — 86140 C-REACTIVE PROTEIN: CPT

## 2025-04-15 PROCEDURE — 85025 COMPLETE CBC W/AUTO DIFF WBC: CPT

## 2025-04-15 PROCEDURE — 83615 LACTATE (LD) (LDH) ENZYME: CPT

## 2025-04-17 ENCOUNTER — OFFICE VISIT (OUTPATIENT)
Dept: INTERNAL MEDICINE CLINIC | Facility: CLINIC | Age: 75
End: 2025-04-17

## 2025-04-17 ENCOUNTER — TELEPHONE (OUTPATIENT)
Dept: DERMATOLOGY CLINIC | Facility: CLINIC | Age: 75
End: 2025-04-17

## 2025-04-17 VITALS
OXYGEN SATURATION: 95 % | RESPIRATION RATE: 16 BRPM | WEIGHT: 191.38 LBS | DIASTOLIC BLOOD PRESSURE: 82 MMHG | HEIGHT: 71 IN | SYSTOLIC BLOOD PRESSURE: 128 MMHG | TEMPERATURE: 97 F | HEART RATE: 84 BPM | BODY MASS INDEX: 26.79 KG/M2

## 2025-04-17 DIAGNOSIS — Z96.651 S/P TOTAL KNEE REPLACEMENT USING CEMENT, RIGHT: Primary | ICD-10-CM

## 2025-04-17 DIAGNOSIS — J30.2 SEASONAL ALLERGIES: ICD-10-CM

## 2025-04-17 DIAGNOSIS — M17.11 PRIMARY OSTEOARTHRITIS OF RIGHT KNEE: ICD-10-CM

## 2025-04-17 PROCEDURE — G2211 COMPLEX E/M VISIT ADD ON: HCPCS | Performed by: INTERNAL MEDICINE

## 2025-04-17 PROCEDURE — 99214 OFFICE O/P EST MOD 30 MIN: CPT | Performed by: INTERNAL MEDICINE

## 2025-04-17 NOTE — TELEPHONE ENCOUNTER
Patient came to clinic requesting sooner appt, patient stated provider inform him need to come in person to discuss lab work.

## 2025-04-17 NOTE — PROGRESS NOTES
The following individual(s) verbally consented to be recorded using ambient AI listening technology and understand that they can each withdraw their consent to this listening technology at any point by asking the clinician to turn off or pause the recording:     Patient name: Benjamin Ralph  Additional names: n/a

## 2025-04-17 NOTE — PROGRESS NOTES
Patient ID: Benjamin Ralph is a 74 year old male.  Chief Complaint   Patient presents with    Follow - Up        HISTORY OF PRESENT ILLNESS:   HPI  History of Present Illness  Benjamin Ralph is a 74-year-old male who presents for a six-month follow-up after right knee arthroplasty.    He experiences persistent pain and slight swelling in the right knee, which has been gradually improving since the arthroplasty performed on 2024. He is not using any narcotics or over-the-counter pain medications such as ibuprofen, indicating that the pain is manageable without medication. He acknowledges some tightness in the knee.    He has ongoing issues with allergies, characterized by persistent itching. He has tried prednisone and topical creams without relief. A recent blood test showed slight inflammation, possibly due to prednisone use. He is taking Claritin and other allergy medications as needed. He has made changes to his environment, such as changing sheets and laundry detergent, in an attempt to alleviate the itching, but these have not resolved the issue.    He is currently taking rosuvastatin for cholesterol management and dutasteride. No recent smoking history.    Review of Systems  Ten point review of systems otherwise negative with the exception of HPI and assessment and plan.    MEDICAL HISTORY:   Past Medical History[1]    Past Surgical History[2]    Medications - Current[3]    Allergies:Allergies[4]    Social History     Socioeconomic History    Marital status: Single     Spouse name: Not on file    Number of children: 0    Years of education: Not on file    Highest education level: Not on file   Occupational History    Occupation:    Tobacco Use    Smoking status: Former     Current packs/day: 0.00     Types: Cigarettes     Quit date: 3/25/1978     Years since quittin.0     Passive exposure: Never    Smokeless tobacco: Never   Vaping Use    Vaping status: Never Used    Substance and Sexual Activity    Alcohol use: Yes     Comment: rarely maybe a glass of wine a week    Drug use: Yes     Types: Cannabis     Comment: gummies    Sexual activity: Not Currently   Other Topics Concern     Service Not Asked    Blood Transfusions Not Asked    Caffeine Concern Yes     Comment: coffee, 3cups/day    Occupational Exposure Not Asked    Hobby Hazards Not Asked    Sleep Concern Not Asked    Stress Concern Not Asked    Weight Concern Not Asked    Special Diet Not Asked    Back Care Not Asked    Exercise Not Asked    Bike Helmet Not Asked    Seat Belt Not Asked    Self-Exams Not Asked    Grew up on a farm No    History of tanning No    Outdoor occupation No    Pt has a pacemaker No    Pt has a defibrillator No    Reaction to local anesthetic No   Social History Narrative    Not on file     Social Drivers of Health     Food Insecurity: No Food Insecurity (4/17/2025)    NCSS - Food Insecurity     Worried About Running Out of Food in the Last Year: No     Ran Out of Food in the Last Year: No   Transportation Needs: No Transportation Needs (4/17/2025)    NCSS - Transportation     Lack of Transportation: No   Stress: Not on file   Housing Stability: Not At Risk (4/17/2025)    NCSS - Housing/Utilities     Has Housing: Yes     Worried About Losing Housing: No     Unable to Get Utilities: No           PHYSICAL EXAM:     Vitals:    04/17/25 0907   BP: 128/82   Pulse: 84   Resp: 16   Temp: 97.2 °F (36.2 °C)   TempSrc: Temporal   SpO2: 95%   Weight: 191 lb 6.4 oz (86.8 kg)   Height: 5' 11\" (1.803 m)       Body mass index is 26.69 kg/m².    Physical Exam  Constitutional:       Appearance: Normal appearance.   Eyes:      General: No scleral icterus.  Cardiovascular:      Rate and Rhythm: Normal rate and regular rhythm.      Pulses: Normal pulses.      Heart sounds: Normal heart sounds. No murmur heard.  Pulmonary:      Effort: Pulmonary effort is normal. No respiratory distress.      Breath sounds:  Normal breath sounds. No stridor. No wheezing or rhonchi.   Musculoskeletal:        Legs:    Neurological:      Mental Status: He is alert.   Psychiatric:         Mood and Affect: Mood normal.         Behavior: Behavior normal.           ASSESSMENT/PLAN:   1. S/P total knee replacement using cement, right  Slowly improving.  Did explain this may take 6 months to 1 year for full healing.  Follow-up with orthopedic surgery.    2. Primary osteoarthritis of right knee  Slowly improving.  Did explain this may take 6 months to 1 year for full healing.  Follow-up with orthopedic surgery.    3. Seasonal allergies  Follow-up with dermatology for now.  If no improvement with treatment send will send to allergy.    Return in about 6 months (around 10/17/2025) for Complete physical.    This note was prepared using Dragon Medical voice recognition dictation software. As a result errors may occur. When identified these errors have been corrected. While every attempt is made to correct errors during dictation discrepancies may still exist.    Felix Metzger MD  4/17/2025       [1]   Past Medical History:   Allergic rhinitis    Arthritis    Basal cell carcinoma    left nose    Basal cell carcinoma of nose    BCC (basal cell carcinoma)     left posterior lateral neck    BCC (basal cell carcinoma)    right ala base    BPH    Calculus of kidney    left    Cancer (HCC)    Chronic headaches    Chronic pansinusitis    Chronic sinusitis    Colon polyp    Congestion of nasal sinus    COVID-19    Esophageal reflux    Ganglion of flexor tendon sheath of right thumb    High cholesterol    Hyperlipidemia    Melanoma (HCC)    left arm, stage I; .75 mm depth    Osteoarthritis    Plantar wart    right heel    Pneumonia due to organism    2016    Visual impairment    Glasses   [2]   Past Surgical History:  Procedure Laterality Date    Adj tiss xfer lid,nos,ear <10sqcm Right 2-17-17    Exc lesion of nose, V_Y flap    Colonoscopy      Colonoscopy       Colonoscopy N/A 12/26/2018    Procedure: COLONOSCOPY;  Surgeon: Isidoro Mosley MD;  Location: [facility] ENDOSCOPY    Exc skin malig 2.1-3cm face,facial  05/18/2022    Excis tendon sheath lesn,hand/fingr Right 2-17-17    Exc ganglion R thumb    Foot surgery Left 2006    Nerve procedure    Full graft proc head,fac,hand <20sqc  05/18/2022    Nasal scopy,remv part ethmoid      Nasal scopy,rmv tiss maxill sinus      Repair of nasal septum  1985    Repair of nasal septum      Repr cmpl wnd head,fac,hand 2.6-7.5  05/18/2022    Skin surgery Left 10/07/16    left upper arm melanoma   [3]   Current Outpatient Medications:     clobetasol 0.05 % External Cream, Apply 1 Application topically 2 (two) times daily., Disp: 60 g, Rfl: 3    acetaminophen 325 MG Oral Tab, Take 2 tablets (650 mg total) by mouth every 8 (eight) hours as needed. Maximum 4000 mg Tylenol/acetaminophen daily from all sources.  Each Norco has 325 mg of Tylenol/acetaminophen in it., Disp: 60 tablet, Rfl: 0    aspirin 325 MG Oral Tab EC, Take 1 tablet (325 mg total) by mouth in the morning and 1 tablet (325 mg total) before bedtime. Do not crush., Disp: 60 tablet, Rfl: 0    rosuvastatin 10 MG Oral Tab, Take 1 tablet (10 mg total) by mouth nightly., Disp: 90 tablet, Rfl: 3    dutasteride 0.5 MG Oral Cap, Take 1 capsule (0.5 mg total) by mouth daily. (Patient taking differently: Take 1 capsule (0.5 mg total) by mouth every evening.), Disp: 90 capsule, Rfl: 3    sildenafil 20 MG Oral Tab, Take one tablet PO daily as needed, Disp: 10 tablet, Rfl: 5    triamcinolone 0.1 % External Cream, Use bid as directed (Patient not taking: Reported on 4/17/2025), Disp: 454 g, Rfl: 0    Mometasone Furoate 0.1 % External Solution, Apply bid to scalp as directed for itching (Patient not taking: Reported on 4/17/2025), Disp: 60 mL, Rfl: 3  [4]   Allergies  Allergen Reactions    Penicillin G HIVES     Tolerated Ancef 2 g IV on 12/6/2024 without problem.  States he is not sure if it  was from actual drug.  Denies skin peeling, blisters or organ damage    Penicillins HIVES     Tolerated Ancef 2 g IV on 12/6/2024 without problem.  Denies blisters, skin peeling or organ damage

## 2025-04-18 ENCOUNTER — LAB ENCOUNTER (OUTPATIENT)
Dept: LAB | Age: 75
End: 2025-04-18
Attending: DERMATOLOGY
Payer: MEDICARE

## 2025-04-18 DIAGNOSIS — L30.9 DERMATITIS: ICD-10-CM

## 2025-04-18 DIAGNOSIS — R21 RASH AND OTHER NONSPECIFIC SKIN ERUPTION: ICD-10-CM

## 2025-04-18 DIAGNOSIS — L20.89 OTHER ATOPIC DERMATITIS: ICD-10-CM

## 2025-04-18 PROCEDURE — 84165 PROTEIN E-PHORESIS SERUM: CPT

## 2025-04-18 PROCEDURE — 86334 IMMUNOFIX E-PHORESIS SERUM: CPT

## 2025-04-18 PROCEDURE — 86003 ALLG SPEC IGE CRUDE XTRC EA: CPT

## 2025-04-18 PROCEDURE — 83521 IG LIGHT CHAINS FREE EACH: CPT

## 2025-04-18 PROCEDURE — 82785 ASSAY OF IGE: CPT

## 2025-04-18 PROCEDURE — 36415 COLL VENOUS BLD VENIPUNCTURE: CPT

## 2025-04-21 LAB
ALBUMIN SERPL ELPH-MCNC: 4.21 G/DL (ref 3.75–5.21)
ALBUMIN/GLOB SERPL: 1.56 {RATIO} (ref 1–2)
ALPHA1 GLOB SERPL ELPH-MCNC: 0.32 G/DL (ref 0.19–0.46)
ALPHA2 GLOB SERPL ELPH-MCNC: 0.77 G/DL (ref 0.48–1.05)
B-GLOBULIN SERPL ELPH-MCNC: 0.93 G/DL (ref 0.68–1.23)
GAMMA GLOB SERPL ELPH-MCNC: 0.67 G/DL (ref 0.62–1.7)
KAPPA LC FREE SER-MCNC: 1.33 MG/DL (ref 0.33–1.94)
KAPPA LC FREE/LAMBDA FREE SER NEPH: 1.34 {RATIO} (ref 0.26–1.65)
LAMBDA LC FREE SERPL-MCNC: 0.99 MG/DL (ref 0.57–2.63)
PROT SERPL-MCNC: 6.9 G/DL (ref 5.7–8.2)

## 2025-04-25 LAB
ALLERGEN BRAZIL NUT: <0.1 KUA/L (ref ?–0.1)
ALMOND IGE QN: <0.1 KUA/L (ref ?–0.1)
CASHEW NUT IGE QN: <0.1 KUA/L (ref ?–0.1)
CLAM IGE QN: <0.1 KUA/L (ref ?–0.1)
CODFISH IGE QN: <0.1 KUA/L (ref ?–0.1)
CORN IGE QN: <0.1 KUA/L (ref ?–0.1)
COW MILK IGE QN: 0.18 KUA/L (ref ?–0.1)
EGG WHITE IGE QN: 0.21 KUA/L (ref ?–0.1)
GLUTEN IGE QN: <0.1 KUA/L (ref ?–0.1)
HAZELNUT IGE QN: <0.1 KUA/L (ref ?–0.1)
IGE SERPL-ACNC: 307 KU/L (ref 2–214)
PEANUT IGE QN: <0.1 KUA/L (ref ?–0.1)
SALMON IGE QN: <0.1 KUA/L (ref ?–0.1)
SCALLOP IGE QN: <0.1 KUA/L (ref ?–0.1)
SESAME SEED IGE QN: <0.1 KUA/L (ref ?–0.1)
SHRIMP IGE QN: <0.1 KUA/L (ref ?–0.1)
SOYBEAN IGE QN: <0.1 KUA/L (ref ?–0.1)
WALNUT IGE QN: <0.1 KUA/L (ref ?–0.1)
WHEAT IGE QN: 0.1 KUA/L (ref ?–0.1)

## 2025-04-29 ENCOUNTER — TELEPHONE (OUTPATIENT)
Dept: INTERNAL MEDICINE CLINIC | Facility: CLINIC | Age: 75
End: 2025-04-29

## 2025-04-29 LAB
A ALTERNATA IGE QN: 1.81 KUA/L (ref ?–0.1)
A FUMIGATUS IGE QN: <0.1 KUA/L (ref ?–0.1)
AMER SYCAMORE IGE QN: <0.1 KUA/L (ref ?–0.1)
BERMUDA GRASS IGE QN: <0.1 KUA/L (ref ?–0.1)
BOXELDER IGE QN: <0.1 KUA/L (ref ?–0.1)
C HERBARUM IGE QN: <0.1 KUA/L (ref ?–0.1)
CALIF WALNUT IGE QN: <0.1 KUA/L (ref ?–0.1)
CAT DANDER IGE QN: 1.42 KUA/L (ref ?–0.1)
CMN PIGWEED IGE QN: <0.1 KUA/L (ref ?–0.1)
COMMON RAGWEED IGE QN: 2.48 KUA/L (ref ?–0.1)
COTTONWOOD IGE QN: <0.1 KUA/L (ref ?–0.1)
D FARINAE IGE QN: <0.1 KUA/L (ref ?–0.1)
D PTERONYSS IGE QN: <0.1 KUA/L (ref ?–0.1)
DOG DANDER IGE QN: 0.12 KUA/L (ref ?–0.1)
IGE SERPL-ACNC: 260 KU/L (ref 2–214)
M RACEMOSUS IGE QN: <0.1 KUA/L (ref ?–0.1)
MARSH ELDER IGE QN: 0.27 KUA/L (ref ?–0.1)
MOUSE EPITH IGE QN: <0.1 KUA/L (ref ?–0.1)
MT JUNIPER IGE QN: <0.1 KUA/L (ref ?–0.1)
P NOTATUM IGE QN: <0.1 KUA/L (ref ?–0.1)
PECAN/HICK TREE IGE QN: <0.1 KUA/L (ref ?–0.1)
ROACH IGE QN: <0.1 KUA/L (ref ?–0.1)
SALTWORT IGE QN: <0.1 KUA/L (ref ?–0.1)
SILVER BIRCH IGE QN: <0.1 KUA/L (ref ?–0.1)
TIMOTHY IGE QN: <0.1 KUA/L (ref ?–0.1)
WHITE ASH IGE QN: <0.1 KUA/L (ref ?–0.1)
WHITE ELM IGE QN: <0.1 KUA/L (ref ?–0.1)
WHITE MULBERRY IGE QN: <0.1 KUA/L (ref ?–0.1)
WHITE OAK IGE QN: <0.1 KUA/L (ref ?–0.1)

## 2025-04-29 RX ORDER — DUTASTERIDE 0.5 MG/1
0.5 CAPSULE, LIQUID FILLED ORAL DAILY
Qty: 90 CAPSULE | Refills: 0 | OUTPATIENT
Start: 2025-04-29

## 2025-04-29 NOTE — TELEPHONE ENCOUNTER
- pt LOV on 11/6/23, no future appts with our provider, protocol not met, refill denied with reason    Future Appointments   Date Time Provider Department Center   5/12/2025  4:00 PM Aisha Slaughter MD [facility]   10/7/2025  1:00 PM Felix Metzger MD [facility]   2/11/2026 10:15 AM Aisha Slaughter MD [facility]

## 2025-04-30 ENCOUNTER — TELEPHONE (OUTPATIENT)
Dept: DERMATOLOGY CLINIC | Facility: CLINIC | Age: 75
End: 2025-04-30

## 2025-04-30 NOTE — PROGRESS NOTES
[facility]  Part of [facility]  Urology Clinic Note    Today I had the pleasure of seeing Benjamin Ralph in my clinic. Mr. Ralph is a pleasant 74 year old male who I am seeing for \"yearly for refill\". Patient was last seen in this department on 11/6/2023 by Dr. Moore. Original HPI as follows:    73-year-old male in follow-up to visit November 10, 2022 with a history of BPH, lower urinary tract symptoms for which she had previously been on dutasteride 0.5 mg daily with well-controlled symptoms. About 3 to 4 months ago he states he discontinued dutasteride. Its not clear to me why. He states his symptoms have mostly been stable. IPSS score today is 14 quality-of-life index is 2. No gross hematuria or dysuria. His PSA which last year had been normal at 0.78 (on a corrective scale 1.56) has increased October 12 2023-4.12 after dutasteride. He denies any bone pain or weight loss gross hematuria or dysuria.       5/1/2025 Follow-up:  Currently takes dutasteride 0.5mg daily for BPH with lower urinary tract symptoms.  Symptoms are well-controlled with medication.  Most recent PSA from 10/16/2024 was 0.7 (1.4 corrected for dutasteride use). He is here to refill this medication.    Bone pain: Denies  Unexpected weight loss: Denies  Gross hematuria: Denies    Previous IPSS score was 14, QOL 2.  IPSS score is 9 (1/2/1/1/1/1/2) with QoL score of 2. Urine dip today is negative. PVR 38 mL.     TOTAL PSA   Latest Ref Rng <=4.00 ng/mL   3/8/2024 0.61    10/16/2024 0.70        PAST MEDICAL HISTORY:  Past Medical History[1]     PAST SURGICAL HISTORY:  Past Surgical History[2]    ALLERGIES:  Allergies[3]      MEDICATIONS:  Current Outpatient Medications   Medication Instructions    acetaminophen (TYLENOL) 650 mg, Oral, Every 8 hours PRN, Maximum 4000 mg Tylenol/acetaminophen daily from all sources.  Each Norco has 325 mg of Tylenol/acetaminophen in it.    aspirin 325 mg, Oral, 2 times daily, Do not crush     clobetasol 0.05 % External Cream 1 Application, Topical, 2 times daily    dutasteride (AVODART) 0.5 mg, Oral, Daily    Mometasone Furoate 0.1 % External Solution Apply bid to scalp as directed for itching    rosuvastatin (CRESTOR) 10 mg, Oral, Nightly    sildenafil 20 MG Oral Tab Take one tablet PO daily as needed    triamcinolone 0.1 % External Cream Use bid as directed        FAMILY HISTORY:  Family History[4]     SOCIAL HISTORY:  Short Social Hx on File[5]       PHYSICAL EXAMINATION:  General: No acute distress, cooperative and communicative, alert and oriented  HEENT: Normocephalic, atraumatic, moist mucous membranes, no discharge, no gross neck abnormalities  Respiratory: No respiratory distress. Chest expansion equal bilaterally.    REVIEW OF SYSTEMS:  A comprehensive 10-point review of systems was completed.  Pertinent positives and negatives are noted in the the HPI.       LABORATORY DATA:  Urine Culture Results (last three years):  Lab Results   Component Value Date    URINECUL No Growth 2 Days 08/21/2020    URINECUL No Growth at 18-24 hrs. 11/06/2018       See HPI        IMAGING REVIEW:  No relevant urologic imaging     IMPRESSION:  #BPH with LUTS  Patient symptoms are well-controlled on dutasteride 0.5 mg daily.  We can refill his medication at this time.    #PSA  Given the patient's age, per AUA guidelines, he may consider discontinuing PSA screening at this time as his next annual PSA would be due in October 2025 when he will be 75 years old.  Otherwise he can continue to follow with PCP for PSA levels.     PLAN:  BPH with LUTS  - Continue dutasteride 0.5mg daily  - Patient can follow-up yearly for dutasteride refills with labs or if he and PCP are comfortable patient can get dutasteride prescription through PCP and follow-up as needed for his urinary symptoms    PSA  - Given the patient's age, per AUA guidelines, he may consider discontinuing PSA screening at this time as his next annual PSA would be  due in October 2025 when he will be 75 years old.  Otherwise he can continue to follow with PCP for PSA levels.      Garry Dunbar PA-C      The 21st Century Cures Act makes medical notes available to patients in the interest of transparency.  However, please be advised that this is a medical document.  It is intended as a peer to peer communication.  It is written in medical language and may contain abbreviations or verbiage that are technical and unfamiliar.  It may appear blunt or direct.  Medical documents are intended to carry relevant information, facts as evident, and the clinical opinion of the practitioner.         [1]   Past Medical History:   Allergic rhinitis    Arthritis    Basal cell carcinoma    left nose    Basal cell carcinoma of nose    BCC (basal cell carcinoma)     left posterior lateral neck    BCC (basal cell carcinoma)    right ala base    BPH    Calculus of kidney    left    Cancer (HCC)    Chronic headaches    Chronic pansinusitis    Chronic sinusitis    Colon polyp    Congestion of nasal sinus    COVID-19    Esophageal reflux    Ganglion of flexor tendon sheath of right thumb    High cholesterol    Hyperlipidemia    Melanoma (HCC)    left arm, stage I; .75 mm depth    Osteoarthritis    Plantar wart    right heel    Pneumonia due to organism    2016    Visual impairment    Glasses   [2]   Past Surgical History:  Procedure Laterality Date    Adj tiss xfer lid,nos,ear <10sqcm Right 2-17-17    Exc lesion of nose, V_Y flap    Colonoscopy      Colonoscopy      Colonoscopy N/A 12/26/2018    Procedure: COLONOSCOPY;  Surgeon: Isidoro Mosley MD;  Location: [facility] ENDOSCOPY    Exc skin malig 2.1-3cm face,facial  05/18/2022    Excis tendon sheath lesn,hand/fingr Right 2-17-17    Exc ganglion R thumb    Foot surgery Left 2006    Nerve procedure    Full graft proc head,fac,hand <20sqc  05/18/2022    Nasal scopy,remv part ethmoid      Nasal scopy,rmv tiss maxill sinus      Repair of nasal septum   1985    Repair of nasal septum      Repr cmpl wnd head,fac,hand 2.6-7.5  2022    Skin surgery Left 10/07/16    left upper arm melanoma   [3]   Allergies  Allergen Reactions    Penicillin G HIVES     Tolerated Ancef 2 g IV on 2024 without problem.  States he is not sure if it was from actual drug.  Denies skin peeling, blisters or organ damage    Penicillins HIVES     Tolerated Ancef 2 g IV on 2024 without problem.  Denies blisters, skin peeling or organ damage   [4]   Family History  Problem Relation Age of Onset    Dementia Mother     Seizure Disorder Mother     Hypertension Mother     Cancer Mother         skin    Cancer Father         LEUKEMIA   [5]   Social History  Socioeconomic History    Marital status: Single    Number of children: 0   Occupational History    Occupation:    Tobacco Use    Smoking status: Former     Current packs/day: 0.00     Types: Cigarettes     Quit date: 3/25/1978     Years since quittin.1     Passive exposure: Never    Smokeless tobacco: Never   Vaping Use    Vaping status: Never Used   Substance and Sexual Activity    Alcohol use: Yes     Comment: rarely maybe a glass of wine a week    Drug use: Yes     Types: Cannabis     Comment: gummies    Sexual activity: Not Currently   Other Topics Concern    Caffeine Concern Yes     Comment: coffee, 3cups/day    Grew up on a farm No    History of tanning No    Outdoor occupation No    Pt has a pacemaker No    Pt has a defibrillator No    Reaction to local anesthetic No     Social Drivers of Health     Food Insecurity: No Food Insecurity (2025)    NCSS - Food Insecurity     Worried About Running Out of Food in the Last Year: No     Ran Out of Food in the Last Year: No   Transportation Needs: No Transportation Needs (2025)    NCSS - Transportation     Lack of Transportation: No   Housing Stability: Not At Risk (2025)    NCSS - Housing/Utilities     Has Housing: Yes     Worried About Losing  Housing: No     Unable to Get Utilities: No

## 2025-05-01 ENCOUNTER — OFFICE VISIT (OUTPATIENT)
Dept: SURGERY | Facility: CLINIC | Age: 75
End: 2025-05-01

## 2025-05-01 DIAGNOSIS — N40.1 BPH WITH OBSTRUCTION/LOWER URINARY TRACT SYMPTOMS: Primary | ICD-10-CM

## 2025-05-01 DIAGNOSIS — R82.90 URINE FINDING: ICD-10-CM

## 2025-05-01 DIAGNOSIS — N13.8 BPH WITH OBSTRUCTION/LOWER URINARY TRACT SYMPTOMS: Primary | ICD-10-CM

## 2025-05-01 LAB
APPEARANCE: CLEAR
BILIRUBIN: NEGATIVE
GLUCOSE (URINE DIPSTICK): NEGATIVE MG/DL
KETONES (URINE DIPSTICK): NEGATIVE MG/DL
LEUKOCYTES: NEGATIVE
MULTISTIX LOT#: NORMAL NUMERIC
NITRITE, URINE: NEGATIVE
OCCULT BLOOD: NEGATIVE
PH, URINE: 7 (ref 4.5–8)
PROTEIN (URINE DIPSTICK): NEGATIVE MG/DL
SPECIFIC GRAVITY: 1.02 (ref 1–1.03)
URINE-COLOR: YELLOW
UROBILINOGEN,SEMI-QN: 0.2 MG/DL (ref 0–1.9)

## 2025-05-01 PROCEDURE — 99213 OFFICE O/P EST LOW 20 MIN: CPT | Performed by: PHYSICIAN ASSISTANT

## 2025-05-01 PROCEDURE — 81002 URINALYSIS NONAUTO W/O SCOPE: CPT | Performed by: PHYSICIAN ASSISTANT

## 2025-05-01 RX ORDER — FAMOTIDINE 40 MG/1
40 TABLET, FILM COATED ORAL 2 TIMES DAILY
Qty: 60 TABLET | Refills: 0 | Status: SHIPPED | OUTPATIENT
Start: 2025-05-01

## 2025-05-01 RX ORDER — DUTASTERIDE 0.5 MG/1
0.5 CAPSULE, LIQUID FILLED ORAL DAILY
Qty: 90 CAPSULE | Refills: 3 | Status: SHIPPED | OUTPATIENT
Start: 2025-05-01

## 2025-05-01 RX ORDER — TRIAMCINOLONE ACETONIDE 1 MG/G
CREAM TOPICAL
Qty: 454 G | Refills: 0 | Status: SHIPPED | OUTPATIENT
Start: 2025-05-01

## 2025-05-12 ENCOUNTER — OFFICE VISIT (OUTPATIENT)
Dept: DERMATOLOGY CLINIC | Facility: CLINIC | Age: 75
End: 2025-05-12

## 2025-05-12 DIAGNOSIS — Z85.820 ENCOUNTER FOR FOLLOW-UP SURVEILLANCE OF MELANOMA: ICD-10-CM

## 2025-05-12 DIAGNOSIS — Z08 ENCOUNTER FOR FOLLOW-UP SURVEILLANCE OF SKIN CANCER: ICD-10-CM

## 2025-05-12 DIAGNOSIS — Z85.828 ENCOUNTER FOR FOLLOW-UP SURVEILLANCE OF SKIN CANCER: ICD-10-CM

## 2025-05-12 DIAGNOSIS — R21 RASH AND OTHER NONSPECIFIC SKIN ERUPTION: ICD-10-CM

## 2025-05-12 DIAGNOSIS — Z08 ENCOUNTER FOR FOLLOW-UP SURVEILLANCE OF MELANOMA: ICD-10-CM

## 2025-05-12 DIAGNOSIS — Z13.89 ENCOUNTER FOR SURVEILLANCE OF ABNORMAL NEVI: ICD-10-CM

## 2025-05-12 DIAGNOSIS — L30.9 DERMATITIS: Primary | ICD-10-CM

## 2025-05-12 PROCEDURE — G2211 COMPLEX E/M VISIT ADD ON: HCPCS | Performed by: DERMATOLOGY

## 2025-05-12 PROCEDURE — 99213 OFFICE O/P EST LOW 20 MIN: CPT | Performed by: DERMATOLOGY

## 2025-05-12 RX ORDER — LEVOCETIRIZINE DIHYDROCHLORIDE 5 MG/1
5 TABLET, FILM COATED ORAL EVERY EVENING
COMMUNITY

## 2025-05-19 NOTE — PROGRESS NOTES
Benjamin Ralph is a 74 year old male.    CC:    Chief Complaint   Patient presents with    Follow - Up     Pt presents to the office following up on rash and itchiness in his back and chest. Pt reports significant improvement of itchiness.          HISTORY:    Past Medical History:    Allergic rhinitis    Arthritis    Basal cell carcinoma    left nose    Basal cell carcinoma of nose    BCC (basal cell carcinoma)     left posterior lateral neck    BCC (basal cell carcinoma)    right ala base    BPH    Calculus of kidney    left    Cancer (HCC)    Chronic headaches    Chronic pansinusitis    Chronic sinusitis    Colon polyp    Congestion of nasal sinus    COVID-19    Esophageal reflux    Ganglion of flexor tendon sheath of right thumb    High cholesterol    Hyperlipidemia    Melanoma (HCC)    left arm, stage I; .75 mm depth    Osteoarthritis    Plantar wart    right heel    Pneumonia due to organism    2016    Visual impairment    Glasses      Past Surgical History:   Procedure Laterality Date    Adj tiss xfer lid,nos,ear <10sqcm Right 2-17-17    Exc lesion of nose, V_Y flap    Colonoscopy      Colonoscopy      Colonoscopy N/A 12/26/2018    Procedure: COLONOSCOPY;  Surgeon: Isidoro Mosley MD;  Location: [facility] ENDOSCOPY    Exc skin malig 2.1-3cm face,facial  05/18/2022    Excis tendon sheath lesn,hand/fingr Right 2-17-17    Exc ganglion R thumb    Foot surgery Left 2006    Nerve procedure    Full graft proc head,fac,hand <20sqc  05/18/2022    Nasal scopy,remv part ethmoid      Nasal scopy,rmv tiss maxill sinus      Repair of nasal septum  1985    Repair of nasal septum      Repr cmpl wnd head,fac,hand 2.6-7.5  05/18/2022    Skin surgery Left 10/07/16    left upper arm melanoma      Family History   Problem Relation Age of Onset    Dementia Mother     Seizure Disorder Mother     Hypertension Mother     Cancer Mother         skin    Cancer Father         LEUKEMIA      Social History     Socioeconomic History     Marital status: Single    Number of children: 0   Occupational History    Occupation:    Tobacco Use    Smoking status: Former     Current packs/day: 0.00     Types: Cigarettes     Quit date: 3/25/1978     Years since quittin.1     Passive exposure: Never    Smokeless tobacco: Never   Vaping Use    Vaping status: Never Used   Substance and Sexual Activity    Alcohol use: Yes     Comment: rarely maybe a glass of wine a week    Drug use: Yes     Types: Cannabis     Comment: gummies    Sexual activity: Not Currently   Other Topics Concern    Caffeine Concern Yes     Comment: coffee, 3cups/day    Grew up on a farm No    History of tanning No    Outdoor occupation No    Pt has a pacemaker No    Pt has a defibrillator No    Reaction to local anesthetic No     Social Drivers of Health     Food Insecurity: No Food Insecurity (2025)    NCSS - Food Insecurity     Worried About Running Out of Food in the Last Year: No     Ran Out of Food in the Last Year: No   Transportation Needs: No Transportation Needs (2025)    NCSS - Transportation     Lack of Transportation: No   Housing Stability: Not At Risk (2025)    NCSS - Housing/Utilities     Has Housing: Yes     Worried About Losing Housing: No     Unable to Get Utilities: No        Current Outpatient Medications   Medication Sig Dispense Refill    levocetirizine 5 MG Oral Tab Take 1 tablet (5 mg total) by mouth every evening.      triamcinolone 0.1 % External Cream Use bid as directed 454 g 0    famotidine 40 MG Oral Tab Take 1 tablet (40 mg total) by mouth 2 (two) times daily. 60 tablet 0    dutasteride 0.5 MG Oral Cap Take 1 capsule (0.5 mg total) by mouth daily. 90 capsule 3    Mometasone Furoate 0.1 % External Solution Apply bid to scalp as directed for itching 60 mL 3    acetaminophen 325 MG Oral Tab Take 2 tablets (650 mg total) by mouth every 8 (eight) hours as needed. Maximum 4000 mg Tylenol/acetaminophen daily from all sources.  Each  Norco has 325 mg of Tylenol/acetaminophen in it. 60 tablet 0    aspirin 325 MG Oral Tab EC Take 1 tablet (325 mg total) by mouth in the morning and 1 tablet (325 mg total) before bedtime. Do not crush. 60 tablet 0    rosuvastatin 10 MG Oral Tab Take 1 tablet (10 mg total) by mouth nightly. 90 tablet 3    dutasteride 0.5 MG Oral Cap Take 1 capsule (0.5 mg total) by mouth daily. (Patient taking differently: Take 1 capsule (0.5 mg total) by mouth every evening.) 90 capsule 3    sildenafil 20 MG Oral Tab Take one tablet PO daily as needed 10 tablet 5    clobetasol 0.05 % External Cream Apply 1 Application topically 2 (two) times daily. (Patient not taking: Reported on 5/12/2025) 60 g 3     Allergies:   Allergies[1]    Past Medical History:    Allergic rhinitis    Arthritis    Basal cell carcinoma    left nose    Basal cell carcinoma of nose    BCC (basal cell carcinoma)     left posterior lateral neck    BCC (basal cell carcinoma)    right ala base    BPH    Calculus of kidney    left    Cancer (HCC)    Chronic headaches    Chronic pansinusitis    Chronic sinusitis    Colon polyp    Congestion of nasal sinus    COVID-19    Esophageal reflux    Ganglion of flexor tendon sheath of right thumb    High cholesterol    Hyperlipidemia    Melanoma (HCC)    left arm, stage I; .75 mm depth    Osteoarthritis    Plantar wart    right heel    Pneumonia due to organism    2016    Visual impairment    Glasses     Past Surgical History:   Procedure Laterality Date    Adj tiss xfer lid,nos,ear <10sqcm Right 2-17-17    Exc lesion of nose, V_Y flap    Colonoscopy      Colonoscopy      Colonoscopy N/A 12/26/2018    Procedure: COLONOSCOPY;  Surgeon: Isidoro Mosley MD;  Location: [facility] ENDOSCOPY    Exc skin malig 2.1-3cm face,facial  05/18/2022    Excis tendon sheath lesn,hand/fingr Right 2-17-17    Exc ganglion R thumb    Foot surgery Left 2006    Nerve procedure    Full graft proc head,fac,hand <20sqc  05/18/2022    Nasal scopy,remv  part ethmoid      Nasal scopy,rmv tiss maxill sinus      Repair of nasal septum  1985    Repair of nasal septum      Repr cmpl wnd head,fac,hand 2.6-7.5  2022    Skin surgery Left 10/07/16    left upper arm melanoma     Social History     Socioeconomic History    Marital status: Single     Spouse name: Not on file    Number of children: 0    Years of education: Not on file    Highest education level: Not on file   Occupational History    Occupation:    Tobacco Use    Smoking status: Former     Current packs/day: 0.00     Types: Cigarettes     Quit date: 3/25/1978     Years since quittin.1     Passive exposure: Never    Smokeless tobacco: Never   Vaping Use    Vaping status: Never Used   Substance and Sexual Activity    Alcohol use: Yes     Comment: rarely maybe a glass of wine a week    Drug use: Yes     Types: Cannabis     Comment: gummies    Sexual activity: Not Currently   Other Topics Concern     Service Not Asked    Blood Transfusions Not Asked    Caffeine Concern Yes     Comment: coffee, 3cups/day    Occupational Exposure Not Asked    Hobby Hazards Not Asked    Sleep Concern Not Asked    Stress Concern Not Asked    Weight Concern Not Asked    Special Diet Not Asked    Back Care Not Asked    Exercise Not Asked    Bike Helmet Not Asked    Seat Belt Not Asked    Self-Exams Not Asked    Grew up on a farm No    History of tanning No    Outdoor occupation No    Pt has a pacemaker No    Pt has a defibrillator No    Reaction to local anesthetic No   Social History Narrative    Not on file     Social Drivers of Health     Food Insecurity: No Food Insecurity (2025)    NCSS - Food Insecurity     Worried About Running Out of Food in the Last Year: No     Ran Out of Food in the Last Year: No   Transportation Needs: No Transportation Needs (2025)    NCSS - Transportation     Lack of Transportation: No   Stress: Not on file   Housing Stability: Not At Risk (2025)    NCSS -  Housing/Utilities     Has Housing: Yes     Worried About Losing Housing: No     Unable to Get Utilities: No     Family History   Problem Relation Age of Onset    Dementia Mother     Seizure Disorder Mother     Hypertension Mother     Cancer Mother         skin    Cancer Father         LEUKEMIA       HPI:     HPI:  Chief Complaint   Patient presents with    Follow - Up     Pt presents to the office following up on rash and itchiness in his back and chest. Pt reports significant improvement of itchiness.      ollow-up  Follow-up patient with history of melanoma here for routine follow-up no particular concerns.  Follow-up history of AK's, BCC, melanoma 0.75 mm 2016 left lateral upper arm most recent BCC at left posterior [id number]  History of Present Illness    2/25  Benjamin Ralph is a 74 year old male who presents for dermatological evaluation.    He has multiple benign keratoses on his back and has not noticed any suspicious lesions. He walks daily, avoids sun exposure, and uses sunscreen regularly. He also experiences dry skin on his legs and uses cream for management.    He had a toe amputated due to an infection that reached the bone, following an initial injury from a bruise that opened while wearing sandals. He did not receive the correct antibiotics in time to save the toe. He currently experiences occasional tenderness but can walk without significant issues.    He underwent a partial knee meniscectomy that was unsuccessful, leading to a knee replacement in December of the previous year. The knee remains swollen and occasionally painful. He uses a compression stocking, though he feels it is not very effective. Additionally, he notes soreness developing in the other knee.    3/25  Benjamin Ralph is a 74 year old male who presents with skin irritation and hives.    He has been experiencing skin irritation and hives for approximately three weeks, beginning after returning home from rehab around  [place]. He attributes the exacerbation of symptoms to weather changes, describing it as 'crazy, like, hot and cold and hot and cold.'    He mentions using a body wash that he does not normally use after returning from rehab, but he stopped using it a few weeks ago and switched back to Dial bar soap, which he finds a bit drying but not usually problematic. He also mentions accidentally using shirt starch a few times in the last month, which he suspects might have aggravated his skin condition. No changes in food, diet, or medication have been noted that could have contributed to his symptoms. He is currently taking a statin and dutasteride, and he has not been using any pain medications, including ibuprofen.    He has been using over-the-counter treatments such as cortisone and CeraVe, but finds them insufficient in managing his symptoms. His scalp has a few spots as well, and he experiences hives from scratching, which he describes as 'dermatographism.'    He inquires about the use of sunscreen on his scars and mentions that he will be wearing shorts soon. He also asks about the potential drying effects of hot showers and expresses concern about avoiding the pool and sauna to prevent further skin drying.    5/25  Benjamin Ralph is a 74 year old male with environmental allergies who presents with concerns about itching and allergy test results.    He began experiencing significant itching approximately three to four weeks ago, which has since subsided over the last couple of weeks. The improvement is attributed to the use of triamcinolone cream and possibly stopping milk consumption. No recent changes in soap, shampoo, or laundry detergent.    He has a history of environmental allergies, including high sensitivity to mold and ragweed, which he has experienced since childhood. He is concerned about the severity of his allergy test results, noting that mold levels were high. He does not have cats or dogs and has  not experienced issues with milk or other foods despite some borderline results for wheat.    He has been using triamcinolone cream and taking famotidine and Xyzal for his symptoms. The itching became manageable after starting this treatment regimen. He has been off prednisone for a while, which has made a difference in his symptoms.    He is concerned about potential mold exposure in his home, particularly in closed, damp areas like the bathroom or laundry room, but acknowledges that the mold allergy is likely influenced by weather conditions.    Patient presents with concerns above.    Patient has been in their usual state of health.     Past notes/ records and appropriate/relevant lab results including pathology and past body maps reviewed. Including outside notes/ PCP notes as appropriate. Updated and new information noted in current visit.     ROS:  Denies other relevant systemic complaints. See HPI.     History, medications, allergies reviewed as noted.    Allergies:  Penicillin g and Penicillins      There were no vitals filed for this visit.    Physical Examination:     Well-developed well-nourished patient alert oriented in no acute distress.  Exam performed of appropriate involved areas    Physical Exam  SKIN: Incision on toe described as 'pretty good' with edema present. Benign keratosis observed on back. Scar from knee replacement described as 'pretty good' with edema present. Legs noted to be dry. Skin tags and a small cyst on nose observed.            SKIN: Tenderness and thin skin around keratosis.    Multiple light to medium brown, well marginated, uniformly pigmented, macules and papules 6 mm and less scattered on exam. pigmented lesions examined with dermoscopy benign-appearing patterns.     Waxy tannish keratotic papules scattered, cherry-red vascular papules scattered.    See map today's date for lesions noted .  See assessment and plan below for specific lesions.    Otherwise remarkable for  lesions as noted on map.    See A/P  below for additional information:    Assessment / plan:    Results  LABS  Mold allergy: elevated  Ragweed allergy: elevated  Wheat allergy: 10    No orders of the defined types were placed in this encounter.      Meds & Refills for this Visit:  Requested Prescriptions      No prescriptions requested or ordered in this encounter         Encounter Diagnoses   Name Primary?    Dermatitis Yes    Rash and other nonspecific skin eruption     Encounter for follow-up surveillance of melanoma     Encounter for follow-up surveillance of skin cancer     Encounter for surveillance of abnormal nevi      Patient seen for follow-up long-term monitoring, treatment of  History of nonmelanoma and melanoma skin cancers actinic keratoses sun damage high risk patient  Plan of care:  ongoing surveillance, monitoring including regular follow-up due to longer term risk of recurrence, new lesions.  See previous notes.  There is a longitudinal care relationship with me, the care plan reflects the ongoing nature of the continuous relationship of care, and the medical record indicates that there is ongoing treatment of a serious/complex medical condition which I am currently managing.  is Applicable     Fall risk assessment:  Given the results of the fall risk questionnaire given today.   Suggest:   Make sure there is adequate lighting.  Eliminate throw rugs or possible sources of tripping & falling  Consider grab bars on the shower & toilet.  Hand rails on all stair cases  Ambulatory assistance devices such as cane or walker as indicate.  To ensure home safety measures.    Assessment & Plan  2/25    Benign Keratosis  Multiple benign keratoses observed on the back. No suspicious lesions noted.  - Advise patient to report any new or changing lesions    Toe Amputation  Post-toe amputation with non-infected but swollen incision site. Patient advised to monitor for signs of infection.  - Monitor for signs  of infection  - Report any new symptoms or changes    Postoperative Knee Swelling  Persistent swelling and mild pain post-knee replacement surgery in December. Compression stockings minimally effective. Advised to elevate leg and massage scar with Vaseline.  - Continue using compression stockings  - Elevate leg during the day  - Massage scar with Vaseline    General Health Maintenance  Advised to use sunscreen and wear a hat outdoors. Dry legs noted; advised to apply cream and use sunscreen while driving.  - Apply cream to dry legs  - Wear sunscreen and a hat outdoors  - Use sunscreen while driving    Follow-up  - Schedule follow-up visit in one year.    3/25  No new suspicious lesions  Dermatographism  Dermatographism with hives exacerbated by recent weather changes and possibly new body wash and shirt starch. Reports dry skin and itching unresponsive to over-the-counter treatments.  - Prescribe triamcinolone cream for application two to four times daily to affected areas.  - Prescribe a liquid formulation for scalp application.  - Initiate Xyzal (levocetirizine) at night, with the option to increase to twice daily if needed. Xyzal is typically non-drowsy.  - Advise to avoid hot showers and use lukewarm water to prevent further skin drying.  - Recommend avoiding the pool and sauna temporarily to prevent skin drying.  - Reassess treatment plan if symptoms do not improve over the next couple of weeks.        5/25  Eczema  Eczema with previous severe pruritus, now well-controlled with current treatment regimen. No recent excoriation or exacerbation of symptoms. Triamcinolone cream and antihistamines have been effective. He has been off prednisone, contributing to symptom control.  - Continue triamcinolone cream application once daily.  - Maintain current use of Xyzal and famotidine as per environmental allergies plan.    Environmental allergies  Environmental allergies with high mold levels. Symptoms have subsided  with current treatment regimen. Mold levels can fluctuate with weather conditions, particularly in hot and humid environments. No significant issues with other allergens such as milk, eggs, or wheat. No immediate need for allergist referral unless symptoms worsen. He is advised to monitor mold counts online and adjust exposure accordingly.  - Monitor mold counts online and adjust exposure accordingly.  - Continue current treatment regimen with Xyzal and famotidine.  - Decrease famotidine to once daily in the morning for a week.  - If symptoms remain controlled, decrease Xyzal to nighttime dosing after a week.    Ulcerative colitis  Ulcerative colitis mentioned in relation to mold exposure but no active symptoms.        Exam essentially unchanged no active AK's no new pigmented lesions suspicious there are some waxy tan papules more inflamed at posterior neck trial of cryo reassurance given continue sunscreen especially on posterior neck, hat.  Melanoma 2016 follow-up at least annually plan recheck in 6 months.    Actinic Keratoses.  Precancerous nature discussed. Sun protection, sunscreen/ blocks encouraged .  Monitoring for new lesions.  Sun damage additional recurrent and new actinic keratoses, skin cancers may occur in areas of prior actinic keratoses, related to past sun exposure to minimize current sun exposure.  Sunscreen applied consistently regularly, reapplication and sun protection while driving recommended.    Forehead lesion waxy tan papule consistent with benign seborrheic keratosis continue careful monitoring.  Consider biopsy at follow-up if lesion does change.  Further plans pending path exam otherwise unchanged    History melanoma 0.75 mm left lateral upper arm post excision doing well no adenopathy no recurrence    History BCC no recurrence healing well left neck    History of AK's, sun damage overall is doing well.  Continue careful sun protection regular follow-up every 6 months most marked sun  damage over the head neck arms hands    On the back numerous waxy tan keratotic papules lesions in areas of concern as noted reassurance given.  Benign nature discussed.  Possibility of cryo, alphahydroxy acids over-the-counter retinol's discussed.    Benign-appearing nevi few lesions on the legs  Benign-appearing nevi, no atypical features on dermoscopy reassurance given monitor.     Waxy tan keratotic papules lesions in areas of concern as noted reassurance given.  Benign nature discussed.  Possibility of cryo, alphahydroxy acids over-the-counter retinol's discussed.     No other susupicious lesions on todays  exam.    Please refer to map for specific lesions.  See additional diagnoses.  Pros cons of various therapies, risks benefits discussed.Pathophysiology, terapeutic options reviewed.  See  Medications in grid.  Instructions reviewed at length.    Benign nevi, seborrheic  keratoses, cherry angiomas:  Reassurance regarding other benign skin lesions.    Monitor for new or changing lesions. Signs and symptoms of skin cancer, ABCDE's of melanoma ( additional information available at AAD.org, skincancer.org) Encourage Sunscreen (broad-spectrum, ideally mineral-based-UVA/UVB -SPF 30 or higher) use encouraged, sun protection/sun protective clothing, self exams reviewed Followup as noted RTC ---routine checkup 6 mos -one year or p.r.n.    Encounter Times   Including precharting, reviewing chart, prior notes obtaining history: 10 minutes, medical exam :10 minutes, notes on body map, plan, counseling 10minutes My total time spent caring for the patient on the day of the encounter: 30 minutes     The patient indicates understanding of these issues and agrees to the plan.  The patient is asked to return as noted in follow-up/ above.    This note was generated using Dragon voice recognition software.  Please contact me regarding any confusion resulting from errors in recognition..  Note to patient and family: The 21st  Century Cures Act makes medical notes like these available to patients. However, be advised this is a medical document. It is intended as [id number] communication and monitoring of a patient's care needs. It is written in medical language and may contain abbreviations or verbiage that are unfamiliar. It may appear blunt or direct. Medical documents are intended to carry relevant information, facts as evident and the clinical opinion of the practitioner.         [1]   Allergies  Allergen Reactions    Penicillin G HIVES     Tolerated Ancef 2 g IV on 12/6/2024 without problem.  States he is not sure if it was from actual drug.  Denies skin peeling, blisters or organ damage    Penicillins HIVES     Tolerated Ancef 2 g IV on 12/6/2024 without problem.  Denies blisters, skin peeling or organ damage

## 2025-06-04 ENCOUNTER — TELEPHONE (OUTPATIENT)
Dept: INTERNAL MEDICINE CLINIC | Facility: CLINIC | Age: 75
End: 2025-06-04

## 2025-06-04 NOTE — TELEPHONE ENCOUNTER
Patient called states he called ' office (GI doctor). He had a colonoscopy in 2022 and is not due for a colonoscopy until 2027.

## (undated) DIAGNOSIS — Z20.822 ENCOUNTER FOR LABORATORY TESTING FOR COVID-19 VIRUS: Primary | ICD-10-CM

## (undated) DIAGNOSIS — Z20.828 EXPOSURE TO SARS-ASSOCIATED CORONAVIRUS: Primary | ICD-10-CM

## (undated) DEVICE — 60 ML SYRINGE LUER-LOCK TIP: Brand: MONOJECT

## (undated) DEVICE — APPLICATOR PREP 26ML CHG 2% ISO ALC 70%

## (undated) DEVICE — SCREW ORTH 2.5X25MM FEM HEX PERSONA

## (undated) DEVICE — GAMMEX® NON-LATEX PI ORTHO SIZE 8, STERILE POLYISOPRENE POWDER-FREE SURGICAL GLOVE: Brand: GAMMEX

## (undated) DEVICE — SINU FOAM: Brand: SINU-FOAM

## (undated) DEVICE — INSTRUMENT TRACKER 9733533XOM ENT 1PK

## (undated) DEVICE — NEEDLE SPNL 18GA L3.5IN W/ QNCKE SHARPER BVL

## (undated) DEVICE — MEDI-VAC NON-CONDUCTIVE SUCTION TUBING: Brand: CARDINAL HEALTH

## (undated) DEVICE — SPONGE 4X4 10PK

## (undated) DEVICE — 200 HOLMIUM FIBER-REUSABLE

## (undated) DEVICE — GUIDEPIN SUR L29MM FEM TIB PREFERRED PT SPEC

## (undated) DEVICE — OUTPATIENT: Brand: MEDLINE INDUSTRIES, INC.

## (undated) DEVICE — SOL H2O 1000ML BTL

## (undated) DEVICE — CATH URET CONE TIP 8FR 138008

## (undated) DEVICE — BLADE SHVR 11CM 2MM XPS 360D

## (undated) DEVICE — GAMMEX® PI HYBRID SIZE 8, STERILE POWDER-FREE SURGICAL GLOVE, POLYISOPRENE AND NEOPRENE BLEND: Brand: GAMMEX

## (undated) DEVICE — GAMMEX® NON-LATEX PI ORTHO SIZE 7, STERILE POLYISOPRENE POWDER-FREE SURGICAL GLOVE: Brand: GAMMEX

## (undated) DEVICE — SNARE OPTMZ PLPCTM TRP

## (undated) DEVICE — UROLOGY DRAIN BAG STERILE

## (undated) DEVICE — STERILE LATEX POWDER-FREE SURGICAL GLOVESWITH NITRILE COATING: Brand: PROTEXIS

## (undated) DEVICE — LINE MNTR ADLT SET O2 INTMD

## (undated) DEVICE — PAD,ABDOMINAL,8"X7.5",STERILE,LF,1/PK: Brand: MEDLINE

## (undated) DEVICE — SOL  .9 3000ML

## (undated) DEVICE — CATH FOLEY COUNCIL 18FR 5CC

## (undated) DEVICE — SUCTION CANISTER, 3000CC,SAFELINER: Brand: DEROYAL

## (undated) DEVICE — SUT MCRYL 3-0 27IN ABSRB UD L24MM PS-1

## (undated) DEVICE — STERILE TETRA-FLEX CF, ELASTIC BANDAGE, 2" X 5.5YD: Brand: TETRA-FLEX™CF

## (undated) DEVICE — ADHESIVE SKIN TOP FOR WND CLSR DERMBND ADV

## (undated) DEVICE — BALLOON DILATATION CATHETER KIT: Brand: UROMAX ULTRA KIT

## (undated) DEVICE — ANTIBACTERIAL UNDYED BRAIDED (POLYGLACTIN 910), SYNTHETIC ABSORBABLE SUTURE: Brand: COATED VICRYL

## (undated) DEVICE — WRAP COOLING KNEE W/ICE PILLOW

## (undated) DEVICE — BNDG,ELSTC,MATRIX,STRL,4"X5YD,LF,HOOK&LP: Brand: MEDLINE

## (undated) DEVICE — Device: Brand: STABLECUT®

## (undated) DEVICE — SPLINT PRECUT ORTHOGLASS 3X12

## (undated) DEVICE — PLASTIC HAND: Brand: MEDLINE INDUSTRIES, INC.

## (undated) DEVICE — SOL  .9 1000ML BTL

## (undated) DEVICE — WEBRIL COTTON UNDERCAST PADDING: Brand: WEBRIL

## (undated) DEVICE — CEMENT MIXING SYSTEM WITH FEMORAL BREAKWAY NOZZLE: Brand: REVOLUTION

## (undated) DEVICE — SOLUTION IRRIG 3000ML 0.9% NACL FLX CONT

## (undated) DEVICE — HOOD: Brand: FLYTE

## (undated) DEVICE — INTENDED FOR TISSUE SEPARATION, AND OTHER PROCEDURES THAT REQUIRE A SHARP SURGICAL BLADE TO PUNCTURE OR CUT.: Brand: BARD-PARKER ® STAINLESS STEEL BLADES

## (undated) DEVICE — SUTURE ETHILON 5-0 698G

## (undated) DEVICE — SHEET,DRAPE,53X77,STERILE: Brand: MEDLINE

## (undated) DEVICE — SUTURE SILK 4-0 P-3

## (undated) DEVICE — Device: Brand: DEFENDO AIR/WATER/SUCTION AND BIOPSY VALVE

## (undated) DEVICE — SUTURE ETHILON 3-0 669H

## (undated) DEVICE — SUTURE CHROMIC GUT 4-0 P-3

## (undated) DEVICE — UROLOGY DRAIN BAG

## (undated) DEVICE — NON-ADHERENT PAD PREPACK: Brand: TELFA

## (undated) DEVICE — PIN DRL 75MM HDLSS TRCR NXGN

## (undated) DEVICE — COTTON UNDERCAST PADDING,REGULAR FINISH: Brand: WEBRIL

## (undated) DEVICE — SCREW FIX 3.5X48MM HEX HD

## (undated) DEVICE — SOCK CNSTR 4IN TNPSL UNV SPEC

## (undated) DEVICE — SOL  .9 1000ML BAG

## (undated) DEVICE — ZIMMER® STERILE DISPOSABLE TOURNIQUET CUFF WITH PLC, DUAL PORT, DUAL BLADDER, 18 IN. (46 CM)

## (undated) DEVICE — ENCORE® LATEX ACCLAIM SIZE 8, STERILE LATEX POWDER-FREE SURGICAL GLOVE: Brand: ENCORE

## (undated) DEVICE — HEAD & NECK: Brand: MEDLINE INDUSTRIES, INC.

## (undated) DEVICE — 3M™ STERI-STRIP™ REINFORCED ADHESIVE SKIN CLOSURES, R1547, 1/2 IN X 4 IN (12 MM X 100 MM), 6 STRIPS/ENVELOPE: Brand: 3M™ STERI-STRIP™

## (undated) DEVICE — PATIENT TRACKER 9734887XOM NON-INVASIVE

## (undated) DEVICE — ENDOSCOPIC VALVE WITH ADAPTER.: Brand: SURSEAL® II

## (undated) DEVICE — SUT VCRL 1-0 36IN CTX ABSRB UD L48MM 1/2 CIR

## (undated) DEVICE — BLADE SHV L13CM DIA4MM TAPR TIP SCIS LIKE CUT

## (undated) DEVICE — SUTURE VICRYL 4-0 J494G

## (undated) DEVICE — SUTURE ETHILON 4-0 699G

## (undated) DEVICE — 3M™ TEGADERM™ TRANSPARENT FILM DRESSING FRAME STYLE, 1626W, 4 IN X 4-3/4 IN (10 CM X 12 CM), 50/CT 4CT/CASE: Brand: 3M™ TEGADERM™

## (undated) DEVICE — ENDOSCOPY PACK - LOWER: Brand: MEDLINE INDUSTRIES, INC.

## (undated) DEVICE — NASAL ACCESSORY: Brand: MEDLINE INDUSTRIES, INC.

## (undated) DEVICE — CYSTO PACK: Brand: MEDLINE INDUSTRIES, INC.

## (undated) DEVICE — GAMMEX® PI HYBRID SIZE 7.5, STERILE POWDER-FREE SURGICAL GLOVE, POLYISOPRENE AND NEOPRENE BLEND: Brand: GAMMEX

## (undated) DEVICE — TIGERTAIL 6F FLXTIP 70CM

## (undated) DEVICE — PUMP SAPS 1  ACT 1 WY VLV

## (undated) DEVICE — ISOVUE 300 10X100ML VIAL

## (undated) DEVICE — 3M™ COBAN™ NL STERILE NON-LATEX SELF-ADHERENT WRAP, 2084S, 4 IN X 5 YD (10 CM X 4,5 M), 18 ROLLS/CASE: Brand: 3M™ COBAN™

## (undated) DEVICE — SNARE CAPTIFLEX MICRO-OVL OLY

## (undated) DEVICE — STERILE SURGICAL LUBRICANT, METAL TUBE: Brand: SURGILUBE

## (undated) DEVICE — EYE PADSSTERILENOT MADE WITH NATURAL RUBBER LATEXSINGLE USE ONLYDO NOT USE IF PACKAGE OPENED OR DAMAGED: Brand: CARDINAL HEALTH

## (undated) DEVICE — DISPOSABLE TOURNIQUET CUFF SINGLE BLADDER, DUAL PORT AND QUICK CONNECT CONNECTOR: Brand: COLOR CUFF

## (undated) DEVICE — SOLUTION IRRIG 1000ML 0.9% NACL USP BTL

## (undated) DEVICE — TOWEL,OR,DSP,ST,BLUE,DLX,2/PK,40PK/CS: Brand: MEDLINE

## (undated) DEVICE — TOWEL OR BLU 16X26 STRL

## (undated) DEVICE — GAMMEX® PI HYBRID SIZE 6.5, STERILE POWDER-FREE SURGICAL GLOVE, POLYISOPRENE AND NEOPRENE BLEND: Brand: GAMMEX

## (undated) DEVICE — AMBIENT SUPER TURBOVAC 90 IFS: Brand: COBLATION

## (undated) DEVICE — HOOD, PEEL-AWAY: Brand: FLYTE

## (undated) DEVICE — PEN 6\" SURGICAL MARKING PURPL

## (undated) DEVICE — SOLO FLEX HYBRID GUIDEWIRE .03

## (undated) DEVICE — PACK CDS TOTAL KNEE

## (undated) DEVICE — ARTHROSCOPY: Brand: MEDLINE INDUSTRIES, INC.

## (undated) DEVICE — SHEETING SIL 2X2IN THK.04IN

## (undated) DEVICE — 35 ML SYRINGE LUER-LOCK TIP: Brand: MONOJECT

## (undated) DEVICE — DILATOR/SHEATH SET: Brand: 8/10 DILATOR/SHEATH SET

## (undated) DEVICE — BLADE 1884380EM QUADCUT 4.3MMX13CM ROHS: Brand: ROTATABLE FUSION®

## (undated) DEVICE — TUBING PMP L16FT DISP

## (undated) NOTE — LETTER
AUTHORIZATION FOR SURGICAL OPERATION OR OTHER PROCEDURE    1.  I hereby authorize  Leeann Lozoya , and [address] [place] [address] staff assigned to my case to perform the following operation and/or procedure at the [address] [place] [address]:    __________________________ Time:  ________ A. M.  P.M.        Patient Name:  ______________________________________________________  (please print)      Patient signature:  ___________________________________________________             Relationship to Patient:

## (undated) NOTE — MR AVS SNAPSHOT
[facility] [place] [address]  [id number] [phone]               Thank you for choosing us for your health care visit with Brigitte Harper MD.  We are glad to serve you and happy to provide you with this summary of y Mix with nasal saline as directed mix 5 cc each time   Commonly known as:  BETADINE           Rosuvastatin Calcium 10 MG Tabs   TAKE ONE TABLET BY MOUTH EVERY NIGHT AT BEDTIME   Commonly known as:  Mathieu 24     Visit MyChart  You c

## (undated) NOTE — MR AVS SNAPSHOT
[address]  [phone]               Thank you for choosing us for your health care visit with Kleber Sánchez MD.  We are glad to serve you and happy to provide you with this summar opening of the sinus. Damage to cilia keeps mucus from draining from the sinuses. Causes of chronic sinusitis  Problems that irritate the mucosa or block drainage can lead to chronic sinusitis.  These may include:  · Infections  · Chronic allergies  · Nasa Date Last Reviewed: 10/1/2016  © 7528-5756 [employer] [address] [place] [address], [address] [place], [place] [address]SmithersDenny Grady. All rights reserved. This information is not intended as a substitute for professional medical care.  Always follow your healthcare professional

## (undated) NOTE — LETTER
AUTHORIZATION FOR SURGICAL OPERATION OR OTHER PROCEDURE    1. I hereby authorize Obed Chavez , and [facility] staff assigned to my case to perform the following operation and/or procedure at the [facility] site:    _______________________________________________________________________________________________    Right knee Durolane injection  _______________________________________________________________________________________________    2.  My physician has explained the nature and purpose of the operation or other procedure, possible alternative methods of treatment, the risks involved, and the possibility of complication to me.  I acknowledge that no guarantee has been made as to the result that may be obtained.  3.  I recognize that, during the course of this operation, or other procedure, unforseen conditions may necessitate additional or different procedure than those listed above.  I, therefore, further authorize and request that the above named physician, his/her physician assistants or designees perform such procedures as are, in his/her professional opinion, necessary and desirable.  4.  Any tissue or organs removed in the operation or other procedure may be disposed of by and at the discretion of the [facility] and [facility].  5.  I understand that in the event of a medical emergency, I will be transported by local paramedics to [facility] or other hospital emergency department.  6.  I certify that I have read and fully understand the above consent to operation and/or other procedure.    7.  I acknowledge that my physician has explained sedation/analgesia administration to me including the risks and benefits.  I consent to the administration of sedation/analgesia as may be necessary or desirable in the judgement of my physician.    Witness signature: ___________________________________________________ Date:   ______/______/_____                    Time:  ________ A.M.  P.M.       Patient Name:  ______________________________________________________  (please print)      Patient signature:  ___________________________________________________             Relationship to Patient:           []  Parent    Responsible person                          []  Spouse  In case of minor or                    [] Other  _____________   Incompetent name:  __________________________________________________                               (please print)      _____________      Responsible person  In case of minor or  Incompetent signature:  _______________________________________________    Statement of Physician  My signature below affirms that prior to the time of the procedure, I have explained to the patient and/or his/her guardian, the risks and benefits involved in the proposed treatment and any reasonable alternative to the proposed treatment.  I have also explained the risks and benefits involved in the refusal of the proposed treatment and have answered the patient's questions.                        Date:  ______/______/_______  Provider                      Signature:  __________________________________________________________       Time:  ___________ A.M    P.M.

## (undated) NOTE — LETTER
1/21/2025          To Whom It May Concern:    Benjamin Ralph is currently under my medical care.  He may return to work on 2/3/25 full duty no restrictions.     If you require additional information please contact our office.        Sincerely,    SOURAV Arthur

## (undated) NOTE — MR AVS SNAPSHOT
[facility] SPECIALTY [facility] [place] [address] [address]  [address] [phone]               Thank you for choosing us for your health care visit with Yao Renteria MD.  We are glad to serve you and happy to provide you with this summary o Return in about 3 weeks (around 3/30/2017). Scheduling Instructions     Thursday March 09, 2017     Imaging:  CT SINUS (CONTRAST AS NEEDED)    Instructions:   To schedule a test at any [facility], call Central Scheduling at It is the patient's responsibility to check with and follow their insurance company's guidelines for prior authorization for this test.  You may be held responsible for payment in full if proper authorization is not acquired.   Please contact the Patient Bu Support Staff. Remember, [employer] is NOT to be used for urgent needs. For medical emergencies, dial 911. Visit [email or web address]. [email or web address]. org to learn more.         Educational Information     Your blood pressure indicates you may be at-risk for Hypertensi

## (undated) NOTE — Clinical Note
2017      Patient: Sushila Cruz  : 6/10/1950 Visit date: 3/16/2017    Dear  Rose Mcneill,      I examined your patient in follow-up today. 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   He has done well post-operat

## (undated) NOTE — LETTER
AUTHORIZATION FOR SURGICAL OPERATION OR OTHER PROCEDURE    1. I hereby authorize Keny Dejesus  and [facility] staff assigned to my case to perform the following operation and/or procedure at the [facility] site:    Cortisone injection in Right knee  _______________________________________________________________________________________________      _______________________________________________________________________________________________    2.  My physician has explained the nature and purpose of the operation or other procedure, possible alternative methods of treatment, the risks involved, and the possibility of complication to me.  I acknowledge that no guarantee has been made as to the result that may be obtained.  3.  I recognize that, during the course of this operation, or other procedure, unforseen conditions may necessitate additional or different procedure than those listed above.  I, therefore, further authorize and request that the above named physician, his/her physician assistants or designees perform such procedures as are, in his/her professional opinion, necessary and desirable.  4.  Any tissue or organs removed in the operation or other procedure may be disposed of by and at the discretion of the [facility] and [facility].  5.  I understand that in the event of a medical emergency, I will be transported by local paramedics to [facility] or other hospital emergency department.  6.  I certify that I have read and fully understand the above consent to operation and/or other procedure.    7.  I acknowledge that my physician has explained sedation/analgesia administration to me including the risks and benefits.  I consent to the administration of sedation/analgesia as may be necessary or desirable in the judgement of my physician.    Witness signature: ___________________________________________________ Date:   ______/______/_____                    Time:  ________ A.M.  P.M.       Patient Name:  ______________________________________________________  (please print)      Patient signature:  ___________________________________________________             Relationship to Patient:           []  Parent    Responsible person                          []  Spouse  In case of minor or                    [] Other  _____________   Incompetent name:  __________________________________________________                               (please print)      _____________      Responsible person  In case of minor or  Incompetent signature:  _______________________________________________    Statement of Physician  My signature below affirms that prior to the time of the procedure, I have explained to the patient and/or his/her guardian, the risks and benefits involved in the proposed treatment and any reasonable alternative to the proposed treatment.  I have also explained the risks and benefits involved in the refusal of the proposed treatment and have answered the patient's questions.                        Date:  ______/______/_______  Provider                      Signature:  __________________________________________________________       Time:  ___________ A.M    P.M.

## (undated) NOTE — MR AVS SNAPSHOT
[address] [facility] [address] [phone]               Thank you for choosing us for your health care visit with Ania Lawson MD.  We are glad to serve you and happy to provide you with this summary Current Medications          This list is accurate as of: 1/23/17  7:14 PM.  Always use your most recent med list.                Albuterol Sulfate  (90 BASE) MCG/ACT Aers   Inhale 2 puffs into the lungs every 4 (four) hours as needed for Wheezing.

## (undated) NOTE — LETTER
AUTHORIZATION FOR SURGICAL OPERATION OR OTHER PROCEDURE    1. I hereby authorize Dr. Katalina Lundberg, and [address] [place] [address] staff assigned to my case to perform the following operation and/or procedure at the [address] [place] [address]:    _______________________________________________________________________________________________    Right foot 2nd toe cortisone injection  _______________________________________________________________________________________________    2. My physician has explained the nature and purpose of the operation or other procedure, possible alternative methods of treatment, the risks involved, and the possibility of complication to me. I acknowledge that no guarantee has been made as to the result that may be obtained. 3.  I recognize that, during the course of this operation, or other procedure, unforseen conditions may necessitate additional or different procedure than those listed above. I, therefore, further authorize and request that the above named physician, his/her physician assistants or designees perform such procedures as are, in his/her professional opinion, necessary and desirable. 4.  Any tissue or organs removed in the operation or other procedure may be disposed of by and at the discretion of the [address] [place] [address] and [facility]. 5.  I understand that in the event of a medical emergency, I will be transported by local paramedics to [facility] or other hospital emergency department. 6.  I certify that I have read and fully understand the above consent to operation and/or other procedure. 7.  I acknowledge that my physician has explained sedation/analgesia administration to me including the risks and benefits. I consent to the administration of sedation/analgesia as may be necessary or desirable in the judgement of my physician.     Witness signature: ___________________________________________________ Date:  ______/______/_____ Time:  ________ A. M.  P.M. Patient Name:  ______________________________________________________  (please print)      Patient signature:  ___________________________________________________             Relationship to Patient:           []  Parent    Responsible person                          []  Spouse  In case of minor or                    [] Other  _____________   Incompetent name:  __________________________________________________                               (please print)      _____________      Responsible person  In case of minor or  Incompetent signature:  _______________________________________________    Statement of Physician  My signature below affirms that prior to the time of the procedure, I have explained to the patient and/or his/her guardian, the risks and benefits involved in the proposed treatment and any reasonable alternative to the proposed treatment. I have also explained the risks and benefits involved in the refusal of the proposed treatment and have answered the patient's questions.                         Date:  ______/______/_______  Provider                      Signature:  __________________________________________________________       Time:  ___________ A.M    P.M.

## (undated) NOTE — LETTER
[facility], [facility]  [address] [facility] [address]  PH: [phone]  FAX: [phone]        25  Benjamin Ralph, :  6/10/1950  [address] [place] [id number]    [facility],      This is the [facility], office of Dr. Felix Metzger     Thank you for putting your trust in [facility].  Our goal is to deliver the highest quality healthcare and an exceptional patient experience. Upon reviewing of your medical record shows you are due for the following:       Colorectal cancer screening            Please call [phone] to schedule your appointment or schedule online via [employer].     If you changed to a new provider at another facility, please notify the clinic to update your records.     If you had any recent testing at another facility, please have your results faxed to our office at [phone].      Thank you and have a great day!

## (undated) NOTE — MR AVS SNAPSHOT
[address] [facility] [address] [phone]               Thank you for choosing us for your health care visit with Rosario Torres.   We are glad to serve you and happy to provide you with this summary of yo This list is accurate as of: 2/28/17  3:27 PM.  Always use your most recent med list.                Albuterol Sulfate  (90 Base) MCG/ACT Aers   Inhale 2 puffs into the lungs every 4 (four) hours as needed for Wheezing.    Commonly known as:  PROAIR

## (undated) NOTE — LETTER
AUTHORIZATION FOR SURGICAL OPERATION OR OTHER PROCEDURE    1. I hereby authorize Dr. Al Herron and the [facility] staff assigned to my case to perform the following operation and/or procedure at the [facility]:    Bilateral cervical paraspinals, upper trapezius and levator scapulae trigger point injection      2. My physician has explained the nature and purpose of the operation or other procedure, possible alternative methods of treatment, the risks involved, and the possibility of complication to me. I acknowledge that no guarantee has been made as to the result that may be obtained. 3.  I recognize that, during the course of this operation, or other procedure, unforseen conditions may necessitate additional or different procedure than those listed above. I, therefore, further authorize and request that the above named physician, his/her physician assistants or designees perform such procedures as are, in his/her professional opinion, necessary and desirable. 4.  Any tissue or organs removed in the operation or other procedure may be disposed of by and at the discretion of the [facility] staff and [facility] AT [facility]. 5.  I understand that in the event of a medical emergency, I will be transported by local paramedics to [facility] or other hospital emergency department. 6.  I certify that I have read and fully understand the above consent to operation and/or other procedure. 7.  I acknowledge that my physician has explained sedation/analgesia administration to me including the risks and benefits. I consent to the administration of sedation/analgesia as may be necessary or desirable in the judgement of my physician. Witness signature: ___________________________________________________ Date:  ______/______/_____                    Time:  ________ A. M.  P.M.        Patient Name: Gabriela Sharp  6/10/1950  [id number]       Patient signature: ___________________________________________________                         Statement of Physician  My signature below affirms that prior to the time of the procedure, I have explained to the patient and/or his/her guardian, the risks and benefits involved in the proposed treatment and any reasonable alternative to the proposed treatment. I have also explained the risks and benefits involved in the refusal of the proposed treatment and have answered the patient's questions.                         Date:  ______/______/_______  Provider                      Signature:  __________________________________________________________       Time:  ___________ ANURIS TRUJILLO

## (undated) NOTE — LETTER
AUTHORIZATION FOR SURGICAL OPERATION OR OTHER PROCEDURE    1. I hereby authorize Dr. Keny Lockhart, and [facility] staff assigned to my case to perform the following operation and/or procedure at the [facility] site:    _______________________________________________________________________________________________    Right knee cortisone injection  _______________________________________________________________________________________________    2.  My physician has explained the nature and purpose of the operation or other procedure, possible alternative methods of treatment, the risks involved, and the possibility of complication to me.  I acknowledge that no guarantee has been made as to the result that may be obtained.  3.  I recognize that, during the course of this operation, or other procedure, unforseen conditions may necessitate additional or different procedure than those listed above.  I, therefore, further authorize and request that the above named physician, his/her physician assistants or designees perform such procedures as are, in his/her professional opinion, necessary and desirable.  4.  Any tissue or organs removed in the operation or other procedure may be disposed of by and at the discretion of the [facility] and [facility].  5.  I understand that in the event of a medical emergency, I will be transported by local paramedics to [facility] or other hospital emergency department.  6.  I certify that I have read and fully understand the above consent to operation and/or other procedure.    7.  I acknowledge that my physician has explained sedation/analgesia administration to me including the risks and benefits.  I consent to the administration of sedation/analgesia as may be necessary or desirable in the judgement of my physician.    Witness signature: ___________________________________________________ Date:   ______/______/_____                    Time:  ________ A.M.  P.M.       Patient Name:  ______________________________________________________  (please print)      Patient signature:  ___________________________________________________             Relationship to Patient:           []  Parent    Responsible person                          []  Spouse  In case of minor or                    [] Other  _____________   Incompetent name:  __________________________________________________                               (please print)      _____________      Responsible person  In case of minor or  Incompetent signature:  _______________________________________________    Statement of Physician  My signature below affirms that prior to the time of the procedure, I have explained to the patient and/or his/her guardian, the risks and benefits involved in the proposed treatment and any reasonable alternative to the proposed treatment.  I have also explained the risks and benefits involved in the refusal of the proposed treatment and have answered the patient's questions.                        Date:  ______/______/_______  Provider                      Signature:  __________________________________________________________       Time:  ___________ A.M    P.M.

## (undated) NOTE — MR AVS SNAPSHOT
[address] [facility]  [phone]               Thank you for choosing us for your health care visit with Kleber Alfaro MD.  We are glad to serve you and happy to provide you with this summar · Nasal polyps, deviated septum, or other blockages  · Constant exposure to irritants, such as cigarette smoke or fumes  · Asthma  · Acute sinusitis that keeps coming back  Common symptoms of chronic sinusitis  Symptoms may include:  · Facial pain and pres professional's instructions.              Allergies as of Apr 04, 2017     Penicillins Hives                Today's Vital Signs     BP Pulse                114/72 mmHg 70             Current Medications          This list is accurate as of: 4/4/17  9:40 PM.

## (undated) NOTE — Clinical Note
2017      Patient: Dany Turner  : 6/10/1950 Visit date: 2017    Dear Betsy Alvarado,      I examined your patient in consultation today.     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

## (undated) NOTE — MR AVS SNAPSHOT
[facility] [place]  [address] [address] [phone]               Thank you for choosing us for your health care visit with Brigitte Harper MD.  We are glad to serve you and happy to provide you with this summary of y TAKE ONE TABLET BY MOUTH EVERY NIGHT AT BEDTIME   Commonly known as:  [address] [facility]                   [place]                  Visit [facility] online at  [email or web address]

## (undated) NOTE — LETTER
AUTHORIZATION FOR SURGICAL OPERATION OR OTHER PROCEDURE    1. I hereby authorize Dr. Sanchez/ Margo Washington PA-C, and [facility] staff assigned to my case to perform the following operation and/or procedure at the [facility] site:    _______________________________________________________________________________________________    Cortisone injection to Right knee  _______________________________________________________________________________________________    2.  My physician has explained the nature and purpose of the operation or other procedure, possible alternative methods of treatment, the risks involved, and the possibility of complication to me.  I acknowledge that no guarantee has been made as to the result that may be obtained.  3.  I recognize that, during the course of this operation, or other procedure, unforseen conditions may necessitate additional or different procedure than those listed above.  I, therefore, further authorize and request that the above named physician, his/her physician assistants or designees perform such procedures as are, in his/her professional opinion, necessary and desirable.  4.  Any tissue or organs removed in the operation or other procedure may be disposed of by and at the discretion of the [facility] and [facility].  5.  I understand that in the event of a medical emergency, I will be transported by local paramedics to [facility] [place] or other hospital emergency department.  6.  I certify that I have read and fully understand the above consent to operation and/or other procedure.    7.  I acknowledge that my physician has explained sedation/analgesia administration to me including the risks and benefits.  I consent to the administration of sedation/analgesia as may be necessary or desirable in the judgement of my physician.    Witness signature: ___________________________________________________ Date:   ______/______/_____                    Time:  ________ A.M.  P.M.       Patient Name:  ______________________________________________________  (please print)      Patient signature:  ___________________________________________________             Relationship to Patient:           []  Parent    Responsible person                          []  Spouse  In case of minor or                    [] Other  _____________   Incompetent name:  __________________________________________________                               (please print)      _____________      Responsible person  In case of minor or  Incompetent signature:  _______________________________________________    Statement of Physician  My signature below affirms that prior to the time of the procedure, I have explained to the patient and/or his/her guardian, the risks and benefits involved in the proposed treatment and any reasonable alternative to the proposed treatment.  I have also explained the risks and benefits involved in the refusal of the proposed treatment and have answered the patient's questions.                        Date:  ______/______/_______  Provider                      Signature:  __________________________________________________________       Time:  ___________ A.M    P.M.

## (undated) NOTE — MR AVS SNAPSHOT
[address], [address] [facility] [address] [phone]               Thank you for choosing us for your health care visit with Meghana Dash MD.  We are glad to serve you and happy to provide you with this summary Take 1 tablet (750 mg total) by mouth daily.    Commonly known as:  LEVAQUIN           predniSONE 10 MG Tabs   4 a day for 3 days decrease by 1 a day every 3 days   Commonly known as:  DELTASONE           Rosuvastatin Calcium 10 MG Tabs   TAKE ONE TABLET BY

## (undated) NOTE — MR AVS SNAPSHOT
[address] [address] [address], [address] [place] [address] [phone]               Thank you for choosing us for your health care visit with Stephan Hills.   We are glad to serve you and happy to provide you with this summary of yo Rosuvastatin Calcium 10 MG Tabs   TAKE ONE TABLET BY MOUTH EVERY NIGHT AT BEDTIME   Commonly known as:  CRESTOR                   [employer]     Call the [employer] for assistance with your inactive [employer] account    If you have questions, you can call [phone])

## (undated) NOTE — MR AVS SNAPSHOT
[address] [place], [place] [address] [facility] [address], [address] [place] [address] [phone]               Thank you for choosing us for your health care visit with Ja Rey MD.  We are glad to serve you and happy to provide you with this summary Current Medications          This list is accurate as of: 1/26/17  6:24 PM.  Always use your most recent med list.                Albuterol Sulfate  (90 Base) MCG/ACT Aers   Inhale 2 puffs into the lungs every 4 (four) hours as needed f

## (undated) NOTE — LETTER
[facility], [facility]  [address] [facility] [address]  PH: [phone]  FAX: [phone]        25  Benjamin Ralph, :  6/10/1950  [address] [facility] [address]      This is the [facility], office of Dr. Felix Metzger.    Thank you for putting your trust in [facility].  Our goal is to deliver the highest quality healthcare and an exceptional patient experience. Review of your medical record shows you are due for the following:       Colonoscopy      Please call [phone] to schedule your appointment or schedule online via [employer].     If you changed to a new provider at another facility, please notify the clinic to update your records.    If you had any recent testing at another facility, please have your results faxed to our office at [phone].     Thank you and have a great day!

## (undated) NOTE — Clinical Note
Gonzalo Christine, [address]       03/20/2017        Patient: Ju Gandhi   YOB: 1950   Date of Visit: 3/17/2017       Dear  Dr. Aldo Ganser, MD,      Thank you for referring Ju Gandhi to my pr

## (undated) NOTE — MR AVS SNAPSHOT
[facility] - [place] [address] [place]  [id number] [phone]               Thank you for choosing us for your health care visit with Shamir Boyd MD.  We are glad to serve you and happy to provide you with this summary o MyChart     Visit [employer]  You can access your MyChart to more actively manage your health care and view more details from this visit by going to [email or web address].   If you've recently had a stay at the Hospital you can access your discharge ins

## (undated) NOTE — MR AVS SNAPSHOT
[address] [address] [address], [address] [place] [address] [phone]               Thank you for choosing us for your health care visit with Jadene Mcburney.   We are glad to serve you and happy to provide you with this summary of yo 4 a day for 3 days decrease by 1 a day every 3 days   Commonly known as:  DELTASONE           Rosuvastatin Calcium 10 MG Tabs   TAKE ONE TABLET BY MOUTH EVERY NIGHT AT BEDTIME   Commonly known as:  Mathieu 24     Visit MyChart  You c

## (undated) NOTE — MR AVS SNAPSHOT
[facility] - [place] [address] [address]  [id number] [phone]               Thank you for choosing us for your health care visit with Jennyfer Zendejas MD.  We are glad to serve you and happy to provide you with this summary o Commonly known as:  CRESTOR                Where to Get Your Medications      These medications were sent to [address] [facility] [place] [facility] [address] [place] [address] [address]. [phone], [address] [place] [address]     Phone:  [phone] - alb

## (undated) NOTE — IP AVS SNAPSHOT
[facility] - [facility]    [address], [facility], [facility] ~ [phone]                Discharge Summary   2/17/2017    Yanni Childers           Admission Information        Provider Department    2/17/2017 Leigh Lebron MD After Your Surgery    [facility]'s commitment to quality care does not end  when you leave the hospital    We are gathering information on the outcomes of our patients after surgery so we can continue to enhance our surgical program.      Franklyn Richardson hours. You can gargle with warm salt water (1/2 tsp in 4 oz warm water) or use a throat lozenge for comfort  · To feel muscle aches or soreness especially in the abdomen, chest or neck.  The achy feeling should go away in the next 24 hours  · To feel weak, Reconstructive Surgery    4/20/2017 2:15 PM Raul [facility]      Immunization History as of 2/17/2017  Never Reviewed    INFLUENZA 8/21/2016, 9/28/2015, 10/25/2013, 11/8/2010    Pneumococcal (Prevnar 13) 8/21/2016    Pne

## (undated) NOTE — MR AVS SNAPSHOT
[address] [address] [address], [address] [facility] [address] [phone]               Thank you for choosing us for your health care visit with Nurse. We are glad to serve you and happy to provide you with this summary of your visit.   Ple This list is accurate as of: 2/21/17  3:16 PM.  Always use your most recent med list.                Albuterol Sulfate  (90 Base) MCG/ACT Aers   Inhale 2 puffs into the lungs every 4 (four) hours as needed for Wheezing.    Commonly known as:  PROAIR